# Patient Record
Sex: FEMALE | Race: WHITE | NOT HISPANIC OR LATINO | Employment: FULL TIME
[De-identification: names, ages, dates, MRNs, and addresses within clinical notes are randomized per-mention and may not be internally consistent; named-entity substitution may affect disease eponyms.]

---

## 2017-06-12 ENCOUNTER — RECORDS - HEALTHEAST (OUTPATIENT)
Dept: ADMINISTRATIVE | Facility: OTHER | Age: 47
End: 2017-06-12

## 2017-07-11 ENCOUNTER — RECORDS - HEALTHEAST (OUTPATIENT)
Dept: ADMINISTRATIVE | Facility: OTHER | Age: 47
End: 2017-07-11

## 2017-07-28 ENCOUNTER — COMMUNICATION - HEALTHEAST (OUTPATIENT)
Dept: FAMILY MEDICINE | Facility: CLINIC | Age: 47
End: 2017-07-28

## 2017-07-28 DIAGNOSIS — I77.810 ASCENDING AORTA DILATATION (H): ICD-10-CM

## 2017-08-08 ENCOUNTER — OFFICE VISIT - HEALTHEAST (OUTPATIENT)
Dept: CARDIOLOGY | Facility: CLINIC | Age: 47
End: 2017-08-08

## 2017-08-08 DIAGNOSIS — I49.3 PVC (PREMATURE VENTRICULAR CONTRACTION): ICD-10-CM

## 2017-08-08 DIAGNOSIS — I77.810 ASCENDING AORTA DILATATION (H): ICD-10-CM

## 2017-08-08 ASSESSMENT — MIFFLIN-ST. JEOR: SCORE: 1817.44

## 2018-01-16 ENCOUNTER — OFFICE VISIT - HEALTHEAST (OUTPATIENT)
Dept: FAMILY MEDICINE | Facility: CLINIC | Age: 48
End: 2018-01-16

## 2018-01-16 DIAGNOSIS — N92.1 MENORRHAGIA WITH IRREGULAR CYCLE: ICD-10-CM

## 2018-01-16 DIAGNOSIS — L30.9 DERMATITIS: ICD-10-CM

## 2018-01-16 DIAGNOSIS — G89.29 WRIST PAIN, CHRONIC, RIGHT: ICD-10-CM

## 2018-01-16 DIAGNOSIS — M25.531 WRIST PAIN, CHRONIC, RIGHT: ICD-10-CM

## 2018-01-16 ASSESSMENT — MIFFLIN-ST. JEOR: SCORE: 1853.73

## 2018-01-23 ENCOUNTER — RECORDS - HEALTHEAST (OUTPATIENT)
Dept: ADMINISTRATIVE | Facility: OTHER | Age: 48
End: 2018-01-23

## 2018-01-29 ENCOUNTER — COMMUNICATION - HEALTHEAST (OUTPATIENT)
Dept: FAMILY MEDICINE | Facility: CLINIC | Age: 48
End: 2018-01-29

## 2018-01-30 ENCOUNTER — HOSPITAL ENCOUNTER (OUTPATIENT)
Dept: PHYSICAL MEDICINE AND REHAB | Facility: CLINIC | Age: 48
Discharge: HOME | End: 2018-01-30
Attending: PHYSICAL MEDICINE & REHABILITATION

## 2018-01-30 DIAGNOSIS — M25.531 WRIST PAIN, CHRONIC, RIGHT: ICD-10-CM

## 2018-01-30 DIAGNOSIS — G89.29 WRIST PAIN, CHRONIC, RIGHT: ICD-10-CM

## 2018-02-20 ENCOUNTER — OFFICE VISIT - HEALTHEAST (OUTPATIENT)
Dept: FAMILY MEDICINE | Facility: CLINIC | Age: 48
End: 2018-02-20

## 2018-02-20 DIAGNOSIS — Z01.818 PREOP EXAMINATION: ICD-10-CM

## 2018-02-20 DIAGNOSIS — N92.1 MENORRHAGIA WITH IRREGULAR CYCLE: ICD-10-CM

## 2018-02-20 LAB
ANION GAP SERPL CALCULATED.3IONS-SCNC: 5 MMOL/L (ref 5–18)
BUN SERPL-MCNC: 8 MG/DL (ref 8–22)
CALCIUM SERPL-MCNC: 9 MG/DL (ref 8.5–10.5)
CHLORIDE BLD-SCNC: 105 MMOL/L (ref 98–107)
CO2 SERPL-SCNC: 31 MMOL/L (ref 22–31)
CREAT SERPL-MCNC: 0.77 MG/DL (ref 0.6–1.1)
GFR SERPL CREATININE-BSD FRML MDRD: >60 ML/MIN/1.73M2
GLUCOSE BLD-MCNC: 76 MG/DL (ref 70–125)
HCG UR QL: NEGATIVE
HGB BLD-MCNC: 12.6 G/DL (ref 12–16)
POTASSIUM BLD-SCNC: 4.2 MMOL/L (ref 3.5–5)
SODIUM SERPL-SCNC: 141 MMOL/L (ref 136–145)
SP GR UR STRIP: 1.02 (ref 1–1.03)

## 2018-02-20 ASSESSMENT — MIFFLIN-ST. JEOR: SCORE: 1861.66

## 2018-03-09 ASSESSMENT — MIFFLIN-ST. JEOR: SCORE: 1861.66

## 2018-03-12 ENCOUNTER — ANESTHESIA - HEALTHEAST (OUTPATIENT)
Dept: SURGERY | Facility: CLINIC | Age: 48
End: 2018-03-12

## 2018-03-13 ENCOUNTER — SURGERY - HEALTHEAST (OUTPATIENT)
Dept: SURGERY | Facility: CLINIC | Age: 48
End: 2018-03-13

## 2018-05-09 ENCOUNTER — COMMUNICATION - HEALTHEAST (OUTPATIENT)
Dept: ADMINISTRATIVE | Facility: CLINIC | Age: 48
End: 2018-05-09

## 2019-03-19 ENCOUNTER — HOSPITAL ENCOUNTER (OUTPATIENT)
Dept: ULTRASOUND IMAGING | Facility: CLINIC | Age: 49
Discharge: HOME | End: 2019-03-19

## 2019-03-19 ENCOUNTER — OFFICE VISIT - HEALTHEAST (OUTPATIENT)
Dept: FAMILY MEDICINE | Facility: CLINIC | Age: 49
End: 2019-03-19

## 2019-03-19 DIAGNOSIS — Z12.31 VISIT FOR SCREENING MAMMOGRAM: ICD-10-CM

## 2019-03-19 DIAGNOSIS — Z12.11 SCREEN FOR COLON CANCER: ICD-10-CM

## 2019-03-19 DIAGNOSIS — M79.661 PAIN OF RIGHT LOWER LEG: ICD-10-CM

## 2019-03-28 ENCOUNTER — OFFICE VISIT - HEALTHEAST (OUTPATIENT)
Dept: FAMILY MEDICINE | Facility: CLINIC | Age: 49
End: 2019-03-28

## 2019-03-28 DIAGNOSIS — J06.9 VIRAL UPPER RESPIRATORY TRACT INFECTION WITH COUGH: ICD-10-CM

## 2019-03-28 LAB — DEPRECATED S PYO AG THROAT QL EIA: NORMAL

## 2019-03-29 LAB — GROUP A STREP BY PCR: NORMAL

## 2019-05-07 ENCOUNTER — HOSPITAL ENCOUNTER (OUTPATIENT)
Dept: MAMMOGRAPHY | Facility: CLINIC | Age: 49
Discharge: HOME | End: 2019-05-07

## 2019-05-07 DIAGNOSIS — Z12.31 VISIT FOR SCREENING MAMMOGRAM: ICD-10-CM

## 2019-05-14 ENCOUNTER — HOSPITAL ENCOUNTER (OUTPATIENT)
Dept: ULTRASOUND IMAGING | Facility: CLINIC | Age: 49
Discharge: HOME | End: 2019-05-14

## 2019-05-14 ENCOUNTER — HOSPITAL ENCOUNTER (OUTPATIENT)
Dept: MAMMOGRAPHY | Facility: CLINIC | Age: 49
Discharge: HOME | End: 2019-05-14

## 2019-05-14 DIAGNOSIS — N64.89 BREAST ASYMMETRY: ICD-10-CM

## 2019-05-14 DIAGNOSIS — R92.0 MICROCALCIFICATIONS OF THE BREAST: ICD-10-CM

## 2019-05-22 ENCOUNTER — HOSPITAL ENCOUNTER (OUTPATIENT)
Dept: MAMMOGRAPHY | Facility: CLINIC | Age: 49
Discharge: HOME | End: 2019-05-22
Admitting: RADIOLOGY

## 2019-05-22 ENCOUNTER — HOSPITAL ENCOUNTER (OUTPATIENT)
Dept: MAMMOGRAPHY | Facility: CLINIC | Age: 49
Discharge: HOME | End: 2019-05-22

## 2019-05-22 DIAGNOSIS — N64.89 BREAST ASYMMETRY: ICD-10-CM

## 2019-05-22 DIAGNOSIS — R92.0 MICROCALCIFICATIONS OF THE BREAST: ICD-10-CM

## 2019-05-23 ENCOUNTER — COMMUNICATION - HEALTHEAST (OUTPATIENT)
Dept: MAMMOGRAPHY | Facility: CLINIC | Age: 49
End: 2019-05-23

## 2019-05-24 ENCOUNTER — COMMUNICATION - HEALTHEAST (OUTPATIENT)
Dept: ONCOLOGY | Facility: HOSPITAL | Age: 49
End: 2019-05-24

## 2019-05-24 LAB
LAB AP CHARGES (HE HISTORICAL CONVERSION): ABNORMAL
PATH REPORT.COMMENTS IMP SPEC: ABNORMAL
PATH REPORT.COMMENTS IMP SPEC: ABNORMAL
PATH REPORT.FINAL DX SPEC: ABNORMAL
PATH REPORT.GROSS SPEC: ABNORMAL
PATH REPORT.MICROSCOPIC SPEC OTHER STN: ABNORMAL
PATH REPORT.RELEVANT HX SPEC: ABNORMAL
RESULT FLAG (HE HISTORICAL CONVERSION): ABNORMAL

## 2019-05-27 ENCOUNTER — COMMUNICATION - HEALTHEAST (OUTPATIENT)
Dept: SCHEDULING | Facility: CLINIC | Age: 49
End: 2019-05-27

## 2019-05-28 ENCOUNTER — OFFICE VISIT - HEALTHEAST (OUTPATIENT)
Dept: FAMILY MEDICINE | Facility: CLINIC | Age: 49
End: 2019-05-28

## 2019-05-28 DIAGNOSIS — C50.911 MALIGNANT NEOPLASM OF RIGHT FEMALE BREAST, UNSPECIFIED ESTROGEN RECEPTOR STATUS, UNSPECIFIED SITE OF BREAST (H): ICD-10-CM

## 2019-05-30 ENCOUNTER — HOSPITAL ENCOUNTER (OUTPATIENT)
Dept: MAMMOGRAPHY | Facility: CLINIC | Age: 49
Discharge: HOME | End: 2019-05-30
Admitting: RADIOLOGY

## 2019-05-30 ENCOUNTER — HOSPITAL ENCOUNTER (OUTPATIENT)
Dept: MAMMOGRAPHY | Facility: CLINIC | Age: 49
Discharge: HOME | End: 2019-05-30
Attending: SURGERY

## 2019-05-30 ENCOUNTER — HOSPITAL ENCOUNTER (OUTPATIENT)
Dept: MAMMOGRAPHY | Facility: CLINIC | Age: 49
Discharge: HOME | End: 2019-05-30

## 2019-05-30 DIAGNOSIS — R92.1 BREAST CALCIFICATION, RIGHT: ICD-10-CM

## 2019-05-30 DIAGNOSIS — R92.0 BREAST MICROCALCIFICATIONS: ICD-10-CM

## 2019-06-03 ENCOUNTER — HOSPITAL ENCOUNTER (OUTPATIENT)
Dept: SURGERY | Facility: CLINIC | Age: 49
Discharge: HOME | End: 2019-06-03
Attending: SURGERY

## 2019-06-03 ENCOUNTER — AMBULATORY - HEALTHEAST (OUTPATIENT)
Dept: ONCOLOGY | Facility: CLINIC | Age: 49
End: 2019-06-03

## 2019-06-03 DIAGNOSIS — C50.911 MALIGNANT NEOPLASM OF RIGHT FEMALE BREAST, UNSPECIFIED ESTROGEN RECEPTOR STATUS, UNSPECIFIED SITE OF BREAST (H): ICD-10-CM

## 2019-06-03 DIAGNOSIS — D05.11 BREAST NEOPLASM, TIS (DCIS), RIGHT: ICD-10-CM

## 2019-06-03 ASSESSMENT — MIFFLIN-ST. JEOR: SCORE: 1837.85

## 2019-06-05 ENCOUNTER — RECORDS - HEALTHEAST (OUTPATIENT)
Dept: ADMINISTRATIVE | Facility: OTHER | Age: 49
End: 2019-06-05

## 2019-06-05 ENCOUNTER — OFFICE VISIT - HEALTHEAST (OUTPATIENT)
Dept: ONCOLOGY | Facility: HOSPITAL | Age: 49
End: 2019-06-05

## 2019-06-05 DIAGNOSIS — Z80.0 FAMILY HISTORY OF PANCREATIC CANCER: ICD-10-CM

## 2019-06-05 DIAGNOSIS — Z80.3 FAMILY HISTORY OF BREAST CANCER: ICD-10-CM

## 2019-06-05 DIAGNOSIS — Z80.0 FAMILY HISTORY OF COLON CANCER: ICD-10-CM

## 2019-06-05 DIAGNOSIS — C50.911 MALIGNANT NEOPLASM OF RIGHT FEMALE BREAST, UNSPECIFIED ESTROGEN RECEPTOR STATUS, UNSPECIFIED SITE OF BREAST (H): ICD-10-CM

## 2019-06-17 ENCOUNTER — COMMUNICATION - HEALTHEAST (OUTPATIENT)
Dept: ONCOLOGY | Facility: HOSPITAL | Age: 49
End: 2019-06-17

## 2019-06-19 ENCOUNTER — COMMUNICATION - HEALTHEAST (OUTPATIENT)
Dept: ADMINISTRATIVE | Facility: HOSPITAL | Age: 49
End: 2019-06-19

## 2019-06-20 ENCOUNTER — AMBULATORY - HEALTHEAST (OUTPATIENT)
Dept: SURGERY | Facility: CLINIC | Age: 49
End: 2019-06-20

## 2019-06-27 ENCOUNTER — COMMUNICATION - HEALTHEAST (OUTPATIENT)
Dept: ADMINISTRATIVE | Facility: HOSPITAL | Age: 49
End: 2019-06-27

## 2019-07-03 ENCOUNTER — OFFICE VISIT - HEALTHEAST (OUTPATIENT)
Dept: ONCOLOGY | Facility: HOSPITAL | Age: 49
End: 2019-07-03

## 2019-07-03 ENCOUNTER — COMMUNICATION - HEALTHEAST (OUTPATIENT)
Dept: ONCOLOGY | Facility: HOSPITAL | Age: 49
End: 2019-07-03

## 2019-07-03 DIAGNOSIS — Z80.0 FAMILY HISTORY OF PANCREATIC CANCER: ICD-10-CM

## 2019-07-03 DIAGNOSIS — Z80.0 FAMILY HISTORY OF COLON CANCER: ICD-10-CM

## 2019-07-03 DIAGNOSIS — C50.911 MALIGNANT NEOPLASM OF RIGHT FEMALE BREAST, UNSPECIFIED ESTROGEN RECEPTOR STATUS, UNSPECIFIED SITE OF BREAST (H): ICD-10-CM

## 2019-07-03 DIAGNOSIS — Z80.3 FAMILY HISTORY OF BREAST CANCER: ICD-10-CM

## 2019-07-18 ENCOUNTER — OFFICE VISIT - HEALTHEAST (OUTPATIENT)
Dept: FAMILY MEDICINE | Facility: CLINIC | Age: 49
End: 2019-07-18

## 2019-07-18 DIAGNOSIS — Z01.818 PREOP GENERAL PHYSICAL EXAM: ICD-10-CM

## 2019-07-18 DIAGNOSIS — C50.911 MALIGNANT NEOPLASM OF RIGHT FEMALE BREAST, UNSPECIFIED ESTROGEN RECEPTOR STATUS, UNSPECIFIED SITE OF BREAST (H): ICD-10-CM

## 2019-07-18 LAB
ALBUMIN SERPL-MCNC: 4 G/DL (ref 3.5–5)
ALP SERPL-CCNC: 66 U/L (ref 45–120)
ALT SERPL W P-5'-P-CCNC: 12 U/L (ref 0–45)
ANION GAP SERPL CALCULATED.3IONS-SCNC: 9 MMOL/L (ref 5–18)
APTT PPP: 27 SECONDS (ref 24–37)
AST SERPL W P-5'-P-CCNC: 12 U/L (ref 0–40)
BASOPHILS # BLD AUTO: 0 THOU/UL (ref 0–0.2)
BASOPHILS NFR BLD AUTO: 1 % (ref 0–2)
BILIRUB SERPL-MCNC: 0.3 MG/DL (ref 0–1)
BUN SERPL-MCNC: 9 MG/DL (ref 8–22)
CALCIUM SERPL-MCNC: 9.2 MG/DL (ref 8.5–10.5)
CHLORIDE BLD-SCNC: 106 MMOL/L (ref 98–107)
CO2 SERPL-SCNC: 24 MMOL/L (ref 22–31)
CREAT SERPL-MCNC: 0.84 MG/DL (ref 0.6–1.1)
EOSINOPHIL # BLD AUTO: 0.2 THOU/UL (ref 0–0.4)
EOSINOPHIL NFR BLD AUTO: 3 % (ref 0–6)
ERYTHROCYTE [DISTWIDTH] IN BLOOD BY AUTOMATED COUNT: 12.4 % (ref 11–14.5)
GFR SERPL CREATININE-BSD FRML MDRD: >60 ML/MIN/1.73M2
GLUCOSE BLD-MCNC: 79 MG/DL (ref 70–125)
HCT VFR BLD AUTO: 41 % (ref 35–47)
HGB BLD-MCNC: 13.7 G/DL (ref 12–16)
LYMPHOCYTES # BLD AUTO: 2.5 THOU/UL (ref 0.8–4.4)
LYMPHOCYTES NFR BLD AUTO: 36 % (ref 20–40)
MCH RBC QN AUTO: 30.1 PG (ref 27–34)
MCHC RBC AUTO-ENTMCNC: 33.3 G/DL (ref 32–36)
MCV RBC AUTO: 90 FL (ref 80–100)
MONOCYTES # BLD AUTO: 0.7 THOU/UL (ref 0–0.9)
MONOCYTES NFR BLD AUTO: 9 % (ref 2–10)
NEUTROPHILS # BLD AUTO: 3.5 THOU/UL (ref 2–7.7)
NEUTROPHILS NFR BLD AUTO: 51 % (ref 50–70)
PLATELET # BLD AUTO: 332 THOU/UL (ref 140–440)
PMV BLD AUTO: 8.1 FL (ref 7–10)
POTASSIUM BLD-SCNC: 4.4 MMOL/L (ref 3.5–5)
PROT SERPL-MCNC: 7 G/DL (ref 6–8)
RBC # BLD AUTO: 4.54 MILL/UL (ref 3.8–5.4)
SODIUM SERPL-SCNC: 139 MMOL/L (ref 136–145)
WBC: 6.9 THOU/UL (ref 4–11)

## 2019-07-18 ASSESSMENT — MIFFLIN-ST. JEOR: SCORE: 1839.55

## 2019-07-19 LAB
ATRIAL RATE - MUSE: 76 BPM
DIASTOLIC BLOOD PRESSURE - MUSE: NORMAL MMHG
INTERPRETATION ECG - MUSE: NORMAL
P AXIS - MUSE: 45 DEGREES
PR INTERVAL - MUSE: 178 MS
QRS DURATION - MUSE: 104 MS
QT - MUSE: 410 MS
QTC - MUSE: 461 MS
R AXIS - MUSE: -13 DEGREES
SYSTOLIC BLOOD PRESSURE - MUSE: NORMAL MMHG
T AXIS - MUSE: 27 DEGREES
VENTRICULAR RATE- MUSE: 76 BPM

## 2019-07-29 ENCOUNTER — ANESTHESIA - HEALTHEAST (OUTPATIENT)
Dept: SURGERY | Facility: HOSPITAL | Age: 49
End: 2019-07-29

## 2019-07-29 ASSESSMENT — MIFFLIN-ST. JEOR: SCORE: 1838.98

## 2019-07-30 ENCOUNTER — HOSPITAL ENCOUNTER (OUTPATIENT)
Dept: NUCLEAR MEDICINE | Facility: HOSPITAL | Age: 49
Discharge: HOME | End: 2019-07-30
Attending: SURGERY | Admitting: RADIOLOGY

## 2019-07-30 ENCOUNTER — SURGERY - HEALTHEAST (OUTPATIENT)
Dept: SURGERY | Facility: HOSPITAL | Age: 49
End: 2019-07-30

## 2019-07-30 ENCOUNTER — HOSPITAL ENCOUNTER (OUTPATIENT)
Dept: NUCLEAR MEDICINE | Facility: HOSPITAL | Age: 49
Discharge: HOME | End: 2019-07-30
Attending: SURGERY

## 2019-07-30 DIAGNOSIS — C50.911 MALIGNANT NEOPLASM OF RIGHT FEMALE BREAST, UNSPECIFIED ESTROGEN RECEPTOR STATUS, UNSPECIFIED SITE OF BREAST (H): ICD-10-CM

## 2019-07-30 DIAGNOSIS — D05.11 BREAST NEOPLASM, TIS (DCIS), RIGHT: ICD-10-CM

## 2019-07-30 ASSESSMENT — MIFFLIN-ST. JEOR: SCORE: 1838.98

## 2019-08-12 ENCOUNTER — COMMUNICATION - HEALTHEAST (OUTPATIENT)
Dept: FAMILY MEDICINE | Facility: CLINIC | Age: 49
End: 2019-08-12

## 2019-08-19 ENCOUNTER — HOSPITAL ENCOUNTER (OUTPATIENT)
Dept: SURGERY | Facility: CLINIC | Age: 49
Discharge: HOME | End: 2019-08-19
Attending: SURGERY

## 2019-08-19 DIAGNOSIS — D05.12 DUCTAL CARCINOMA IN SITU (DCIS) OF BOTH BREASTS: ICD-10-CM

## 2019-08-19 DIAGNOSIS — D05.11 DUCTAL CARCINOMA IN SITU (DCIS) OF BOTH BREASTS: ICD-10-CM

## 2019-08-19 ASSESSMENT — MIFFLIN-ST. JEOR: SCORE: 1740.88

## 2019-08-20 ENCOUNTER — COMMUNICATION - HEALTHEAST (OUTPATIENT)
Dept: ONCOLOGY | Facility: HOSPITAL | Age: 49
End: 2019-08-20

## 2019-08-29 ENCOUNTER — OFFICE VISIT - HEALTHEAST (OUTPATIENT)
Dept: SLEEP MEDICINE | Facility: CLINIC | Age: 49
End: 2019-08-29

## 2019-08-29 ENCOUNTER — COMMUNICATION - HEALTHEAST (OUTPATIENT)
Dept: TELEHEALTH | Facility: CLINIC | Age: 49
End: 2019-08-29

## 2019-08-29 DIAGNOSIS — R06.83 SNORING: ICD-10-CM

## 2019-08-29 DIAGNOSIS — R29.818 SUSPECTED SLEEP APNEA: ICD-10-CM

## 2019-08-29 DIAGNOSIS — G47.10 HYPERSOMNIA: ICD-10-CM

## 2019-08-29 DIAGNOSIS — R03.0 ELEVATED BLOOD PRESSURE READING: ICD-10-CM

## 2019-08-29 ASSESSMENT — MIFFLIN-ST. JEOR: SCORE: 1744.87

## 2019-09-04 ENCOUNTER — OFFICE VISIT - HEALTHEAST (OUTPATIENT)
Dept: FAMILY MEDICINE | Facility: CLINIC | Age: 49
End: 2019-09-04

## 2019-09-04 ENCOUNTER — OFFICE VISIT - HEALTHEAST (OUTPATIENT)
Dept: RADIATION ONCOLOGY | Facility: CLINIC | Age: 49
End: 2019-09-04

## 2019-09-04 ENCOUNTER — OFFICE VISIT - HEALTHEAST (OUTPATIENT)
Dept: ONCOLOGY | Facility: CLINIC | Age: 49
End: 2019-09-04

## 2019-09-04 ENCOUNTER — COMMUNICATION - HEALTHEAST (OUTPATIENT)
Dept: ONCOLOGY | Facility: CLINIC | Age: 49
End: 2019-09-04

## 2019-09-04 DIAGNOSIS — C50.911 MALIGNANT NEOPLASM OF RIGHT FEMALE BREAST, UNSPECIFIED ESTROGEN RECEPTOR STATUS, UNSPECIFIED SITE OF BREAST (H): ICD-10-CM

## 2019-09-04 DIAGNOSIS — D05.11 DUCTAL CARCINOMA IN SITU (DCIS) OF BOTH BREASTS: ICD-10-CM

## 2019-09-04 DIAGNOSIS — Z01.818 PRE-OP EXAM: ICD-10-CM

## 2019-09-04 DIAGNOSIS — D05.12 DUCTAL CARCINOMA IN SITU (DCIS) OF BOTH BREASTS: ICD-10-CM

## 2019-09-04 DIAGNOSIS — C50.911 INFILTRATING DUCTAL CARCINOMA OF RIGHT FEMALE BREAST (H): ICD-10-CM

## 2019-09-04 DIAGNOSIS — T85.79XS INFECTION OF BREAST IMPLANT, SEQUELA: ICD-10-CM

## 2019-09-04 LAB
ANION GAP SERPL CALCULATED.3IONS-SCNC: 9 MMOL/L (ref 5–18)
APTT PPP: 28 SECONDS (ref 24–37)
BUN SERPL-MCNC: 6 MG/DL (ref 8–22)
CALCIUM SERPL-MCNC: 9.5 MG/DL (ref 8.5–10.5)
CHLORIDE BLD-SCNC: 106 MMOL/L (ref 98–107)
CO2 SERPL-SCNC: 25 MMOL/L (ref 22–31)
CREAT SERPL-MCNC: 0.77 MG/DL (ref 0.6–1.1)
ERYTHROCYTE [DISTWIDTH] IN BLOOD BY AUTOMATED COUNT: 12 % (ref 11–14.5)
GFR SERPL CREATININE-BSD FRML MDRD: >60 ML/MIN/1.73M2
GLUCOSE BLD-MCNC: 71 MG/DL (ref 70–125)
HCT VFR BLD AUTO: 42.8 % (ref 35–47)
HGB BLD-MCNC: 14.3 G/DL (ref 12–16)
MCH RBC QN AUTO: 30.7 PG (ref 27–34)
MCHC RBC AUTO-ENTMCNC: 33.3 G/DL (ref 32–36)
MCV RBC AUTO: 92 FL (ref 80–100)
PLATELET # BLD AUTO: 365 THOU/UL (ref 140–440)
PMV BLD AUTO: 8.2 FL (ref 7–10)
POTASSIUM BLD-SCNC: 4.3 MMOL/L (ref 3.5–5)
RBC # BLD AUTO: 4.64 MILL/UL (ref 3.8–5.4)
SODIUM SERPL-SCNC: 140 MMOL/L (ref 136–145)
WBC: 4.4 THOU/UL (ref 4–11)

## 2019-09-04 ASSESSMENT — MIFFLIN-ST. JEOR
SCORE: 1824.25
SCORE: 1822.25

## 2019-09-09 ENCOUNTER — COMMUNICATION - HEALTHEAST (OUTPATIENT)
Dept: FAMILY MEDICINE | Facility: CLINIC | Age: 49
End: 2019-09-09

## 2019-09-10 ENCOUNTER — AMBULATORY - HEALTHEAST (OUTPATIENT)
Dept: RADIATION ONCOLOGY | Facility: CLINIC | Age: 49
End: 2019-09-10

## 2019-09-10 DIAGNOSIS — C50.911 INFILTRATING DUCTAL CARCINOMA OF RIGHT FEMALE BREAST (H): ICD-10-CM

## 2019-09-11 ENCOUNTER — COMMUNICATION - HEALTHEAST (OUTPATIENT)
Dept: ONCOLOGY | Facility: HOSPITAL | Age: 49
End: 2019-09-11

## 2019-09-11 ENCOUNTER — OFFICE VISIT - HEALTHEAST (OUTPATIENT)
Dept: INFECTIOUS DISEASES | Facility: CLINIC | Age: 49
End: 2019-09-11

## 2019-09-11 DIAGNOSIS — T81.40XA POSTOPERATIVE INFECTION, UNSPECIFIED TYPE, INITIAL ENCOUNTER: ICD-10-CM

## 2019-09-11 DIAGNOSIS — A49.02 MRSA (METHICILLIN RESISTANT STAPHYLOCOCCUS AUREUS) INFECTION: ICD-10-CM

## 2019-09-12 ENCOUNTER — OFFICE VISIT - HEALTHEAST (OUTPATIENT)
Dept: ONCOLOGY | Facility: CLINIC | Age: 49
End: 2019-09-12

## 2019-09-12 DIAGNOSIS — C50.911 MALIGNANT NEOPLASM OF RIGHT FEMALE BREAST, UNSPECIFIED ESTROGEN RECEPTOR STATUS, UNSPECIFIED SITE OF BREAST (H): ICD-10-CM

## 2019-09-19 ENCOUNTER — COMMUNICATION - HEALTHEAST (OUTPATIENT)
Dept: ONCOLOGY | Facility: HOSPITAL | Age: 49
End: 2019-09-19

## 2019-09-19 ENCOUNTER — HOSPITAL ENCOUNTER (OUTPATIENT)
Dept: PET IMAGING | Facility: HOSPITAL | Age: 49
Setting detail: RADIATION/ONCOLOGY SERIES
Discharge: STILL ADMITTED OR ADMINISTRATIVE | End: 2019-09-19
Attending: INTERNAL MEDICINE

## 2019-09-19 DIAGNOSIS — C50.911 MALIGNANT NEOPLASM OF RIGHT FEMALE BREAST, UNSPECIFIED ESTROGEN RECEPTOR STATUS, UNSPECIFIED SITE OF BREAST (H): ICD-10-CM

## 2019-09-19 LAB — GLUCOSE BLDC GLUCOMTR-MCNC: 77 MG/DL (ref 70–139)

## 2019-09-19 ASSESSMENT — MIFFLIN-ST. JEOR: SCORE: 1815.17

## 2019-09-25 ENCOUNTER — RECORDS - HEALTHEAST (OUTPATIENT)
Dept: ADMINISTRATIVE | Facility: OTHER | Age: 49
End: 2019-09-25

## 2019-09-26 ENCOUNTER — AMBULATORY - HEALTHEAST (OUTPATIENT)
Dept: RADIATION ONCOLOGY | Facility: HOSPITAL | Age: 49
End: 2019-09-26

## 2019-09-26 DIAGNOSIS — C50.911 INFILTRATING DUCTAL CARCINOMA OF RIGHT FEMALE BREAST (H): ICD-10-CM

## 2019-10-02 ENCOUNTER — OFFICE VISIT - HEALTHEAST (OUTPATIENT)
Dept: RADIATION ONCOLOGY | Facility: CLINIC | Age: 49
End: 2019-10-02

## 2019-10-02 ENCOUNTER — AMBULATORY - HEALTHEAST (OUTPATIENT)
Dept: RADIATION ONCOLOGY | Facility: CLINIC | Age: 49
End: 2019-10-02

## 2019-10-08 ENCOUNTER — COMMUNICATION - HEALTHEAST (OUTPATIENT)
Dept: RADIATION ONCOLOGY | Facility: CLINIC | Age: 49
End: 2019-10-08

## 2019-10-09 ENCOUNTER — COMMUNICATION - HEALTHEAST (OUTPATIENT)
Dept: ADMINISTRATIVE | Facility: CLINIC | Age: 49
End: 2019-10-09

## 2019-10-09 ENCOUNTER — AMBULATORY - HEALTHEAST (OUTPATIENT)
Dept: ONCOLOGY | Facility: CLINIC | Age: 49
End: 2019-10-09

## 2019-10-10 ENCOUNTER — AMBULATORY - HEALTHEAST (OUTPATIENT)
Dept: ONCOLOGY | Facility: CLINIC | Age: 49
End: 2019-10-10

## 2019-10-15 ENCOUNTER — AMBULATORY - HEALTHEAST (OUTPATIENT)
Dept: ONCOLOGY | Facility: CLINIC | Age: 49
End: 2019-10-15

## 2019-10-15 ENCOUNTER — OFFICE VISIT - HEALTHEAST (OUTPATIENT)
Dept: RADIATION ONCOLOGY | Facility: CLINIC | Age: 49
End: 2019-10-15

## 2019-10-15 DIAGNOSIS — C50.911 INFILTRATING DUCTAL CARCINOMA OF RIGHT FEMALE BREAST (H): ICD-10-CM

## 2019-10-21 ENCOUNTER — OFFICE VISIT - HEALTHEAST (OUTPATIENT)
Dept: FAMILY MEDICINE | Facility: CLINIC | Age: 49
End: 2019-10-21

## 2019-10-21 DIAGNOSIS — R21 RASH: ICD-10-CM

## 2019-10-22 ENCOUNTER — OFFICE VISIT - HEALTHEAST (OUTPATIENT)
Dept: RADIATION ONCOLOGY | Facility: CLINIC | Age: 49
End: 2019-10-22

## 2019-10-22 DIAGNOSIS — L58.9 RADIATION DERMATITIS: ICD-10-CM

## 2019-10-22 DIAGNOSIS — C50.919 BREAST CANCER (H): ICD-10-CM

## 2019-10-23 ENCOUNTER — COMMUNICATION - HEALTHEAST (OUTPATIENT)
Dept: FAMILY MEDICINE | Facility: CLINIC | Age: 49
End: 2019-10-23

## 2019-10-23 LAB — BACTERIA SPEC CULT: ABNORMAL

## 2019-10-29 ENCOUNTER — OFFICE VISIT - HEALTHEAST (OUTPATIENT)
Dept: RADIATION ONCOLOGY | Facility: CLINIC | Age: 49
End: 2019-10-29

## 2019-10-29 DIAGNOSIS — C50.911 INFILTRATING DUCTAL CARCINOMA OF RIGHT FEMALE BREAST (H): ICD-10-CM

## 2019-10-29 DIAGNOSIS — L58.9 RADIATION DERMATITIS: ICD-10-CM

## 2019-10-30 ENCOUNTER — OFFICE VISIT - HEALTHEAST (OUTPATIENT)
Dept: INFECTIOUS DISEASES | Facility: CLINIC | Age: 49
End: 2019-10-30

## 2019-10-30 DIAGNOSIS — Z22.322 MRSA CARRIER: ICD-10-CM

## 2019-10-31 ENCOUNTER — COMMUNICATION - HEALTHEAST (OUTPATIENT)
Dept: RADIATION ONCOLOGY | Facility: CLINIC | Age: 49
End: 2019-10-31

## 2019-11-05 ENCOUNTER — OFFICE VISIT - HEALTHEAST (OUTPATIENT)
Dept: RADIATION ONCOLOGY | Facility: CLINIC | Age: 49
End: 2019-11-05

## 2019-11-05 DIAGNOSIS — C50.911 INFILTRATING DUCTAL CARCINOMA OF RIGHT FEMALE BREAST (H): ICD-10-CM

## 2019-11-06 ENCOUNTER — AMBULATORY - HEALTHEAST (OUTPATIENT)
Dept: ONCOLOGY | Facility: CLINIC | Age: 49
End: 2019-11-06

## 2019-11-12 ENCOUNTER — OFFICE VISIT - HEALTHEAST (OUTPATIENT)
Dept: RADIATION ONCOLOGY | Facility: CLINIC | Age: 49
End: 2019-11-12

## 2019-11-12 ENCOUNTER — AMBULATORY - HEALTHEAST (OUTPATIENT)
Dept: ONCOLOGY | Facility: CLINIC | Age: 49
End: 2019-11-12

## 2019-11-12 DIAGNOSIS — C50.911 INFILTRATING DUCTAL CARCINOMA OF RIGHT FEMALE BREAST (H): ICD-10-CM

## 2019-11-14 ENCOUNTER — COMMUNICATION - HEALTHEAST (OUTPATIENT)
Dept: SLEEP MEDICINE | Facility: CLINIC | Age: 49
End: 2019-11-14

## 2019-11-14 DIAGNOSIS — R03.0 ELEVATED BLOOD PRESSURE READING: ICD-10-CM

## 2019-11-14 DIAGNOSIS — R29.818 SUSPECTED SLEEP APNEA: ICD-10-CM

## 2019-11-14 DIAGNOSIS — G47.10 HYPERSOMNIA: ICD-10-CM

## 2019-11-14 DIAGNOSIS — R06.83 SNORING: ICD-10-CM

## 2019-11-19 ENCOUNTER — OFFICE VISIT - HEALTHEAST (OUTPATIENT)
Dept: RADIATION ONCOLOGY | Facility: CLINIC | Age: 49
End: 2019-11-19

## 2019-11-19 DIAGNOSIS — C50.911 INFILTRATING DUCTAL CARCINOMA OF RIGHT FEMALE BREAST (H): ICD-10-CM

## 2019-11-25 ENCOUNTER — RECORDS - HEALTHEAST (OUTPATIENT)
Dept: ADMINISTRATIVE | Facility: OTHER | Age: 49
End: 2019-11-25

## 2019-11-25 ENCOUNTER — RECORDS - HEALTHEAST (OUTPATIENT)
Dept: SLEEP MEDICINE | Facility: CLINIC | Age: 49
End: 2019-11-25

## 2019-11-25 DIAGNOSIS — G47.10 HYPERSOMNIA, UNSPECIFIED: ICD-10-CM

## 2019-11-25 DIAGNOSIS — R29.818 OTHER SYMPTOMS AND SIGNS INVOLVING THE NERVOUS SYSTEM: ICD-10-CM

## 2019-11-25 DIAGNOSIS — R03.0 ELEVATED BLOOD-PRESSURE READING, WITHOUT DIAGNOSIS OF HYPERTENSION: ICD-10-CM

## 2019-11-25 DIAGNOSIS — R06.83 SNORING: ICD-10-CM

## 2019-11-26 ENCOUNTER — AMBULATORY - HEALTHEAST (OUTPATIENT)
Dept: ONCOLOGY | Facility: CLINIC | Age: 49
End: 2019-11-26

## 2019-11-26 ENCOUNTER — OFFICE VISIT - HEALTHEAST (OUTPATIENT)
Dept: RADIATION ONCOLOGY | Facility: CLINIC | Age: 49
End: 2019-11-26

## 2019-11-26 DIAGNOSIS — C50.911 INFILTRATING DUCTAL CARCINOMA OF RIGHT FEMALE BREAST (H): ICD-10-CM

## 2019-11-27 ENCOUNTER — COMMUNICATION - HEALTHEAST (OUTPATIENT)
Dept: SLEEP MEDICINE | Facility: CLINIC | Age: 49
End: 2019-11-27

## 2019-12-02 ENCOUNTER — AMBULATORY - HEALTHEAST (OUTPATIENT)
Dept: ONCOLOGY | Facility: CLINIC | Age: 49
End: 2019-12-02

## 2019-12-03 ENCOUNTER — OFFICE VISIT - HEALTHEAST (OUTPATIENT)
Dept: RADIATION ONCOLOGY | Facility: CLINIC | Age: 49
End: 2019-12-03

## 2019-12-03 ENCOUNTER — COMMUNICATION - HEALTHEAST (OUTPATIENT)
Dept: ADMINISTRATIVE | Facility: HOSPITAL | Age: 49
End: 2019-12-03

## 2019-12-03 ENCOUNTER — OFFICE VISIT - HEALTHEAST (OUTPATIENT)
Dept: SLEEP MEDICINE | Facility: CLINIC | Age: 49
End: 2019-12-03

## 2019-12-03 DIAGNOSIS — G47.33 OBSTRUCTIVE SLEEP APNEA: ICD-10-CM

## 2019-12-03 DIAGNOSIS — C50.911 INFILTRATING DUCTAL CARCINOMA OF RIGHT FEMALE BREAST (H): ICD-10-CM

## 2019-12-03 DIAGNOSIS — G47.8 SLEEP DYSFUNCTION WITH SLEEP STAGE DISTURBANCE: ICD-10-CM

## 2019-12-03 DIAGNOSIS — G47.10 HYPERSOMNIA: ICD-10-CM

## 2019-12-03 ASSESSMENT — MIFFLIN-ST. JEOR: SCORE: 1860.53

## 2019-12-05 ENCOUNTER — AMBULATORY - HEALTHEAST (OUTPATIENT)
Dept: RADIATION ONCOLOGY | Facility: CLINIC | Age: 49
End: 2019-12-05

## 2019-12-06 ENCOUNTER — COMMUNICATION - HEALTHEAST (OUTPATIENT)
Dept: FAMILY MEDICINE | Facility: CLINIC | Age: 49
End: 2019-12-06

## 2019-12-06 ENCOUNTER — AMBULATORY - HEALTHEAST (OUTPATIENT)
Dept: ONCOLOGY | Facility: CLINIC | Age: 49
End: 2019-12-06

## 2019-12-06 ENCOUNTER — AMBULATORY - HEALTHEAST (OUTPATIENT)
Dept: RADIATION ONCOLOGY | Facility: CLINIC | Age: 49
End: 2019-12-06

## 2019-12-18 ENCOUNTER — OFFICE VISIT - HEALTHEAST (OUTPATIENT)
Dept: RADIATION ONCOLOGY | Facility: CLINIC | Age: 49
End: 2019-12-18

## 2019-12-18 ENCOUNTER — COMMUNICATION - HEALTHEAST (OUTPATIENT)
Dept: ONCOLOGY | Facility: CLINIC | Age: 49
End: 2019-12-18

## 2019-12-18 DIAGNOSIS — C50.911 INFILTRATING DUCTAL CARCINOMA OF RIGHT FEMALE BREAST (H): ICD-10-CM

## 2019-12-19 ENCOUNTER — AMBULATORY - HEALTHEAST (OUTPATIENT)
Dept: ONCOLOGY | Facility: CLINIC | Age: 49
End: 2019-12-19

## 2019-12-19 ENCOUNTER — OFFICE VISIT - HEALTHEAST (OUTPATIENT)
Dept: ONCOLOGY | Facility: CLINIC | Age: 49
End: 2019-12-19

## 2019-12-19 DIAGNOSIS — C50.911 INFILTRATING DUCTAL CARCINOMA OF RIGHT FEMALE BREAST (H): ICD-10-CM

## 2019-12-19 DIAGNOSIS — Z13.820 SCREENING FOR OSTEOPOROSIS: ICD-10-CM

## 2019-12-24 ENCOUNTER — RECORDS - HEALTHEAST (OUTPATIENT)
Dept: BONE DENSITY | Facility: CLINIC | Age: 49
End: 2019-12-24

## 2019-12-24 ENCOUNTER — RECORDS - HEALTHEAST (OUTPATIENT)
Dept: ADMINISTRATIVE | Facility: OTHER | Age: 49
End: 2019-12-24

## 2019-12-24 DIAGNOSIS — Z13.820 ENCOUNTER FOR SCREENING FOR OSTEOPOROSIS: ICD-10-CM

## 2019-12-27 ENCOUNTER — COMMUNICATION - HEALTHEAST (OUTPATIENT)
Dept: ONCOLOGY | Facility: CLINIC | Age: 49
End: 2019-12-27

## 2020-01-03 ENCOUNTER — COMMUNICATION - HEALTHEAST (OUTPATIENT)
Dept: RADIATION ONCOLOGY | Facility: CLINIC | Age: 50
End: 2020-01-03

## 2020-01-08 ENCOUNTER — AMBULATORY - HEALTHEAST (OUTPATIENT)
Dept: ONCOLOGY | Facility: CLINIC | Age: 50
End: 2020-01-08

## 2020-01-08 DIAGNOSIS — M54.42 BILATERAL LOW BACK PAIN WITH LEFT-SIDED SCIATICA, UNSPECIFIED CHRONICITY: ICD-10-CM

## 2020-01-13 ENCOUNTER — HOSPITAL ENCOUNTER (OUTPATIENT)
Dept: RADIOLOGY | Facility: CLINIC | Age: 50
Discharge: HOME | End: 2020-01-13
Attending: INTERNAL MEDICINE

## 2020-01-13 DIAGNOSIS — M54.42 BILATERAL LOW BACK PAIN WITH LEFT-SIDED SCIATICA, UNSPECIFIED CHRONICITY: ICD-10-CM

## 2020-01-14 ENCOUNTER — COMMUNICATION - HEALTHEAST (OUTPATIENT)
Dept: ONCOLOGY | Facility: CLINIC | Age: 50
End: 2020-01-14

## 2020-01-15 ENCOUNTER — COMMUNICATION - HEALTHEAST (OUTPATIENT)
Dept: ONCOLOGY | Facility: CLINIC | Age: 50
End: 2020-01-15

## 2020-02-03 ENCOUNTER — COMMUNICATION - HEALTHEAST (OUTPATIENT)
Dept: ONCOLOGY | Facility: CLINIC | Age: 50
End: 2020-02-03

## 2020-02-03 DIAGNOSIS — M79.661 PAIN OF RIGHT LOWER LEG: ICD-10-CM

## 2020-02-10 ENCOUNTER — COMMUNICATION - HEALTHEAST (OUTPATIENT)
Dept: FAMILY MEDICINE | Facility: CLINIC | Age: 50
End: 2020-02-10

## 2020-02-13 ENCOUNTER — OFFICE VISIT - HEALTHEAST (OUTPATIENT)
Dept: PHYSICAL THERAPY | Facility: REHABILITATION | Age: 50
End: 2020-02-13

## 2020-02-13 DIAGNOSIS — M25.561 ARTHRALGIA OF RIGHT LOWER LEG: ICD-10-CM

## 2020-02-13 DIAGNOSIS — M62.81 GENERALIZED MUSCLE WEAKNESS: ICD-10-CM

## 2020-02-14 ENCOUNTER — COMMUNICATION - HEALTHEAST (OUTPATIENT)
Dept: ONCOLOGY | Facility: CLINIC | Age: 50
End: 2020-02-14

## 2020-02-14 DIAGNOSIS — C50.911 INFILTRATING DUCTAL CARCINOMA OF RIGHT FEMALE BREAST (H): ICD-10-CM

## 2020-02-20 ENCOUNTER — OFFICE VISIT - HEALTHEAST (OUTPATIENT)
Dept: PHYSICAL THERAPY | Facility: REHABILITATION | Age: 50
End: 2020-02-20

## 2020-02-20 DIAGNOSIS — M62.81 GENERALIZED MUSCLE WEAKNESS: ICD-10-CM

## 2020-02-20 DIAGNOSIS — M25.561 ARTHRALGIA OF RIGHT LOWER LEG: ICD-10-CM

## 2020-03-17 ENCOUNTER — COMMUNICATION - HEALTHEAST (OUTPATIENT)
Dept: ONCOLOGY | Facility: CLINIC | Age: 50
End: 2020-03-17

## 2020-03-18 ENCOUNTER — COMMUNICATION - HEALTHEAST (OUTPATIENT)
Dept: ADMINISTRATIVE | Facility: HOSPITAL | Age: 50
End: 2020-03-18

## 2020-04-01 ENCOUNTER — COMMUNICATION - HEALTHEAST (OUTPATIENT)
Dept: SCHEDULING | Facility: CLINIC | Age: 50
End: 2020-04-01

## 2020-04-07 ENCOUNTER — COMMUNICATION - HEALTHEAST (OUTPATIENT)
Dept: ONCOLOGY | Facility: CLINIC | Age: 50
End: 2020-04-07

## 2020-04-14 ENCOUNTER — OFFICE VISIT - HEALTHEAST (OUTPATIENT)
Dept: ONCOLOGY | Facility: CLINIC | Age: 50
End: 2020-04-14

## 2020-04-14 ENCOUNTER — AMBULATORY - HEALTHEAST (OUTPATIENT)
Dept: INFUSION THERAPY | Facility: CLINIC | Age: 50
End: 2020-04-14

## 2020-04-14 DIAGNOSIS — N95.1 MENOPAUSAL SYNDROME (HOT FLASHES): ICD-10-CM

## 2020-04-14 LAB
ESTRADIOL SERPL-MCNC: 12 PG/ML
FSH SERPL-ACNC: 27.4 MIU/ML

## 2020-04-20 ENCOUNTER — COMMUNICATION - HEALTHEAST (OUTPATIENT)
Dept: ONCOLOGY | Facility: CLINIC | Age: 50
End: 2020-04-20

## 2020-05-20 ENCOUNTER — COMMUNICATION - HEALTHEAST (OUTPATIENT)
Dept: ONCOLOGY | Facility: CLINIC | Age: 50
End: 2020-05-20

## 2020-05-21 ENCOUNTER — COMMUNICATION - HEALTHEAST (OUTPATIENT)
Dept: ONCOLOGY | Facility: CLINIC | Age: 50
End: 2020-05-21

## 2020-05-21 DIAGNOSIS — C50.911 MALIGNANT NEOPLASM OF RIGHT FEMALE BREAST, UNSPECIFIED ESTROGEN RECEPTOR STATUS, UNSPECIFIED SITE OF BREAST (H): ICD-10-CM

## 2020-06-11 ENCOUNTER — COMMUNICATION - HEALTHEAST (OUTPATIENT)
Dept: ONCOLOGY | Facility: CLINIC | Age: 50
End: 2020-06-11

## 2020-06-16 ENCOUNTER — OFFICE VISIT - HEALTHEAST (OUTPATIENT)
Dept: FAMILY MEDICINE | Facility: CLINIC | Age: 50
End: 2020-06-16

## 2020-06-16 DIAGNOSIS — L81.9 HYPOPIGMENTED SKIN LESION: ICD-10-CM

## 2020-06-16 DIAGNOSIS — D05.11 DUCTAL CARCINOMA IN SITU (DCIS) OF BOTH BREASTS: ICD-10-CM

## 2020-06-16 DIAGNOSIS — D05.12 DUCTAL CARCINOMA IN SITU (DCIS) OF BOTH BREASTS: ICD-10-CM

## 2020-06-16 DIAGNOSIS — N93.9 VAGINAL BLEEDING: ICD-10-CM

## 2020-06-17 ENCOUNTER — COMMUNICATION - HEALTHEAST (OUTPATIENT)
Dept: OTHER | Facility: CLINIC | Age: 50
End: 2020-06-17

## 2020-06-23 ENCOUNTER — AMBULATORY - HEALTHEAST (OUTPATIENT)
Dept: OTHER | Facility: CLINIC | Age: 50
End: 2020-06-23

## 2020-06-30 ENCOUNTER — COMMUNICATION - HEALTHEAST (OUTPATIENT)
Dept: OTHER | Facility: CLINIC | Age: 50
End: 2020-06-30

## 2020-07-04 ENCOUNTER — COMMUNICATION - HEALTHEAST (OUTPATIENT)
Dept: SCHEDULING | Facility: CLINIC | Age: 50
End: 2020-07-04

## 2020-07-06 ENCOUNTER — OFFICE VISIT - HEALTHEAST (OUTPATIENT)
Dept: FAMILY MEDICINE | Facility: CLINIC | Age: 50
End: 2020-07-06

## 2020-07-06 ENCOUNTER — COMMUNICATION - HEALTHEAST (OUTPATIENT)
Dept: TELEHEALTH | Facility: CLINIC | Age: 50
End: 2020-07-06

## 2020-07-06 DIAGNOSIS — R60.0 EDEMA OF EXTREMITIES: ICD-10-CM

## 2020-07-06 DIAGNOSIS — Z12.31 VISIT FOR SCREENING MAMMOGRAM: ICD-10-CM

## 2020-07-06 DIAGNOSIS — Z12.11 SCREEN FOR COLON CANCER: ICD-10-CM

## 2020-07-09 ENCOUNTER — AMBULATORY - HEALTHEAST (OUTPATIENT)
Dept: OTHER | Facility: CLINIC | Age: 50
End: 2020-07-09

## 2020-07-15 ENCOUNTER — AMBULATORY - HEALTHEAST (OUTPATIENT)
Dept: OTHER | Facility: CLINIC | Age: 50
End: 2020-07-15

## 2020-07-15 ENCOUNTER — RECORDS - HEALTHEAST (OUTPATIENT)
Dept: ADMINISTRATIVE | Facility: OTHER | Age: 50
End: 2020-07-15

## 2020-07-15 LAB — COLOGUARD-ABSTRACT: NEGATIVE

## 2020-07-21 ENCOUNTER — COMMUNICATION - HEALTHEAST (OUTPATIENT)
Dept: ONCOLOGY | Facility: CLINIC | Age: 50
End: 2020-07-21

## 2020-07-21 ENCOUNTER — RECORDS - HEALTHEAST (OUTPATIENT)
Dept: ADMINISTRATIVE | Facility: OTHER | Age: 50
End: 2020-07-21

## 2020-07-21 DIAGNOSIS — C50.911 MALIGNANT NEOPLASM OF RIGHT FEMALE BREAST, UNSPECIFIED ESTROGEN RECEPTOR STATUS, UNSPECIFIED SITE OF BREAST (H): ICD-10-CM

## 2020-07-28 ENCOUNTER — COMMUNICATION - HEALTHEAST (OUTPATIENT)
Dept: ADMINISTRATIVE | Facility: CLINIC | Age: 50
End: 2020-07-28

## 2020-08-04 ENCOUNTER — RECORDS - HEALTHEAST (OUTPATIENT)
Dept: HEALTH INFORMATION MANAGEMENT | Facility: CLINIC | Age: 50
End: 2020-08-04

## 2020-08-06 ENCOUNTER — COMMUNICATION - HEALTHEAST (OUTPATIENT)
Dept: FAMILY MEDICINE | Facility: CLINIC | Age: 50
End: 2020-08-06

## 2020-08-13 ENCOUNTER — OFFICE VISIT - HEALTHEAST (OUTPATIENT)
Dept: FAMILY MEDICINE | Facility: CLINIC | Age: 50
End: 2020-08-13

## 2020-08-13 DIAGNOSIS — R60.0 EDEMA OF LOWER EXTREMITY: ICD-10-CM

## 2020-08-13 DIAGNOSIS — M79.605 PAIN IN BOTH LOWER EXTREMITIES: ICD-10-CM

## 2020-08-13 DIAGNOSIS — M79.604 PAIN IN BOTH LOWER EXTREMITIES: ICD-10-CM

## 2020-08-18 ENCOUNTER — RECORDS - HEALTHEAST (OUTPATIENT)
Dept: ADMINISTRATIVE | Facility: OTHER | Age: 50
End: 2020-08-18

## 2020-08-28 ENCOUNTER — COMMUNICATION - HEALTHEAST (OUTPATIENT)
Dept: SCHEDULING | Facility: CLINIC | Age: 50
End: 2020-08-28

## 2020-08-28 ENCOUNTER — NURSE TRIAGE (OUTPATIENT)
Dept: NURSING | Facility: CLINIC | Age: 50
End: 2020-08-28

## 2020-08-28 NOTE — TELEPHONE ENCOUNTER
Call came in on the [place] Triage line, pt calling about her lab work, PCP MARIE Bell.      RN transferred the call to HE Triage.     Chanelle Burnett, BUTCH/SIXTO    Additional Information    Negative: RN needs further essential information from caller in order to complete triage    Negative: Requesting regular office appointment    Negative: [1] Caller requesting NON-URGENT health information AND [2] PCP's office is the best resource    Health Information question, no triage required and triager able to answer question    Protocols used: INFORMATION ONLY CALL-A-AH

## 2020-08-29 ENCOUNTER — COMMUNICATION - HEALTHEAST (OUTPATIENT)
Dept: FAMILY MEDICINE | Facility: CLINIC | Age: 50
End: 2020-08-29

## 2020-08-29 ENCOUNTER — COMMUNICATION - HEALTHEAST (OUTPATIENT)
Dept: SCHEDULING | Facility: CLINIC | Age: 50
End: 2020-08-29

## 2020-08-29 ENCOUNTER — OFFICE VISIT - HEALTHEAST (OUTPATIENT)
Dept: FAMILY MEDICINE | Facility: CLINIC | Age: 50
End: 2020-08-29

## 2020-08-29 ENCOUNTER — HOSPITAL ENCOUNTER (OUTPATIENT)
Dept: ULTRASOUND IMAGING | Facility: CLINIC | Age: 50
Discharge: HOME | End: 2020-08-29
Attending: FAMILY MEDICINE

## 2020-08-29 DIAGNOSIS — R60.0 BILATERAL EDEMA OF LOWER EXTREMITY: ICD-10-CM

## 2020-08-29 DIAGNOSIS — R79.89 ELEVATED TSH: ICD-10-CM

## 2020-08-29 DIAGNOSIS — Z91.041 CONTRAST MEDIA ALLERGY: ICD-10-CM

## 2020-08-29 DIAGNOSIS — R79.89 ELEVATED D-DIMER: ICD-10-CM

## 2020-08-29 DIAGNOSIS — R07.89 ATYPICAL CHEST PAIN: ICD-10-CM

## 2020-08-29 DIAGNOSIS — R06.09 EXERTIONAL DYSPNEA: ICD-10-CM

## 2020-08-29 LAB
ANION GAP SERPL CALCULATED.3IONS-SCNC: 9 MMOL/L (ref 5–18)
BASOPHILS # BLD AUTO: 0 THOU/UL (ref 0–0.2)
BASOPHILS NFR BLD AUTO: 0 % (ref 0–2)
BNP SERPL-MCNC: 82 PG/ML (ref 0–76)
BUN SERPL-MCNC: 9 MG/DL (ref 8–22)
CALCIUM SERPL-MCNC: 9.2 MG/DL (ref 8.5–10.5)
CHLORIDE BLD-SCNC: 105 MMOL/L (ref 98–107)
CO2 SERPL-SCNC: 26 MMOL/L (ref 22–31)
CREAT SERPL-MCNC: 0.86 MG/DL (ref 0.6–1.1)
D DIMER PPP FEU-MCNC: 0.48 FEU UG/ML
EOSINOPHIL # BLD AUTO: 0.2 THOU/UL (ref 0–0.4)
EOSINOPHIL NFR BLD AUTO: 5 % (ref 0–6)
ERYTHROCYTE [DISTWIDTH] IN BLOOD BY AUTOMATED COUNT: 11.7 % (ref 11–14.5)
GFR SERPL CREATININE-BSD FRML MDRD: >60 ML/MIN/1.73M2
GLUCOSE BLD-MCNC: 70 MG/DL (ref 70–125)
HCT VFR BLD AUTO: 40.9 % (ref 35–47)
HGB BLD-MCNC: 13.9 G/DL (ref 12–16)
LYMPHOCYTES # BLD AUTO: 1.1 THOU/UL (ref 0.8–4.4)
LYMPHOCYTES NFR BLD AUTO: 28 % (ref 20–40)
MCH RBC QN AUTO: 31.7 PG (ref 27–34)
MCHC RBC AUTO-ENTMCNC: 34.1 G/DL (ref 32–36)
MCV RBC AUTO: 93 FL (ref 80–100)
MONOCYTES # BLD AUTO: 0.4 THOU/UL (ref 0–0.9)
MONOCYTES NFR BLD AUTO: 10 % (ref 2–10)
NEUTROPHILS # BLD AUTO: 2.3 THOU/UL (ref 2–7.7)
NEUTROPHILS NFR BLD AUTO: 57 % (ref 50–70)
PLATELET # BLD AUTO: 286 THOU/UL (ref 140–440)
PMV BLD AUTO: 7.8 FL (ref 7–10)
POTASSIUM BLD-SCNC: 4.7 MMOL/L (ref 3.5–5)
RBC # BLD AUTO: 4.39 MILL/UL (ref 3.8–5.4)
SODIUM SERPL-SCNC: 140 MMOL/L (ref 136–145)
T4 FREE SERPL-MCNC: 0.9 NG/DL (ref 0.7–1.8)
TROPONIN I SERPL-MCNC: <0.01 NG/ML (ref 0–0.29)
TSH SERPL DL<=0.005 MIU/L-ACNC: 3.88 UIU/ML (ref 0.3–5)
WBC: 4 THOU/UL (ref 4–11)

## 2020-08-30 ENCOUNTER — HOSPITAL ENCOUNTER (OUTPATIENT)
Dept: CT IMAGING | Facility: CLINIC | Age: 50
Discharge: HOME | End: 2020-08-30
Attending: FAMILY MEDICINE

## 2020-08-30 ENCOUNTER — COMMUNICATION - HEALTHEAST (OUTPATIENT)
Dept: FAMILY MEDICINE | Facility: CLINIC | Age: 50
End: 2020-08-30

## 2020-08-30 DIAGNOSIS — R07.89 ATYPICAL CHEST PAIN: ICD-10-CM

## 2020-08-30 DIAGNOSIS — R06.09 EXERTIONAL DYSPNEA: ICD-10-CM

## 2020-08-30 DIAGNOSIS — R79.89 ELEVATED D-DIMER: ICD-10-CM

## 2020-08-31 ENCOUNTER — COMMUNICATION - HEALTHEAST (OUTPATIENT)
Dept: FAMILY MEDICINE | Facility: CLINIC | Age: 50
End: 2020-08-31

## 2020-08-31 DIAGNOSIS — R60.0 EDEMA OF LOWER EXTREMITY: ICD-10-CM

## 2020-08-31 DIAGNOSIS — R06.02 SHORTNESS OF BREATH: ICD-10-CM

## 2020-08-31 DIAGNOSIS — C50.911 INFILTRATING DUCTAL CARCINOMA OF RIGHT FEMALE BREAST (H): ICD-10-CM

## 2020-09-03 ENCOUNTER — COMMUNICATION - HEALTHEAST (OUTPATIENT)
Dept: FAMILY MEDICINE | Facility: CLINIC | Age: 50
End: 2020-09-03

## 2020-09-04 ENCOUNTER — COMMUNICATION - HEALTHEAST (OUTPATIENT)
Dept: PULMONOLOGY | Facility: OTHER | Age: 50
End: 2020-09-04

## 2020-09-17 ENCOUNTER — COMMUNICATION - HEALTHEAST (OUTPATIENT)
Dept: FAMILY MEDICINE | Facility: CLINIC | Age: 50
End: 2020-09-17

## 2020-09-18 ENCOUNTER — AMBULATORY - HEALTHEAST (OUTPATIENT)
Dept: PULMONOLOGY | Facility: OTHER | Age: 50
End: 2020-09-18

## 2020-09-18 ENCOUNTER — OFFICE VISIT - HEALTHEAST (OUTPATIENT)
Dept: PULMONOLOGY | Facility: OTHER | Age: 50
End: 2020-09-18

## 2020-09-18 ENCOUNTER — HOSPITAL ENCOUNTER (OUTPATIENT)
Dept: RESPIRATORY THERAPY | Facility: CLINIC | Age: 50
Discharge: HOME | End: 2020-09-18

## 2020-09-18 DIAGNOSIS — E66.812 CLASS 2 OBESITY DUE TO EXCESS CALORIES WITH BODY MASS INDEX (BMI) OF 39.0 TO 39.9 IN ADULT, UNSPECIFIED WHETHER SERIOUS COMORBIDITY PRESENT: ICD-10-CM

## 2020-09-18 DIAGNOSIS — G47.33 OBSTRUCTIVE SLEEP APNEA SYNDROME: ICD-10-CM

## 2020-09-18 DIAGNOSIS — R06.09 DOE (DYSPNEA ON EXERTION): ICD-10-CM

## 2020-09-18 DIAGNOSIS — E66.09 CLASS 2 OBESITY DUE TO EXCESS CALORIES WITH BODY MASS INDEX (BMI) OF 39.0 TO 39.9 IN ADULT, UNSPECIFIED WHETHER SERIOUS COMORBIDITY PRESENT: ICD-10-CM

## 2020-09-18 DIAGNOSIS — R53.81 PHYSICAL DECONDITIONING: ICD-10-CM

## 2020-09-18 DIAGNOSIS — R06.00 DYSPNEA, UNSPECIFIED TYPE: ICD-10-CM

## 2020-09-18 ASSESSMENT — MIFFLIN-ST. JEOR: SCORE: 1928.57

## 2020-10-12 ENCOUNTER — COMMUNICATION - HEALTHEAST (OUTPATIENT)
Dept: FAMILY MEDICINE | Facility: CLINIC | Age: 50
End: 2020-10-12

## 2020-10-13 ENCOUNTER — OFFICE VISIT - HEALTHEAST (OUTPATIENT)
Dept: ONCOLOGY | Facility: CLINIC | Age: 50
End: 2020-10-13

## 2020-10-13 DIAGNOSIS — D05.12 DUCTAL CARCINOMA IN SITU (DCIS) OF BOTH BREASTS: ICD-10-CM

## 2020-10-13 DIAGNOSIS — C50.911 MALIGNANT NEOPLASM OF RIGHT FEMALE BREAST, UNSPECIFIED ESTROGEN RECEPTOR STATUS, UNSPECIFIED SITE OF BREAST (H): ICD-10-CM

## 2020-10-13 DIAGNOSIS — D05.11 DUCTAL CARCINOMA IN SITU (DCIS) OF BOTH BREASTS: ICD-10-CM

## 2020-10-13 DIAGNOSIS — C50.911 INFILTRATING DUCTAL CARCINOMA OF RIGHT FEMALE BREAST (H): ICD-10-CM

## 2020-10-14 ENCOUNTER — COMMUNICATION - HEALTHEAST (OUTPATIENT)
Dept: ONCOLOGY | Facility: CLINIC | Age: 50
End: 2020-10-14

## 2020-10-23 ENCOUNTER — COMMUNICATION - HEALTHEAST (OUTPATIENT)
Dept: ONCOLOGY | Facility: HOSPITAL | Age: 50
End: 2020-10-23

## 2020-10-23 ENCOUNTER — COMMUNICATION - HEALTHEAST (OUTPATIENT)
Dept: ADMINISTRATIVE | Facility: CLINIC | Age: 50
End: 2020-10-23

## 2020-10-23 ENCOUNTER — COMMUNICATION - HEALTHEAST (OUTPATIENT)
Dept: FAMILY MEDICINE | Facility: CLINIC | Age: 50
End: 2020-10-23

## 2020-10-29 ENCOUNTER — OFFICE VISIT - HEALTHEAST (OUTPATIENT)
Dept: OBGYN | Facility: CLINIC | Age: 50
End: 2020-10-29

## 2020-10-29 DIAGNOSIS — N95.0 PMB (POSTMENOPAUSAL BLEEDING): ICD-10-CM

## 2020-11-02 ENCOUNTER — COMMUNICATION - HEALTHEAST (OUTPATIENT)
Dept: SCHEDULING | Facility: CLINIC | Age: 50
End: 2020-11-02

## 2020-11-03 ENCOUNTER — OFFICE VISIT - HEALTHEAST (OUTPATIENT)
Dept: FAMILY MEDICINE | Facility: CLINIC | Age: 50
End: 2020-11-03

## 2020-11-03 ENCOUNTER — COMMUNICATION - HEALTHEAST (OUTPATIENT)
Dept: FAMILY MEDICINE | Facility: CLINIC | Age: 50
End: 2020-11-03

## 2020-11-03 DIAGNOSIS — H61.22 IMPACTED CERUMEN OF LEFT EAR: ICD-10-CM

## 2020-11-19 ENCOUNTER — COMMUNICATION - HEALTHEAST (OUTPATIENT)
Dept: FAMILY MEDICINE | Facility: CLINIC | Age: 50
End: 2020-11-19

## 2020-11-19 DIAGNOSIS — R60.0 EDEMA OF LOWER EXTREMITY: ICD-10-CM

## 2020-11-19 DIAGNOSIS — M79.605 PAIN IN BOTH LOWER EXTREMITIES: ICD-10-CM

## 2020-11-19 DIAGNOSIS — M79.604 PAIN IN BOTH LOWER EXTREMITIES: ICD-10-CM

## 2020-11-23 ENCOUNTER — RECORDS - HEALTHEAST (OUTPATIENT)
Dept: ADMINISTRATIVE | Facility: OTHER | Age: 50
End: 2020-11-23

## 2020-12-07 ENCOUNTER — HOSPITAL ENCOUNTER (OUTPATIENT)
Dept: ULTRASOUND IMAGING | Facility: CLINIC | Age: 50
Discharge: HOME | End: 2020-12-07
Attending: PLASTIC SURGERY

## 2020-12-07 DIAGNOSIS — Z85.3 PERSONAL HISTORY OF MALIGNANT NEOPLASM OF BREAST: ICD-10-CM

## 2020-12-19 ENCOUNTER — VIRTUAL VISIT (OUTPATIENT)
Dept: FAMILY MEDICINE | Facility: OTHER | Age: 50
End: 2020-12-19

## 2020-12-19 NOTE — PROGRESS NOTES
"Date: 2020 17:50:41  Clinician: Garfield Tidwell  Clinician NPI: [id number]  Patient: Sarah Gilmore  Patient : 1970  Patient Address: [address] [place] [id number]  Patient Phone: [phone]  Visit Protocol: URI  Patient Summary:  Sarah is a 50 year old ( : 1970 ) female who initiated a [facility] Visit for COVID-19 (Coronavirus) evaluation and screening. When asked the question \"Please sign me up to receive news, health information and promotions. \", Sarah responded \"No\".    Sarah states her symptoms started 1-2 days ago.   Her symptoms consist of diarrhea, myalgia, ear pain, a headache, nasal congestion, chills, malaise, and rhinitis. She is experiencing mild difficulty breathing with activities but can speak normally in full sentences. Sarah also feels feverish.   Symptom details     Nasal secretions: The color of her mucus is clear.    Temperature: Her current temperature is 100.2 degrees Fahrenheit. Sarah has had a temperature over 100 degrees Fahrenheit for 1-2 days.     Headache: She states the headache is moderate (4-6 on a 10 point pain scale).      Sarah denies having facial pain or pressure, anosmia, wheezing, cough, nausea, sore throat, teeth pain, ageusia, and vomiting. She also denies having a sinus infection within the past year, having recent facial or sinus surgery in the past 60 days, and taking antibiotic medication in the past month.   Precipitating events  She has not recently been exposed to someone with influenza. Sarah has not been in close contact with any high risk individuals.   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 In the past 14 days, Sarah has worked or volunteered at a healthcare facility or a group living setting. 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   Sarah has not had a close contact with a laboratory-confirmed COVID-19 patient within 14 days of symptom onset.    Sarah has not been tested for COVID-19.   Pertinent medical history  She has not been told by her provider to avoid NSAIDs.   Sarah does not get yeast infections when she takes antibiotics.   Sarah does not have diabetes. She denies having immunosuppressive conditions (e.g., chemotherapy, HIV, organ transplant, long-term use of steroids or other immunosuppressive medications, splenectomy). She denies having congestive heart failure and severe COPD. She does not have asthma.   Sarah needs a return to work/school note.   Sarah does not smoke or use smokeless tobacco.   Additional information as reported by the patient (free text): Breast cancer history. Wouldn't allow me to add my meds   Weight: 260 lbs    MEDICATIONS: No current medications, ALLERGIES: NKDA  Clinician Response:  Dear Sarah,   Your symptoms show that you may have coronavirus (COVID-19). This illness can cause fever, cough and trouble breathing. Many people get a mild case and get better on their own. Some people can get very sick.  Based on the symptoms you have shared, I would like you to be re-checked in 2 to 3 days. Please call your family clinic to set up a video or phone visit.  Will I be tested for COVID-19?  We would like to test you for this virus.   Please call [phone] to schedule your visit. Explain that you were referred by [facility] to have a COVID-19 test. Be ready to share your [facility] visit ID number.   * If you need to schedule in [place] or [facility] please call [phone] or for [employer] employees please call [phone].    The following will serve as your written order for this COVID Test, ordered by me, for the indication of suspected COVID [Z20.828]: The test will be ordered in [employer], our electronic health record, after you are scheduled. It will show as ordered and  "authorized by Alex Khan MD.  Order: COVID-19 (Coronavirus) PCR for SYMPTOMATIC testing from [facility].   1.When it's time for your COVID test:   Stay at least 6 feet away from others. (If someone will drive you to your test, stay in the backseat, as far away from the  as you can.)   Cover your mouth and nose with a mask, tissue or washcloth.  Go straight to the testing site. Don't make any stops on the way there or back.      2.Starting now: Stay home and away from others (self-isolate) until:   You've had no fever---and no medicine that reduces fever---for one full day (24 hours). And...   Your other symptoms have gotten better. For example, your cough or breathing has improved. And...   At least 10 days have passed since your symptoms started.       During this time, don't leave the house except for testing or medical care.   Stay in your own room, even for meals. Use your own bathroom if you can.   Stay away from others in your home. No hugging, kissing or shaking hands. No visitors.  Don't go to work, school or anywhere else.    Clean \"high touch\" surfaces often (doorknobs, counters, handles, etc.). Use a household cleaning spray or wipes. You'll find a full list of  on the EPA website: www.epa.gov/pesticide-registration/list-n-disinfectants-use-against-sars-cov-2.   Cover your mouth and nose with a mask, tissue or washcloth to avoid spreading germs.  Wash your hands and face often. Use soap and water.  Caregivers in these groups are at risk for severe illness due to COVID-19:  o People 65 years and older  o People who live in a nursing home or long-term care facility  o People with chronic disease (lung, heart, cancer, diabetes, kidney, liver, immunologic)   o People who have a weakened immune system, including those who:   Are in cancer treatment  Take medicine that weakens the immune system, such as corticosteroids  Had a bone marrow or organ transplant  Have an immune deficiency  Have poorly " controlled HIV or AIDS  Are obese (body mass index of 40 or higher)  Smoke regularly   o Caregivers should wear gloves while washing dishes, handling laundry and cleaning bedrooms and bathrooms.  o Use caution when washing and drying laundry: Don't shake dirty laundry, and use the warmest water setting that you can.  o For more tips, go to www.cdc.gov/coronavirus/2019-ncov/downloads/10Things.pdf.      How can I take care of myself?   Get lots of rest. Drink extra fluids (unless a doctor has told you not to)   Take Tylenol (acetaminophen) for fever or pain. If you have liver or kidney problems, ask your family doctor if it's okay to take Tylenol.   Adults can take either:    650 mg (two 325 mg pills) every 4 to 6 hours, or...   1,000 mg (two 500 mg pills) every 8 hours as needed.    Note: Don't take more than 3,000 mg in one day. Acetaminophen is found in many medicines (both prescribed and over-the-counter medicines). Read all labels to be sure you don't take too much.   For children, check the Tylenol bottle for the right dose. The dose is based on the child's age or weight.    If you have other health problems (like cancer, heart failure, an organ transplant or severe kidney disease): Call your specialty clinic if you don't feel better in the next 2 days.       Know when to call 911. Emergency warning signs include:    Trouble breathing or shortness of breath Pain or pressure in the chest that doesn't go away Feeling confused like you haven't felt before, or not being able to wake up Bluish-colored lips or face  Where can I get more information?    [facility] -- About COVID-19: [email or web address]."[email or web address]/   CDC -- What to Do If You're Sick: www.cdc.gov/coronavirus/2019-ncov/about/steps-when-sick.html   CDC -- Ending Home Isolation: www.cdc.gov/coronavirus/2019-ncov/hcp/disposition-in-home-patients.html   CDC -- Caring for Someone: www.cdc.gov/coronavirus/2019-ncov/if-you-are-sick/care-for-someone.html    [facility] -- Interim Guidance for Hospital Discharge to Home: www.health.[facility].mn.us/diseases/coronavirus/hcp/hospdischarge.pdf   [facility] clinical trials (COVID-19 research studies): clinicalaffairs.[facility]/n-clinical-trials    Below are the COVID-19 hotlines at the [place] Department of Health ([facility]). Interpreters are available.    For health questions: Call 405-101-3742 or 1-918.402.2202 (7 a.m. to 7 p.m.) For questions about schools and childcare: Call 254-121-4580 or 1-574.712.8136 (7 a.m. to 7 p.m.)       Diagnosis: Other malaise  Diagnosis ICD: R53.81

## 2020-12-20 ENCOUNTER — AMBULATORY - HEALTHEAST (OUTPATIENT)
Dept: FAMILY MEDICINE | Facility: CLINIC | Age: 50
End: 2020-12-20

## 2020-12-20 DIAGNOSIS — Z20.822 SUSPECTED COVID-19 VIRUS INFECTION: ICD-10-CM

## 2020-12-21 LAB
SARS-COV-2 PCR COMMENT: NORMAL
SARS-COV-2 RNA SPEC QL NAA+PROBE: NEGATIVE
SARS-COV-2 VIRUS SPECIMEN SOURCE: NORMAL

## 2020-12-23 ENCOUNTER — COMMUNICATION - HEALTHEAST (OUTPATIENT)
Dept: SCHEDULING | Facility: CLINIC | Age: 50
End: 2020-12-23

## 2021-02-09 ENCOUNTER — OFFICE VISIT - HEALTHEAST (OUTPATIENT)
Dept: FAMILY MEDICINE | Facility: CLINIC | Age: 51
End: 2021-02-09

## 2021-02-09 DIAGNOSIS — C50.911 MALIGNANT NEOPLASM OF RIGHT FEMALE BREAST, UNSPECIFIED ESTROGEN RECEPTOR STATUS, UNSPECIFIED SITE OF BREAST (H): ICD-10-CM

## 2021-02-09 DIAGNOSIS — Z01.818 PREOP GENERAL PHYSICAL EXAM: ICD-10-CM

## 2021-02-09 LAB
ANION GAP SERPL CALCULATED.3IONS-SCNC: 7 MMOL/L (ref 5–18)
BUN SERPL-MCNC: 13 MG/DL (ref 8–22)
CALCIUM SERPL-MCNC: 8.9 MG/DL (ref 8.5–10.5)
CHLORIDE BLD-SCNC: 106 MMOL/L (ref 98–107)
CO2 SERPL-SCNC: 28 MMOL/L (ref 22–31)
CREAT SERPL-MCNC: 0.83 MG/DL (ref 0.6–1.1)
GFR SERPL CREATININE-BSD FRML MDRD: >60 ML/MIN/1.73M2
GLUCOSE BLD-MCNC: 75 MG/DL (ref 70–125)
HGB BLD-MCNC: 13.4 G/DL (ref 12–16)
INR PPP: 1.06 (ref 0.9–1.1)
POTASSIUM BLD-SCNC: 4.3 MMOL/L (ref 3.5–5)
SODIUM SERPL-SCNC: 141 MMOL/L (ref 136–145)

## 2021-02-19 ENCOUNTER — COMMUNICATION - HEALTHEAST (OUTPATIENT)
Dept: ADMINISTRATIVE | Facility: CLINIC | Age: 51
End: 2021-02-19

## 2021-02-19 DIAGNOSIS — R60.9 LIPEDEMA: ICD-10-CM

## 2021-02-25 ENCOUNTER — COMMUNICATION - HEALTHEAST (OUTPATIENT)
Dept: ADMINISTRATIVE | Facility: CLINIC | Age: 51
End: 2021-02-25

## 2021-03-03 DIAGNOSIS — G47.33 OBSTRUCTIVE SLEEP APNEA (ADULT) (PEDIATRIC): Primary | ICD-10-CM

## 2021-03-05 ENCOUNTER — COMMUNICATION - HEALTHEAST (OUTPATIENT)
Dept: ONCOLOGY | Facility: CLINIC | Age: 51
End: 2021-03-05

## 2021-03-08 ENCOUNTER — COMMUNICATION - HEALTHEAST (OUTPATIENT)
Dept: ONCOLOGY | Facility: HOSPITAL | Age: 51
End: 2021-03-08

## 2021-03-13 ENCOUNTER — RECORDS - HEALTHEAST (OUTPATIENT)
Dept: ADMINISTRATIVE | Facility: OTHER | Age: 51
End: 2021-03-13

## 2021-03-25 ENCOUNTER — TELEPHONE (OUTPATIENT)
Dept: SLEEP MEDICINE | Facility: CLINIC | Age: 51
End: 2021-03-25

## 2021-03-25 NOTE — TELEPHONE ENCOUNTER
Reason for call:  Other   Patient called regarding (reason for call): call back  Additional comments: Request for CPAP was on 3/3/21 and patient hasn't heard back. Was seen at the [facility].    Phone number to reach patient:  Home number on file [phone] (home)    Best Time:  Anytime    Can we leave a detailed message on this number?  YES    Travel screening: N/A

## 2021-04-08 NOTE — TELEPHONE ENCOUNTER
Patient will need to schedule a follow up appointment to discuss DME orders. LVM for patient to contact sleep clinic to schedule.    Peri Lockett MA

## 2021-04-12 ENCOUNTER — COMMUNICATION - HEALTHEAST (OUTPATIENT)
Dept: ONCOLOGY | Facility: CLINIC | Age: 51
End: 2021-04-12

## 2021-04-15 ENCOUNTER — OFFICE VISIT - HEALTHEAST (OUTPATIENT)
Dept: ONCOLOGY | Facility: CLINIC | Age: 51
End: 2021-04-15

## 2021-04-15 DIAGNOSIS — C50.911 INFILTRATING DUCTAL CARCINOMA OF RIGHT FEMALE BREAST (H): ICD-10-CM

## 2021-04-15 DIAGNOSIS — D05.12 DUCTAL CARCINOMA IN SITU (DCIS) OF BOTH BREASTS: ICD-10-CM

## 2021-04-15 DIAGNOSIS — C50.911 MALIGNANT NEOPLASM OF RIGHT FEMALE BREAST, UNSPECIFIED ESTROGEN RECEPTOR STATUS, UNSPECIFIED SITE OF BREAST (H): ICD-10-CM

## 2021-04-15 DIAGNOSIS — Z79.811 LONG TERM CURRENT USE OF AROMATASE INHIBITOR: ICD-10-CM

## 2021-04-15 DIAGNOSIS — D05.11 DUCTAL CARCINOMA IN SITU (DCIS) OF BOTH BREASTS: ICD-10-CM

## 2021-04-15 DIAGNOSIS — I89.0 LYMPHEDEMA OF RIGHT ARM: ICD-10-CM

## 2021-04-15 ASSESSMENT — MIFFLIN-ST. JEOR: SCORE: 1925.39

## 2021-04-23 ENCOUNTER — COMMUNICATION - HEALTHEAST (OUTPATIENT)
Dept: FAMILY MEDICINE | Facility: CLINIC | Age: 51
End: 2021-04-23

## 2021-04-23 ENCOUNTER — COMMUNICATION - HEALTHEAST (OUTPATIENT)
Dept: ONCOLOGY | Facility: CLINIC | Age: 51
End: 2021-04-23

## 2021-05-03 ENCOUNTER — OFFICE VISIT - HEALTHEAST (OUTPATIENT)
Dept: VASCULAR SURGERY | Facility: CLINIC | Age: 51
End: 2021-05-03

## 2021-05-03 DIAGNOSIS — M79.89 SWELLING IN RIGHT ARMPIT: ICD-10-CM

## 2021-05-03 DIAGNOSIS — I87.303 VENOUS HYPERTENSION OF LOWER EXTREMITY, BILATERAL: ICD-10-CM

## 2021-05-03 DIAGNOSIS — M79.605 LEG PAIN, BILATERAL: ICD-10-CM

## 2021-05-03 DIAGNOSIS — I89.0 ACQUIRED LYMPHEDEMA OF LEG: ICD-10-CM

## 2021-05-03 DIAGNOSIS — M79.89 LEG SWELLING: ICD-10-CM

## 2021-05-03 DIAGNOSIS — E66.01 CLASS 2 SEVERE OBESITY WITH SERIOUS COMORBIDITY AND BODY MASS INDEX (BMI) OF 38.0 TO 38.9 IN ADULT, UNSPECIFIED OBESITY TYPE (H): ICD-10-CM

## 2021-05-03 DIAGNOSIS — M79.604 LEG PAIN, BILATERAL: ICD-10-CM

## 2021-05-03 DIAGNOSIS — E66.812 CLASS 2 SEVERE OBESITY WITH SERIOUS COMORBIDITY AND BODY MASS INDEX (BMI) OF 38.0 TO 38.9 IN ADULT, UNSPECIFIED OBESITY TYPE (H): ICD-10-CM

## 2021-05-03 DIAGNOSIS — R60.9 LIPEDEMA: ICD-10-CM

## 2021-05-03 ASSESSMENT — MIFFLIN-ST. JEOR: SCORE: 1912.69

## 2021-05-04 ENCOUNTER — COMMUNICATION - HEALTHEAST (OUTPATIENT)
Dept: ONCOLOGY | Facility: CLINIC | Age: 51
End: 2021-05-04

## 2021-05-04 ENCOUNTER — OFFICE VISIT - HEALTHEAST (OUTPATIENT)
Dept: PHYSICAL THERAPY | Facility: REHABILITATION | Age: 51
End: 2021-05-04

## 2021-05-04 DIAGNOSIS — D05.11 DUCTAL CARCINOMA IN SITU (DCIS) OF BOTH BREASTS: ICD-10-CM

## 2021-05-04 DIAGNOSIS — D05.12 DUCTAL CARCINOMA IN SITU (DCIS) OF BOTH BREASTS: ICD-10-CM

## 2021-05-04 DIAGNOSIS — I89.0 LYMPHEDEMA OF RIGHT ARM: ICD-10-CM

## 2021-05-04 DIAGNOSIS — C50.911 INFILTRATING DUCTAL CARCINOMA OF RIGHT FEMALE BREAST (H): ICD-10-CM

## 2021-05-06 ENCOUNTER — OFFICE VISIT - HEALTHEAST (OUTPATIENT)
Dept: PHYSICAL THERAPY | Facility: REHABILITATION | Age: 51
End: 2021-05-06

## 2021-05-06 ENCOUNTER — RECORDS - HEALTHEAST (OUTPATIENT)
Dept: VASCULAR ULTRASOUND | Facility: CLINIC | Age: 51
End: 2021-05-06

## 2021-05-06 DIAGNOSIS — M79.89 OTHER SPECIFIED SOFT TISSUE DISORDERS: ICD-10-CM

## 2021-05-06 DIAGNOSIS — I89.0 LYMPHEDEMA, NOT ELSEWHERE CLASSIFIED: ICD-10-CM

## 2021-05-06 DIAGNOSIS — M79.605 PAIN IN LEFT LEG: ICD-10-CM

## 2021-05-06 DIAGNOSIS — I89.0 LYMPHEDEMA OF RIGHT ARM: ICD-10-CM

## 2021-05-06 DIAGNOSIS — I87.303 CHRONIC VENOUS HYPERTENSION (IDIOPATHIC) WITHOUT COMPLICATIONS OF BILATERAL LOWER EXTREMITY: ICD-10-CM

## 2021-05-06 DIAGNOSIS — M62.81 GENERALIZED MUSCLE WEAKNESS: ICD-10-CM

## 2021-05-06 DIAGNOSIS — D05.12 DUCTAL CARCINOMA IN SITU (DCIS) OF BOTH BREASTS: ICD-10-CM

## 2021-05-06 DIAGNOSIS — C50.911 INFILTRATING DUCTAL CARCINOMA OF RIGHT FEMALE BREAST (H): ICD-10-CM

## 2021-05-06 DIAGNOSIS — M79.604 PAIN IN RIGHT LEG: ICD-10-CM

## 2021-05-06 DIAGNOSIS — M25.561 ARTHRALGIA OF RIGHT LOWER LEG: ICD-10-CM

## 2021-05-06 DIAGNOSIS — D05.11 DUCTAL CARCINOMA IN SITU (DCIS) OF BOTH BREASTS: ICD-10-CM

## 2021-05-06 DIAGNOSIS — R60.9 EDEMA, UNSPECIFIED: ICD-10-CM

## 2021-05-10 ENCOUNTER — COMMUNICATION - HEALTHEAST (OUTPATIENT)
Dept: VASCULAR SURGERY | Facility: CLINIC | Age: 51
End: 2021-05-10

## 2021-05-11 ENCOUNTER — OFFICE VISIT - HEALTHEAST (OUTPATIENT)
Dept: PHYSICAL THERAPY | Facility: REHABILITATION | Age: 51
End: 2021-05-11

## 2021-05-11 ENCOUNTER — VIRTUAL VISIT (OUTPATIENT)
Dept: SLEEP MEDICINE | Facility: CLINIC | Age: 51
End: 2021-05-11
Payer: COMMERCIAL

## 2021-05-11 DIAGNOSIS — E66.812 CLASS 2 SEVERE OBESITY WITH SERIOUS COMORBIDITY AND BODY MASS INDEX (BMI) OF 38.0 TO 38.9 IN ADULT, UNSPECIFIED OBESITY TYPE (H): ICD-10-CM

## 2021-05-11 DIAGNOSIS — G47.33 OBSTRUCTIVE SLEEP APNEA: Primary | ICD-10-CM

## 2021-05-11 DIAGNOSIS — E66.01 CLASS 2 SEVERE OBESITY WITH SERIOUS COMORBIDITY AND BODY MASS INDEX (BMI) OF 38.0 TO 38.9 IN ADULT, UNSPECIFIED OBESITY TYPE (H): ICD-10-CM

## 2021-05-11 DIAGNOSIS — I89.0 ACQUIRED LYMPHEDEMA OF LEG: ICD-10-CM

## 2021-05-11 DIAGNOSIS — I89.0 LYMPHEDEMA OF RIGHT ARM: ICD-10-CM

## 2021-05-11 DIAGNOSIS — C50.911 INFILTRATING DUCTAL CARCINOMA OF RIGHT FEMALE BREAST (H): ICD-10-CM

## 2021-05-11 PROBLEM — Z85.3 PERSONAL HISTORY OF MALIGNANT NEOPLASM OF BREAST: Status: ACTIVE | Noted: 2020-12-22

## 2021-05-11 PROBLEM — Z98.890 H/O BREAST RECONSTRUCTION: Status: ACTIVE | Noted: 2019-08-30

## 2021-05-11 PROCEDURE — 99213 OFFICE O/P EST LOW 20 MIN: CPT | Mod: TEL | Performed by: INTERNAL MEDICINE

## 2021-05-11 RX ORDER — TURMERIC 400 MG
CAPSULE ORAL
COMMUNITY

## 2021-05-11 RX ORDER — DIPHENOXYLATE HYDROCHLORIDE AND ATROPINE SULFATE 2.5; .025 MG/1; MG/1
1 TABLET ORAL
COMMUNITY

## 2021-05-11 RX ORDER — OMEGA-3/DHA/EPA/FISH OIL 300-1000MG
CAPSULE ORAL
COMMUNITY

## 2021-05-11 RX ORDER — ANASTROZOLE 1 MG/1
1 TABLET ORAL
COMMUNITY
Start: 2020-07-21 | End: 2021-10-18

## 2021-05-11 RX ORDER — GABAPENTIN 100 MG/1
100 CAPSULE ORAL
COMMUNITY
Start: 2020-08-13 | End: 2022-02-08

## 2021-05-11 NOTE — PATIENT INSTRUCTIONS
Your BMI is There is no height or weight on file to calculate BMI.  Weight management is a personal decision.  If you are interested in exploring weight loss strategies, the following discussion covers the approaches that may be successful. Body mass index (BMI) is one way to tell whether you are at a healthy weight, overweight, or obese. It measures your weight in relation to your height.  A BMI of 18.5 to 24.9 is in the healthy range. A person with a BMI of 25 to 29.9 is considered overweight, and someone with a BMI of 30 or greater is considered obese. More than two-thirds of American adults are considered overweight or obese.  Being overweight or obese increases the risk for further weight gain. Excess weight may lead to heart disease and diabetes.  Creating and following plans for healthy eating and physical activity may help you improve your health.  Weight control is part of healthy lifestyle and includes exercise, emotional health, and healthy eating habits. Careful eating habits lifelong are the mainstay of weight control. Though there are significant health benefits from weight loss, long-term weight loss with diet alone may be very difficult to achieve- studies show long-term success with dietary management in less than 10% of people. Attaining a healthy weight may be especially difficult to achieve in those with severe obesity. In some cases, medications, devices and surgical management might be considered.  What can you do?  If you are overweight or obese and are interested in methods for weight loss, you should discuss this with your provider.     Consider reducing daily calorie intake by 500 calories.     Keep a food journal.     Avoiding skipping meals, consider cutting portions instead.    Diet combined with exercise helps maintain muscle while optimizing fat loss. Strength training is particularly important for building and maintaining muscle mass. Exercise helps reduce stress, increase energy,  and improves fitness. Increasing exercise without diet control, however, may not burn enough calories to loose weight.       Start walking three days a week 10-20 minutes at a time    Work towards walking thirty minutes five days a week     Eventually, increase the speed of your walking for 1-2 minutes at time    In addition, we recommend that you review healthy lifestyles and methods for weight loss available through the National Institutes of Health patient information sites:  http://win.niddk.nih.gov/publications/index.htm    And look into health and wellness programs that may be available through your health insurance provider, employer, local community center, or mian club.    Weight management plan: Patient was referred to their PCP to discuss a diet and exercise plan.  Equipment Instructions    We will process your PAP order and send it to a Durable Medical Equipment (DME) provider.    The medical equipment company should call you within 7 days.  If you have not heard from the company, please contact them to see if they received your order and are planning to call you.    Please call us at [phone] if you are unable to contact the medical equipment company or if they do not have the order.    If you are starting a new PAP machine, please call us after you use it the first night to let us know how it went. This call also helps us know that you received your equipment and that everything is ready. Please use our central phone number [phone]    Contact information for [employer] company:    [employer] Tel: [phone]

## 2021-05-11 NOTE — PROGRESS NOTES
"Sarah Gilmore is a 50 year old female being evaluated via a billable telephone visit.     \"This telephone visit will be conducted via a call between you and your physician/provider. We have found that certain health care needs can be provided without the need for an in-person visit or physical exam.  This service lets us provide the care you need with a telephone conversation.  If a prescription is necessary we can send it directly to your pharmacy.  If lab work is needed we can place an order for that and you can then stop by our lab to have the test done at a later time.\"    Telephone visits are billed at different rates depending on your insurance coverage.  Please reach out to your insurance provider with any questions.    Patient has given verbal consent for  a Telephone visit? Yes    What telephone number would you like your provider to contact at at:  [phone]    How would you like to obtain your AVS? Mail a copy    Fatemeh Angeles MA    Telephone Visit Details:     Telephone Visit Start Time: 2:20 PM    Telephone Visit End Time:2:27 PM    Thank you for the opportunity to participate in the care of Sarah Gilmore.     She is a 50 year old y/o female patient who comes to the sleep medicine clinic for follow up.  The patient was diagnosed with ONIEL at our facility on 11/25/2019 (AHI=35.6). The patient was started on CPAP therapy and doing well since that time. She states that she is still getting benefit from CPAP therapy. She just needs to get a prescription to continue to get supplies.     Assessment and Plan:  In summary Sarah Gilmore is a 50 year old year old female who is here for follow up.    1. Obstructive sleep apnea  I congratulated the patient on her excellent CPAP usage. I will narrow her pressure range to 10 cwp.  - COMPREHENSIVE DME     Compliance Download data for 30 Days:  Pressure setting: APAP 5-20 cwp  95% pressure:9.5 cwp  Residual AHI:3.7 events per " hour  Leak:Minimal  Compliance:80%  Mask Tolerance:Good  Skin irritation:None  DME:[facility]    Sleep-Wake Cycle:    The patient likes to initiate sleep at around 8-11 PM with a sleep latency of less than 15 minutes. The patient has 1-2 nocturnal awakenings. Final wake up time is around 9AM.    JEAN CLAUDE:  JEAN CLAUDE Total Score: 6  Total score - [place]: 12 (2021  8:00 AM)        Patient Active Problem List   Diagnosis     Iron deficiency anemia     Excessive or frequent menstruation     Hymenoptera allergy     Osteopenia     Vitamin D deficiency     Acquired lymphedema of leg     H/O breast reconstruction     Personal history of malignant neoplasm of breast       Past Medical History:   Diagnosis Date     Foot fracture 6/3/11     Other specified anemias      Radial head fracture 6/3/11       Past Surgical History:   Procedure Laterality Date     C  DELIVERY ONLY         Social History     Socioeconomic History     Marital status: Single     Spouse name: Not on file     Number of children: 3     Years of education: 12+     Highest education level: Not on file   Occupational History     Occupation: pt care tech, [employer]   Social Needs     Financial resource strain: Not on file     Food insecurity     Worry: Not on file     Inability: Not on file     Transportation needs     Medical: Not on file     Non-medical: Not on file   Tobacco Use     Smoking status: Never Smoker     Smokeless tobacco: Never Used   Substance and Sexual Activity     Alcohol use: Not on file     Drug use: Not on file     Sexual activity: Yes     Partners: Male     Birth control/protection: Condom   Lifestyle     Physical activity     Days per week: Not on file     Minutes per session: Not on file     Stress: Not on file   Relationships     Social connections     Talks on phone: Not on file     Gets together: Not on file     Attends Temple service: Not on file     Active member of club or organization: Not on file     Attends  meetings of clubs or organizations: Not on file     Relationship status: Not on file     Intimate partner violence     Fear of current or ex partner: Not on file     Emotionally abused: Not on file     Physically abused: Not on file     Forced sexual activity: Not on file   Other Topics Concern      Service Not Asked     Blood Transfusions No     Caffeine Concern Yes     Occupational Exposure Not Asked     Hobby Hazards No     Sleep Concern No     Stress Concern No     Weight Concern No     Special Diet No     Back Care No     Exercise Yes     Bike Helmet Not Asked     Seat Belt Yes     Self-Exams Yes   Social History Narrative     Not on file       Current Outpatient Medications   Medication Sig Dispense Refill     anastrozole (ARIMIDEX) 1 MG tablet Take 1 mg by mouth       EPI E-Z PEN JOELLEN 1:1000  IJ 1 TIME ONLY  PRN bee sting 2 Device 20     ergocalciferol (ERGOCALCIFEROL) 51133 UNIT capsule Take 1 capsule by mouth every 14 days. 12 capsule 1     FERROUS SULFATE  MG OR TBCR 1 TABLET BID  after food withm orange juice 100 3     fish oil-omega-3 fatty acids 1000 MG capsule        gabapentin (NEURONTIN) 100 MG capsule Take 100 mg by mouth       Multiple Vitamin (MULTI-VITAMINS) TABS Take 1 tablet by mouth       OSCO MAGNESIUM CITRATE OR        Turmeric 400 MG CAPS          Allergies   Allergen Reactions     Bees      Noted in 1/17/09 ER     Contrast Dye      airway problems       I reviewed the efficacy and compliance report from her device. Data summarized on the HPI and the PAP compliance flow sheet.     Patient verbalized understanding of these issues, agrees with the plan and all questions were answered today. Patient was given an opportuntity to voice any other symptoms or concerns not listed above. Patient did not have any other symptoms or concerns.      Darshan Christianson DO  Board Certified in Internal Medicine and Sleep Medicine    (Note created with Dragon voice recognition and unintended  spelling errors and word substitutions may occur)     Audio and visual devices were used for this virtual clinic visit with permission from patient.

## 2021-05-12 NOTE — NURSING NOTE
DME order to [facility].  Reminder card will be sent out to patient to follow up in 2 years.

## 2021-05-13 ENCOUNTER — OFFICE VISIT - HEALTHEAST (OUTPATIENT)
Dept: PHYSICAL THERAPY | Facility: REHABILITATION | Age: 51
End: 2021-05-13

## 2021-05-13 DIAGNOSIS — I89.0 ACQUIRED LYMPHEDEMA OF LEG: ICD-10-CM

## 2021-05-13 DIAGNOSIS — I89.0 LYMPHEDEMA OF RIGHT ARM: ICD-10-CM

## 2021-05-13 DIAGNOSIS — C50.911 INFILTRATING DUCTAL CARCINOMA OF RIGHT FEMALE BREAST (H): ICD-10-CM

## 2021-05-25 ENCOUNTER — OFFICE VISIT - HEALTHEAST (OUTPATIENT)
Dept: PHYSICAL THERAPY | Facility: REHABILITATION | Age: 51
End: 2021-05-25

## 2021-05-25 DIAGNOSIS — E66.01 CLASS 2 SEVERE OBESITY WITH SERIOUS COMORBIDITY AND BODY MASS INDEX (BMI) OF 38.0 TO 38.9 IN ADULT, UNSPECIFIED OBESITY TYPE (H): ICD-10-CM

## 2021-05-25 DIAGNOSIS — M79.89 SWELLING IN RIGHT ARMPIT: ICD-10-CM

## 2021-05-25 DIAGNOSIS — I87.303 VENOUS HYPERTENSION OF LOWER EXTREMITY, BILATERAL: ICD-10-CM

## 2021-05-25 DIAGNOSIS — I89.0 ACQUIRED LYMPHEDEMA OF LEG: ICD-10-CM

## 2021-05-25 DIAGNOSIS — E66.812 CLASS 2 SEVERE OBESITY WITH SERIOUS COMORBIDITY AND BODY MASS INDEX (BMI) OF 38.0 TO 38.9 IN ADULT, UNSPECIFIED OBESITY TYPE (H): ICD-10-CM

## 2021-05-25 DIAGNOSIS — R60.9 LIPEDEMA: ICD-10-CM

## 2021-05-26 VITALS — HEART RATE: 72 BPM | DIASTOLIC BLOOD PRESSURE: 76 MMHG | TEMPERATURE: 98.6 F | SYSTOLIC BLOOD PRESSURE: 104 MMHG

## 2021-05-26 VITALS — DIASTOLIC BLOOD PRESSURE: 64 MMHG | SYSTOLIC BLOOD PRESSURE: 108 MMHG | HEART RATE: 77 BPM | OXYGEN SATURATION: 97 %

## 2021-05-27 ENCOUNTER — OFFICE VISIT - HEALTHEAST (OUTPATIENT)
Dept: PHYSICAL THERAPY | Facility: REHABILITATION | Age: 51
End: 2021-05-27

## 2021-05-27 DIAGNOSIS — I89.0 ACQUIRED LYMPHEDEMA OF LEG: ICD-10-CM

## 2021-05-27 DIAGNOSIS — R60.9 LIPEDEMA: ICD-10-CM

## 2021-05-27 DIAGNOSIS — I87.303 VENOUS HYPERTENSION OF LOWER EXTREMITY, BILATERAL: ICD-10-CM

## 2021-05-27 DIAGNOSIS — M79.89 SWELLING IN RIGHT ARMPIT: ICD-10-CM

## 2021-05-27 NOTE — PROGRESS NOTES
Subjective:   Sarah Gilmore is a(n) 48 y.o. White or  female who presents to [facility] with the following complaint(s):  Sore Throat (fever) and Cough    History of Present Illness:  Primary symptom: Sore throat  Onset: 4-5 day(s) ago  Progression: Worsening  Hoarseness: Yes  Dysphagia: Yes  Drooling: No  Neck swelling: Yes, left-sided  Fevers: Yes, Tmax 101 F  Chills: Yes  Headache: Yes  Rash: No  Abdominal pain: No  Upper respiratory symptoms: Has nasal congestion but minimal rhinorrhea. No ear pain.   Additional symptoms: Has an occasional cough, mainly when laying down.   Home therapies utilized: Acetaminophen.   History of recurrent strep: Yes  History of peritonsillar abscess: No  Exposure to strep: No  Exposure to mono: No  Tobacco use / exposure: No    The following portions of the patient's history were reviewed and updated as appropriate: allergies, current medications, past family history, past medical history, past social history, past surgical history and problem list.    Review of Systems:   Review of Systems   All other systems reviewed and are negative.    Objective:     Vitals:    03/28/19 1327   BP: 111/76   Patient Site: Right Arm   Patient Position: Sitting   Cuff Size: Adult Large   Pulse: 65   Resp: 18   Temp: 98.3  F (36.8  C)   TempSrc: Oral   SpO2: 98%   Weight: (!) 253 lb (114.8 kg)     Physical Exam   Constitutional: She is oriented to person, place, and time. She appears well-developed and well-nourished.  Non-toxic appearance. No distress.   HENT:   Head: Normocephalic and atraumatic.   Right Ear: Tympanic membrane, external ear and ear canal normal.   Left Ear: Tympanic membrane, external ear and ear canal normal.   Nose: Mucosal edema present. No rhinorrhea. Right sinus exhibits no maxillary sinus tenderness and no frontal sinus tenderness. Left sinus exhibits no maxillary sinus tenderness and no frontal sinus tenderness.   Mouth/Throat: Uvula is midline and mucous  membranes are normal. No oral lesions. No trismus in the jaw. No uvula swelling. Posterior oropharyngeal erythema (mild, streaky) present. No oropharyngeal exudate. Tonsils are 2+ on the right. Tonsils are 1+ on the left. No tonsillar exudate.   Eyes: Conjunctivae and lids are normal.   Neck: Neck supple. No edema and no erythema present.   Cardiovascular: Normal rate, regular rhythm, S1 normal and S2 normal. Exam reveals no gallop and no friction rub.   No murmur heard.  Pulmonary/Chest: Effort normal and breath sounds normal. No stridor. She has no wheezes. She has no rhonchi. She has no rales.   Lymphadenopathy:        Head (right side): No submandibular adenopathy present.        Head (left side): No submandibular adenopathy present.     She has no cervical adenopathy.   Neurological: She is alert and oriented to person, place, and time.   Skin: Skin is warm and dry. No rash noted. No pallor.   Nursing note and vitals reviewed.    Laboratory:  Results for orders placed or performed in visit on 03/28/19   Rapid Strep A Screen-Throat   Result Value Ref Range    Rapid Strep A Antigen No Group A Strep detected, presumptive negative No Group A Strep detected, presumptive negative       Radiology:  N/A    Electrocardiogram:  N/A    Assessment/Plan   1. Viral upper respiratory tract infection with cough  - Rapid Strep A Screen-Throat  - Group A Strep, RNA Direct Detection, Throat    - Strep screen negative. Reflex testing in process; will prescribe amoxicillin if positive. Discussed symptomatic / supportive cares.   - Counseled patient regarding assessment and plan for evaluation and treatment. Questions were answered. See AVS for the specific written instructions and educational handout(s) regarding viral URI that were provided at the conclusion of the visit.   - Discussed signs / symptoms that warrant urgent / emergent medical attention.   - Follow up as needed.     Gordon Carreno MD

## 2021-05-29 NOTE — TELEPHONE ENCOUNTER
Called patient to discuss preliminary results from her genetic testing (BRCAPl[facility] STAT panel). Sarah was not available, so I left a non-detailed message to call back with my contact information.     Qi Stein MS, [facility]  Licensed Genetic Counselor  [facility]  [phone]

## 2021-05-29 NOTE — PROGRESS NOTES
History:  This is a 48 y.o. female who I'm asked to see for evaluation of a right breast cancer.  This was picked up by screening mammogram as an area of calcifications.  She actually has 2 areas of calcifications.  Both are retroareolar.  The deeper location was biopsied.  The more superficial area of calcifications was unable to technically be biopsied stereotactically.  The patient cannot feel a mass.  She denies any recent changes to her breast.  The right side has always been slightly larger and more ptotic than the left.  A needle biopsy was done of the deeper area of calcifications which shows DCIS.  The receptor status was deferred to surgical excision.      PAST MEDICAL HISTORY:  Osteoporosis    PAST SURGICAL HISTORY:  Right arm surgery after fracture  Laparoscopic hysterectomy with bilateral salpingectomy      Current Outpatient Medications:      EPINEPHrine (EPIPEN JR) 0.15 mg/0.3 mL injection, Inject 0.3 mL (0.15 mg total) into the shoulder, thigh, or buttocks as needed for anaphylaxis., Disp: 1 each, Rfl: 12     hydrOXYzine pamoate (VISTARIL) 25 MG capsule, Take 1 capsule (25 mg total) by mouth 3 (three) times a day as needed for itching., Disp: 60 capsule, Rfl: 0     ondansetron (ZOFRAN) 4 MG tablet, Take 1 tablet (4 mg total) by mouth daily as needed for nausea., Disp: 30 tablet, Rfl: 1    Allergies:  Anaphylaxis to bees and IV contrast    Social History:  Reports that she has never smoked. She has never used smokeless tobacco. She reports that she does not drink alcohol or use drugs.  Works as a dialysis nurse.    Family History:  She has a family history of breast cancer - involving her paternal grandmother in her 50s.   in her 60s of the breast cancer.  That grandmother had 3 sisters.  At least 1 of them also had breast cancer.    ROS:  General ROS: No complaints or constitutional symptoms  Skin: No complaints or symptoms   Hematologic/Lymphatic: No symptoms or complaints  Psychiatric:  "No symptoms or complaints  Endocrine: No excessive fatigue, no hypermetabolic symptoms reported  Respiratory ROS: No cough, shortness of breath, or wheezing  Cardiovascular ROS: No chest pain or dyspnea on exertion  Breast ROS: No new changes including masses, rash, nipple discharge, or nipple inversion  Gastrointestinal ROS: No abdominal pain, nausea, diarrhea, or constipation  Musculoskeletal ROS: No recent injuries reported  Neurological ROS: No focal neurologic defects reported.      PHYSICAL EXAM  /80 (Patient Site: Left Arm, Patient Position: Sitting, Cuff Size: Adult Large)   Pulse 84   Ht 5' 10\" (1.778 m)   Wt (!) 253 lb (114.8 kg)   LMP 03/03/2018   SpO2 99%   Breastfeeding? No   BMI 36.30 kg/m    General: Alert, cooperative, appears stated age   Skin: Skin color, texture, turgor normal, no rashes or lesions   Lymphatic: No obvious adenopathy, no swelling   Eyes: No scleral icterus, pupils equal  HENT: No traumatic injury to the head or face, no gross abnormalities  Lungs: Normal respiratory effort, breath sounds equal bilaterally  Heart: Regular rate and rhythm  Breasts: Right larger than left and more ptotic.  No discrete palpable masses or lymphadenopathy.  Abdomen: Soft, non-distended and non-tender to palpation  Neurologic: Grossly intact    IMAGING  EXAM: UNILATERAL MAMMO DIAGNOSTIC 2D RIGHT, UNILATERAL US BREAST LIMITED (FOCAL) RIGHT  LOCATION: [facility]  DATE/TIME: 5/14/2019 10:44 AM     INDICATION: Right breast microcalcification x 2, right breast nodular density-call back.  COMPARISON: Baseline exam 5/7/2019.     MAMMOGRAPHIC FINDINGS: Right ML and spot compression magnification ML and CC views were obtained. In addition to the two focal clusters of slightly heterogeneous calcifications deep to the nipple, there are other calcifications scattered throughout the   anterior right breast. Some are dispersed and there is another grouping far medially. Multiple partially obscured " nodular densities are also noted. These are not associated with the calcifications in question.     ULTRASOUND FINDINGS: The entire superior aspect of the right breast was examined. There are numerous simple cysts scattered throughout. No duct ectasia or any abnormalities associated with the calcifications seen on the mammogram.     IMPRESSION:   1.  Patient has two groupings of suspicious calcifications with other somewhat scattered microcalcifications. No solid components noted by ultrasound to clearly indicate these could reflect partially calcified papillomas. Would suggest stereotactic core   biopsy of one group. One furthest from the nipple should be amenable to targeting.  2.  Patient also has numerous scattered simple cysts.  3.  Findings discussed at length with the patient.     Our staff will help her schedule the biopsy at the [facility]     ACR BI-RADS Category 4: Suspicious.    PATHOLOGY  RIGHT BREAST, 3 O'CLOCK POSITION, SUBAREOLAR, STEREOTACTIC NEEDLE CORE BIOPSY:    1) DUCTAL CARCINOMA IN-SITU (DCIS):         a) NUCLEAR GRADE: 2-3 OF 3         b) PATTERNS: CRIBRIFORM, SOLID, AND PAPILLARY TYPES WITH FOCI OF NECROSIS,            SINGLE FOCUS OF COMEDONECROSIS     2) CALCIFICATIONS PRESENT IN DCIS AND IN ADJACENT STROMAL TISSUE     3) ADDITIONAL FINDINGS:         a) PATCHY CHRONIC INFLAMMATION AND STROMAL FIBROSIS         b) PORTIONS OF BENIGN SCLEROSING PAPILLOMA     4) NO EVIDENCE OF INVASIVE CARCINOMA     5) BREAST CANCER PROGNOSTIC MARKER ANALYSIS:         a) DEFERRED TO EXCISION OF THIS LESION      IMPRESSION:  Right breast DCIS - receptors unknown     PLAN:   Discussed the surgical options for treatment of breast cancer which generally are a lumpectomy (partial mastectomy) combined with radiation versus a mastectomy.  Explained that the survival benefit is the same for both.  The difference is in local recurrence risk.  The patient is a fair candidate for a lumpectomy.  Since  the biopsied area is not palpable, it would require localization.  Unfortunately, she has a second area that has not pathologically been evaluated.  It would need to be known whether or not this area also involves cancer.  We could obtain an MRI to further evaluate this area of DCIS and the one nearest the nipple.  If both areas turn out to look suspicious, then she would be a very poor candidate for lumpectomy.  If on MRI the second area did not look suspicious, then I would still recommend a second wire localization in order to biopsy the second area of calcifications.  Discussed SLN biopsy.  It would not be done with lumpectomy, but would be recommended with mastectomy.  The procedure and rationale were explained.   Discussed that at this point we do not know yet whether or not she will need chemotherapy, but it generally does not play a role in DCIS.  I also explained the role of endocrine therapy if her cancer came back as estrogen receptor positive.      She has spoken with a few friends who have been through the process.  She has already been leaning toward a right mastectomy.  She would be a candidate for genetic testing.  She would be interested in obtaining results.  A positive genetic test would sway her to have a bilateral mastectomy over a right mastectomy.  She would be interested in reconstruction.  A list of plastic surgeons was provided to her.  I explained the surgical process for mastectomy with or without reconstruction.  She understands that it is done under general anesthesia in the hospital setting.  She would stay in the hospital overnight.  She would be taught how to manage a drain.  Her postoperative restrictions would be dependent upon the plastic surgeon.  Then ultimately her next steps in treatment would be dependent upon her surgical pathology.      An urgent referral to genetics has been placed.  The results would sway her for a bilateral mastectomy.  She will check with her insurance  and make an appointment to meet with a plastic surgeon.  After she has met with plastic surgery, the 2 offices will coordinate a date for her surgery, which we would plan at [facility].    Mckenzie Colby [facility]  [phone]

## 2021-05-29 NOTE — PROGRESS NOTES
Office Visit - Follow Up   Sarah Gilmore   48 y.o. female    Date of Visit: 5/28/2019    Chief Complaint   Patient presents with     Nausea     x2 days        Assessment and Plan   1. Malignant neoplasm of right female breast, unspecified estrogen receptor status, unspecified site of breast (H)  Recent breast cancer diagnosis, patient is feeling much better, less nausea.  Patient has an appointment to see the surgeon on Monday 6/3.  All questions were answered regarding typical sequence of events for early-stage breast cancer.  Recommend using hydroxyzine as needed for sleep and Zofran for nausea.  Also recommended that patient have a sleep study due to possible sleep apnea leading to chronic fatigue.  - Ambulatory referral to Sleep Medicine  - ondansetron (ZOFRAN) 4 MG tablet; Take 1 tablet (4 mg total) by mouth daily as needed for nausea.  Dispense: 30 tablet; Refill: 1  - hydrOXYzine pamoate (VISTARIL) 25 MG capsule; Take 1 capsule (25 mg total) by mouth 3 (three) times a day as needed for itching.  Dispense: 60 capsule; Refill: 0    The following high BMI interventions were performed this visit: encouragement to exercise and dietary management education, guidance, and counseling    No follow-ups on file.     History of Present Illness   This 48 y.o. old female patient with history of obesity presented to the clinic today for follow-up after she was recently diagnosed with breast cancer from her pathology results from her right breast, 3 o'clock position, subareolar, stereotactic needle core biopsy performed on 5/22/19. After her diagnosis she says she started feeling nauseated she thinks this is due to her anxiety from knowing that she has cancer.  She also reported that she has been fatigued lately she is not sure if this is due to the cancer something else, she has no other symptoms. There was a second area on the original imaging that is of interest and a biopsy has been scheduled for 5/30/19 at 9:00am.      Review of Systems: A comprehensive review of systems was negative except as noted.     Medications, Allergies and Problem List   Reviewed and updated     Physical Exam   General Appearance: Well groomed    /67   Pulse 68   Wt (!) 253 lb (114.8 kg)   LMP 03/03/2018   SpO2 97%   BMI 36.83 kg/m         Additional Information   Current Outpatient Medications   Medication Sig Dispense Refill     EPINEPHrine (EPIPEN JR) 0.15 mg/0.3 mL injection Inject 0.3 mL (0.15 mg total) into the shoulder, thigh, or buttocks as needed for anaphylaxis. 1 each 12     hydrOXYzine pamoate (VISTARIL) 25 MG capsule Take 1 capsule (25 mg total) by mouth 3 (three) times a day as needed for itching. 60 capsule 0     ondansetron (ZOFRAN) 4 MG tablet Take 1 tablet (4 mg total) by mouth daily as needed for nausea. 30 tablet 1     No current facility-administered medications for this visit.      Allergies   Allergen Reactions     Bee Pollen Anaphylaxis     Iodinated Contrast- Oral And Iv Dye Anaphylaxis     Social History     Tobacco Use     Smoking status: Never Smoker     Smokeless tobacco: Never Used   Substance Use Topics     Alcohol use: No     Drug use: No       Reviewed biopsy result and confirmed malignant     Time: total time spent with the patient was 25 minutes of which >50% was spent in counseling and coordination of care     Laura Lobo, CNP

## 2021-05-29 NOTE — TELEPHONE ENCOUNTER
work number today    Recent Breast Cancer diagnosis (Friday).  Feeling nauseated, has another biopsy Thursday, see's  surgeon next Monday.    Said is so fatigued and feels its because of the Cancer. Wants something for nausea. Says she spoke with a pharmacist who said there was not anything over the counter. Explained to her she would need to be seen for script, in her best interest. She has not seen her provider yet since this diagnosis. Transferred to schedulers to make appointment.                         Reason for Disposition    Symptoms interfere with work or school    Protocols used: ANXIETY AND PANIC ATTACK-A-AH

## 2021-05-29 NOTE — TELEPHONE ENCOUNTER
6/17/2019    I called Sarah with her BRCA1 and BRCA2 results today.  Sarah is NEGATIVE for pathogenic (harmful) mutations in the genes on the BRCAPlus panel by sequencing and deletion/duplication analysis (BLOSSOM, BRCA1, BRCA2, CDH1, CHEK2, PALB2, PTEN, and TP53). No clinically actionable mutations were discovered.    We rushed these genes so that she can make surgical decisions. Results for the remaining genes (OvaNext panel) are still pending and should be available within the next 2 weeks.     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 This variant is called [id number] (also known as c.[id number]).  No other variants or mutations were found in the BLOSSOM gene. Given the uncertain significance of this result, medical management decisions should NOT be made based on this test result alone.      Interpretation of a variant of uncertain significance:  We briefly discussed several different interpretations of this inconclusive test result. It is not clear if this variant in the BLOSSOM gene is associated with increased cancer risk.  1. This variant may be a benign change that does not increase cancer risk.  2. This variant may be a harmful mutation that causes increased risk for breast cancer.    Cancer Risks:    Individuals with an BLOSSOM mutation are at increased risk of certain cancers.  These risks may be influenced by family history:     The lifetime breast cancer risk for women who carry one BLOSSOM mutation is approximately 24-48%, which is a 2-4 fold increase compared to the general population lifetime population risk of 12%. Some mutations in the BLOSSOM gene may confer a higher risk for breast cancer.    We also discussed the association of BLOSSOM mutations with increased risk for pancreatic and prostate cancer; however data is still limited in this area.  Though no exact lifetime risks are available at this time, there may be an increased risk for other cancers.     We reviewed that the  BLOSSOM gene is currently considered a moderate-risk gene. This means that mutations in this gene increase the risk for certain cancers, but are unlikely to be the single cause for an individual's cancer. There are likely other genetic and/or environmental risk factors that, in combination with the BLOSOSM gene mutation, may be associated with the cancers in her and her family.   The laboratory is working to determine if this variant is harmful or benign, and they will contact me if it is reclassified.    I explained that will discussed the results in more detail when we discuss the full results from her genetic testing.     Plan:  1. She plans to follow-up with the breast surgeons she has consulted with (Dr. Colby and .  2. Sarah will be contacted byt our scheduling department to set up a result disclosure appointment.     If Sarah has any further questions, I encouraged her to contact me at [phone].    Qi Stein MS, [facility]  Licensed Genetic Counselor  [facility]  [phone]

## 2021-05-29 NOTE — PROGRESS NOTES
6/5/2019    Referring Provider: Mckenzie Colby DO    Presenting Information:   I met with Sarah Gilmore today for genetic counseling at the [facility] to discuss her personal history of breast cancer and family history of breast, pancreatic, and colon cancer.  She is here today to review this history, cancer screening recommendations, and available genetic testing options.    Personal History:  Sarah is a 48 y.o. female. She was recently diagnosed with a right breast cancer at the end of May 2019 as the result of a screening mammogram. 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 Sarah met with Dr. Colby on June 3rd to discuss surgical options. Sarah stated that the results from genetic testing will help determine whether bilateral mastectomy is the right surgical option for her.      She had her first menstrual period at age 10, her first child at age 18, and is postmenopausal. Sarah had a total abdominal hysterectomy at age 47 due to abnormal bleeding. She reports about a 2-year history of history of oral contraceptive use in he 20's and that she has never been on hormone replacement therapy.      She has not had a colonoscopy yet due to her age. She knows that she is due for one now that she is 48.  She does not regularly do any other cancer screening at this time.  Sarah reported no tobacco use, and no alcohol use.    Family History: (Please see scanned pedigree for detailed family history information)    Sarah's maternal uncle passed away at age 55 due to pancreatic cancer.     Sarah's maternal uncle, who is in his late 60's, has a history of pancreatic cancer diagnosed in his 60's.     Sarah's maternal aunt passed away in her late 80's with a history of colon cancer diagnosed in her 60's.    Sarah's maternal aunt passed away in her 70's with a history of lung cancer diagnosed in her 60's. She was  a smoker.     Sarah's paternal aunt, who is in her late 60's has a history of and unknown type of cancer. Diagnosed at an unknown age.     Sarah's paternal grndmother passed away in her 50's due to breast cancer diagnosed at age 53.    Her maternal ethnicity is [place] and [place]. Her paternal ethnicity is Mexican and [place].  There is no known Ashkenazi Faith ancestry on either side of her family. There is no reported consanguinity.    Discussion:    Sarah's personal history of breast cancer and family history of pancreatic and colon cancer is suggestive of a hereditary cancer syndrome.    We reviewed the features of sporadic, familial, and hereditary cancers. In looking at Sarah's family history, it is possible that a cancer susceptibility gene is present due to her breast cancer, her paternal grandmother's breast cancer, her maternal uncles' histories of pancreatic cancer, and her paternal aunt's history of colon cancer.    We discussed the natural history and genetics of hereditary breast, pancreatic, and colon cancers. A detailed handout regarding the information we discussed was provided to Sarah at the end of our appointment today and can be found in the after visit summary. Topics included: inheritance pattern, cancer risks, cancer screening recommendations, and also risks, benefits and limitations of testing.    We reviewed that the most common cause of hereditary breast cancer is Hereditary Breast and Ovarian Cancer (HBOC) syndrome, which is caused by mutations in the genes BRCA1 and BRCA2. BRCA1 and BRCA2 are two genes that increase the risk for breast and ovarian cancers, among others. Women who inherit a BRCA mutation have a 50 to 85% lifetime risk of breast cancer and a 15 to 45% lifetime risk of ovarian cancer. This is higher than the general population lifetime risks of 12% for breast cancer and less than 2% for ovarian cancer. Men with BRCA gene mutations have up to a 7% risk of breast cancer and  20% risk of prostate cancer. Other cancers, such as pancreatic cancer and melanoma, have also been associated with BRCA mutations.    Given her maternal family history of pancreatic and colon cancers, we also discussed Cotton syndrome. Cotton syndrome can be caused by a mutation in one of five genes:  MLH1, MSH2, MSH6, PMS2, and EPCAM.  Cotton syndrome may present with polyps, but typically a small number.  The highest cancer risks associated with Cotton syndrome include colon cancer, endometrial/uterine cancer, gastric cancer, and ovarian cancer.    Based on her personal and family history, Sarah meets current National Comprehensive Cancer Network (NCCN) criteria for genetic testing of BRCA1 and BRCA2 and Cotton syndrome.      We discussed that there are additional genes that could cause increased risk for breast, pancreatic, and colon cancer. As many of these genes present with overlapping features in a family and accurate cancer risk cannot always be established based upon the pedigree analysis alone, it would be reasonable for Sarah to consider panel genetic testing to analyze multiple genes at once. After our discussion, Sarah opted to proceed with OvaNext through [facility].  Genetic testing is available for 25 genes associated with hereditary gynecologic, breast, and related cancers: OvaNext (BLOSSOM, BARD1, BRCA1, BRCA2, BRIP1, CDH1, CHEK2, DICER1, EPCAM, MLH1, MRE11A, MSH2, MSH6, MUTYH, NBN, NF1, PALB2, PMS2, PTEN, RAD50, RAD51C, RAD51D, SMARCA4, STK11, and TP53).  We discussed that some of the genes in the OvaNext panel are associated with specific hereditary cancer syndromes and published management guidelines: Hereditary Breast and Ovarian Cancer syndrome (BRCA1, BRCA2), Cotton syndrome (MLH1, MSH2, MSH6, PMS2, EPCAM), Hereditary Diffuse Gastric Cancer (CDH1), Cowden syndrome (PTEN), Li Fraumeni syndrome (TP53), Peutz-Jeghers syndrome (STK11), MUTYH Associated Polyposis (MUTYH), and Neurofibromatosis type 1  (NF1).    Risk-reducing salpingo-oophorectomy can be considered in women with mutations in BRIP1, RAD51C, or RAD51D. Breast and/or other cancer risk management guidelines are available for BLOSSOM, CHEK2, PALB2, NF1, and NBN.  The remaining genes (BARD1, DICER1, MRE11A, RAD50, and SMARCA4) are associated with increased cancer risk and may allow us to make medical recommendations when mutations are identified.    Consent was obtained and genetic testing for OvaNext was sent to [facility].    Medical Management: For Sarah, we reviewed that the information from genetic testing may determine:    surgery to treat Sarah's active cancer diagnosis (i.e. lumpectomy versus bilateral mastectomy, partial versus total colectomy, etc.),    additional cancer screening for which Sarah may qualify (i.e. mammogram and breast MRI, more frequent colonoscopies, more frequent dermatologic exams, etc.),    options for risk reducing surgeries Sarah could consider (i.e. bilateral mastectomy, surgery to remove her ovaries and/or uterus, etc.),      and targeted chemotherapies for Sarah's active cancer, or if she were to develop certain cancers in the future (i.e. immunotherapy for individuals with Cotton syndrome, PARP inhibitors, etc.).     These recommendations and possible targeted chemotherapies will be discussed in detail once genetic testing is completed.   We discussed that Sarah's surgical decisions are pending genetic testing results. For that reason, I will place a STAT request on the BRCAPlus genes (BLOSSOM, BRCA1, BRCA2, CDH1, CHEK2, PALB2, PTEN, and TP53) in order to get these results back as soon as possible.    Plan:  1) Today Sarah elected to proceed with OvaNext genetic testing (25 genes) through [facility].  2) A STAT request was placed on the BRCAPlus genes (including BRCA1 and BRCA2) and we will get these results back in about 2 weeks. I will call Sarah with these results as soon as they are available.   3) The  results from the rest of the testing will be available in approximately 4 weeks.   4) Sarah will be contacted by our scheduling department to set up a result disclosure appointment either over the phone or in person.     Face to face time: 55 minutes    Qi Stein MS, [facility]  Licensed Genetic Counselor  [facility]  [phone]

## 2021-05-29 NOTE — TELEPHONE ENCOUNTER
Telephoned and spoke with Sarah to share pathology results from her right breast, 3 o'clock position, subareolar, stereotactic needle core biopsy performed on 5/22/19 at [facility].  We discussed breast anatomy and next steps.  There was a second area on the original imaging that is of interest and a biopsy has been scheduled for 5/30/19 at 9:00am.  Sarah verbalized understanding of that appointment details and location.    I assisted in scheduling surgical consult with Dr. Mckenzie Colby on 6/3/19 at 9:00am (8:40 arrival).  Patient also verbalized understanding of those appointment details and location.  I helped her understand what to expect at that appointment and answered her questions about the typical sequence of events for early-stage breast cancer.      Zabrina gave permission for website links regarding ductal-carcinoma-in-situ to be e-mailed to [email or web address] and I did send her American Cancer Society  https://www.cancer.org/cancer/breast-cancer/if-you-have-breast-cancer.html     Yxdrhj603.org  https://bklkpi361.org/topics/category/ductal-carcinoma-in-situ-dcis/     Breastcancer.org  https://www.breastcancer.org/symptoms/types/dcis    Offered emotional support and encouragement and I remain a resource to Sarah as needed.  Bhavya Britton RN

## 2021-05-29 NOTE — PROGRESS NOTES
I met with Sarah and her sister and family today at her surgical consult with Dr. Colby.  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  She has recently been diagnosed with right breast DCIS.  I talked with her after her consult with Dr. Colby.  She is planning a right breast mastectomy with reconstruction.  She was given the list of plastic surgeons.  An urgent referral for genetics was placed as she will use this information to decide her surgical decision and whether or not she would have a bilateral mastectomy.  We discussed general post op information following a mastectomy with reconstruction.  Reviewed some resources with her including the Uyguaf[employer] web info on DCIS as well as [facility] Newly diagnosed and [employer] sisterhood support info.  Discussed the role of her Nurse Navigator.  She is a bit worried about her financial situation as she lives by herself.  Discussed with her that there are financial support resources available to her specific to breast cancer as well as the [employer] to help her if she qualifies and needs it.  I have given her my contact information as well as that of my partner, Bhavya.  Support and reassurance provided, invited calls.

## 2021-05-29 NOTE — PROGRESS NOTES
Chief Complaint   Patient presents with     Consult     patient into talk about treatment on right breast CA tumor found by the rad tech

## 2021-05-30 NOTE — TELEPHONE ENCOUNTER
I called Sarah today at our scheduled appointment time (8am) to discuss her genetic testing results. Her blood was drawn on 06/05/2019. OvaNext testing was ordered  from [facility]. This testing was done because of Sarah's personal and family history of breast cancer.  As Sarah was not available, I left a non-detailed voicemail message with my contact information.  I can be reached at [phone].    Qi Stein MS, [facility]  Licensed Genetic Counselor  [facility]  [phone]

## 2021-05-30 NOTE — ANESTHESIA CARE TRANSFER NOTE
Last vitals:   Vitals:    07/30/19 1420   BP: 120/63   Pulse: (!) 58   Resp: 17   Temp:    SpO2: 100%     Patient's level of consciousness is drowsy  Spontaneous respirations: yes  Maintains airway independently: yes  Dentition unchanged: yes  Oropharynx: oropharynx clear of all foreign objects    QCDR Measures:  ASA# 20 - Surgical Safety Checklist: WHO surgical safety checklist completed prior to induction    PQRS# 430 - Adult PONV Prevention: 4558F - Pt received => 2 anti-emetic agents (different classes) preop & intraop  ASA# 8 - Peds PONV Prevention: NA - Not pediatric patient, not GA or 2 or more risk factors NOT present  PQRS# 424 - Niesha-op Temp Management: 4559F - At least one body temp DOCUMENTED => 35.5C or 95.9F within required timeframe  PQRS# 426 - PACU Transfer Protocol: - Transfer of care checklist used  ASA# 14 - Acute Post-op Pain: ASA14B - Patient did NOT experience pain >= 7 out of 10

## 2021-05-30 NOTE — PROGRESS NOTES
"7/3/2019    Referring Provider: Dr. Colby    Presenting Information:  I spoke with Sarah over the phone today to discuss her genetic testing results. Her blood was drawn on 06/05/2019. OvaNext testing was ordered from [facility]. This testing was done because of her personal and family history of breast cancer.    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 This variant is called [id number] (also known as c.8507T>C).  No other variants or mutations were found in the BLOSSOM gene. Given the uncertain significance of this result, medical management decisions should NOT be made based on this test result alone.    Sarah is negative for pathogenic (harmful) mutations in the BLOSSOM, BARD1, BRCA1, BRCA2, BRIP1, CDH1, CHEK2, DICER1, EPCAM, MLH1, MRE11A, MSH2, MSH6, MUTYH, NBN, NF1, PALB2, PMS2, PTEN, RAD50, RAD51C, RAD51D, SMARCA4, STK11, and TP53 genes. No harmful (pathogenic) mutations were found in any of the 25 genes analyzed. This test involved sequencing and deletion/duplication analysis of all genes with the exceptions of EPCAM (deletions/duplications only).    Testing did not detect an identifiable pathogenic (harmful) mutation associated with Hereditary Breast and Ovarian Cancer syndrome (BRCA1, BRCA2), Cotton syndrome (MLH1, MSH2, MSH6, PMS2, EPCAM), Hereditary Diffuse Gastric Cancer (CDH1), Cowden syndrome (PTEN), Li Fraumeni syndrome (TP53), Peutz-Jeghers syndrome (STK11), MUTYH Associated Polyposis (MUTYH), or Neurofibromatosis type 1 (NF1).    We reviewed the autosomal dominant inheritance of these genes. Sarah cannot pass on a mutation in any of these genes to her children based on this test result. Mutations in these genes do not skip generations.      A copy of the test report can be found in the Media tab and named \"Genetics Scan -[facility]\". The report is scanned in as a linked " document.    Interpretation:  We discussed several different interpretations of this inconclusive test result. It is not clear if this variant in the BLOSSOM gene is associated with increased cancer risk.  1. This variant may be a benign change that does not increase cancer risk.  2. This variant may be a harmful mutation that causes increased risk for breast cancer.     Cancer Risks:    Individuals with an BLOSSOM mutation are at increased risk of certain cancers.  These risks may be influenced by family history:     The lifetime breast cancer risk for women who carry one BLOSSOM mutation is approximately 24-48%, which is a 2-4 fold increase compared to the general population lifetime population risk of 12%. Some mutations in the BLOSSOM gene may confer a higher risk for breast cancer.    We also discussed the association of BLOSSOM mutations with increased risk for pancreatic and prostate cancer; however data is still limited in this area.  Though no exact lifetime risks are available at this time, there may be an increased risk for other cancers.      We reviewed that the BLOSSOM gene is currently considered a moderate-risk gene. This means that mutations in this gene increase the risk for certain cancers, but are unlikely to be the single cause for an individual's cancer. There are likely other genetic and/or environmental risk factors that, in combination with the BLOSSOM gene mutation, may be associated with the cancers in her and her family.   We also discussed reproductive implications of having an BLOSSOM mutation. In rare situations in which both parents have a mutation in the BLOSSOM gene, their children each have a 25% risk for ataxia-telangiectasia. Ataxia-telangiectasia is a rare autosomal recessive disorder that typically presents in childhood and can present with widened blood vessels called telangiectasias, progressive neurologic symptoms, immune deficiency, and increased risk for childhood cancers. If this variant is later classified  as harmful, Sarah would be considered a carrier for Ataxia telangectasia. In that case, genetic counseling and genetic testing may be advised for her partner.    The laboratory is working to determine if this variant is harmful or benign, and they will contact me if it is reclassified. If this variant is determined to be a benign change, there may be a different gene or combination of genes and environment that are associated with the cancers in Sarah and/or her relatives.      It is also important to consider that her paternal grandmother may have had a mutation in one of the genes tested and she did not inherit it.     Inheritance:  We reviewed the autosomal dominant inheritance of this variant in the BLOSSOM gene.  We discussed that Sarah has a 50% chance to pass this variant to each of her children.  Likewise, each of her siblings has a 50% risk of having the same variant. Because it is unclear what, if any, risk is associated with this variant, clinical genetic testing for this BLOSSOM variant alone is not recommended for relatives.    Family Studies:  The laboratory may offer additional research based testing for specific relatives in order to help reclassify this variant.    Screening:  Based on this inconclusive test result, it is important for Sarah and her relatives to refer back to the family history for appropriate cancer screening.      Sarah s close female relatives remain at increased risk for breast cancer given their family history. Breast cancer screening is generally recommended to begin approximately 10 years younger than the earliest age of breast cancer diagnosis in the family, or at age 40, whichever comes first. In this family, screening may begin at age 39. Breast screening options should be discussed with an individual's primary care provider and a genetic counselor, to determine at what age to begin screening, what screening is appropriate, and if additional screening (such as breast MRI) is  necessary based on personal/family history factors.      We discussed that Sarah likely has some increased risk for pancreatic cancer and colon cancer given her maternal aunt's and uncles' histories as well as her paternal grandfather's history, but routine screening is typically not recommended.  Sarah is encouraged to discuss this history with her care providers.      Other population cancer screening options, such as those recommended by the American Cancer Society and the National Comprehensive Cancer Network (NCCN), are also appropriate for Sarah and her family. These screening recommendations may change if there are changes to Sarah's personal and/or family history. Final screening recommendations should be made by each individual's managing physician.    Additional Testing Considerations:  It is possible Sarah does carry a currently identifiable gene or combination of genes and environment that increase her risk for pancreatic and/or colon cancer. We again reviewed the option of larger gene panels covering more moderate risk genes associated with pancreatic and colon cancer. Sarah is encouraged to contact me if she wishes to readdress these larger gene panel options.    Although Sarah's genetic testing result is inconclusive, other relatives may still carry a harmful gene mutation associated with pancreatic and/or colon cancer. Genetic counseling is recommended for Sarah's relatives with a personal/family history of pancreatic cancer histories and her siblings to discuss genetic testing options.  If any of these relatives do pursue genetic testing, Sarah is encouraged to contact me so that we may review the impact of their test results on Sarah.    Summary:  We do not have an explanation for her breast cancer. Because of that, it is important that she continue with cancer screening based on her personal and family history as discussed above.    Genetic testing is rapidly advancing, and new cancer  susceptibility genes will most likely be identified in the future. Therefore, I encouraged Sarah to contact me annually or if there are changes in her personal or family history. This may change how we assess her cancer risk, screening, and the testing we would offer.    Plan:  1.  I provided Sarah with a copy of her test results today.    2. She plans to follow-up with her oncology care team.  3. She should contact me annually, or sooner if her family history changes.  4. I will contact Sarah if the laboratory informs me that this VUS has been reclassified.  This may change screening and testing recommendations for Sarah and her relatives.    If Sarah has any further questions, I encouraged her to contact me at [phone].      Time spent over the phone: 30 minutes    Qi Stein MS, [facility]  Licensed Genetic Counselor  [facility]  [phone]

## 2021-05-30 NOTE — PROGRESS NOTES
Preoperative Exam    Scheduled Procedure: Double Mastectomy  Surgery Date:  7.30.19  Surgery Location: [facility], fax [phone]    Surgeon:  Singh Hemphill    Assessment/Plan:     1. Preop general physical exam  2. Malignant neoplasm of right female breast, unspecified estrogen receptor status, unspecified site of breast (H)  - Comprehensive Metabolic Panel  - APTT(PTT)  - HM1(CBC and Differential)  - HM1 (CBC with Diff)  - Electrocardiogram Perform and Read    Surgical Procedure Risk: Low (reported cardiac risk generally < 1%)  Have you had prior anesthesia?: Yes  Have you or any family members had a previous anesthesia reaction:  No  Do you or any family members have a history of a clotting or bleeding disorder?: No  Cardiac Risk Assessment: no increased risk for major cardiac complications    APPROVAL GIVEN to proceed with proposed procedure, without further diagnostic evaluation    Functional Status: Independent  Patient plans to recover at home with family.     Subjective:      Sarah Gilmore is a 49 y.o. female who presents for a preoperative consultation. She was recently diagnosed with breast cancer from her pathology results from her right breast, 3 o'clock position, subareolar, stereotactic needle core biopsy performed on 5/22/19. Patient is working with her oncologist for mastectomy. Patient has no concerns today. She denied chest pain, shortness of breath, fever and chills.      All other systems reviewed and are negative, other than those listed in the HPI.    Pertinent History  Do you have difficulty breathing or chest pain after walking up a flight of stairs: No  History of obstructive sleep apnea: No  Steroid use in the last 6 months: No  Frequent Aspirin/NSAID use: No  Prior Blood Transfusion: No  Prior Blood Transfusion Reaction: No  If for some reason prior to, during or after the procedure, if it is medically indicated, would you be willing to have a blood transfusion?:  There is no  transfusion refusal.    Current Outpatient Medications   Medication Sig Dispense Refill     EPINEPHrine (EPIPEN JR) 0.15 mg/0.3 mL injection Inject 0.3 mL (0.15 mg total) into the shoulder, thigh, or buttocks as needed for anaphylaxis. 1 each 12     hydrOXYzine pamoate (VISTARIL) 25 MG capsule Take 1 capsule (25 mg total) by mouth 3 (three) times a day as needed for itching. 60 capsule 0     ondansetron (ZOFRAN) 4 MG tablet Take 1 tablet (4 mg total) by mouth daily as needed for nausea. 30 tablet 1     No current facility-administered medications for this visit.         Allergies   Allergen Reactions     Bee Pollen Anaphylaxis     Iodinated Contrast- Oral And Iv Dye Anaphylaxis       Patient Active Problem List   Diagnosis     Menorrhagia with irregular cycle     Malignant neoplasm of right female breast (H)       Past Medical History:   Diagnosis Date     Anemia      Osteopenia      Osteoporosis        Past Surgical History:   Procedure Laterality Date      SECTION, CLASSIC       HYSTERECTOMY Bilateral 3/13/2018    Procedure: TOTAL LAPAROSCOPIC HYSTERECTOMY BILATERAL SALPINGECTOMY, CYSTOSCOPY;  Surgeon: Magi White MD;  Location: [facility];  Service:      MAMMO STEREOTACTIC CORE BIOPSY RIGHT Right 2019     MAMMO STEREOTACTIC CORE BIOPSY RIGHT Right 2019     OOPHORECTOMY       right arm surgery         Social History     Socioeconomic History     Marital status: Single     Spouse name: Not on file     Number of children: Not on file     Years of education: Not on file     Highest education level: Not on file   Occupational History     Not on file   Social Needs     Financial resource strain: Not on file     Food insecurity:     Worry: Not on file     Inability: Not on file     Transportation needs:     Medical: Not on file     Non-medical: Not on file   Tobacco Use     Smoking status: Never Smoker     Smokeless tobacco: Never Used   Substance and Sexual Activity     Alcohol use: No      "Drug use: No     Sexual activity: Never   Lifestyle     Physical activity:     Days per week: Not on file     Minutes per session: Not on file     Stress: Not on file   Relationships     Social connections:     Talks on phone: Not on file     Gets together: Not on file     Attends Christian service: Not on file     Active member of club or organization: Not on file     Attends meetings of clubs or organizations: Not on file     Relationship status: Not on file     Intimate partner violence:     Fear of current or ex partner: Not on file     Emotionally abused: Not on file     Physically abused: Not on file     Forced sexual activity: Not on file   Other Topics Concern     Not on file   Social History Narrative     Not on file       Patient Care Team:  Laura Lobo FNP as PCP - General (Nurse Practitioner)          Objective:     Vitals:    07/18/19 1432   BP: 119/58   Pulse: 86   Temp: 98.6  F (37  C)   TempSrc: Oral   SpO2: 99%   Weight: (!) 255 lb 2 oz (115.7 kg)   Height: 5' 9.5\" (1.765 m)   LMP: 03/03/2018         Physical Exam:  /58   Pulse 86   Temp 98.6  F (37  C) (Oral)   Ht 5' 9.5\" (1.765 m)   Wt (!) 255 lb 2 oz (115.7 kg)   LMP 03/03/2018   SpO2 99%   BMI 37.14 kg/m    General appearance: alert, appears stated age and cooperative  Head: Normocephalic, without obvious abnormality, atraumatic  Eyes: conjunctivae/corneas clear. PERRL, EOM's intact. Fundi benign.  Throat: lips, mucosa, and tongue normal; teeth and gums normal  Neck: no adenopathy, no carotid bruit, no JVD, supple, symmetrical, trachea midline and thyroid not enlarged, symmetric, no tenderness/mass/nodules  Lungs: clear to auscultation bilaterally  Heart: regular rate and rhythm, S1, S2 normal, no murmur, click, rub or gallop  Abdomen: soft, non-tender; bowel sounds normal; no masses,  no organomegaly  Extremities: extremities normal, atraumatic, no cyanosis or edema  Pulses: 2+ and symmetric  Skin: Skin color, texture, " turgor normal. No rashes or lesions  Lymph nodes: Cervical, supraclavicular, and axillary nodes normal.  Neurologic: Grossly normal      There are no Patient Instructions on file for this visit.    EKG:  Normal sinus rhythm   Normal ECG   When compared with ECG of 21-SEP-2003 19:34,   No significant change was found     Labs:  Recent Results (from the past 240 hour(s))   Electrocardiogram Perform and Read    Collection Time: 07/18/19  3:14 PM   Result Value Ref Range    SYSTOLIC BLOOD PRESSURE  mmHg    DIASTOLIC BLOOD PRESSURE  mmHg    VENTRICULAR RATE 76 BPM    ATRIAL RATE 76 BPM    P-R INTERVAL 178 ms    QRS DURATION 104 ms    Q-T INTERVAL 410 ms    QTC CALCULATION (BEZET) 461 ms    P Axis 45 degrees    R AXIS -13 degrees    T AXIS 27 degrees    MUSE DIAGNOSIS       Normal sinus rhythm  Normal ECG  When compared with ECG of 21-SEP-2003 19:34,  No significant change was found      and Labs pending at this time.  Results will be reviewed when available.    Immunization History   Administered Date(s) Administered     Tdap 06/23/2016           Electronically signed by IGOR Sweet 07/18/19 2:30 PM

## 2021-05-30 NOTE — ANESTHESIA PROCEDURE NOTES
Peripheral Block    Patient location during procedure: pre-op  Start time: 7/30/2019 10:35 AM  End time: 7/30/2019 10:50 AM  post-op analgesia per surgeon order as noted in medical record  Preanesthetic Checklist  Completed: patient identified, site marked, risks, benefits, and alternatives discussed, timeout performed, consent obtained, airway assessed, oxygen available, suction available, emergency drugs available and hand hygiene performed  Peripheral Block  Block type: other, paravertebral - thoracic  Prep: ChloraPrep  Patient position: prone  Patient monitoring: cardiac monitor, continuous pulse oximetry, heart rate and blood pressure  Laterality: bilateral, same technique used bilaterally  Injection technique: ultrasound guided    Ultrasound used to visualize needle placement in proximity to nerve being blocked: yes   Permanent ultrasound image captured for medical record  Sterile gel and probe cover used for ultrasound.    Needle  Needle gauge: 20G  Needle length: 4 in  no peripheral nerve catheter placed  Assessment  Injection assessment: no difficulty with injection  Additional Notes    B/L T3-6 paravertebral blocks (6 needle insertion sites - (3 levels B/L)). Patient tolerated procedure well.

## 2021-05-30 NOTE — ANESTHESIA POSTPROCEDURE EVALUATION
Patient: Sarah Gilmore  BILATERAL MASTECTOMY, WITH RIGHT SENTINEL LYMPH NODE BIOPSY @0940, BILATERAL IMPLANT RECONSTRUCTION TO BREASTS  Anesthesia type: general    Patient location: PACU  Last vitals:   Vitals Value Taken Time   /60 7/30/2019  4:00 PM   Temp 36.8  C (98.2  F) 7/30/2019  3:10 PM   Pulse 68 7/30/2019  4:01 PM   Resp 22 7/30/2019  4:01 PM   SpO2 96 % 7/30/2019  4:01 PM   Vitals shown include unvalidated device data.  Post vital signs: stable  Level of consciousness: awake and responds to simple questions  Post-anesthesia pain: pain controlled  Post-anesthesia nausea and vomiting: no  Pulmonary: unassisted, return to baseline  Cardiovascular: stable and blood pressure at baseline  Hydration: adequate  Anesthetic events: no    QCDR Measures:  ASA# 11 - Niesha-op Cardiac Arrest: ASA11B - Patient did NOT experience unanticipated cardiac arrest  ASA# 12 - Niesha-op Mortality Rate: ASA12B - Patient did NOT die  ASA# 13 - PACU Re-Intubation Rate: ASA13B - Patient did NOT require a new airway mgmt  ASA# 10 - Composite Anes Safety: ASA10A - No serious adverse event    Additional Notes:

## 2021-05-31 VITALS — HEIGHT: 69 IN | WEIGHT: 260 LBS | BODY MASS INDEX: 38.51 KG/M2

## 2021-05-31 VITALS — HEIGHT: 70 IN | WEIGHT: 260 LBS | BODY MASS INDEX: 37.22 KG/M2

## 2021-05-31 VITALS — WEIGHT: 252 LBS | BODY MASS INDEX: 37.33 KG/M2 | HEIGHT: 69 IN

## 2021-05-31 NOTE — PROGRESS NOTES
"[facility] with Dr. Colby.  She is accompanied by her mother for appointment.  RN assessment and EMR update.  /66 (Patient Site: Left Arm, Patient Position: Sitting)   Pulse 83   Resp 16   Ht 5' 5\" (1.651 m)   Wt (!) 249 lb 1.9 oz (113 kg)   LMP 03/03/2018   SpO2 99%   BMI 41.46 kg/m  .  Sarah said her right drain started coming out by itself and she developed an infection in her right mastectomy side.  She had a temp that was as high as 103.3.  She has been on antibiotics and following closely with Dr. Crum.  She feels that it is improving.  Patient met with Dr. Colby, see dictation for details.  She will plan to meet with Onc and Rad Onc and continue to follow with Dr. Crum.  She will see Dr. Colby again in a years, sooner as needed. RN time 20 mins  "

## 2021-05-31 NOTE — PROGRESS NOTES
"Dear Dr. Lobo, Laura C, Fnp  [address] [facility]  [address]  [place] [address]    Thank you for the opportunity to participate in the care of Ms. Sarah Gilmore.    She is a 49 y.o. female who comes to the clinic with a chief complaint of excessive daytime sleepiness that is been going on for approximately a year.  The patient has been informed by his friends and family members that she has significant pauses in her breathing during sleep followed by loud snoring.  Patient was recently diagnosed with cancer currently scheduled to undergo chemo and radiation therapy.  The patient's review of system is otherwise negative.     Ideal Sleep-Wake Cycle(devoid of societal pressure):    Patient would try to initiate sleep at around 9-10 PM with a sleep latency of variable length. The patient would have 2-4 awakenings. Final wake up time is around 6-8 AM.    Past Medical History  Past Medical History:   Diagnosis Date     Anemia      Breast cancer (H)      Dilated aortic root (H)      History of anesthesia complications     slow to wake up     Osteopenia      Osteoporosis      PONV (postoperative nausea and vomiting)      Seizures (H)     h/o seizure disorder in childhood, last seizure activity  \"4 yrs ago\"     Suspected sleep apnea     sleep study set up in Aug 2019        Past Surgical History  Past Surgical History:   Procedure Laterality Date      SECTION, CLASSIC       HYSTERECTOMY Bilateral 3/13/2018    Procedure: TOTAL LAPAROSCOPIC HYSTERECTOMY BILATERAL SALPINGECTOMY, CYSTOSCOPY;  Surgeon: Magi White MD;  Location: [facility];  Service:      MAMMO STEREOTACTIC CORE BIOPSY RIGHT Right 2019     MAMMO STEREOTACTIC CORE BIOPSY RIGHT Right 2019     NM SENTINEL NODE INJECTION  2019     OOPHORECTOMY       CO MASTECTOMY, MODIFIED RADICAL Bilateral 2019    Procedure: BILATERAL MASTECTOMY;  Surgeon: Mckenzie Colby DO;  Location: [facility] OR;  Service: General     CO " REPLACE TISSUE EXPANDER Bilateral 7/30/2019    Procedure: BILATERAL IMPLANT RECONSTRUCTION TO BREASTS;  Surgeon: Carlitos Crum MD;  Location: [facility];  Service: Plastics     right arm surgery          Meds  Current Outpatient Medications   Medication Sig Dispense Refill     cyclobenzaprine (FLEXERIL) 10 MG tablet Take 1 tablet (10 mg total) by mouth 3 (three) times a day as needed for muscle spasms. 30 tablet 0     docusate sodium (COLACE) 100 MG capsule Take 1 capsule (100 mg total) by mouth 2 (two) times a day.  0     EPINEPHrine (EPIPEN JR) 0.15 mg/0.3 mL injection Inject 0.3 mL (0.15 mg total) into the shoulder, thigh, or buttocks as needed for anaphylaxis. 1 each 12     hydrOXYzine pamoate (VISTARIL) 25 MG capsule Take 1 capsule (25 mg total) by mouth 3 (three) times a day as needed for itching. 60 capsule 0     ibuprofen (ADVIL,MOTRIN) 600 MG tablet Take 1 tablet (600 mg total) by mouth every 6 (six) hours as needed for pain. 90 tablet 0     levoFLOXacin (LEVAQUIN) 500 MG tablet Take 500 mg by mouth.       ondansetron (ZOFRAN) 4 MG tablet Take 1 tablet (4 mg total) by mouth every 8 (eight) hours as needed for nausea. 10 tablet 1     No current facility-administered medications for this visit.         Allergies  Bee pollen; Bee venom protein (honey bee); and Iodinated contrast media     Social History  Social History     Socioeconomic History     Marital status: Single     Spouse name: Not on file     Number of children: Not on file     Years of education: Not on file     Highest education level: Not on file   Occupational History     Not on file   Social Needs     Financial resource strain: Not on file     Food insecurity:     Worry: Not on file     Inability: Not on file     Transportation needs:     Medical: Not on file     Non-medical: Not on file   Tobacco Use     Smoking status: Never Smoker     Smokeless tobacco: Never Used   Substance and Sexual Activity     Alcohol use: No     Drug use: No      Sexual activity: Never   Lifestyle     Physical activity:     Days per week: Not on file     Minutes per session: Not on file     Stress: Not on file   Relationships     Social connections:     Talks on phone: Not on file     Gets together: Not on file     Attends Scientologist service: Not on file     Active member of club or organization: Not on file     Attends meetings of clubs or organizations: Not on file     Relationship status: Not on file     Intimate partner violence:     Fear of current or ex partner: Not on file     Emotionally abused: Not on file     Physically abused: Not on file     Forced sexual activity: Not on file   Other Topics Concern     Not on file   Social History Narrative     Not on file        Family History  Family History   Problem Relation Age of Onset     Mental illness Mother      Diabetes Father      No Medical Problems Sister      No Medical Problems Brother      Asperger's syndrome Son      Suicidality Maternal Grandfather      Breast cancer Paternal Grandmother 50     Prostate cancer Paternal Grandfather      Mental illness Son         Review of Systems:  Constitutional: Negative except as noted in HPI.   Eyes: Negative except as noted in HPI.   ENT: Negative except as noted in HPI.   Cardiovascular: Negative except as noted in HPI.   Respiratory: Negative except as noted in HPI.   Gastrointestinal: Negative except as noted in HPI.   Genitourinary: Negative except as noted in HPI.   Musculoskeletal: Negative except as noted in HPI.   Integumentary: Negative except as noted in HPI.   Neurological: Negative except as noted in HPI.   Psychiatric: Negative except as noted in HPI.   Endocrine: Negative except as noted in HPI.   Hematologic/Lymphatic: Negative except as noted in HPI.      STOP BANG 8/29/2019   Do you snore loudly (louder than talking or loud enough to be heard through closed doors)? 1   Do you often feel tired, fatigued, or sleepy during daytime? 1   Has anyone observed  "you stop breathing in your sleep? 1   Do you have or are you being treated for high blood pressure? 0   BMI more than 35 kg/m2 1   Age over 50 years old? 1   Neck circumference greater than 16 inches? 0   Gender male? 0   Total Score 5     Epworths Sleepiness Scale 8/29/2019   Sitting and reading 3   Watching TV 3   Sitting, inactive in a public place (e.g. a theatre or a meeting) 3   As a passenger in a car for an hour without a break 3   Lying down to rest in the afternoon when circumstances permit 3   Sitting and talking to someone 0   Sitting quietly after a lunch without alcohol 3   In a car, while stopped for a few minutes in traffic 0   Total score 18     Rooming 8/29/2019   Usual bedtime 9-10   Sleep Latency varies   Awakenings 2-4 times   Wake Up Time 5am   Weekends varies   Energy Drinks no   Coffee yes   Cola yes   Difficulty falling asleep Yes   Difficulty staying asleep Yes   Excessive daytime tiredness Yes   Excessive daytime sleepiness Yes   Dozing off while driving No   Shift Worker No   Sleep Walking? No   Sleep Talking? No   Kicking or punching? No   Restless legs symptoms No       Physical Exam:  BP (!) 122/93   Pulse 84   Ht 5' 5\" (1.651 m)   Wt (!) 250 lb (113.4 kg)   LMP 03/03/2018   SpO2 97%   BMI 41.60 kg/m    BMI:Body mass index is 41.6 kg/m .   GEN: NAD, morbidly obese  Head: Normocephalic.  EYES: PERRLA, EOMI  ENT: Oropharynx is clear, Tanner class 4+ airway.   Nasal mucosa is moist without erythema  Neck : Thyroid is within normal limits.  CV: Regular rate and rhythm, S1 & S2 positive.  LUNGS: Bilateral breathsounds heard.   ABDOMEN: Positive bowel sounds in all quadrants, soft, no rebound or guarding  MUSCULOSKELETAL: Bilateral trace leg swelling  SKIN: warm, dry, no rashes  Neurological: Alert, oriented to time, place, and person.  Psych: normal mood, normal affect     Labs/Studies:     Lab Results   Component Value Date    WBC 6.9 07/18/2019    HGB 13.7 07/18/2019    HCT 41.0 " 07/18/2019    MCV 90 07/18/2019     07/18/2019         Chemistry        Component Value Date/Time     07/18/2019 1520    K 4.4 07/18/2019 1520     07/18/2019 1520    CO2 24 07/18/2019 1520    BUN 9 07/18/2019 1520    CREATININE 0.84 07/18/2019 1520    GLU 79 07/18/2019 1520        Component Value Date/Time    CALCIUM 9.2 07/18/2019 1520    ALKPHOS 66 07/18/2019 1520    AST 12 07/18/2019 1520    ALT 12 07/18/2019 1520    BILITOT 0.3 07/18/2019 1520            No results found for: FERRITIN  Lab Results   Component Value Date    TSH 2.65 06/30/2016         Assessment and Plan:  In summary Sarah Gilmore is a 49 y.o. year old female here for sleep disturbance.  1.  Suspected sleep apnea   Mr. Sarah Gilmore has high risk for obstructive sleep apnea based on the history of witnessed apnea, snoring and a crowded airway. I educated the patient on the underlying pathophysiology of obstructive sleep apnea. We reviewed the risks associated with sleep apnea, including increased cardiovascular risk and overall death. We talked about treatments briefly. I recommend getting an split-night nocturnal polysomnography. The patient should return to the clinic to discuss results and treatment option in a patient-centered approach.  2.  Hypersomnia  3.  Snoring  4.  Elevated blood pressure reading  I will have the patient's blood pressure readings repeated during this clinic visit. I strongly advised the patient to follow up with PCP in one week.    Patient verbalized understanding of these issues, agrees with the plan and all questions were answered today. Patient was given an opportuntity to voice any other symptoms or concerns not listed above. Patient did not have any other symptoms or concerns.      Patient told to return in one week after the sleep study is interpreted.      Darshan Chritsianson DO  Board Certified in Internal Medicine and Sleep Medicine  [facility].    (Note created with Dragon  voice recognition and unintended spelling errors and word substitutions may occur)

## 2021-05-31 NOTE — TELEPHONE ENCOUNTER
Telephoned and spoke with Sarah to assist in scheduling medical and radiation oncology consults as ordered by Dr. Colby for patient recent diagnosis of breast cancer.     Appointments created with Dr. Burroughs on 9/4/19 at 11:00am (10:30 arrival) at [facility], and with Dr. Renee on 9/4/19 at 1:30pm (1:00 arrival) at [facility].    We did correct Sarah's address and welcome letter, health history form, and medications/allergy list will be sent in US mail to her home.   Bhavya Britton RN

## 2021-05-31 NOTE — PROGRESS NOTES
"HISTORY:  Sarah Gilmore is s/p bilateral mastectomy with DTI reconstruction on 7/31.  She is finally doing better.  She got started on some antibiotics by Dr. Crum because of a possible infection.  She notes she had some pain and redness around her drain site on the right.  Her left drain got pulled mid last week.  Her right drain got pulled last Friday.    PHYSICAL EXAM:  Vitals:    08/19/19 0927   BP: 108/66   Patient Site: Left Arm   Patient Position: Sitting   Pulse: 83   Resp: 16   SpO2: 99%   Weight: (!) 249 lb 1.9 oz (113 kg)   Height: 5' 5\" (1.651 m)     BREAST: Drain sites healing well without evidence of erythema or underlying infection.  Bilateral breast incisions healing well without erythema or signs of ischemia.    PATHOLOGY:  A) RIGHT BREAST SENTINEL LYMPH NODE, BIOPSY:        - RARE ISOLATED CYTOKERATIN(+) CELLS IDENTIFIED, INSUFFICIENT FOR DIAGNOSIS OF          MICROMETASTASIS     B) RIGHT BREAST, MASTECTOMY:        - MULTIFOCAL DUCTAL CARCINOMA IN-SITU, SOLID, PAPILLARY, AND MICROPAPILLARY TYPES          WITH FOCI OF COMEDONECROSIS        - NUCLEAR GRADE 2 OF 3        - TUMOR IS LOCATED 0.5 MM FROM THE UPPER INNER QUADRANT SOFT TISSUE MARGIN        - DCIS IS IDENTIFIED ON 21 SLIDES OF 50 EXAMINED, LARGEST SINGLE FOCUS MEASURES          0.7 CM IN GREATEST DIMENSION        - NO EVIDENCE OF INVASIVE CARCINOMA (IMMUNOSTAINS PERFORMED ON MULTIPLE BLOCKS)        - MULTIPLE MICROPAPILLOMAS AND RADIAL SCARS ARE IDENTIFIED        - PLEASE SEE SYNOPTIC REPORT FOR ADDITIONAL DETAILS     C) RIGHT BREAST SENTINEL LYMPH NODE, BIOPSY:        - METASTATIC DUCTAL CARCINOMA INVOLVING ONE LYMPH NODE OF FIVE EXAMINED (1/5)        - GRADE: ESTIMATED LEE GRADE I OF III (WELL-DIFFERENTIATED)        - METASTATIC DEPOSIT MEASURES 2.2 MM IN GREATEST DIMENSION, MACROMETASTASIS,          CYTOKERATIN(+)        - BREAST CANCER PROGNOSTIC MARKER ANALYSIS:          - ESTROGEN RECEPTOR: POSITIVE (GREATER THAN 90% OF " CELLS, STRONG STAINING)          - PROGESTERONE RECEPTOR: POSITIVE (90% OF CELLS, MODERATE TO STRONG STAINING)          - HER2/BROCK: NEGATIVE (1-2+ OUT OF 3 MEMBRANE STAINING)     D) LEFT BREAST, MASTECTOMY:        - FOCUS OF DUCTAL CARCINOMA IN-SITU, SOLID, PAPILLARY, AND MICROPAPILLARY TYPES          (APPROXIMATELY 2 MM IN GREATEST DIMENSION), GRADE 1 OF 3, WITHOUT COMEDONECROSIS        - MULTIPLE MICROPAPILLOMAS        - MICROSCOPIC FOCI OF ATYPICAL LOBULAR HYPERPLASIA        - BENIGN NIPPLE AND SKIN        - BENIGN MICROCALCIFICATIONS IDENTIFIED WITHIN FIBROCYSTIC CHANGES        - HISTOLOGICALLY UNREMARKABLE SURGICAL MARGINS        - DCIS IS LOCATED GREATER THAN 1 CM FROM THE SURGICAL MARGINS     ASSESSMENT:  Sarah Gilmore is s/p bilateral mastectomy for DCIS, but also an invasive component since IDC was found in 2 of 6 lymph nodes  ER/IN+, HER2-, grade 1    PLAN:  Pathology was discussed  Referrals were placed for medical oncology and radiation oncology  Will no longer have yearly mammograms  All physical activity restrictions per Dr. Crum.  Continue antibiotics per Dr. Crum.  Return to the breast clinic in 1 year, or earlier as needed    Mckenzie Colby, DO  [facility]  [phone]

## 2021-06-01 ENCOUNTER — OFFICE VISIT - HEALTHEAST (OUTPATIENT)
Dept: FAMILY MEDICINE | Facility: CLINIC | Age: 51
End: 2021-06-01

## 2021-06-01 DIAGNOSIS — D05.12 DUCTAL CARCINOMA IN SITU (DCIS) OF BOTH BREASTS: ICD-10-CM

## 2021-06-01 DIAGNOSIS — D05.11 DUCTAL CARCINOMA IN SITU (DCIS) OF BOTH BREASTS: ICD-10-CM

## 2021-06-01 DIAGNOSIS — Z01.818 PREOP GENERAL PHYSICAL EXAM: ICD-10-CM

## 2021-06-01 LAB
ANION GAP SERPL CALCULATED.3IONS-SCNC: 13 MMOL/L (ref 5–18)
BUN SERPL-MCNC: 10 MG/DL (ref 8–22)
CALCIUM SERPL-MCNC: 9.5 MG/DL (ref 8.5–10.5)
CHLORIDE BLD-SCNC: 107 MMOL/L (ref 98–107)
CO2 SERPL-SCNC: 19 MMOL/L (ref 22–31)
CREAT SERPL-MCNC: 0.71 MG/DL (ref 0.6–1.1)
GFR SERPL CREATININE-BSD FRML MDRD: >60 ML/MIN/1.73M2
GLUCOSE BLD-MCNC: 74 MG/DL (ref 70–125)
HGB BLD-MCNC: 14.2 G/DL (ref 12–16)
INR PPP: 0.97 (ref 0.9–1.1)
POTASSIUM BLD-SCNC: 4.2 MMOL/L (ref 3.5–5)
SODIUM SERPL-SCNC: 139 MMOL/L (ref 136–145)

## 2021-06-01 NOTE — PATIENT INSTRUCTIONS - HE
Cleared for surgery     Follow their NPO guidelines     Ensure only essential meds to be taken, otherwise save for after the procedure     Will fax the note over this evening     Let the team know if you get any new fevers or illnesses in case they need to delay     Make sure that the anesthesiologist is aware of any reaction concerns.     Stop taking Ibuprofen or other NSAID 7 days before surgery.

## 2021-06-01 NOTE — PROCEDURES
Procedures  Clinical Treatment Planning Note    The complex radiotherapy planning will be completed for the patient to plan the treatment for her breast cancer.  The patient had a planning CT earlier today for planning.  The treatment aids were used for planning, including headrest and Wing Board to help keep the same position during the daily radiation therapy.  The therapy planning is necessary to reduce radiotherapy dose to the normal critical organs which are not possible with simple treatment.  In addition, dose to the target and the critical structures requires three-dimensional analysis of the isodose distribution.  The planning will be done to reduce dose to the lungs, spinal cord, liver, and heart.     I will contour the clinical tumor volume  CTV , with expanded volume of planning treatment volume  PTV  on the treatment planning system.  The critical structures will be outlined, including spinal cord, lungs, heart, liver, bone and soft tissue.     Treatment planning will be done on the computer treatment planning system.  The tangential field will be used to achieve optimal coverage of the target volume.  Dose distribution to the above critical structures will be reviewed.  Isodose distribution along with the X, Y, Z plan will also be reviewed.  Custom blocking will be used to shield normal structures.  The beam s eye views will be reviewed and the digital reconstructed image will be reviewed on the planning software.      The patient will receive 5040 cGy in 28 treatments targeted to the right chest/breast and regional lymph nodes using 6-MV or 10-MV photons.  An additional 1000 cGy in 5 fractions will be planned to give to the primary tumor bed using electron.        Shira Renee MD, PhD  Department of Radiation Oncology   [facility]  Tel: [phone]  Page: [phone]    [facility]  [address] [place] [address]     [place] [id number] [place] [address]

## 2021-06-01 NOTE — CONSULTS
[facility] Hematology and Oncology Consult Note    Patient: Sarah Gilmore  MRN: [id number]  Date of Service: 09/04/2019      Reason for Visit    Chief Complaint   Patient presents with     HE Cancer     Breast Cancer       Assessment/Plan    ECOG Performance   ECOG Performance Status: 0  Distress Assessment  Distress Assessment Score: 2(new dx)    #.  DCIS of both breasts, pTx pN1a M0 invasive ductal carcinoma of the right breast, grade 1.  ER positive, AL positive, HER-2 negative.     I reviewed her pathology result in detail with the patient and her mother.  Primary tumor of the invasive portion of the right breast was not able to identified, however there is evidence of invasive ductal carcinoma in the 1 of the 5 lymph node examined.  I explained to her that it will be determined as pTx.  The question here is whether she has indication for adjuvant chemotherapy.  To further clarify that, I will send out Oncotype DX to the right axillary lymph node positive for invasive cancer.  Because of the hormone receptor positive biology, she has clear indication for adjuvant endocrine therapy.  I discussed about timing of treatment as surgery, followed by chemotherapy (if indicated), followed by radiation therapy (if indicated), followed by endocrine therapy.  At this point, she will still need to address the infection of the right breast.   I discussed about adjuvant endocrine therapy with tamoxifen due to her age and history of osteoporosis.  She still have intact bilateral ovaries.  I reviewed the side effects and duration of treatment will be minimum of 5 years.   We will call when the Oncotype results is available and time to follow-up.     #.  History of osteoporosis.   She is not currently not taking calcium or vitamin D but reported she was.  Encouraged her to resume calcium vitamin D and weightbearing exercises for bone health aspect.    #.  MRSA infection of the right breast around the implant.   Follow-up with  "Dr. Crum as planned.    Reviewed the pathology results with our pathologist.    Problem List    1. Malignant neoplasm of right female breast, unspecified estrogen receptor status, unspecified site of breast (H)  Oncotype Test   2. 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       ______________________________________________________________________________    Staging History    Cancer Staging  Breast neoplasm, Tis (DCIS), right  Staging form: Breast, AJCC 8th Edition  - Clinical: No stage assigned - Unsigned  - Pathologic stage from 9/3/2019: pTis (DCIS), pN1a, cM0, ER+, NJ+, HER2- - Signed by Ez Burroughs MD on 9/3/2019      History    Ms. Sarah Gilmore is a very pleasant 49-year-old female accompanied by her mother.  She was recently diagnosed with DCIS of the right breast, subsequently underwent right breast mastectomy and right sentinel lymph node biopsy.  Interestingly, 1 of the 5 sentinel lymph node was positive for invasive ductal carcinoma (ER positive, NJ positive, HER-2 negative).  She had immediate reconstruction.  She is now dealing with MRSA infection around the implant in the right breast and planning for surgery tomorrow by Dr. Crum.    Reported history of aortic root enlargement which has been followed by CT annually and [facility] and overall stable in the last several years.  History of osteoporosis.    G3, P3.  Age at first pregnancy 19.  Menarche at age 10.  LMP at age 48 from hysterectomy.    She is .  She has 3 children.  She is a dialysis technician.  Never smoker.  She does not drink alcohol.    Past History    Past Medical History:   Diagnosis Date     Anemia      Breast cancer (H)      Dilated aortic root (H)      History of anesthesia complications     slow to wake up     Osteopenia      Osteoporosis      PONV (postoperative nausea and vomiting)      Seizures (H)     h/o seizure disorder in childhood, last seizure activity  \"4 yrs ago\"     Suspected sleep apnea     sleep " study set up in Aug 2019    Family History   Problem Relation Age of Onset     Mental illness Mother      Sleep apnea Mother      Diabetes Father      No Medical Problems Sister      No Medical Problems Brother      Asperger's syndrome Son      Suicidality Maternal Grandfather      Breast cancer Paternal Grandmother 50     Prostate cancer Paternal Grandfather      Testicular cancer Paternal Grandfather      Mental illness Son      Leukemia Paternal Uncle       Past Surgical History:   Procedure Laterality Date      SECTION, CLASSIC       HYSTERECTOMY Bilateral 3/13/2018    Procedure: TOTAL LAPAROSCOPIC HYSTERECTOMY BILATERAL SALPINGECTOMY, CYSTOSCOPY;  Surgeon: Magi White MD;  Location: [facility];  Service:      MAMMO STEREOTACTIC CORE BIOPSY RIGHT Right 2019     MAMMO STEREOTACTIC CORE BIOPSY RIGHT Right 2019     NM SENTINEL NODE INJECTION  2019     OOPHORECTOMY       NM MASTECTOMY, MODIFIED RADICAL Bilateral 2019    Procedure: BILATERAL MASTECTOMY;  Surgeon: Mckenzie Colby DO;  Location: [facility];  Service: General     NM REPLACE TISSUE EXPANDER Bilateral 2019    Procedure: BILATERAL IMPLANT RECONSTRUCTION TO BREASTS;  Surgeon: Carlitos Crum MD;  Location: [facility];  Service: Plastics     right arm surgery      Social History     Socioeconomic History     Marital status: Single     Spouse name: Not on file     Number of children: Not on file     Years of education: Not on file     Highest education level: Not on file   Occupational History     Not on file   Social Needs     Financial resource strain: Not on file     Food insecurity:     Worry: Not on file     Inability: Not on file     Transportation needs:     Medical: Not on file     Non-medical: Not on file   Tobacco Use     Smoking status: Never Smoker     Smokeless tobacco: Never Used   Substance and Sexual Activity     Alcohol use: No     Drug use: No     Sexual activity: Never   Lifestyle      Physical activity:     Days per week: Not on file     Minutes per session: Not on file     Stress: Not on file   Relationships     Social connections:     Talks on phone: Not on file     Gets together: Not on file     Attends Restorationism service: Not on file     Active member of club or organization: Not on file     Attends meetings of clubs or organizations: Not on file     Relationship status: Not on file     Intimate partner violence:     Fear of current or ex partner: Not on file     Emotionally abused: Not on file     Physically abused: Not on file     Forced sexual activity: Not on file   Other Topics Concern     Not on file   Social History Narrative     Not on file        Allergies    Allergies   Allergen Reactions     Bee Pollen Anaphylaxis     Bee Venom Protein (Honey Bee) Anaphylaxis     Iodinated Contrast Media Anaphylaxis       Review of Systems    General  General (WDL): Exceptions to WDL  Fatigue: Yes - Chronic (Greater than 3 months)  Fever: None  Generalized Muscle Weakness: None  Weight Loss: Yes - Chronic (Less than 3 months)  ENT  ENT (WDL): Exceptions to WDL  Vertigo (Dizziness): Yes - Chronic (Greater than 3 months)  Changes in vision: None  Glasses or Contacts: None  Hearing loss: None  Hearing Aids: None  Tinnitus: None  Pain/Pressure in ears: None  Sinus Congestion/Drainage: None  Hoarseness: None  Sore Throat: None  Dental Problems: Yes - Chronic (Greater than 3 months)  Dentures: None  Respiratory  Respiratory (WDL): All respiratory elements are within defined limits  Cardiovascular  Cardiovascular (WDL): Exceptions to WDL  Palpitations: None  Edema Limbs: None  Irregular Heart Beat: None  Chest Pain: None  Lightheadedness: Yes - Chronic (Greater than 3 months)  Endocrine  Endocrine (WDL): Exceptions to WDL  Heat Intolerance: None  Excessive Thirst: None  Cold Intolerance: None  Excessive Urination: Yes - Chronic (Greater than 3 months)  Hotflashes:  None  Gastrointestinal  Gastrointestinal (WDL): Exceptions to WDL  Difficulty Swallowing: None  Heartburn: Yes - Chronic (Greater than 3 months)  Constipation: Yes - Chronic (Greater than 3 months)  Yellowish skin and/or eyes: None  Blood from rectum: None  Nausea and Vomiting: Yes - Recent (Less than 3 months)  Abdominal Pain: None  Diarrhea: None  Have had black or tan stools?: None  Hemorrhoids: None  Poor Appetite: Yes- Recent (Less than 3 months)  Musculoskeletal  Musculoskeletal (WDL): Exceptions to WDL  Range of Motion Limitation: None  Joint pain: Yes - Chronic (Greater than 3 months)  Back Pain: None  Activity Assistance: None  Difficulty to lie flat for more than 30 minutes: None  Pain interfering with walking: None  Muscle pain or stiffness: Yes - Chronic (Greater than 3 months)  Recent fall: None  Assistive device: None  Neurological  Neurological (WDL): Exceptions to WDL  History of LOC?: None  Headaches: None  Difficulty walking: None  Difficulty with speech: None  Difficulty with memory: None  Vertigo (Dizziness): Yes - Chronic (Greater than 3 months)  Dominant Hand: Right  Seizures: None  Difficulty with Balance: None  Numbness and/or tingling: Yes - Chronic (Greater than 3 months)  Psychological/Emotional  Psychological/Emotional (WDL): Exceptions to WDL  Depression: None  Insomnia: None  Panic attacks: None  Anxiety: None  Daytime sleepiness: Yes - Recent (Less than 3 months)  Hallucinations: None  Psychosocial needs or not coping well: None  Hematological/Lymphatic  Hematological/Lymphatic (WDL): All hematological/lymphatic elements are within defined limits  Dermatological  Dermatologic (WDL): Exceptions to WDL  Open wounds: None  Rash : Yes - Chronic (Greater than 3 months)  Hair Loss: None  Healing Incision: Yes - Recent (Less than 3 months)  Changes in moles: None  Genitourinary/Reproductive  Genitourinary/Reproductive (WDL): Exceptions to WDL  Urinary Frequency: Yes - Chronic (Greater than 3  "months)  Urinary Incontinence: None  Painful urination: None  Urination more than 2 times a night: Yes - Chronic (Greater than 3 months)  Urinary Urgency: Yes - Chronic (Greater than 3 months)  Difficulty Initiating Urine Stream: None  Blood in urine: None  Sensation of incomplete emptying of bladder: Yes - Chronic (Greater than 3 months)  Reproductive (Females only)  Menstrual irritation or increase in discharge: No  Age at start of periods: 10  Last menstural cycle: 2018  Number of pregnancies: 3  Age at first pregnancy: 19  Patient Pregnant?: No  Is the patient trying to get pregnant?: No  Is the patient on birth control: No  Pain  Currently in Pain: Yes  Pain Score (Initial OR Reassessment): 2  Pain Frequency: Constant/continuous  Location: breast  Pain Characteristics : Dull;Sore  Pain Intervention(s): Home medication;Repositioned  Response to Interventions: improved    Physical Exam    Recent Vitals 9/4/2019   Height 5' 9\"   Weight 253 lbs 8 oz   BSA (m2) 2.37 m2   /75   Pulse 70   Temp 98   Temp src 1   SpO2 99   Some recent data might be hidden     General: alert, awake, not in acute distress  HEENT: Head: Normal, normocephalic, atraumatic.  Eye: Normal external eye, conjunctiva, lids cornea, DAYSI.  Ears:  Non-tender.  Nose: Normal external nose, mucus membranes and septum.  Pharynx: Dental Hygiene adequate. Normal buccal mucosa. Normal pharynx.  Neck / Thyroid: Supple, no masses, nodes, nodules or enlargement.  Lymphatics: No abnormally enlarged lymph nodes.  Chest: Normal chest wall and respirations. Clear to auscultation.  Breasts: Reconstructed breast.  Slight tenderness around the right breast.  Overlying skin is normal.  Heart: S1 S2 RRR, no murmur.   Abdomen: abdomen is soft without significant tenderness, masses, organomegaly or guarding  Extremities: normal strength, tone, and muscle mass.  No tenderness on percussion over the spine.  Skin: normal. no rash or abnormalities  CNS: non " focal.    Lab Results    Recent Results (from the past 168 hour(s))   HM2(CBC w/o Differential)   Result Value Ref Range    WBC 4.4 4.0 - 11.0 thou/uL    RBC 4.64 3.80 - 5.40 mill/uL    Hemoglobin 14.3 12.0 - 16.0 g/dL    Hematocrit 42.8 35.0 - 47.0 %    MCV 92 80 - 100 fL    MCH 30.7 27.0 - 34.0 pg    MCHC 33.3 32.0 - 36.0 g/dL    RDW 12.0 11.0 - 14.5 %    Platelets 365 140 - 440 thou/uL    MPV 8.2 7.0 - 10.0 fL       Imaging Results    No results found.      Signed by: Ez Burroughs MD

## 2021-06-01 NOTE — PROGRESS NOTES
Preoperative Exam    Scheduled Procedure: Implant infection  Surgery Date:  9.5.19  Surgery Location: [facility], [phone]  Surgeon:  Dr. Crum    Assessment/Plan:     1. Pre-op exam  2. Infection of breast implant, sequela  - APTT(PTT)  - Basic Metabolic Panel  - HM2(CBC w/o Differential)    Surgical Procedure Risk: Low (reported cardiac risk generally < 1%)  Have you had prior anesthesia?: Yes  Have you or any family members had a previous anesthesia reaction:  No  Do you or any family members have a history of a clotting or bleeding disorder?: No  Cardiac Risk Assessment: no increased risk for major cardiac complications    APPROVAL GIVEN to proceed with proposed procedure, without further diagnostic evaluation    Functional Status: Independent  Patient plans to recover at home with family.     Subjective:      Sarah Gilmore is a 49 y.o. female who presents for a preoperative consultation.  Recent mastectomy with implant reconstruction.  Patient experiences right breast swelling and pain. She did have an evaluation for seroma and aspiration.  Drainage was cultured.  Patient has been on levofloxacin for 3 weeks.  Patient has no concerns today.  Patient denied chest pain, shortness of breath fever or chills.    All other systems reviewed and are negative, other than those listed in the HPI.    Pertinent History  Do you have difficulty breathing or chest pain after walking up a flight of stairs: No  History of obstructive sleep apnea: No  Steroid use in the last 6 months: No  Frequent Aspirin/NSAID use: No  Prior Blood Transfusion: No  Prior Blood Transfusion Reaction: No  If for some reason prior to, during or after the procedure, if it is medically indicated, would you be willing to have a blood transfusion?:  There is no transfusion refusal.    Current Outpatient Medications   Medication Sig Dispense Refill     cyclobenzaprine (FLEXERIL) 10 MG tablet Take 1 tablet (10 mg total) by mouth 3 (three) times  "a day as needed for muscle spasms. 30 tablet 0     docusate sodium (COLACE) 100 MG capsule Take 1 capsule (100 mg total) by mouth 2 (two) times a day.  0     EPINEPHrine (EPIPEN JR) 0.15 mg/0.3 mL injection Inject 0.3 mL (0.15 mg total) into the shoulder, thigh, or buttocks as needed for anaphylaxis. 1 each 12     hydrOXYzine pamoate (VISTARIL) 25 MG capsule Take 1 capsule (25 mg total) by mouth 3 (three) times a day as needed for itching. 60 capsule 0     ibuprofen (ADVIL,MOTRIN) 600 MG tablet Take 1 tablet (600 mg total) by mouth every 6 (six) hours as needed for pain. 90 tablet 0     ondansetron (ZOFRAN) 4 MG tablet Take 1 tablet (4 mg total) by mouth every 8 (eight) hours as needed for nausea. 10 tablet 1     levoFLOXacin (LEVAQUIN) 500 MG tablet Take 500 mg by mouth daily.       No current facility-administered medications for this visit.         Allergies   Allergen Reactions     Bee Pollen Anaphylaxis     Bee Venom Protein (Honey Bee) Anaphylaxis     Iodinated Contrast Media Anaphylaxis       Patient Active Problem List   Diagnosis     Menorrhagia with irregular cycle     Malignant neoplasm of right female breast (H)     Breast neoplasm, Tis (DCIS), right       Past Medical History:   Diagnosis Date     Anemia      Breast cancer (H)      Dilated aortic root (H)      History of anesthesia complications     slow to wake up     Osteopenia      Osteoporosis      PONV (postoperative nausea and vomiting)      Seizures (H)     h/o seizure disorder in childhood, last seizure activity  \"4 yrs ago\"     Suspected sleep apnea     sleep study set up in Aug 2019       Past Surgical History:   Procedure Laterality Date      SECTION, CLASSIC       HYSTERECTOMY Bilateral 3/13/2018    Procedure: TOTAL LAPAROSCOPIC HYSTERECTOMY BILATERAL SALPINGECTOMY, CYSTOSCOPY;  Surgeon: Magi White MD;  Location: [facility];  Service:      MAMMO STEREOTACTIC CORE BIOPSY RIGHT Right 2019     MAMMO STEREOTACTIC CORE " BIOPSY RIGHT Right 5/30/2019     NM SENTINEL NODE INJECTION  7/30/2019     OOPHORECTOMY       DE MASTECTOMY, MODIFIED RADICAL Bilateral 7/30/2019    Procedure: BILATERAL MASTECTOMY;  Surgeon: Mckenzie Colby DO;  Location: [facility];  Service: General     DE REPLACE TISSUE EXPANDER Bilateral 7/30/2019    Procedure: BILATERAL IMPLANT RECONSTRUCTION TO BREASTS;  Surgeon: Carlitos Crum MD;  Location: [facility];  Service: Plastics     right arm surgery         Social History     Socioeconomic History     Marital status: Single     Spouse name: Not on file     Number of children: Not on file     Years of education: Not on file     Highest education level: Not on file   Occupational History     Not on file   Social Needs     Financial resource strain: Not on file     Food insecurity:     Worry: Not on file     Inability: Not on file     Transportation needs:     Medical: Not on file     Non-medical: Not on file   Tobacco Use     Smoking status: Never Smoker     Smokeless tobacco: Never Used   Substance and Sexual Activity     Alcohol use: No     Drug use: No     Sexual activity: Never   Lifestyle     Physical activity:     Days per week: Not on file     Minutes per session: Not on file     Stress: Not on file   Relationships     Social connections:     Talks on phone: Not on file     Gets together: Not on file     Attends Worship service: Not on file     Active member of club or organization: Not on file     Attends meetings of clubs or organizations: Not on file     Relationship status: Not on file     Intimate partner violence:     Fear of current or ex partner: Not on file     Emotionally abused: Not on file     Physically abused: Not on file     Forced sexual activity: Not on file   Other Topics Concern     Not on file   Social History Narrative     Not on file       Patient Care Team:  Laura Lobo FNP as PCP - General (Nurse Practitioner)  Laura Lobo FNP as Assigned PCP  Víctor  "MD Shira as Physician (Radiation Oncology)  Ez Burroughs MD as Physician (Hematology and Oncology)  Bhavya Britton, RN as Oncology Nurse Navigator (Hematology and Oncology)          Objective:     Vitals:    09/04/19 0851   BP: 123/74   Pulse: 70   SpO2: 99%   Weight: (!) 252 lb 3 oz (114.4 kg)   Height: 5' 9.25\" (1.759 m)   LMP: 03/03/2018         Physical Exam:  /74   Pulse 70   Ht 5' 9.25\" (1.759 m)   Wt (!) 252 lb 3 oz (114.4 kg)   LMP 03/03/2018   SpO2 99%   BMI 36.97 kg/m    General appearance: alert, appears stated age and cooperative  Head: Normocephalic, without obvious abnormality, atraumatic  Eyes: conjunctivae/corneas clear. PERRL, EOM's intact. Fundi benign.  Throat: lips, mucosa, and tongue normal; teeth and gums normal  Neck: no adenopathy, no carotid bruit, no JVD, supple, symmetrical, trachea midline and thyroid not enlarged, symmetric, no tenderness/mass/nodules  Lungs: clear to auscultation bilaterally  Heart: regular rate and rhythm, S1, S2 normal, no murmur, click, rub or gallop  Abdomen: soft, non-tender; bowel sounds normal; no masses,  no organomegaly  Extremities: extremities normal, atraumatic, no cyanosis or edema  Pulses: 2+ and symmetric  Skin: Skin color, texture, turgor normal. No rashes or lesions  Lymph nodes: Cervical, supraclavicular, and axillary nodes normal.  Neurologic: Grossly normal      There are no Patient Instructions on file for this visit.    EKG:  Normal sinus rhythm   Normal ECG   When compared with ECG of 21-SEP-2003 19:34,   No significant change was found   Confirmed by FRANCE RIOJAS MD LOC: ([id number]) on 7/19/2019 3:29:31 PM     Labs:  No results found for this or any previous visit (from the past 240 hour(s)). and Labs pending at this time.  Results will be reviewed when available.    Immunization History   Administered Date(s) Administered     Tdap 06/23/2016           Electronically signed by IGOR Sweet 09/04/19 8:46 AM    "

## 2021-06-01 NOTE — TELEPHONE ENCOUNTER
Left message with clinician's message - also left a message for patient to call back - unsure what patient is looking for in the message. Would recommend patient to call her plastic surgeon to discuss the antibiotic with them - she would also need to go back to the provider who referred her to infectious disease as the orders doesn't seem like they were placed by [facility].

## 2021-06-01 NOTE — PROGRESS NOTES
[place] paperwork signed and faxed to patients employer as well as to HIM to be scanned.  Copy made for patient to have as well.    Fax # [phone] for [employer] [facility]

## 2021-06-01 NOTE — CONSULTS
Consults   [facility] Radiation Oncology Consult Note     Patient: Sarah Gilmore  MRN: [id number]  Date of Service: 09/04/2019          Mckenzie Colby DO  [id number] [id number]       Dear Dr. Colby:    Thank you very much for referring this patient for consideration of radiotherapy. As you know Ms. Gilmore is a 49 y.o. female with a diagnosis of left breast DCIS and right breast cancer, stage [id number], ER/CA positive, HER-2 negative, status post bilateral simple mastectomy with immediate reconstruction and right sentinel lymph nodes resection (1/6 LNs +) with negative margin and the closest DCIS margin was measured 0.5 mm upper inner margin.  The patient is referred to radiation oncology for evaluation and discussion of possible postop radiation therapy.    HISTORY OF PRESENT ILLNESS:   Ms. Gilmore is a 49 y.o. female who has been in her usual state of good health until recently.  Patient presented with abnormal finding by routine screening mammogram for which she was a seeking further evaluation.  The mammogram followed by ultrasound showed 2 area of calcification involving the right breast suspicious for malignancy. Both are retroareolar.  The deeper location was biopsied.  The more superficial area of calcifications was unable to technically be biopsied stereotactically. A needle biopsy was done of the deeper area of calcifications which shows DCIS.  After discussed with the patient about various treatment options, the patient had to proceed with bilateral simple mastectomy with immediate reconstruction and right sentinel node resection as her treatment choice and had a surgery on 7/30/2019.     The pathology from left breast mastectomy showed 2 mm focal DCIS, grade 1 with negative margin and the closest margin was measured 1 cm.    The pathology from the right breast mastectomy and sentinel node showed multi focus of DCIS involving 21/50 examined the blocks, grade 2, negative margin  with the closest margin measured 0.5 mm upper inner quadrant with no pathologic evidence of invasive cancer.  1/6 removed sentinel lymph node showed evidence of metastatic disease measuring 2.2 mm in greatest dimension with no evidence of actual capsular extension.  ER/DE positive and HER-2 negative.    Her postoperative course is complicated by wound infection (MRSA+) and patient is currently receiving antibiotics with significant improvement.  She saw Dr. Burroughs, med oncology earlier today with an Oncotype DX score was ordered.  The patient is referred to radiation oncology for evaluation and discussion of possible postop radiation therapy.    CHEMOTHERAPY HISTORY: Concurrent Chemotherapy: No    RADIATION THERAPY HISTORY: Prior Radiation: No    IMPLANTED CARDIAC DEVICE: none     PREGNANCY: The patient is informed not to be pregnant during the radiation therapy.      Current Outpatient Medications   Medication Sig Dispense Refill     acetaminophen 325 mg cap Take 2 tablets by mouth as needed.       cyclobenzaprine (FLEXERIL) 10 MG tablet Take 1 tablet (10 mg total) by mouth 3 (three) times a day as needed for muscle spasms. 30 tablet 0     docusate sodium (COLACE) 100 MG capsule Take 1 capsule (100 mg total) by mouth 2 (two) times a day.  0     EPINEPHrine (EPIPEN JR) 0.15 mg/0.3 mL injection Inject 0.3 mL (0.15 mg total) into the shoulder, thigh, or buttocks as needed for anaphylaxis. 1 each 12     hydrOXYzine pamoate (VISTARIL) 25 MG capsule Take 1 capsule (25 mg total) by mouth 3 (three) times a day as needed for itching. 60 capsule 0     ibuprofen (ADVIL,MOTRIN) 600 MG tablet Take 1 tablet (600 mg total) by mouth every 6 (six) hours as needed for pain. 90 tablet 0     levoFLOXacin (LEVAQUIN) 500 MG tablet Take 500 mg by mouth daily.       ondansetron (ZOFRAN) 4 MG tablet Take 1 tablet (4 mg total) by mouth every 8 (eight) hours as needed for nausea. 10 tablet 1     No current facility-administered medications for  "this visit.      Past Medical History:   Diagnosis Date     Anemia      Breast cancer (H)      Dilated aortic root (H)      History of anesthesia complications     slow to wake up     Osteopenia      Osteoporosis      PONV (postoperative nausea and vomiting)      Seizures (H)     h/o seizure disorder in childhood, last seizure activity  \"4 yrs ago\"     Suspected sleep apnea     sleep study set up in Aug 2019     Past Surgical History:   Procedure Laterality Date      SECTION, CLASSIC       HYSTERECTOMY Bilateral 3/13/2018    Procedure: TOTAL LAPAROSCOPIC HYSTERECTOMY BILATERAL SALPINGECTOMY, CYSTOSCOPY;  Surgeon: Magi White MD;  Location: [facility];  Service:      MAMMO STEREOTACTIC CORE BIOPSY RIGHT Right 2019     MAMMO STEREOTACTIC CORE BIOPSY RIGHT Right 2019     NM SENTINEL NODE INJECTION  2019     OOPHORECTOMY       TX MASTECTOMY, MODIFIED RADICAL Bilateral 2019    Procedure: BILATERAL MASTECTOMY;  Surgeon: Mckenzie Colby DO;  [place] [facility] [place]  Service: General     TX REPLACE TISSUE EXPANDER Bilateral 2019    Procedure: BILATERAL IMPLANT RECONSTRUCTION TO BREASTS;  Surgeon: Carlitos Crum MD;  [place] [facility] [place]  Service: Plastics     right arm surgery       Bee pollen; Bee venom protein (honey bee); and Iodinated contrast media  Family History   Problem Relation Age of Onset     Mental illness Mother      Sleep apnea Mother      Diabetes Father      No Medical Problems Sister      No Medical Problems Brother      Asperger's syndrome Son      Suicidality Maternal Grandfather      Breast cancer Paternal Grandmother 50     Prostate cancer Paternal Grandfather      Testicular cancer Paternal Grandfather      Mental illness Son      Leukemia Paternal Uncle      Social History     Socioeconomic History     Marital status: Single     Spouse name: Not on file     Number of children: Not on file     Years of education: Not on file     Highest " education level: Not on file   Occupational History     Not on file   Social Needs     Financial resource strain: Not on file     Food insecurity:     Worry: Not on file     Inability: Not on file     Transportation needs:     Medical: Not on file     Non-medical: Not on file   Tobacco Use     Smoking status: Never Smoker     Smokeless tobacco: Never Used   Substance and Sexual Activity     Alcohol use: No     Drug use: No     Sexual activity: Never   Lifestyle     Physical activity:     Days per week: Not on file     Minutes per session: Not on file     Stress: Not on file   Relationships     Social connections:     Talks on phone: Not on file     Gets together: Not on file     Attends [employer] service: Not on file     Active member of club or organization: Not on file     Attends meetings of clubs or organizations: Not on file     Relationship status: Not on file     Intimate partner violence:     Fear of current or ex partner: Not on file     Emotionally abused: Not on file     Physically abused: Not on file     Forced sexual activity: Not on file   Other Topics Concern     Not on file   Social History Narrative     Not on file        Review of Systems:      General  General (WDL): Exceptions to WDL  Fatigue: Yes - Chronic (Greater than 3 months)(having sleep study end of month)  Weight Loss: Yes - Chronic (Less than 3 months)  EENT  ENT (WDL): Exceptions to WDL  Respiratory   Respiratory (WDL): All respiratory elements are within defined limits  Cardiovascular  Cardiovascular (WDL): Exceptions to WDL  Lightheadedness: Yes - Chronic (Greater than 3 months)(occasionally)  Endocrine  Endocrine (WDL): Exceptions to WDL  Excessive Urination: Yes - Chronic (Greater than 3 months)  Gastrointestinal  Gastrointestinal (WDL): Exceptions to WDL  Heartburn: Yes - Chronic (Greater than 3 months)  Constipation: Yes - Chronic (Greater than 3 months)  Nausea and Vomiting: Yes - Recent (Less than 3 months)(since on  antibiotic)  Musculoskeletal  Musculoskeletal (WDL): Exceptions to WDL  Joint pain: Yes - Chronic (Greater than 3 months)(knees)  Muscle pain or stiffness: Yes - Chronic (Greater than 3 months)  Integumentary                  Neurological  Neurological (WDL): Exceptions to WDL  Vertigo (Dizziness): Yes - Chronic (Greater than 3 months)  Dominant Hand: Right  Psychological/Emotional   Psychological/Emotional (WDL): Exceptions to WDL  Daytime sleepiness: Yes - Recent (Less than 3 months)(having sleep study end of month)  Hematological/Lymphatic  Hematological/Lymphatic (WDL): All hematological/lymphatic elements are within defined limits  Dermatologic  Dermatologic (WDL): Exceptions to WDL  Rash : Yes - Chronic (Greater than 3 months)(chronic rashes)  Healing Incision: Yes - Recent (Less than 3 months)(bilateral breast cancer, R>L)  Genitourinary/Reproductive  Genitourinary/Reproductive (WDL): Exceptions to WDL  Urinary Frequency: Yes - Chronic (Greater than 3 months)  Urination more than 2 times a night: Yes - Chronic (Greater than 3 months)(nocturia x2-3)  Urinary Urgency: Yes - Chronic (Greater than 3 months)  Sensation of incomplete emptying of bladder: Yes - Chronic (Greater than 3 months)  Reproductive  Menstrual irritation or increase in discharge: No  Age at start of periods: 10  Last menstural cycle: 2018  Number of pregnancies: 3  Age at first pregnancy: 19  Patient Pregnant?: No  Is the patient trying to get pregnant?: No  Is the patient on birth control: No  Pain              Currently in Pain: Yes  Pain Score (Initial OR Reassessment): 2  Pain Frequency: Constant/continuous  Location: right breast  Pain Intervention(s): Home medication  Response to Interventions: some  Accompanied by       Imaging: Reviewed.    Pathology: Reviewed    ECOG Peformance Status  ECOG Performance Status: 0  Distress Assessment Score: 2    Objective:      PHYSICAL EXAMINATION:    /75 Comment: VS done in medical oncology   Pulse 70 Comment: VS done in medical oncology  Temp 98  F (36.7  C) (Oral) Comment: VS done in medical oncology  Wt (!) 254 lb 12.8 oz (115.6 kg)   LMP 03/03/2018   SpO2 99% Comment: VS done in medical oncology  BMI 37.63 kg/m      Gen: Alert, in NAD. The rest of examination is not performed today and the patient is only here for evaluation and discussion of possible radiation therapy.    Intent of Therapy: Curative     We recommend adjuvant radiation as part of her breast conserving therapy to decrease her chance of local recurrence to the single digits. ROM stretches and skin care were discussed with the patient. She understands that these maneuvers need to continue for 6 months following completion of radiation as skin and muscle fibrosis continue to form for weeks to months following completion of therapy.      Side effects that may occur during or within weeks after radiation therapy      Fatigue and general weakness    Darkening, irritation, itchiness, redness, dryness, erythema, peeling, scabbing, ulceration and contraction of the skin of the breast and chest    Swelling of the breast    Loss of armpit hair    Lung irritation    Decrease in appetite    Side effects that may occur months or years after radiation therapy      Development of another tumor or cancer    Thickening, telangiectasias (development of spider like blood vessels in the skin) and ulceration of the skin of the breast and chest    Firming, fibrosis (scar tissue), fat necrosis, and distortion of the breast    Poor healing after a trauma or surgery in the irradiated area    Nerve damage resulting in loss of arm strength and sensation    Coronary artery blockage causing angina pain or a heart attack    Lung inflammation of fibrosis causing cough, fever and shortness of breath    Fracture of the ribs    Swellingof an arm and hand    The risks, benefits and alternatives to radiation therapy were outlined with the patient. All questions were  answered and a consent was signed.     Impression     Lft breast DCIS and right breast cancer, stage [id number], ER/OR positive, HER-2 negative, status post bilateral simple mastectomy with immediate reconstruction and right sentinel lymph nodes resection (1/6 LNs +) with negative margin and the closest DCIS margin was measured 0.5 mm upper inner margin.      Assessment & Plan:     I have personally reviewed her upcoming medical record today.  I have also discussed with the patient about all the possible treatment options for breast cancer including surgery, systemic therapy, and radiation therapy.  The possible risks and side effects of radiation therapy has been explained to the patient in detail and at the great lengths.  Questions are answered to patient's satisfaction.  This is a 49-year-old healthy female with a new diagnosis of breast cancer status post bilateral simple mastectomy with immediate reconstruction and right sentinel lymph node resection.  Her left breast DCIS is a status post mastectomy with negative margin.  There is no postop radiation therapy indicated.  Given the evidence of close surgical margin with DCIS and the positive sentinel node metastasis for the right breast, I think the patient is at an increased risks of local regional recurrence and therefore postop radiation therapy will be a good option to reduce likelihood of cancer recurrence.  The pros and cons of different options has also been discussed with patient.  After long discussion, the patient denied to proceed with postop radiation therapy as her treatment choice for her right breast cancer being aware of potential risks and side effects involved.  We will wait for the Oncotype DX score to determine if the patient is a candidate for chemotherapy.  We will also wait for her right breast infection (MRSA+) to be cleared before proceed with postop radiation therapy.  The patient is informed to contact radiation oncology later on to set  up time for simulation.  I plan to give her radiation therapy at 180 cGy fraction to a total of 5040 cGy in 28 treatments targeted to the right breast/chest wall and the regional lymph nodes using a high tangents or 4 field technique followed by additional 1000 cGy in 5 fractions to the primary tumor bed using electron.    Again, thank you very much for the referral and allowing me to participate in the care of this patient.  If you have any questions or concerns about this consultation, please do not hesitate to call.  I spent approximately 45 minutes today with the patient and 80% time was used for counseling.      Sincerely,        Shira Renee MD, PhD  Department of Radiation Oncology   [facility]  Tel: [phone]  Page: [phone]    [facility]  [address] [place] [address]    CC:  Patient Care Team:  Laura Lobo FNP as PCP - General (Nurse Practitioner)  Laura Lobo FNP as Assigned PCP  Shira Renee MD as Physician (Radiation Oncology)  Ez Burroughs MD as Physician (Hematology and Oncology)  Bhavya Britton RN as Oncology Nurse Navigator (Hematology and Oncology)  Mckenzie Colby DO as Physician (General Surgery)  Carlitos Crum MD as Physician (Plastic Surgery)

## 2021-06-01 NOTE — PROGRESS NOTES
[facility]   Outpatient Consult Note    ASSESSMENT  Breast cancer  Postmastectomy MRSA abscess chest wall   status post removal of implant and debridement 9/fifth  Doing well on Septra      PLAN  Continue Septra DS until 9/19  Touch base with surgery regarding the drain  Weight a few days after stopping the Septra before resuming chemoradiotherapy to make sure there is no relapse    Agapito Covington M.D.          HPI  49 years old female with recent diagnosis of breast cancer who underwent bilateral breast mastectomies and implants 7/30.  This was complicated by MRSA infection of the implant diagnosed by aspiration of fluid collection this grew MRSA see culture at [facility] micro lab.  She then had the implant removed on the right side 9/fifth.  Initially she had been on levofloxacin fatigue.  After surgery she was switched to Septra since the culture was MRSA previously.  Since then she is feeling much better.  She still has a drain which drains about 30 cc of serous fluid.  No redness.  No fevers.  No rash.  Chemo and radiotherapy are planned.      ROS  All systems reviewed, negative except for the above    Current Outpatient Medications on File Prior to Visit   Medication Sig Dispense Refill     acetaminophen 325 mg cap Take 2 tablets by mouth as needed.       ibuprofen (ADVIL,MOTRIN) 600 MG tablet Take 1 tablet (600 mg total) by mouth every 6 (six) hours as needed for pain. 90 tablet 0     cyclobenzaprine (FLEXERIL) 10 MG tablet Take 1 tablet (10 mg total) by mouth 3 (three) times a day as needed for muscle spasms. 30 tablet 0     docusate sodium (COLACE) 100 MG capsule Take 1 capsule (100 mg total) by mouth 2 (two) times a day.  0     EPINEPHrine (EPIPEN JR) 0.15 mg/0.3 mL injection Inject 0.3 mL (0.15 mg total) into the shoulder, thigh, or buttocks as needed for anaphylaxis. 1 each 12     hydrOXYzine pamoate (VISTARIL) 25 MG capsule Take 1 capsule (25 mg total) by mouth 3 (three)  times a day as needed for itching. 60 capsule 0     ondansetron (ZOFRAN) 4 MG tablet Take 1 tablet (4 mg total) by mouth every 8 (eight) hours as needed for nausea. 10 tablet 1     [DISCONTINUED] levoFLOXacin (LEVAQUIN) 500 MG tablet Take 500 mg by mouth daily.       No current facility-administered medications on file prior to visit.        Allergies   Allergen Reactions     Bee Pollen Anaphylaxis     Bee Venom Protein (Honey Bee) Anaphylaxis     Iodinated Contrast Media Anaphylaxis       Social History     Socioeconomic History     Marital status: Single     Spouse name: Not on file     Number of children: Not on file     Years of education: Not on file     Highest education level: Not on file   Occupational History     Not on file   Social Needs     Financial resource strain: Not on file     Food insecurity:     Worry: Not on file     Inability: Not on file     Transportation needs:     Medical: Not on file     Non-medical: Not on file   Tobacco Use     Smoking status: Never Smoker     Smokeless tobacco: Never Used   Substance and Sexual Activity     Alcohol use: No     Drug use: No     Sexual activity: Never   Lifestyle     Physical activity:     Days per week: Not on file     Minutes per session: Not on file     Stress: Not on file   Relationships     Social connections:     Talks on phone: Not on file     Gets together: Not on file     Attends [employer] service: Not on file     Active member of club or organization: Not on file     Attends meetings of clubs or organizations: Not on file     Relationship status: Not on file     Intimate partner violence:     Fear of current or ex partner: Not on file     Emotionally abused: Not on file     Physically abused: Not on file     Forced sexual activity: Not on file   Other Topics Concern     Not on file   Social History Narrative     Not on file       Family History   Problem Relation Age of Onset     Mental illness Mother      Sleep apnea Mother      Diabetes Father       No Medical Problems Sister      No Medical Problems Brother      Asperger's syndrome Son      Suicidality Maternal Grandfather      Breast cancer Paternal Grandmother 50     Prostate cancer Paternal Grandfather      Testicular cancer Paternal Grandfather      Mental illness Son      Leukemia Paternal Uncle          PHYSICAL EXAM  LMP 03/03/2018     General Appearance:  Alert, cooperative, no distress, appears stated age    Head:  Normocephalic, without obvious abnormality, atraumatic   Neck no stiffness or adenopathy  Eyes no conjunctivitis or icterus          Chest Wall:   Right chest incision healed.  Drain in place ALVARO serous.  No redness no tenderness no induration   CV No edema   Abdomen:  Soft, non-tender, bowel sounds active , no masses, no organomegaly    Extremities:  Extremities normal, atraumatic, no cyanosis or edema,     Skin:  Skin color, texture, turgor normal, no rashes or lesions    Neurologic:  Alert and oriented X 3, Moves all 4 extremities      DATA  Results for orders placed or performed in visit on 09/04/19   APTT(PTT)   Result Value Ref Range    PTT 28 24 - 37 seconds   Basic Metabolic Panel   Result Value Ref Range    Sodium 140 136 - 145 mmol/L    Potassium 4.3 3.5 - 5.0 mmol/L    Chloride 106 98 - 107 mmol/L    CO2 25 22 - 31 mmol/L    Anion Gap, Calculation 9 5 - 18 mmol/L    Glucose 71 70 - 125 mg/dL    Calcium 9.5 8.5 - 10.5 mg/dL    BUN 6 (L) 8 - 22 mg/dL    Creatinine 0.77 0.60 - 1.10 mg/dL    GFR MDRD Af Amer >60 >60 mL/min/1.73m2    GFR MDRD Non Af Amer >60 >60 mL/min/1.73m2   HM2(CBC w/o Differential)   Result Value Ref Range    WBC 4.4 4.0 - 11.0 thou/uL    RBC 4.64 3.80 - 5.40 mill/uL    Hemoglobin 14.3 12.0 - 16.0 g/dL    Hematocrit 42.8 35.0 - 47.0 %    MCV 92 80 - 100 fL    MCH 30.7 27.0 - 34.0 pg    MCHC 33.3 32.0 - 36.0 g/dL    RDW 12.0 11.0 - 14.5 %    Platelets 365 140 - 440 thou/uL    MPV 8.2 7.0 - 10.0 fL       RADIOLOGY  none

## 2021-06-01 NOTE — TELEPHONE ENCOUNTER
Question following Office Visit  When did you see your provider: 9/4/19  What is your question: Patient was started on SMZTMP- mg, one tablet two times a day x 7 days.  Oncology advised patient her infection was MRSA.  Plastic surgeon cleaned out infection.  Removed right implant and started antibiotic.    Patient is asking if oral antibiotic is enough to kick out the MRSA.  She can not start her chemotherapy until the infection is gone.    Second:  She can not get into Infection Disease provider until October 11, 2019.  Is there another place she can go or a way to get her in sooner?  Okay to leave a detailed message: Yes

## 2021-06-01 NOTE — TELEPHONE ENCOUNTER
Sarah telephoned to inquire about her PET scan results and follow up on my voice mail from 9/11/19.  She is anxious to learn of the next step.  I informed her she can expect contact from Dr. Burroughs or her collab nurse.      We discussed the simulation process for radiation therapy and time frame for start.  Bhavya Britton RN

## 2021-06-01 NOTE — PROGRESS NOTES
Patient here ambulatory accompanied by family for radiation consult for her breast cancer.  Patient states she is having problems with an infection and is going back to the surgeon for more surgery tomorrow to remove one implant.  Patient met with medical oncology today as well.  Per patient sample sent for oncotype and will not be back for a couple weeks.  15 minutes spent in review of radiation process and potential side effects.  Written information given for review: ACS RT book, [facility] RT handouts, RT to breast handout, HE class schedule, insurance PA handout, doctor bio card, dietician handout, team photo sheet and HE cancer care welcome letter.  Seen by Dr. Renee.  Plan RTC for follow up and/or CT simulation as directed by physician.

## 2021-06-01 NOTE — PROGRESS NOTES
[facility] Hematology and Oncology Progress Note    Patient: Sarah Gilmore  MRN: [id number]  Date of Service: 09/12/2019        Reason for Visit    Chief Complaint   Patient presents with     HE Cancer     Breast Cancer       Assessment and Plan  Cancer Staging  Infiltrating ductal carcinoma of right female breast (H)  Staging form: Breast, AJCC 8th Edition  - Clinical: No stage assigned - Unsigned  - Pathologic stage from 9/3/2019: Stage Unknown (pTX, pN1a(sn), cM0, G1, ER+, MI+, HER2-) - Signed by Ez Burroughs MD on 9/4/2019      ECOG Performance   ECOG Performance Status: 0     Distress Assessment  Distress Assessment Score: 5(visit today)    Pain  Currently in Pain: No/denies  Pain Score (Initial OR Reassessment): No/Denies Pain  Location: R breast    #. DCIS of both breasts (upper outer quadrant, lower outer quadrant and central portion of the right breast, multifocal, grade 2; focus of DCIS approximately 2 mm grade 1 in the left breast)  #. pTx pN1a M0 invasive ductal carcinoma of the right breast, grade 1.  ER positive, MI positive, HER-2 negative.     Today, I reviewed with her that the Oncotype DX RS for invasive carcinoma was not able to perform  because the Oncotype test was not validated to for perform on the lymph node sample.  I have reached out to [facility] and verified that information with them as well.  Oncotype DX for DCIS will be available to complete, however it will not add any benefit in terms of whether she would benefit from chemotherapy or not.  I reviewed the NCI predict score and it is clearly she will not get any survival benefit from addition of chemotherapy.  I explained to her that her with the current understanding of the biology of the cancer, she would not likely get a significant benefit from chemotherapy, and  my recommendation is to start adjuvant endocrine therapy after she finishes adjuvant radiation treatment.  I also discussed about adjuvant chemotherapy regimens,  potential side effects and schedule as well.  She asked whether she has any distant disease.   I explained to her that we would not need to complete systemic staging in general like in her situation, however I think it will be beneficial before making final decision about adjuvant treatment options.  Because of that, I offered her to proceed with PET scan to complete staging.   I also discussed about seeking a second opinion will be reasonable to know there is a consistent recommendation or controversies.   I discussed about adjuvant endocrine therapy with tamoxifen as well.  I reviewed side effects.  Duration of endocrine therapy will be minimum 5 years.    Plan:  - PET scan.  - Will call with the result.  - She will return to Dr. Renee after she is clear from MRSA infection of right breast.      Problem List    1. Malignant neoplasm of right female breast, unspecified estrogen receptor status, unspecified site of breast (H)  NM PET CT Skull to Mid Thigh      ______________________________________________________________________________    History of Present Illness    Oliver presents today accompanied by her sister, Balaji.  She reported that the breast drain has removed.  No fever.  She has ongoing fatigue.  Reported incision of the right breast is healing.    Pain Status  Currently in Pain: No/denies    Review of Systems    Oncology Nurse Assessment/CMA Intake: Constitutional  Constitutional (WDL): Exceptions to WDL  Fatigue: Fatigue relieved by rest  Fever: None  Chills: None  Weight Gain: None  Weight Loss: to <10% from baseline, intervention not indicated(3# 9/4/19)  Neurosensory  Neurosensory (WDL): Exceptions to WDL  Peripheral Motor Neuropathy: None  Ataxia: None  Peripheral Sensory Neuropathy: Asymptomatic, loss of deep tendon reflexes or paresthesia(hands and feet)  Confusion: None  Syncope: None  Eye   Eye Disorder (WDL): Exceptions to WDL  Blurred Vision: Intervention not indicated(needs eyes  checked)  Dry Eye: None  Eye Pain: None  Watering Eyes: None  Ear  Ear Disorder (WDL): Exceptions to WDL  Ear Pain: None  Tinnitus: Mild symptoms, intervention not indicated(intermittent)  Cardiovascular  Cardiovascular (WDL): Exceptions to WDL  Palpitations: None  Edema: Yes  Edema Limbs: 5 - 10% inter-limb discrepancy in volume or circumference at point of greatest visible difference, swelling or obscuration of anatomic architecture on close inspection(bilat LE)  Phlebitis: None  Superficial thrombophlebitis: None  Pulmonary  Respiratory (WDL): Exceptions to WDL  Cough: None  Dyspnea: Shortness of breath with moderate exertion  Hypoxia: None  Gastrointestinal  Gastrointestinal (WDL): Exceptions to WDL  Anorexia: None  Constipation: Occasional or intermittent symptoms, occasional use of stool softeners, laxatives, dietary modification, or enema  Diarrhea: None  Dysphagia: None  Esophagitis: Asymptomatic, clinical or diagnostic observations only, intervention not indicated(occ)  Nausea: Loss of appetite without alteration in eating habits  Pharyngitis: None  Vomiting: None  Dysgeusia: None  Dry Mouth: None  Genitourinary  Genitourinary (WDL): Exceptions to WDL  Urinary Frequency: Present  Urinary Retention: Urinary, suprapubic or intermittent catheter placement not indicated, able to void with some residual(doesn't feel like empties)  Urinary Tract Pain: None  Lymphatic  Lymph (WDL): Exceptions to WDL  Lymph Node Discomfort: Yes  Lymph Node Discomfort Location: R axilla, R breast  Musculoskeletal and Connective Tissue  Musculoskeletal and Connetive Tissue Disorders (WDL): All Musculoskeletal and Connetive Tissue Disorder elements are within defined limits  Integumentary  Integumentary (WDL): Exceptions to WDL(healing incision R breast/drain removed today-R breast)  Alopecia: None  Rash Maculo-Papular: None  Pruritus: None  Urticaria: None  Palmar-Plantar Erythrodysesthesia Syndrome: None  Flushing: None  Patient  "Coping  Patient Coping: Accepting;Open/discussion  Distress Assessment  Distress Assessment Score: 5(visit today)  Accompanied by  Accompanied by: Family Member(SisterBalaji)  Oral Chemo Adherence         Past History  Past Medical History:   Diagnosis Date     Anemia      Breast cancer (H)      Dilated aortic root (H)      History of anesthesia complications     slow to wake up     Osteopenia      Osteoporosis      PONV (postoperative nausea and vomiting)      Seizures (H)     h/o seizure disorder in childhood, last seizure activity  \"4 yrs ago\"     Suspected sleep apnea     sleep study set up in Aug 2019       Physical Exam    Recent Vitals 9/12/2019   Height -   Weight 251 lbs 10 oz   BSA (m2) 2.36 m2   /76   Pulse 70   Temp 98.1   Temp src 1   SpO2 99   Some recent data might be hidden     General: alert, awake, not in acute distress      Lab Results    No results found for this or any previous visit (from the past 168 hour(s)).    Imaging    No results found.    TT: 40 minutes: time consisted of medical record review, counseling time on patient of Oncotype testing, discussion about pain treatment options for breast cancer and including coordination care time with personnel from [employer].      Signed by: Ez Burroughs MD  "

## 2021-06-01 NOTE — PROCEDURES
Procedures    SIMULATION NOTE:       DIAGNOSIS: Left breast DCIS and right breast cancer, stage [id number], ER/IL positive, HER-2 negative, status post bilateral simple mastectomy with immediate reconstruction and right sentinel lymph nodes resection (1/6 LNs +) with negative margin and the closest DCIS margin was measured 0.5 mm upper inner margin.      INDICATION:  Postoperative radiation therapy is recommended for patient to further reduce the likelihood of cancer recurrence.    CONSENT:  The possible risks and the side effects of radiation therapy have been discussed with patient in detail and at great length.  Questions are answered to patient's satisfaction.  Written consent was obtained.    SIMULATION:  The patient is in a supine position with a wing breast board to help keep the same position during the daily radiation therapy.  Tentative isocenter is set up in the center of the thoracic region.  We will acquire CT information to help us to better locate target and design radiation therapy field.    BLOCKS:  Custom blocks will be drawn to minimize radiation to normal tissues and to protect normal organs including, but not limited to, lungs, heart, liver, bone and soft tissue.    DOSAGE:  I plan to give her radiation therapy at 180 cGy each fraction to a total of 5040 cGy in 28 treatments targeted to the right breast/chest wall and the regional lymph nodes using a 4 field technique.  An additional 1000 cGy in 5 fractions will be given to the primary tumor bed using an electron. I will consider to use 3D conformer technique to help us better locate target and to protect normal tissue.      Shira Renee MD, PhD  Department of Radiation Oncology   [facility]  Tel: [phone]  Page: [phone]    [facility]  [address] [place] [address]     [place] [id number] [facility] [place] [address]

## 2021-06-02 VITALS — WEIGHT: 253 LBS | BODY MASS INDEX: 36.83 KG/M2

## 2021-06-02 VITALS — WEIGHT: 253.4 LBS | BODY MASS INDEX: 36.88 KG/M2

## 2021-06-02 VITALS — BODY MASS INDEX: 36.22 KG/M2 | HEIGHT: 70 IN | WEIGHT: 253 LBS

## 2021-06-02 VITALS — BODY MASS INDEX: 36.83 KG/M2 | WEIGHT: 253 LBS

## 2021-06-02 NOTE — TELEPHONE ENCOUNTER
Please review and advise if it is okay to proceed forward with scheduling the sleep study. Her insurance has not heard from us regarding her Sleep Study. Please call Ivanna at [phone].    Thank you!

## 2021-06-02 NOTE — PROGRESS NOTES
Met with Sarah when she was at clinic for radiation treatment. She is currently on leave from work and is in need of financial assistance. She is receiving a reduce income while receiving short term disability. She is living with her sister and has not applied for any assistance at this time. Writer was scheduled for a meeting and will plan to meet with sarah in the morning after her radiation treatment to review financial resource options....Rupali Douglas RN

## 2021-06-02 NOTE — PROGRESS NOTES
RADIATION ONCOLOGY WEEKLY TREATMENT VISIT NOTE      Assessment / Impression       1. Radiation dermatitis  mometasone (ELOCON) 0.1 % cream   2. Breast cancer (H)  mometasone (ELOCON) 0.1 % cream      Tolerating radiation therapy well.  The patient did have significant skin reaction within the region therapy field.  She states she has a sensitive skin and easy to have some burn.  All questions and concerns addressed.    Plan:     Continue radiation treatment as prescribed.    Discussed with the patient about appropriate skin care.  We will redo the bolus to cover the scar with 2 cm margin to reduce her skin reaction.    Subjective:      HPI: Sarah Gilmore is a 49 y.o. female with    1. Radiation dermatitis  mometasone (ELOCON) 0.1 % cream   2. Breast cancer (H)  mometasone (ELOCON) 0.1 % cream       The following portions of the patient's history were reviewed and updated as appropriate: allergies, current medications, past family history, past medical history, past social history, past surgical history and problem list.    Assessment                  Body Site: Breast                           Site: Right Breast  Stereotactic Radiosurgery: No  Today's Dose: 1800  Total Dose for Breast: 6040  Today's Fraction/Total Fraction Breast: 10/33  Drainage: 0: Absent                                            Sexuality Alteration                 Emotional Alteration Copin: Effective  Comfort Alteration KPS: 90% Can perform normal activity, minor signs of disease  Fatigue (ONS scale) : 3: Mild Fatigue  Pain Location: Right LE  Pain Intensity. Rate degree of pain ranging from 0 (no pain) to 10 (severe pain) : 0-6  Pain Description: Pricking - Pricking pain, tingling nerve endings  Pain Intervention: 5: Complementary and/or alternative methods(elevating)  Effectiveness of pain intervention: 2: Pain relieved 50%  Hot Flashes and/or Flushes: 1: Mild or no more than 1 per day   Nutrition Alteration Anorexia: 0:  None  Nausea: 0: None  Vomitin: None  Skin Alteration Skin Sensation: 2: Burning  Skin Reaction: 3: Dry desquamation with or without erythema(mepilex lite & radiacare gel sheets given w/instructions)  AUA Assessment                                  Accompanied by       Objective:     Exam: moderate, severe Erythema.    Vitals:    10/22/19 1116   BP: 147/82   Pulse: 70   Temp: 98.1  F (36.7  C)   TempSrc: Oral   SpO2: 97%   Weight: (!) 257 lb (116.6 kg)       Wt Readings from Last 8 Encounters:   10/22/19 (!) 257 lb (116.6 kg)   10/21/19 (!) 256 lb 3 oz (116.2 kg)   10/15/19 (!) 256 lb 4.8 oz (116.3 kg)   19 (!) 248 lb (112.5 kg)   19 (!) 251 lb 9.6 oz (114.1 kg)   19 (!) 254 lb 12.8 oz (115.6 kg)   19 (!) 253 lb 8 oz (115 kg)   19 (!) 252 lb 3 oz (114.4 kg)       General: Alert and oriented, in no acute distress  Sarah has moderate, severe Erythema.  Aria chart and setup information reviewed    Shira Renee MD

## 2021-06-02 NOTE — PROGRESS NOTES
RADIATION ONCOLOGY WEEKLY TREATMENT VISIT NOTE      Assessment / Impression       1. Infiltrating ductal carcinoma of right female breast (H)        Tolerating radiation therapy well.  All questions and concerns addressed.    Plan:     Continue radiation treatment as prescribed.    Patient noticed some skin rashes on the knees in the axilla and chest wall region.  Recommend patient not to take deodorant during the radiation therapy.    The findings could be related to the fungal infection.  I have recommend patient to take over-the-counter antifungal cream for topical use.    I have discussed with the patient about appropriate skin care during the therapy.    Subjective:      HPI: Sarah Gilmore is a 49 y.o. female with    1. Infiltrating ductal carcinoma of right female breast (H)         The following portions of the patient's history were reviewed and updated as appropriate: allergies, current medications, past family history, past medical history, past social history, past surgical history and problem list.    Assessment                  Body Site: Breast                           Site: Right Breast  Stereotactic Radiosurgery: No  Today's Dose: 900  Total Dose for Breast: 6040  Today's Fraction/Total Fraction Breast:   Drainage: 0: Absent                                            Sexuality Alteration                 Emotional Alteration Copin: Effective  Comfort Alteration KPS: 90% Can perform normal activity, minor signs of disease  Fatigue (ONS scale) : 3: Mild Fatigue  Pain Location: Right lower leg  Pain Intensity. Rate degree of pain ranging from 0 (no pain) to 10 (severe pain) : 3  Pain Description: Pricking - Pricking pain, tingling nerve endings  Pain Intervention: 5: Complementary and/or alternative methods(Elevating)  Effectiveness of pain intervention: 2: Pain relieved 50%   Nutrition Alteration Anorexia: 0: None  Nausea: 0: None  Vomitin: None  Skin Alteration Skin  Sensation: 2: Burning(Pt reports burning/itching.)  Skin Reaction: 1: Faint erythema or dry desquamation  AUA Assessment                                  Accompanied by       Objective:     Exam: The patient has skin rashes on the chest wall and right/left axilla suspicious for fungal infection.    Vitals:    10/15/19 1513   BP: 128/72   Pulse: 80   Temp: 98.2  F (36.8  C)   TempSrc: Oral   SpO2: 98%   Weight: (!) 256 lb 4.8 oz (116.3 kg)       Wt Readings from Last 8 Encounters:   10/15/19 (!) 256 lb 4.8 oz (116.3 kg)   09/19/19 (!) 248 lb (112.5 kg)   09/12/19 (!) 251 lb 9.6 oz (114.1 kg)   09/04/19 (!) 254 lb 12.8 oz (115.6 kg)   09/04/19 (!) 253 lb 8 oz (115 kg)   09/04/19 (!) 252 lb 3 oz (114.4 kg)   08/29/19 (!) 250 lb (113.4 kg)   08/19/19 (!) 249 lb 1.9 oz (113 kg)       General: Alert and oriented, in no acute distress  The patient has skin rashes on the chest wall and right/left axilla suspicious for fungal infection.  Aria chart and setup information reviewed    Shira Renee MD

## 2021-06-02 NOTE — TELEPHONE ENCOUNTER
Received voicemail from patient asking that we call her back.  Called patient and she had seen her infectious disease doctor yesterday and was cleared to resume radiation.  Spoke with Dr. Renee earlier today and he is OK with resuming radiation on Monday 11/4/19.  Informed patient of that and she will come at her usual time on Monday for treatment.

## 2021-06-02 NOTE — PROGRESS NOTES
RADIATION ONCOLOGY WEEKLY TREATMENT VISIT NOTE      Assessment / Impression       1. Infiltrating ductal carcinoma of right female breast (H)     2. Radiation dermatitis       All questions and concerns addressed.    Plan:     Her culture from her chest wall last week showed positive for MRSA and patient is currently given antibiotics.  Her skin changes has been stable since last week.  She however still have significant skin erythema and the skin rashes within and outside chest wall region.  The patient had a sensitive skin which certainly can contribute to the current skin reaction.  I will consider give her another week break to allow her to recover from her infection/dermatitis.  We will also wait for clearance from her wound care/infection specialist before restart her radiation therapy.  Patient is scheduled to return to radiation oncology next week for another evaluation and office follow-up.    Subjective:      HPI: Sarah Gilmore is a 49 y.o. female with    1. Infiltrating ductal carcinoma of right female breast (H)     2. Radiation dermatitis         The following portions of the patient's history were reviewed and updated as appropriate: allergies, current medications, past family history, past medical history, past social history, past surgical history and problem list.    Assessment                  Body Site: Breast                           Site: Right Breast  Stereotactic Radiosurgery: No  Today's Dose: 2160  Total Dose for Breast: 6040  Today's Fraction/Total Fraction Breast:   Drainage: 0: Absent                                            Sexuality Alteration                 Emotional Alteration Copin: Effective  Comfort Alteration KPS: 90% Can perform normal activity, minor signs of disease  Fatigue (ONS scale) : 3: Mild Fatigue  Pain Location: Right LE  Pain Intensity. Rate degree of pain ranging from 0 (no pain) to 10 (severe pain) : 0-6  Pain Description: Pricking -  Pricking pain, tingling nerve endings  Pain Intervention: 5: Complementary and/or alternative methods(elevating)  Effectiveness of pain intervention: 2: Pain relieved 50%  Hot Flashes and/or Flushes: 1: Mild or no more than 1 per day   Nutrition Alteration Anorexia: 0: None  Nausea: 0: None  Vomitin: None  Skin Alteration Skin Sensation: 0: No problem(fullness in right chest wall)  Skin Reaction: 3: Dry desquamation with or without erythema  AUA Assessment                                  Accompanied by       Objective:     Exam: moderate, severe Erythema.    Vitals:    10/29/19 1457   BP: 134/75   Pulse: 71   Temp: 97.7  F (36.5  C)   TempSrc: Oral   SpO2: 97%   Weight: (!) 258 lb 14.4 oz (117.4 kg)       Wt Readings from Last 8 Encounters:   10/29/19 (!) 258 lb 14.4 oz (117.4 kg)   10/22/19 (!) 257 lb (116.6 kg)   10/21/19 (!) 256 lb 3 oz (116.2 kg)   10/15/19 (!) 256 lb 4.8 oz (116.3 kg)   19 (!) 248 lb (112.5 kg)   19 (!) 251 lb 9.6 oz (114.1 kg)   19 (!) 254 lb 12.8 oz (115.6 kg)   19 (!) 253 lb 8 oz (115 kg)       General: Alert and oriented, in no acute distress  Sarah has moderate, severe Erythema.  Aria chart and setup information reviewed    Shira Renee MD

## 2021-06-02 NOTE — PROGRESS NOTES
Assessment:   1. Rash  Likely secondary to radiation dermatitis from radiation burn.  Discussed diagnosis with patient and recommend wound culture and initial treatment which mupirocin.  Recommend the patient follow-up with oncologist for treatment adjustment due to recent reaction.  - Culture, Wound  - mupirocin (BACTROBAN) 2 % ointment; Apply to affected area 3 times daily  Dispense: 22 g; Refill: 0     Plan:   Medications: topical antibiotic.  Verbal patient instruction given.  Follow up in 2 weeks.     Subjective:   Sarah Gilmore is a 49 y.o. female with a recent diagnosis of breast cancer with mastectomy. She reports significant skin reaction during the region of her radiotherapy.  She states that rash is itchy and painful.  She was informed by the radiation takes to follow-up with her PCP for possible fungal infection.  Patient did reports that she has a sensitive skin and can easily have a sunburn . Rash involves the chest. Rash has changed over time. Rash is painful and is pruritic. Associated symptoms: none. Patient denies: abdominal pain, arthralgia, congestion, cough, decrease in appetite, decrease in energy level, headache, myalgia, nausea and vomiting.     The following portions of the patient's history were reviewed and updated as appropriate: allergies, current medications, past family history, past medical history, past social history, past surgical history and problem list.    Review of Systems  A 12 point comprehensive review of systems was negative except as noted.      Objective:      /64   Pulse 67   Temp 97.9  F (36.6  C)   Wt (!) 256 lb 3 oz (116.2 kg)   LMP 03/03/2018   SpO2 97%   BMI 36.76 kg/m    General:  alert, appears stated age and cooperative   Skin:  Erythema with dry burns skin, superficial.  Few open wound and vesicles.

## 2021-06-02 NOTE — PROGRESS NOTES
Met with Sarah at clinic today regarding financial resources. 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 Applications were provided and reviewed for [employer] and [facility]. She will complete and submit the applications. She signed COLTEN to allow future communication with  [facility] as they will call to verify active treatment status prior to funding dispersement. Writer will submit online application for [facility]. Sarah will keep writer updated with status of applications. She has writer's contact information for future correspondence.

## 2021-06-02 NOTE — TELEPHONE ENCOUNTER
Patient is to start her radiation therapy treatment tomorrow for her breast cancer and left a message on the nurses line to call.  Called patient and she states she is having dry cough.  Feels this is better today than yesterday but wanted to be sure it did not interfere with her starting treatment.  Reassured patient that she should be fine to get treatment tomorrow and that Dr. Renee would be in clinic if there are any problems that need to be addressed.  Patient verbalized understanding and will be here tomorrow at scheduled time.

## 2021-06-02 NOTE — PROGRESS NOTES
[facility] Infectious Disease  Followup Visit        Assessment:   Breast cancer  Postmastectomy MRSA abscess chest wall : resolved  status post removal of implant and debridement 9/5  Now presenting for MRSA carriage state: no abscess no cellulitis on exam  Radiation dermatitis    Plan:   Apply mupirocin to the nares twice a day for 5 days  Use Hibiclens in the shower apply from the neck down then  rinse off for 3 times only  Okay to finish clindamycin 10 days course  Okay to restart radiation therapy  Consider steroid ointment to rash    Agapito Covington M.D.      Present Illness:   Patient referred again by radiation oncology.  We saw her back in September with a history of MRSA abscess chest wall here is the note:  49 years old female with recent diagnosis of breast cancer who underwent bilateral breast mastectomies and implants 7/30.  This was complicated by MRSA infection of the implant diagnosed by aspiration of fluid collection this grew MRSA see culture at [facility] micro lab.  She then had the implant removed on the right side 9/fifth.  Initially she had been on levofloxacin fatigue.  After surgery she was switched to Septra since the culture was MRSA previously.  Since then she is feeling much better.  She still has a drain which drains about 30 cc of serous fluid.  No redness.  No fevers.  No rash.  Chemo and radiotherapy are planned.    Then follow-up with plastic surgery :  drain was removed, signs of infection resolved  After starting radiation she developed a rash upper chest, and now posterior shoulder  There was some weeping so this was cultured and turned out to be MRSA  There was no new pain no fevers  She has not noticed that the rash has gotten any better since starting clindamycin topical mupirocin      Review of Systems  Performed and all negative except as mentioned above.    Past medical, family, and social history reviewed, unchanged from before.        Physical Exam:     General  Appearance:  Alert, cooperative, no distress, appears stated age    Head:  Normocephalic, without obvious abnormality, atraumatic   Neck no stiffness or adenopathy  Eyes no conjunctivitis or icterus          Chest Wall:   Dermatitis anterior upper chest and right posterior shoulder not tender no abscess no fluctuance no induration no pimples           Extremities:  Extremities normal, atraumatic, no cyanosis or edema,     Skin:  Skin color, texture, turgor normal, no rashes or lesions    Neurologic:  Alert and oriented X 3, Moves all 4 extremities        DATA     Results for orders placed or performed in visit on 10/21/19   Culture, Wound   Result Value Ref Range    Culture 3+ MRSA Coagulase Positive Staph (!)        Susceptibility    MRSA Coagulase Positive Staph - ASHLEY     Clindamycin <=0.5 Sensitive      Cefazolin >16 Resistant      Doxycycline <=0.5 Sensitive      Daptomycin <=1 Sensitive      Linezolid 2 Sensitive      Oxacillin >2 Resistant      Trimethoprim + Sulfamethoxazole <=1/19 Sensitive      Vancomycin 1 Sensitive          Agapito Covington MD

## 2021-06-02 NOTE — PROGRESS NOTES
Met with Sarah in clinic and relayed that she was awarded a $500 momo from [employer] and they will be mailing her a bill payment form to complete. She will request [employer] and [employer] cards. She will be seen for her OTV today, reports that she has radiation dermatitis and looks forward to further recommendations from the MD..Rupali Douglas RN

## 2021-06-03 ENCOUNTER — AMBULATORY - HEALTHEAST (OUTPATIENT)
Dept: LAB | Facility: CLINIC | Age: 51
End: 2021-06-03

## 2021-06-03 VITALS
HEIGHT: 69 IN | DIASTOLIC BLOOD PRESSURE: 74 MMHG | HEART RATE: 70 BPM | BODY MASS INDEX: 37.35 KG/M2 | OXYGEN SATURATION: 99 % | WEIGHT: 252.19 LBS | SYSTOLIC BLOOD PRESSURE: 123 MMHG

## 2021-06-03 VITALS
SYSTOLIC BLOOD PRESSURE: 142 MMHG | DIASTOLIC BLOOD PRESSURE: 90 MMHG | WEIGHT: 264.7 LBS | OXYGEN SATURATION: 97 % | TEMPERATURE: 98 F | BODY MASS INDEX: 37.98 KG/M2 | HEART RATE: 79 BPM

## 2021-06-03 VITALS
BODY MASS INDEX: 36.94 KG/M2 | HEIGHT: 70 IN | DIASTOLIC BLOOD PRESSURE: 70 MMHG | WEIGHT: 258 LBS | OXYGEN SATURATION: 97 % | HEART RATE: 73 BPM | SYSTOLIC BLOOD PRESSURE: 106 MMHG

## 2021-06-03 VITALS
HEART RATE: 79 BPM | TEMPERATURE: 97.8 F | WEIGHT: 256.4 LBS | BODY MASS INDEX: 36.79 KG/M2 | SYSTOLIC BLOOD PRESSURE: 111 MMHG | OXYGEN SATURATION: 99 % | DIASTOLIC BLOOD PRESSURE: 78 MMHG

## 2021-06-03 VITALS
DIASTOLIC BLOOD PRESSURE: 85 MMHG | BODY MASS INDEX: 37.11 KG/M2 | TEMPERATURE: 97.8 F | SYSTOLIC BLOOD PRESSURE: 134 MMHG | WEIGHT: 258.6 LBS | HEART RATE: 85 BPM | OXYGEN SATURATION: 99 %

## 2021-06-03 VITALS
TEMPERATURE: 98 F | BODY MASS INDEX: 37.55 KG/M2 | SYSTOLIC BLOOD PRESSURE: 144 MMHG | WEIGHT: 253.5 LBS | DIASTOLIC BLOOD PRESSURE: 75 MMHG | HEIGHT: 69 IN | OXYGEN SATURATION: 99 % | HEART RATE: 70 BPM

## 2021-06-03 VITALS
OXYGEN SATURATION: 97 % | WEIGHT: 256.19 LBS | TEMPERATURE: 98.1 F | TEMPERATURE: 97.9 F | DIASTOLIC BLOOD PRESSURE: 64 MMHG | HEART RATE: 70 BPM | BODY MASS INDEX: 36.76 KG/M2 | DIASTOLIC BLOOD PRESSURE: 82 MMHG | WEIGHT: 257 LBS | OXYGEN SATURATION: 97 % | HEART RATE: 67 BPM | SYSTOLIC BLOOD PRESSURE: 147 MMHG | BODY MASS INDEX: 36.88 KG/M2 | SYSTOLIC BLOOD PRESSURE: 109 MMHG

## 2021-06-03 VITALS
HEART RATE: 71 BPM | DIASTOLIC BLOOD PRESSURE: 75 MMHG | OXYGEN SATURATION: 97 % | BODY MASS INDEX: 37.15 KG/M2 | WEIGHT: 258.9 LBS | SYSTOLIC BLOOD PRESSURE: 134 MMHG | TEMPERATURE: 97.7 F

## 2021-06-03 VITALS — WEIGHT: 255 LBS | HEIGHT: 70 IN | BODY MASS INDEX: 36.51 KG/M2

## 2021-06-03 VITALS
DIASTOLIC BLOOD PRESSURE: 75 MMHG | BODY MASS INDEX: 37.63 KG/M2 | HEART RATE: 70 BPM | WEIGHT: 254.8 LBS | TEMPERATURE: 98 F | SYSTOLIC BLOOD PRESSURE: 144 MMHG | OXYGEN SATURATION: 99 %

## 2021-06-03 VITALS
TEMPERATURE: 98.1 F | DIASTOLIC BLOOD PRESSURE: 76 MMHG | OXYGEN SATURATION: 99 % | WEIGHT: 251.6 LBS | BODY MASS INDEX: 37.15 KG/M2 | HEART RATE: 70 BPM | SYSTOLIC BLOOD PRESSURE: 153 MMHG

## 2021-06-03 VITALS
TEMPERATURE: 98.2 F | DIASTOLIC BLOOD PRESSURE: 64 MMHG | SYSTOLIC BLOOD PRESSURE: 124 MMHG | BODY MASS INDEX: 37.45 KG/M2 | WEIGHT: 261 LBS | OXYGEN SATURATION: 98 % | HEART RATE: 75 BPM

## 2021-06-03 VITALS
TEMPERATURE: 98.2 F | OXYGEN SATURATION: 98 % | HEART RATE: 68 BPM | DIASTOLIC BLOOD PRESSURE: 78 MMHG | WEIGHT: 263.6 LBS | SYSTOLIC BLOOD PRESSURE: 124 MMHG | BODY MASS INDEX: 37.82 KG/M2

## 2021-06-03 VITALS
WEIGHT: 256.3 LBS | HEART RATE: 80 BPM | DIASTOLIC BLOOD PRESSURE: 72 MMHG | BODY MASS INDEX: 36.78 KG/M2 | OXYGEN SATURATION: 98 % | SYSTOLIC BLOOD PRESSURE: 128 MMHG | TEMPERATURE: 98.2 F

## 2021-06-03 VITALS
SYSTOLIC BLOOD PRESSURE: 115 MMHG | WEIGHT: 258.6 LBS | OXYGEN SATURATION: 99 % | DIASTOLIC BLOOD PRESSURE: 75 MMHG | BODY MASS INDEX: 37.11 KG/M2 | HEART RATE: 73 BPM | TEMPERATURE: 98.1 F

## 2021-06-03 VITALS
TEMPERATURE: 98.7 F | SYSTOLIC BLOOD PRESSURE: 104 MMHG | DIASTOLIC BLOOD PRESSURE: 70 MMHG | HEART RATE: 72 BPM | BODY MASS INDEX: 36.59 KG/M2 | WEIGHT: 255 LBS

## 2021-06-03 VITALS — WEIGHT: 255.13 LBS | BODY MASS INDEX: 36.52 KG/M2 | HEIGHT: 70 IN

## 2021-06-03 VITALS
TEMPERATURE: 98.1 F | OXYGEN SATURATION: 98 % | DIASTOLIC BLOOD PRESSURE: 68 MMHG | SYSTOLIC BLOOD PRESSURE: 109 MMHG | HEART RATE: 74 BPM | WEIGHT: 260 LBS | BODY MASS INDEX: 37.31 KG/M2

## 2021-06-03 VITALS — BODY MASS INDEX: 41.65 KG/M2 | HEIGHT: 65 IN | WEIGHT: 250 LBS

## 2021-06-03 VITALS — BODY MASS INDEX: 41.51 KG/M2 | HEIGHT: 65 IN | WEIGHT: 249.12 LBS

## 2021-06-03 VITALS — HEIGHT: 70 IN | WEIGHT: 248 LBS | BODY MASS INDEX: 35.5 KG/M2

## 2021-06-03 DIAGNOSIS — Z01.818 PREOP GENERAL PHYSICAL EXAM: ICD-10-CM

## 2021-06-03 NOTE — TELEPHONE ENCOUNTER
Patient Returning Call  Reason for call:  lmtcb   Information relayed to patient: Relayed Msg and patient agrees, Patient was transferred to Sleep Med to make follow up apt.   Patient has additional questions:  No  If YES, what are your questions/concerns:  Na  Okay to leave a detailed message?: Yes

## 2021-06-03 NOTE — TELEPHONE ENCOUNTER
Lmtckeesha    Please mail a letter with a copy of the patient's sleep study encouraging the patient to call us to schedule a follow up visit to review the results in person. Thank you.

## 2021-06-03 NOTE — PROGRESS NOTES
"  RADIATION ONCOLOGY WEEKLY TREATMENT VISIT NOTE      Assessment / Impression       1. Infiltrating ductal carcinoma of right female breast (H)        Tolerating radiation therapy well.  All questions and concerns addressed.    Plan:     Continue radiation treatment as prescribed.    The patient has a significant skin reaction and is carrier for MRSA.  There is concern for ongoing infection.  If her skin continue getting worse, I may consider give her a few days break to allow her recovery before complete her radiation therapy.    Subjective:      HPI: Sarah Gilmore is a 49 y.o. female with    1. Infiltrating ductal carcinoma of right female breast (H)         The following portions of the patient's history were reviewed and updated as appropriate: allergies, current medications, past family history, past medical history, past social history, past surgical history and problem list.    Assessment                  Body Site: Breast                           Site: Right Breast  Stereotactic Radiosurgery: No  Concurrent Therapy: No  Today's Dose: 4860  Total Dose for Breast: 6040  Today's Fraction/Total Fraction Breast:   Drainage: 0: Absent                                            Sexuality Alteration                 Emotional Alteration Copin: Effective  Comfort Alteration KPS: 90% Can perform normal activity, minor signs of disease  Fatigue (ONS scale) : 6: Moderate Fatigue  Pain Location: Right Chest area.  Pain Intensity. Rate degree of pain ranging from 0 (no pain) to 10 (severe pain) : 3-4  Pain Description: Pressure - Pressure, heavy or fullness(\"Not all the time\")  Pain Intervention: 5: Complementary and/or alternative methods  Effectiveness of pain intervention: 2: Pain relieved 50%  Hot Flashes and/or Flushes: 1: Mild or no more than 1 per day   Nutrition Alteration Anorexia: 0: None  Nausea: 0: None  Skin Alteration Skin Sensation: 2: Burning  Skin Reaction: 3: Dry desquamation with or " without erythema  AUA Assessment                                  Accompanied by       Objective:     Exam: moderate, severe Erythema.    Vitals:    11/26/19 1539   BP: 115/75   Pulse: 73   Temp: 98.1  F (36.7  C)   TempSrc: Oral   SpO2: 99%   Weight: (!) 258 lb 9.6 oz (117.3 kg)       Wt Readings from Last 8 Encounters:   11/26/19 (!) 258 lb 9.6 oz (117.3 kg)   11/19/19 (!) 258 lb 9.6 oz (117.3 kg)   11/12/19 (!) 261 lb (118.4 kg)   11/05/19 (!) 256 lb 6.4 oz (116.3 kg)   10/30/19 (!) 255 lb (115.7 kg)   10/29/19 (!) 258 lb 14.4 oz (117.4 kg)   10/22/19 (!) 257 lb (116.6 kg)   10/21/19 (!) 256 lb 3 oz (116.2 kg)       General: Alert and oriented, in no acute distress  Sarah has moderate, severe Erythema.  Aria chart and setup information reviewed    Shira Renee MD

## 2021-06-03 NOTE — PROGRESS NOTES
Met with Zabrina when she was at clinic for radiation treatment. She plans to move to her own apartment on 11/29. She continues to be off from work during her radiation treatment. She has received [employer], qualifies for [employer] in January. Application for [facility] was provided for submission. She has writer's contact information for future reference.

## 2021-06-03 NOTE — PROGRESS NOTES
RADIATION ONCOLOGY WEEKLY TREATMENT VISIT NOTE      Assessment / Impression       1. Infiltrating ductal carcinoma of right female breast (H)       Tolerating radiation therapy well.  All questions and concerns addressed.    Plan:     Continue radiation treatment as prescribed.    The skin within the radiation therapy field appears to be stable with no significant changes compared to last week.    Subjective:      HPI: Sarah Gilmore is a 49 y.o. female with    1. Infiltrating ductal carcinoma of right female breast (H)         The following portions of the patient's history were reviewed and updated as appropriate: allergies, current medications, past family history, past medical history, past social history, past surgical history and problem list.    Assessment                  Body Site: Breast                           Site: Right Breast  Stereotactic Radiosurgery: No  Today's Dose: 3960  Total Dose for Breast: 6040  Today's Fraction/Total Fraction Breast: /  Drainage: 0: Absent                                            Sexuality Alteration                 Emotional Alteration Copin: Effective  Comfort Alteration KPS: 90% Can perform normal activity, minor signs of disease  Fatigue (ONS scale) : 6: Moderate Fatigue  Pain Location: Denies currently  Pain Intensity. Rate degree of pain ranging from 0 (no pain) to 10 (severe pain) : 0-6(Occassional prickly pain.)  Pain Description: Pricking - Pricking pain, tingling nerve endings  Pain Intervention: 5: Complementary and/or alternative methods(Cabbage)  Effectiveness of pain intervention: 2: Pain relieved 50%  Hot Flashes and/or Flushes: 1: Mild or no more than 1 per day   Nutrition Alteration Anorexia: 0: None  Nausea: 1: Able to eat  Skin Alteration Skin Sensation: 2: Burning  Skin Reaction: 3: Dry desquamation with or without erythema(Pt using Radiaplex and cabbage leaves.)  AUA Assessment                                  Accompanied by        Objective:     Exam:  moderate, severe Erythema.    Vitals:    11/19/19 1522   BP: 134/85   Pulse: 85   Temp: 97.8  F (36.6  C)   TempSrc: Oral   SpO2: 99%   Weight: (!) 258 lb 9.6 oz (117.3 kg)       Wt Readings from Last 8 Encounters:   11/19/19 (!) 258 lb 9.6 oz (117.3 kg)   11/12/19 (!) 261 lb (118.4 kg)   11/05/19 (!) 256 lb 6.4 oz (116.3 kg)   10/30/19 (!) 255 lb (115.7 kg)   10/29/19 (!) 258 lb 14.4 oz (117.4 kg)   10/22/19 (!) 257 lb (116.6 kg)   10/21/19 (!) 256 lb 3 oz (116.2 kg)   10/15/19 (!) 256 lb 4.8 oz (116.3 kg)       General: Alert and oriented, in no acute distress  Sarah has moderate, severe Erythema.  Aria chart and setup information reviewed    Shira Renee MD

## 2021-06-03 NOTE — PROGRESS NOTES
Followed up with Sarah when she came to clinic for her radiation treatment. She was on antibx for infection was cleared to restart treatment a couple days ago. She is off from work at this time. She has [employer] bill payment form and will send from in to allocate funds in the near future. She is appreciative of the follow up.

## 2021-06-03 NOTE — PROGRESS NOTES
Dear Dr. Lobo, Laura C, FN  [address] [facility]  [place] [address] [place] [id number],    Thank you for the opportunity to participate in the care of Sarah Gilmore.     She is a 49 y.o.  female patient who comes to the sleep medicine clinic for review of her sleep study. The study was completed on 11/25/2019 which showed that the patient had severe obstructive sleep apnea with an apnea hypopnea index of 35.6 events per hour with lowest O2 sat of 81%.    Current Outpatient Medications   Medication Sig Dispense Refill     acetaminophen 325 mg cap Take 2 tablets by mouth as needed.       chlorhexidine (HIBICLENS) 4 % external liquid Apply daily from the neck down, then rinse off, shower, for 3 days 118 mL 0     cyclobenzaprine (FLEXERIL) 10 MG tablet Take 1 tablet (10 mg total) by mouth 3 (three) times a day as needed for muscle spasms. (Patient taking differently: Take 10 mg by mouth 3 (three) times a day as needed for muscle spasms. Not currently using      ) 30 tablet 0     EPINEPHrine (EPIPEN JR) 0.15 mg/0.3 mL injection Inject 0.3 mL (0.15 mg total) into the shoulder, thigh, or buttocks as needed for anaphylaxis. 1 each 12     fexofenadine (ALLEGRA ALLERGY) 60 MG tablet Take 60 mg by mouth daily.       hydrOXYzine pamoate (VISTARIL) 25 MG capsule Take 1 capsule (25 mg total) by mouth 3 (three) times a day as needed for itching. 60 capsule 0     ibuprofen (ADVIL,MOTRIN) 600 MG tablet Take 1 tablet (600 mg total) by mouth every 6 (six) hours as needed for pain. 90 tablet 0     mometasone (ELOCON) 0.1 % cream Apply to affected area up to twice daily as needed for itch. Apply before other creams for better skin absorption. 45 g 1     mupirocin (BACTROBAN) 2 % ointment Apply topically 3 (three) times a day.       ondansetron (ZOFRAN) 4 MG tablet Take 1 tablet (4 mg total) by mouth every 8 (eight) hours as needed for nausea. 10 tablet 1     No current facility-administered medications for this visit.        Allergies  "  Allergen Reactions     Bee Pollen Anaphylaxis     Bee Venom Protein (Honey Bee) Anaphylaxis     Iodinated Contrast Media Anaphylaxis       Epworths Sleepiness Scale 8/29/2019   Sitting and reading 3   Watching TV 3   Sitting, inactive in a public place (e.g. a theatre or a meeting) 3   As a passenger in a car for an hour without a break 3   Lying down to rest in the afternoon when circumstances permit 3   Sitting and talking to someone 0   Sitting quietly after a lunch without alcohol 3   In a car, while stopped for a few minutes in traffic 0   Total score 18       Physical Exam:  /70   Pulse 73   Ht 5' 10\" (1.778 m)   Wt (!) 258 lb (117 kg)   LMP 03/03/2018   SpO2 97%   BMI 37.02 kg/m    BMI:Body mass index is 37.02 kg/m .   GEN: NAD, obese  Psych: normal mood, normal affect     Labs/Studies:    - We reviewed the results of the overnight PSG as described on the HPI.     Assessment and Plan:  In summary Sarah Gilmore is a 49 y.o. year old female here for review of her sleep study.  1. Obstructive Sleep Apnea  We had an extensive conversation to review the results of her sleep study and to  her on the importance of treating sleep apnea. Patient decided to proceed with CPAP. She will start using the device as soon as she receives it with the intention to use if for the entire night. We discussed some tips to increase PAP tolerance as well as the normal curve of adaptation. CPAP is going to provide improved respiratory function during the night but it can cause some sleep disruption that tends to improve with continuous usage. She should return to the clinic in 6 weeks to review compliance and efficacy monitoring. Since the patient does not have any preference, we will use [employer] as the durable medical equipment company.   2.  Hypersomnia  3.  Other sleep disturbance     Patient verbalized understanding of these issues, agrees with the plan and all questions were answered today. Patient " was given an opportuntity to voice any other symptoms or concerns not listed above. Patient did not have any other symptoms or concerns.        Darshan Christianson DO  Board Certified in Internal Medicine and Sleep Medicine  [facility].    (Note created with Dragon voice recognition and unintended spelling errors and word substitutions may occur)

## 2021-06-03 NOTE — PROGRESS NOTES
RADIATION ONCOLOGY WEEKLY TREATMENT VISIT NOTE      Assessment / Impression       1. Infiltrating ductal carcinoma of right female breast (H)        Tolerating radiation therapy well.  Her skin is stable compared to last week.  There is no signs of skin breakdown and infection.  All questions and concerns addressed.    Plan:     Continue radiation treatment as prescribed.    The patient has a significant skin reaction and is carrier for MRSA.  There is concern for ongoing infection.  She had a few days off and her skin has been stable.  We will finish her boost treatment this week.    Discussed with patient about appropriate skin care.    Subjective:      HPI: Sarah Gilmore is a 49 y.o. female with    1. Infiltrating ductal carcinoma of right female breast (H)         The following portions of the patient's history were reviewed and updated as appropriate: allergies, current medications, past family history, past medical history, past social history, past surgical history and problem list.    Assessment                  Body Site: Breast                           Site: Right Breast  Stereotactic Radiosurgery: No  Concurrent Therapy: No  Today's Dose: 5440  Total Dose for Breast: 6040  Today's Fraction/Total Fraction Breast: 30/33  Drainage: 0: Absent                                            Sexuality Alteration                 Emotional Alteration Copin: Effective  Comfort Alteration KPS: 90% Can perform normal activity, minor signs of disease  Fatigue (ONS scale) : 6: Moderate Fatigue  Pain Location: Treatment site  Pain Intensity. Rate degree of pain ranging from 0 (no pain) to 10 (severe pain) : 3-4  Pain Description: Burning - Burning, hot, fire type pain  Pain Intervention: 5: Complementary and/or alternative methods  Effectiveness of pain intervention: 2: Pain relieved 50%  Hot Flashes and/or Flushes: 1: Mild or no more than 1 per day   Nutrition Alteration Anorexia: 0: None  Nausea: 0:  None  Skin Alteration Skin Sensation: 2: Burning  Skin Reaction: 3: Dry desquamation with or without erythema(Pt reports continuing to use Radiaplex.)  AUA Assessment                                  Accompanied by       Objective:     Exam: moderate, severe Erythema.    Vitals:    12/03/19 1517   BP: 109/68   Pulse: 74   Temp: 98.1  F (36.7  C)   TempSrc: Oral   SpO2: 98%   Weight: (!) 260 lb (117.9 kg)       Wt Readings from Last 8 Encounters:   12/03/19 (!) 260 lb (117.9 kg)   12/03/19 (!) 258 lb (117 kg)   11/26/19 (!) 258 lb 9.6 oz (117.3 kg)   11/19/19 (!) 258 lb 9.6 oz (117.3 kg)   11/12/19 (!) 261 lb (118.4 kg)   11/05/19 (!) 256 lb 6.4 oz (116.3 kg)   10/30/19 (!) 255 lb (115.7 kg)   10/29/19 (!) 258 lb 14.4 oz (117.4 kg)       General: Alert and oriented, in no acute distress  Sarah has moderate, severe Erythema.  Aria chart and setup information reviewed    Shira Renee MD

## 2021-06-03 NOTE — PROGRESS NOTES
Pt ambulatory to On Treatment Visit during Radiation Therapy. Pt reports increased fatigue, and some occasional nausea. Pt using Radiaplex and cabbage leaves for skin; skin is red with dry desquamation.

## 2021-06-03 NOTE — PROGRESS NOTES
RADIATION ONCOLOGY WEEKLY TREATMENT VISIT NOTE      Assessment / Impression       1. Infiltrating ductal carcinoma of right female breast (H)       Cancer Staging  Infiltrating ductal carcinoma of right female breast (H)  Staging form: Breast, AJCC 8th Edition  - Clinical: No stage assigned - Unsigned  - Pathologic stage from 9/3/2019: Stage Unknown (pTX, pN1a(sn), cM0, G1, ER+, VT+, HER2-) - Signed by Ez Burroughs MD on 9/4/2019     Plan:     1. Continue radiation treatment as prescribed. Tolerating radiation therapy well.    2. Positive MRSA culture from chest erythema last week and given break from radiation treatment to heal while on Clindamycin x10 days. She has followed up with wound cares on 10/30/2019 who gave further skin care instructions and cleared patient for radiation therapy. Patient evaluated yesterday prior to treatment and cleared to begin radiation treatment by all parties involved. Has finished antibiotics and feels erythema is improving. No fevers today. All questions and concerns addressed.    Radiation: Site: Right Breast  Stereotactic Radiosurgery: No  Stereotactic Radiosurgery: No  Today's Dose: 2340  Total Dose for Breast: 6040  Today's Fraction/Total Fraction Breast: 13/33      Subjective:      HPI: Sarah Gilmore is a 49 y.o. female with    1. Infiltrating ductal carcinoma of right female breast (H)         The following portions of the patient's history were reviewed and updated as appropriate: allergies, current medications, past family history, past medical history, past social history, past surgical history and problem list.    Assessment                  Body Site: Breast                           Site: Right Breast  Stereotactic Radiosurgery: No  Today's Dose: 2340  Total Dose for Breast: 6040  Today's Fraction/Total Fraction Breast: 13/33  Drainage: 0: Absent                                            Sexuality Alteration                 Emotional Alteration  Copin: Effective  Comfort Alteration KPS: 90% Can perform normal activity, minor signs of disease  Fatigue (ONS scale) : 4: Moderate Fatigue  Pain Location: Right LE  Pain Intensity. Rate degree of pain ranging from 0 (no pain) to 10 (severe pain) : 0-6  Pain Description: Pricking - Pricking pain, tingling nerve endings  Pain Intervention: 5: Complementary and/or alternative methods(elevating)  Effectiveness of pain intervention: 2: Pain relieved 50%  Hot Flashes and/or Flushes: 1: Mild or no more than 1 per day   Nutrition Alteration Anorexia: 0: None  Nausea: 0: None  Vomitin: None  Skin Alteration Skin Sensation: 0: No problem(tight)  Skin Reaction: 3: Dry desquamation with or without erythema  AUA Assessment                                  Accompanied by       Objective:     Exam:     Vitals:    19 1500   BP: 111/78   Pulse: 79   Temp: 97.8  F (36.6  C)   TempSrc: Oral   SpO2: 99%   Weight: (!) 256 lb 6.4 oz (116.3 kg)       Wt Readings from Last 8 Encounters:   19 (!) 256 lb 6.4 oz (116.3 kg)   10/30/19 (!) 255 lb (115.7 kg)   10/29/19 (!) 258 lb 14.4 oz (117.4 kg)   10/22/19 (!) 257 lb (116.6 kg)   10/21/19 (!) 256 lb 3 oz (116.2 kg)   10/15/19 (!) 256 lb 4.8 oz (116.3 kg)   19 (!) 248 lb (112.5 kg)   19 (!) 251 lb 9.6 oz (114.1 kg)       General: Alert and oriented, in no acute distress  Sarah has mild-moderate erythema with mild folliculitis on right anterior CW from radiation therapy. Similar, less severe, erythema to posterior CW. Both areas of erythema are improving per patient.     Treatment Summary to Date    Aria chart and setup information reviewed    Rogerio Contreras MD

## 2021-06-03 NOTE — PROGRESS NOTES
Met with Sarah briefly when she was at clinic for radiation treatment. She is considering moving into her own residence in December. She received [employer] momo and still needs to complete the bill payment form and is considering using for housing/rent. We will connect again in the near future. She has writer's contact information

## 2021-06-03 NOTE — PROGRESS NOTES
RADIATION ONCOLOGY WEEKLY TREATMENT VISIT NOTE      Assessment / Impression       1. Infiltrating ductal carcinoma of right female breast (H)        Tolerating radiation therapy well.  All questions and concerns addressed.    Plan:     Continue radiation treatment as prescribed.    Subjective:      HPI: Sarah Gilmore is a 49 y.o. female with    1. Infiltrating ductal carcinoma of right female breast (H)         The following portions of the patient's history were reviewed and updated as appropriate: allergies, current medications, past family history, past medical history, past social history, past surgical history and problem list.    Assessment                  Body Site: Breast                           Site: Right Breast  Stereotactic Radiosurgery: No  Today's Dose: 3240  Total Dose for Breast: 6040  Today's Fraction/Total Fraction Breast: 18/  Drainage: 0: Absent                                            Sexuality Alteration                 Emotional Alteration Copin: Effective  Comfort Alteration KPS: 90% Can perform normal activity, minor signs of disease  Fatigue (ONS scale) : 5: Moderate Fatigue  Pain Location: denies currently  Hot Flashes and/or Flushes: 1: Mild or no more than 1 per day   Nutrition Alteration Anorexia: 0: None  Nausea: 0: None  Vomitin: None  Skin Alteration Skin Sensation: 2: Burning  Skin Reaction: 3: Dry desquamation with or without erythema  AUA Assessment                                  Accompanied by       Objective:     Exam: moderate, severe Erythema.  No signs of significant worsening of skin.    Vitals:    19 1457   BP: 124/64   Pulse: 75   Temp: 98.2  F (36.8  C)   TempSrc: Oral   SpO2: 98%   Weight: (!) 261 lb (118.4 kg)       Wt Readings from Last 8 Encounters:   19 (!) 261 lb (118.4 kg)   19 (!) 256 lb 6.4 oz (116.3 kg)   10/30/19 (!) 255 lb (115.7 kg)   10/29/19 (!) 258 lb 14.4 oz (117.4 kg)   10/22/19 (!) 257 lb (116.6 kg)    10/21/19 (!) 256 lb 3 oz (116.2 kg)   10/15/19 (!) 256 lb 4.8 oz (116.3 kg)   09/19/19 (!) 248 lb (112.5 kg)       General: Alert and oriented, in no acute distress  Sarah has moderate, severe Erythema.  Aria chart and setup information reviewed    Shira Renee MD

## 2021-06-03 NOTE — TELEPHONE ENCOUNTER
----- Message from Wayne Helm, PSGT sent at 11/14/2019 11:42 AM CST -----  Hi, this patient's in lab sleep study has been denied. You can call and do a peer to peer, or change to a HST.    Thanks,     Wayne

## 2021-06-04 ENCOUNTER — COMMUNICATION - HEALTHEAST (OUTPATIENT)
Dept: SCHEDULING | Facility: CLINIC | Age: 51
End: 2021-06-04

## 2021-06-04 VITALS — WEIGHT: 230 LBS | BODY MASS INDEX: 33 KG/M2

## 2021-06-04 VITALS
OXYGEN SATURATION: 97 % | SYSTOLIC BLOOD PRESSURE: 106 MMHG | DIASTOLIC BLOOD PRESSURE: 75 MMHG | WEIGHT: 273 LBS | TEMPERATURE: 99 F | RESPIRATION RATE: 20 BRPM | BODY MASS INDEX: 39.17 KG/M2 | HEART RATE: 65 BPM

## 2021-06-04 VITALS
DIASTOLIC BLOOD PRESSURE: 87 MMHG | SYSTOLIC BLOOD PRESSURE: 138 MMHG | HEART RATE: 72 BPM | BODY MASS INDEX: 37.84 KG/M2 | WEIGHT: 263.7 LBS | OXYGEN SATURATION: 96 % | TEMPERATURE: 98.4 F

## 2021-06-04 VITALS
DIASTOLIC BLOOD PRESSURE: 68 MMHG | SYSTOLIC BLOOD PRESSURE: 128 MMHG | BODY MASS INDEX: 39.6 KG/M2 | OXYGEN SATURATION: 98 % | WEIGHT: 276 LBS | HEART RATE: 69 BPM

## 2021-06-04 VITALS
WEIGHT: 273 LBS | SYSTOLIC BLOOD PRESSURE: 104 MMHG | OXYGEN SATURATION: 98 % | HEART RATE: 98 BPM | HEIGHT: 70 IN | BODY MASS INDEX: 39.08 KG/M2 | DIASTOLIC BLOOD PRESSURE: 70 MMHG | RESPIRATION RATE: 12 BRPM

## 2021-06-04 VITALS
WEIGHT: 277.6 LBS | HEART RATE: 70 BPM | TEMPERATURE: 98.3 F | OXYGEN SATURATION: 95 % | SYSTOLIC BLOOD PRESSURE: 149 MMHG | BODY MASS INDEX: 39.83 KG/M2 | DIASTOLIC BLOOD PRESSURE: 87 MMHG

## 2021-06-04 VITALS
TEMPERATURE: 98.3 F | DIASTOLIC BLOOD PRESSURE: 78 MMHG | BODY MASS INDEX: 38.86 KG/M2 | WEIGHT: 270.8 LBS | HEART RATE: 68 BPM | SYSTOLIC BLOOD PRESSURE: 118 MMHG | OXYGEN SATURATION: 98 %

## 2021-06-04 VITALS
OXYGEN SATURATION: 95 % | HEART RATE: 74 BPM | DIASTOLIC BLOOD PRESSURE: 64 MMHG | WEIGHT: 269.1 LBS | SYSTOLIC BLOOD PRESSURE: 126 MMHG | BODY MASS INDEX: 38.61 KG/M2

## 2021-06-04 NOTE — TELEPHONE ENCOUNTER
I was able to speak to Sarah but it was not a good time to talk and requested that I call another time. I accepted that information and would indeed call another time. Chyna

## 2021-06-04 NOTE — TELEPHONE ENCOUNTER
"Contacted patient, she said:  She was told by tech here when having DEXA scan on 12/24, \"Has anyone ever told you you have a compression fracture in your low back? Because you do and I don't want you to be surprised.\"  She is unsure of previous DEXA scan, know she had one, thinks it was at [facility].  Care Everywhere shows results from 06/08/2011.    She is taking Calcium 600 mg with Vitamin D 400 IU two tabs daily.   Is aware of weight bearing exercise.   She is wondering how to proceed?  Kaila Akins RN    "

## 2021-06-04 NOTE — TELEPHONE ENCOUNTER
Called patient at the requested of medical oncology staff to answer patient questions.  Left message for patient with call back number.  Patient questioning paperwork for return to work on Monday 1/6/20 and also had questions about implants.  Awaiting call back from patient.

## 2021-06-04 NOTE — TELEPHONE ENCOUNTER
Who is requesting the letter?  Sarah  Why is the letter needed? Patient has cancer and is going through radiation treatment.  She has two cats at home for emotional support.  If she has a letter stating her cats are for support, her apartment management will waive the  additional month pet fee and damage deposit.  Money is very tight for since she has been out of work since July 30, 2019 dealing with post-mastectomy complications.    How would you like this letter returned? Patient will  the letter from the .  Okay to leave a detailed message? Yes

## 2021-06-04 NOTE — PROGRESS NOTES
Patient here ambulatory for final treatment to her right chest wall for her breast cancer.  Patient has bright erythema with desquamation along the scar line in the treatment field.  Reinforced skin cares.  Written discharge instructions reviewed and given to patient.  Follow up with Dr. Renee in about 1-2 weeks for skin check.  Patient able to verbalize understanding and left ambulatory with copy of appointments.

## 2021-06-04 NOTE — TELEPHONE ENCOUNTER
Reviewed Dexa scan results with Dr. Burroughs yesterday.  Dr. Burroughs would like clarification re:  Compression fracture.  Where was imaging done to indicate this? Where is location of this compression fracture? I asked she call me back for further clarification on this   I also left  's recommendation to:  -Take calcium and Vit D.  -Balance and weight bearing exercises  -She recommends repeat in Dexa in 1 year/SLIM Maravilla RN

## 2021-06-04 NOTE — PROGRESS NOTES
Radiation Treatment Summary    Patient: Sarah Gilmore   MRN: [id number]  : 1970  Care Provider: Shira Renee    Date of Service: 2019          Mckenzie Colby DO  [address] [address]             Dear Dr. Colby:     Your patient Mrs. Sarah Gilmore completed her radiation therapy on 2019. As you know Ms. Gilmore is a 49 y.o. female with a diagnosis of left breast DCIS and right breast cancer, stage [id number], ER/DE positive, HER-2 negative, status post bilateral simple mastectomy with immediate reconstruction and right sentinel lymph nodes resection ( LNs +) with negative margin and the closest DCIS margin was measured 0.5 mm upper inner margin.  She received postop radiation therapy for her breast cancer with a total dose of 5040 cGy in 28 treatments targeted to the right breast/chest wall and the regional lymph nodes followed by additional 1000 cGy in 5 fractions given to the primary tumor bed using electron.  Her radiation therapy was given from 10/9/2019-2019.  The patient did experience a significant skin reaction during the radiation therapy for which she required a few days break before completion.  She otherwise has been doing well at the end of the therapy.  She is scheduled to return to radiation oncology eating 4 weeks for routine post radiation therapy office follow-up.    Again, thank you very much for the referral and allowing me to participate in the care of this patient.  If you have any question about this patient, please do not hesitate to call.    Sincerely,      Shira Renee MD, PhD  Department of Radiation Oncology   [facility]  Tel: [phone]  Page: [phone]    [facility]  [address] [place] [address]     [address]   [place] [address]    CC:  Patient Care Team:  Laura Lobo FNP as PCP - General (Nurse Practitioner)  Laura Lobo FNP as Assigned PCP  Shira Renee MD as  Physician (Radiation Oncology)  Ez Burroughs MD as Physician (Hematology and Oncology)  Mckenzie Colby DO as Physician (General Surgery)  Carlitos Crum MD as Physician (Plastic Surgery)  Rupali Douglas, BUTCH as Oncology Nurse Navigator (Oncology)

## 2021-06-04 NOTE — PROGRESS NOTES
Patient is here today for provider visit for Malignant neoplasm of right female breast, unspecified estrogen receptor status, unspecified site of breast.

## 2021-06-04 NOTE — PROGRESS NOTES
Met with Sarah when she came to clinic for follow up with Dr. Burroughs. She saw Dr. Renee in follow up yesterday and reports that her skin has healed and her fatigue is improving. She returns to work on 12/23 to 8 hour days, instead of her longer 12 hour shifts. She has moved to her own apartment in [place] and is enjoying living independently. She has writer's direct dial number for future correspondence.

## 2021-06-04 NOTE — PROGRESS NOTES
Patient here ambulatory for follow up s/p radiation for her breast cancer.  Patient states her skin is healing well and she is scheduled to return to work next week.  Seen by Dr. Renee.  Plan RTC for follow up as directed by physician.

## 2021-06-04 NOTE — TELEPHONE ENCOUNTER
Message reviewed with Dr. Burroughs sent from JUDITH Alarcon.  Dr. Burroughs asked that I contact Dr. Ramos for clarification on the tech mentioning to patient she has a compression fracture.  Message sent.  Called and left patient a message letting her know that I have a call out to Dr. Cornelius's office and will call her back next week with an update/SLIM Maravilla RN     1/7/20-Dr. Burroughs will call patient tomorrow/SLIM Maravilla RN

## 2021-06-04 NOTE — PROGRESS NOTES
[facility] Hematology and Oncology Progress Note    Patient: Sarah Gilmore  MRN: [id number]  Date of Service: 12/19/2019        Reason for Visit    Chief Complaint   Patient presents with     HE Cancer     Malignant neoplasm of right female breast, unspecified estrogen receptor status, unspecified site of breast       Assessment and Plan  Cancer Staging  Infiltrating ductal carcinoma of right female breast (H)  Staging form: Breast, AJCC 8th Edition  - Clinical: No stage assigned - Unsigned  - Pathologic stage from 9/3/2019: Stage Unknown (pTX, pN1a(sn), cM0, G1, ER+, NH+, HER2-) - Signed by Ez Burroughs MD on 9/4/2019      ECOG Performance   ECOG Performance Status: 0     Distress Assessment  Distress Assessment Score: 1    Pain  Currently in Pain: No/denies    #.  DCIS of the both breasts (upper outer quadrant, lower outer quadrant and central portion of the right breast, multifocal, grade 2; focus of DCIS approximately 2 mm grade 1 in the left breast)  #. pTx pN1a M0 invasive ductal carcinoma of the right breast, grade 1.  ER positive, NH positive, HER-2 negative.     She completed adjuvant radiation couple weeks ago.  She is recovering well.  She is here to discuss about adjuvant endocrine therapy.  We reviewed the tamoxifen side effects again today and information for reading.  I recommended that she can start anytime now.     Duration of adjuvant endocrine therapy will be for minimum of 5 years and potentially 10-year in her case if she tolerates well and no major side effects   Follow-up clinical exam in about 3 months.     #.  History of osteoporosis.   I cannot find any imaging or other information.  It was diagnosed many years ago.  I recommended to get a new DEXA scan.  If it confirms osteoporosis, I will discuss about osteoporosis management.   Recommended to start calcium and vitamin D, and recommended daily requirement information were given today.    #.  MRSA infection of the right breast  implant.   She will need to have her reconstruction in 6 months.    Problem List    1. Screening for osteoporosis  DXA Bone Density Scan   2. Infiltrating ductal carcinoma of right female breast (H)  tamoxifen (NOLVADEX) 20 MG tablet      ______________________________________________________________________________  Diagnosis  July 2019-    DCIS of both breasts (upper outer quadrant, lower outer quadrant and central portion of the right breast, multifocal, grade 2; focus of DCIS approximately 2 mm grade 1 in the left breast)   pTx pN1a M0 invasive ductal carcinoma of the right breast, grade 1.  ER positive, SD positive, HER-2 negative.      Treatment to date  7/30/2019- right breast mastectomy and right sentinel lymph node biopsy, left mastectomy.  10/9/2019-12/6/2019-radiation to the right breast and chest wall and regional lymph nodes area of 5040 cGy/28 fractions followed by additional 1000 cGy in 5 fractions to the primary tumor bed.    History of Present Illness    Oliver presents today accompanied by her sister, Balaji.  She reported that the breast drain has removed.  No fever.  She has ongoing fatigue.  Reported incision of the right breast is healing.    Pain Status  Currently in Pain: No/denies    Review of Systems  Constitutional  Constitutional (WDL): Exceptions to WDL  Fatigue: Concerns(finished radiation)  Neurosensory  Neurosensory (WDL): Exceptions to WDL  Peripheral Motor Neuropathy: Concerns(hands and feet)  Eye   Eye Disorder (WDL): All eye disorder elements are within defined limits  Ear  Ear Disorder (WDL): All ear disorder elements are within defined limits  Cardiovascular  Cardiovascular (WDL): Exceptions to WDL  Edema: Concerns(ankles)  Pulmonary  Respiratory (WDL): Within Defined Limits  Gastrointestinal  Gastrointestinal (WDL): Exceptions to WDL  Diarrhea: Concerns(since finishing radiation)  Genitourinary  Genitourinary (WDL): All genitourinary elements are within defined  "limits  Lymphatic  Lymph (WDL): All lymph disorder elements are within defined limits  Musculoskeletal and Connective Tissue  Musculoskeletal and Connetive Tissue Disorders (WDL): Exceptions to WDL  Arthralgia: Concerns(all over)  Integumentary  Integumentary (WDL): All integumentary elements are within defined limits  Patient Coping  Patient Coping: Accepting  Accompanied by  Accompanied by: Alone  Oral Chemo Adherence         Past History  Past Medical History:   Diagnosis Date     Anemia      Breast cancer (H)      Dilated aortic root (H)      History of anesthesia complications     slow to wake up     Osteopenia      Osteoporosis      PONV (postoperative nausea and vomiting)      Seizures (H)     h/o seizure disorder in childhood, last seizure activity  \"4 yrs ago\"     Suspected sleep apnea     sleep study set up in Aug 2019       Physical Exam    Recent Vitals 12/19/2019   Height -   Weight 264 lbs 11 oz   BSA (m2) 2.44 m2   /90   Pulse 79   Temp 98   Temp src 1   SpO2 97   Some recent data might be hidden     General: alert, awake, not in acute distress  HEENT: Head: Normal, normocephalic, atraumatic.  Eye: Normal external eye, conjunctiva, lids cornea, DAYSI.  Ears:  Non-tender.  Nose: Normal external nose, mucus membranes and septum.  Pharynx: Dental Hygiene adequate. Normal buccal mucosa. Normal pharynx.  Neck / Thyroid: Supple, no masses, nodes, nodules or enlargement.  Lymphatics: No abnormally enlarged lymph nodes.  Chest: Normal chest wall and respirations. Clear to auscultation.  Heart: S1 S2 RRR, no murmur.   Abdomen: abdomen is soft without significant tenderness, masses, organomegaly or guarding  Extremities: normal strength, tone, and muscle mass  Skin: normal. no rash or abnormalities  CNS: non focal.      Lab Results    No results found for this or any previous visit (from the past 168 hour(s)).    Imaging    No results found.    Signed by: Ez Burroughs MD  "

## 2021-06-04 NOTE — PROGRESS NOTES
[facility] Radiation Oncology Follow Up     Patient: Sarah iGlmore  MRN: [id number]  Date of Service: 12/18/2019       DISEASE TREATED:  Left breast DCIS and right breast cancer, stage [id number], ER/SC positive, HER-2 negative, status post bilateral simple mastectomy with immediate reconstruction and right sentinel lymph nodes resection (1/6 LNs +) with negative margin and the closest DCIS margin was measured 0.5 mm upper inner margin.       TYPE OF RADIATION THERAPY ADMINISTERED:  5040 cGy in 28 treatments targeted to the right breast/chest wall and the regional lymph nodes followed by additional 1000 cGy in 5 fractions given to the primary tumor bed using electron.  Her radiation therapy was given from 10/9/2019-12/6/2019.      INTERVAL SINCE COMPLETION OF RADIATION THERAPY: 3 weeks.      SUBJECTIVE:  Ms. Gilmore is a 49 y.o. female who has been in her usual state of good health until recently.  Patient presented with abnormal finding by routine screening mammogram for which she was a seeking further evaluation.  The mammogram followed by ultrasound showed 2 area of calcification involving the right breast suspicious for malignancy. Both are retroareolar.  The deeper location was biopsied.  The more superficial area of calcifications was unable to technically be biopsied stereotactically. A needle biopsy was done of the deeper area of calcifications which shows DCIS.  After discussed with the patient about various treatment options, the patient had to proceed with bilateral simple mastectomy with immediate reconstruction and right sentinel node resection as her treatment choice and had a surgery on 7/30/2019.      The pathology from left breast mastectomy showed 2 mm focal DCIS, grade 1 with negative margin and the closest margin was measured 1 cm.     The pathology from the right breast mastectomy and sentinel node showed multi focus of DCIS involving 21/50 examined the blocks, grade 2, negative margin with the  closest margin measured 0.5 mm upper inner quadrant with no pathologic evidence of invasive cancer.  1/6 removed sentinel lymph node showed evidence of metastatic disease measuring 2.2 mm in greatest dimension with no evidence of actual capsular extension. ER/IL positive and HER-2 negative.     Her postoperative course is complicated by wound infection (MRSA+) and patient is currently receiving antibiotics with significant improvement.  She received postop radiation therapy for her breast cancer with a total dose of 5040 cGy in 28 treatments targeted to the right breast/chest wall and the regional lymph nodes followed by additional 1000 cGy in 5 fractions given to the primary tumor bed using electron.  Her radiation therapy was given from 10/9/2019-12/6/2019.  The patient did experience a significant skin reaction during the radiation therapy for which she required a few days break before completion.     She has been recovering very well since completion of the radiation therapy.  Her skin has been completely healed.  The patient denies any pain discomfort at the time of evaluation.  She is here for routine post therapy office follow-up.    Medications were reviewed and are up to date on EPIC.    The following portions of the patient's history were reviewed and updated as appropriate: allergies, current medications, past family history, past medical history, past social history, past surgical history and problem list.    Review of Systems:      General  Constitutional (WDL): Exceptions to WDL  Fatigue: Fatigue relieved by rest  EENT  Eye Disorder (WDL): All eye disorder elements are within defined limits  Ear Disorder (WDL): Exceptions to WDL  Tinnitus: Mild symptoms, intervention not indicated(intermittent)  Respiratory   Respiratory (WDL): Exceptions to WDL  Dyspnea: Shortness of breath with moderate exertion(cold air bothers)  Cardiovascular  Cardiovascular (WDL): Exceptions to WDL  Edema: Yes  Edema Limbs: 5 -  10% inter-limb discrepancy in volume or circumference at point of greatest visible difference, swelling or obscuration of anatomic architecture on close inspection(bilateral LE)  Endocrine     Gastrointestinal  Gastrointestinal (WDL): Exceptions to WDL  Musculoskeletal  Musculoskeletal and Connetive Tissue Disorders (WDL): Exceptions to WDL  Myalgia: Mild pain(right axilla, chest wall)  Integumentary               Integumentary (WDL): Exceptions to WDL(radiation dermatitis right chestwall & axilla)  Neurological  Neurosensory (WDL): Exceptions to WDL  Peripheral Sensory Neuropathy: Asymptomatic, loss of deep tendon reflexes or paresthesia(hands & feet)  Dizziness: Mild unsteadiness or sensation of movement(occ)  Psychological/Emotional   Patient Coping: Accepting;Open/discussion  Hematological/Lymphatic  Lymph (WDL): Exceptions to WDL  Lymph Node Discomfort: Yes  Lymph Node Discomfort Location: Rt axilla, Rt chest wall  Dermatologic     Genitourinary/Reproductive  Genitourinary (WDL): Exceptions to WDL  Urinary Frequency: Present  Reproductive     Pain              Currently in Pain: Yes  Pain Score (Initial OR Reassessment): 2  Pain Frequency: Intermittent  Location: right chest wall/axilla  Pain Intervention(s): Repositioned  Response to Interventions: good  Accompanied by  Accompanied by: Alone    Objective:      PHYSICAL EXAMINATION:    /78   Pulse 68   Temp 98.2  F (36.8  C) (Oral)   Wt (!) 263 lb 9.6 oz (119.6 kg)   LMP 03/03/2018   SpO2 98%   BMI 37.82 kg/m      Gen: Alert, in NAD  Eyes: PERRL, EOMI, sclera anicteric  HENT     Head: NC/AT     Ears: No external auricular lesions     Nose/sinus: No rhinorrhea or epistaxis     Oropharynx: MMM, no visible oral lesions  Neck: Supple, full ROM, no LAD  Pulm: No wheezing, stridor or respiratory distress  CV: Well-perfused, no cyanosis, no pedal edema  Abdominal: BS+, soft, nontender, nondistended, no hepatomegaly  Back: No step-offs or pain to palpation  along the thoracolumbar spine  Rectal: Deferred  : Deferred  Musculoskeletal: Normal muscle bulk and tone  Skin: Normal color and turgor, skin within radiation therapy field shows post therapy changes.  Neurologic: A/Ox3, CN II-XII intact, normal gait and station  Psychiatric: Appropriate mood and affect      Impression     The patient is 49-year-old female with a diagnosis of right breast cancer, status post surgery followed by postop radiation therapy.  She completed her radiation therapy 3 weeks ago and has been recovering well.    Assessment & Plan:     1.  The patient will continue her long-term follow-up and ongoing care for her breast cancer with Dr. Burroughs, medical oncology as planned.  2.  Follow-up with radiation oncology as needed.    Face to face time  15 minutes with > 75% spent on consultation, education and coordination of care.      Shira Renee MD, PhD  Department of Radiation Oncology   [facility]  Tel: [phone]  Page: [phone]    [address] [place] [address]     [address] [place] [address]    CC:  Patient Care Team:  Laura Lobo FNP as PCP - General (Nurse Practitioner)  Laura Lobo FNP as Assigned PCP  Shira Renee MD as Physician (Radiation Oncology)  Ez Burroughs MD as Physician (Hematology and Oncology)  Mckenzie Colby DO as Physician (General Surgery)  Carlitos Crum MD as Physician (Plastic Surgery)  Rupali Douglas RN as Oncology Nurse Navigator (Oncology)

## 2021-06-04 NOTE — TELEPHONE ENCOUNTER
This will need to be addressed by Promise next week.  I am not sure if he would require an office visit.    Mikala Leach NP

## 2021-06-04 NOTE — TELEPHONE ENCOUNTER
Patient called wanting results of bone scan from 12/24.  She said she was told she has a compression fracture and wondering if she needs to start on the shots, like Dr. Burruoghs discussed, for osteoporosis.  She asked for a call back to [phone]/SLIM Maravilla RN    Called patient to acknowledge that I received her message and that Dr. Burroughs is out of the office until 1/2. I told her I would follow-up with her when she returns with the plan.  Patient verbalized understanding.     Message to Dr. Burroughs/SLIM Maravilla RN

## 2021-06-04 NOTE — PROGRESS NOTES
I met with Sarah to present her SCP. I explained the contents of the care plan and reviewed the treatment summary in its' entirety. She voiced understanding. I pointed out the survivorship resources should she be interested in online or reading resources. We discussed health and wellness, free yoga and strength rehab. We also discussed adv dir and I explained the rationale and cautioned her about the witnessed signature. I pointed out my business card and asked if she could look the plan over in the next week or so as I would like to call her on follow up. She again voiced understanding. I congratulated her on her survivorship and thanked her for her time. Chyna

## 2021-06-04 NOTE — PROGRESS NOTES
Met with Sarah briefly when she came to clinic on her last day of radiation treatment. She is very excited about treatment completion. She will return to work in two weeks. She did receive momo from [facility]. She will return to clinic for follow up with Dr. Burroughs  on 12/19. She has writer's contact information for future correspondence.

## 2021-06-05 ENCOUNTER — COMMUNICATION - HEALTHEAST (OUTPATIENT)
Dept: SCHEDULING | Facility: CLINIC | Age: 51
End: 2021-06-05

## 2021-06-05 VITALS
SYSTOLIC BLOOD PRESSURE: 117 MMHG | DIASTOLIC BLOOD PRESSURE: 71 MMHG | WEIGHT: 274.06 LBS | BODY MASS INDEX: 39.32 KG/M2 | OXYGEN SATURATION: 99 % | HEART RATE: 87 BPM

## 2021-06-05 VITALS
HEIGHT: 70 IN | TEMPERATURE: 98.3 F | OXYGEN SATURATION: 97 % | DIASTOLIC BLOOD PRESSURE: 70 MMHG | SYSTOLIC BLOOD PRESSURE: 128 MMHG | BODY MASS INDEX: 38.98 KG/M2 | HEART RATE: 83 BPM | WEIGHT: 272.3 LBS

## 2021-06-05 VITALS
HEIGHT: 70 IN | SYSTOLIC BLOOD PRESSURE: 112 MMHG | BODY MASS INDEX: 38.58 KG/M2 | DIASTOLIC BLOOD PRESSURE: 64 MMHG | OXYGEN SATURATION: 98 % | WEIGHT: 269.5 LBS | RESPIRATION RATE: 20 BRPM | TEMPERATURE: 99.3 F | HEART RATE: 74 BPM

## 2021-06-05 NOTE — TELEPHONE ENCOUNTER
"Pt called cancer care clinic to inquire about her phone call from a couple days ago. She is also reporting a new shooting pain she experienced. She said her right leg went numb with this and her foot felt like it was \"vibrating\". It has gotten better but seems to come and goes. She is wondering what Dr. Burroughs's recommendations are and if it would be ok for pt to try Voxx Sox.  Per Dr. Burroughs, pt should start with PT if she is willing.  Called pt back and she would like to start PT.   I also informed her that she could try the Voxx Sox if she would like. And also that she may want to f/u with her PCP at some point if these issues persist.  Pt was agreeable to this. Call routed to Dr. Burroughs to place PT order.  "

## 2021-06-05 NOTE — TELEPHONE ENCOUNTER
"Patient called and LM, reports sleep issues. She also reports \"bone pain\"; bottoms of feet and wrists. She said this is why   Left message for patient to return call. Kaila Akins RN      "

## 2021-06-05 NOTE — TELEPHONE ENCOUNTER
I left a final message for Sarah looking for feedback on her CP or inquiring as to any needs she may have. I reminded her of my contact information and congratulated her again on her survivorship. Chyna

## 2021-06-05 NOTE — TELEPHONE ENCOUNTER
"Dr Burroughs, please review and advise.   Patient returned call. She says for past week or so, she is having pain on bottom of feet, ankles,  also posterior Rt calf.Describes pain as bone pain, achy, tight, twisting, difficult to stand and walk at work. Difficult to sleep; she will sleep for a few hours and then wake up achy. Will take Tylenol 325 mg, it \"takes the edge off.\"  Denies swelling except says her legs always have some edema, no change. Nothing red, warm, no fevers, no open areas on feet. Has had US of Rt lower leg previously. No clot.   Reports hx of Lyme's x \"years ago.\"  F/u appt 3/19/20.  Kaila Akins RN    "

## 2021-06-05 NOTE — TELEPHONE ENCOUNTER
Left message for patient to call back. If patient calls back, please relay message below.    I want to believe that patient was seen by a provider prior to ordering PT. She can continue with PT and follow up with me if symptom persist after PT>     Yamil    Routing comment

## 2021-06-05 NOTE — TELEPHONE ENCOUNTER
Who is calling:  Patient   Reason for Call:  Caller stated that she did cancel her appointment for today and was just wondering if Laura Lobo FNP also wanted to do a follow up with her for lab work or scans. Caller stated that she is seeing a physical therapist and was wondering if she should had seen Laura Lobo FNP first or after.   Date of last appointment with primary care: n/a  Okay to leave a detailed message: Yes

## 2021-06-05 NOTE — TELEPHONE ENCOUNTER
Patient called and left a message stating she was told she couldn't get her flu shot for a period of time after radiation.  She said that her work wants her to get and if she is unable to get, she will need a note and have to wear a mask.  She asked for call back to [phone].    I discussed the above with Dr. Burroughs and as well as Juli, Radiation RN and they didn't see any reason patient couldn't have her flu shot.    Called patient to let her know it was ok to get flu shot.  Patient verbalized understanding/SLIM Maravilla RN

## 2021-06-06 NOTE — PROGRESS NOTES
[facility]   Initial Evaluation    Patient Name: Sarah Gilmore  Date of evaluation: 2/13/2020  Referral Diagnosis: Pain of right lower leg  Referring provider: Ez Burroughs MD  Visit Diagnosis:     ICD-10-CM    1. Arthralgia of right lower leg M25.561    2. Generalized muscle weakness M62.81        Assessment:       Patient presents with leg pain, R>L, which has increased in intensity after chemotherapy.  She has Lyme's Disease so has had leg pain and swelling on and off prior to this.  She has tried wearing compression stockings but they bunch at her ankles as the day progresses.  She has more pain while sitting but feels better when she can move and circulate blood flow. Due to the treatments she is more deconditioned and frequently tired so wants to build her stamina to sustain activity as well.  She demonstrates weakness and pain to palpation, decreased APTA.  Functional limitations are described as occurring with: stairs, sit, stand, sleep, kneel and squat  Pt. is appropriate for skilled PT intervention as outlined in the Plan of Care (POC).  Feel she will benefit from edu, stretches and strengthening to help promote strength and improved blood flow for support and stabilization.    Goals:  Pt. will be independent with home exercise program in : 6 weeks    Pt will: Able to ambulate up and down stairs with step through pattern and less dependent on railing as leg strength improves within 8-10 visits  Pt will: Able to sleep through the night without waking due to pain within 8-10 visits  Pt will: Able to sit >60 minutes at work with pain 0-2/10 within 8-10 visits      Patient's expectations/goals are realistic.    Barriers to Learning or Achieving Goals:  Past Medical History:   Diagnosis Date     Anemia      Breast cancer (H)      Dilated aortic root (H)      History of anesthesia complications     slow to wake up     Osteopenia      Osteoporosis      PONV (postoperative nausea and vomiting)   "    Seizures (H)     h/o seizure disorder in childhood, last seizure activity  \"4 yrs ago\"     Suspected sleep apnea     sleep study set up in Aug 2019          Plan / Patient Instructions:        Plan of Care:   Authorization / Certification Start Date: 20  Authorization / Certification Number of Visits: 8-10  Communication with: Referral Source  Patient Related Instruction: Nature of Condition;Treatment plan and rationale;Self Care instruction;Basis of treatment  Times per Week: 1-2  Number of Visits: 8-10  Therapeutic Exercise: ROM;Stretching;Strengthening  Neuromuscular Reeducation: kinesio tape  Manual Therapy: soft tissue mobilization  Modalities: electrical stimulation  Gait Training: prn      Plan for next visit:   Review stretches  Intro hip, LE and core exercises  Proprioception exercises       Subjective:       History of Present Illness:    Sarah is a 49 y.o. female who presents to therapy today with complaints of right leg pain, She has Lyme's Disease so has always had swelling and pain in her legs.  After the cancer the pain is more intense and pronounced.  She does not wear support stockings because as the day goes on it becomes painful.  The leg will get numb and tingling and painful to stand on and if knee is bent symptoms may increase.   Symptoms are constant and not improving.  She recently moved someplace that has a pool so has started to use it regularly.    Pain Ratin}  Pain rating at best: 1  Pain rating at worst: 8  Pain description: numbness, pain and tingling    Functional limitations are described as occurring with:   stairs, sit, stand, sleep, kneel and squat    Patient reports benefit from:  moving, rest, elevation, stretch       Objective:      Patient Outcome Measures :    Lower Extremity Functional Scale (_/80): 42     Scores range from 0-80, where a score of 80 represents maximum function. The minimal clinically important difference is a positive change of 9 points.  APTA " score: 10    Examination  1. Arthralgia of right lower leg     2. Generalized muscle weakness       Involved side: Right  Posture Observation:      General standing posture is fair.    Gait: right foot mild circumduction, rotation at ankle, decrease hip motion    Lumbar AROM: WNL    Hip and knee AROM: WNL    Hip/Knee Strength  Date: 2/13/20     Hip/Knee Strength (/5) MMT MMT MMT    Right Left Right Left Right Left   Hip Flexion 5- 5-       Hip Abduction 5- 5-       Hip Adduction 4 4+       Hip Extension         Hip External Rotation         Hip Internal Rotation         Knee Extension 5- 5-       Knee Flexion 5- 5-           Palpation: medial knees at hip adductor insertion, various spots in calf    1 leg stance: R=2 seconds                        L=9 seconds, instability noted         Pain in back of knees with both    Squatting: moderate hip deviation to left  Flexibility: fair to good at hamstrings    Trendelenberg: unable to determine    Exercises:  Exercise #1: stretches: sitting hamstring, standing gastroc and soleus, stair heel cord, SKC, LTR, supine piriformis  Comment #1:    hold 30 seconds X 1-3 reps  Exercise #2: sit<>stands    Treatment Today     TREATMENT MINUTES COMMENTS   Evaluation 25 Leg pain   Self-care/ Home management     Manual therapy     Neuromuscular Re-education     Therapeutic Activity     Therapeutic Exercises 30 See above or flow sheet  Intro to the stick   Gait training     Modality__________________                Total 55    Blank areas are intentional and mean the treatment did not include these items.       PT Evaluation Code: (Please list factors)  Patient History/Comorbidities:   Past Medical History:   Diagnosis Date     Anemia      Breast cancer (H)      Dilated aortic root (H)      History of anesthesia complications     slow to wake up     Osteopenia      Osteoporosis      PONV (postoperative nausea and vomiting)      Seizures (H)     h/o seizure disorder in childhood, last  "seizure activity  \"4 yrs ago\"     Suspected sleep apnea     sleep study set up in Aug 2019     Examination: pain to palpation, weakness  Clinical Presentation: stable  Clinical Decision Making: low    Patient History/  Comorbidities Examination  (body structures and functions, activity limitations, and/or participation restrictions) Clinical Presentation Clinical Decision Making (Complexity)   No documented Comorbidities or personal factors 1-2 Elements Stable and/or uncomplicated Low   1-2 documented comorbidities or personal factor 3 Elements Evolving clinical presentation with changing characteristics Moderate   3-4 documented comorbidities or personal factors 4 or more Unstable and unpredictable High         Laurita Mathias, PT  2/13/2020  6:37 AM               "

## 2021-06-06 NOTE — TELEPHONE ENCOUNTER
"Dr. Burroughs, Please advise.   Patient called in, LM- reports ongoing \"bone\" pain and blurry vision.   Called and spoke with patient, she has had ongoing pain., mostly lateral shins and ankles. Worse in mornings. She has done some PT, says they mentioned lymphedema therapy due to \"fluid in my legs.\" She has not had lymph node dissection in groin. . She says she is not continuing with PT due to COVID 19. She will also take warm bath with Epsom salt soaks.   She also reports some blurry vision on and off for past few weeks. Happened anywhere from 1 to 4 times daily, lasts maybe 30 seconds. Denies HA's, no new double vision, no other visual defects.   Wondering if related to Tamoxifen? Other options for meds?   Had hysterectomy, still has ovaries.   Kaila Akins RN    "

## 2021-06-06 NOTE — TELEPHONE ENCOUNTER
Patient Returning Call  Reason for call:  PT  Information relayed to patient:  The writer read the following to patient per Laura Lobo CNP:  I want to believe that patient was seen by a provider prior to ordering PT. She can continue with PT and follow up with me if symptom persist after        Patient has additional questions:  No  If YES, what are your questions/concerns:  Patient was recommended per oncology to start PT again for possible pinched nerve.  She was last seen in Dec and has a three month check with oncologist soon.     Okay to leave a detailed message?: No call back needed

## 2021-06-06 NOTE — PROGRESS NOTES
[facility] Discharge Summary  Patient Name: Sarah Gilmore  Date: 4/2/2020  Referral Diagnosis: Leg Pain  Referring provider: Ez Burroughs MD  Visit Diagnosis:   1. Arthralgia of right lower leg     2. Generalized muscle weakness         Goals:  Pt. will be independent with home exercise program in : 6 weeks    Pt will: Able to ambulate up and down stairs with step through pattern and less dependent on railing as leg strength improves within 8-10 visits  Pt will: Able to sleep through the night without waking due to pain within 8-10 visits  Pt will: Able to sit >60 minutes at work with pain 0-2/10 within 8-10 visits      Patient was seen for 2 visits from 2/13/20 to 2/20/20 with 2 missed appointments.  Pateint canceled last two visits with no reason.  She is discharged at this time.    Therapy will be discontinued at this time.  The patient will need a new referral to resume.    Thank you for your referral.  Laurita S Stew, PT  4/2/2020  11:28 AM    [facility] Daily Progress     Patient Name: Sarah Gilmore  Date: 2/20/2020  Visit #: 2/8-10  Evaluation Date: 2/13/20, [place]  Referral Diagnosis: Pain of right lower leg  Referring provider: Ez Burroughs MD  Visit Diagnosis:     ICD-10-CM    1. Arthralgia of right lower leg M25.561    2. Generalized muscle weakness M62.81          Assessment:     Patient is benefitting from skilled physical therapy and is making steady progress toward functional goals.  Patient is appropriate to continue with skilled physical therapy intervention, as indicated by initial plan of care.    Goal Status:  Pt. will be independent with home exercise program in : 6 weeks    Pt will: Able to ambulate up and down stairs with step through pattern and less dependent on railing as leg strength improves within 8-10 visits  Pt will: Able to sleep through the night without waking due to pain within 8-10 visits  Pt will: Able to sit >60 minutes at work  with pain 0-2/10 within 8-10 visits      Plan / Patient Education:     Intro/review hip, LE and core exercises  Proprioception exercises    Subjective:     Pain Ratin- 3    Not much change since last visit  No questions on the stretches  Sit<>stands are ok.  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     Infiltrating ductal carcinoma of right female breast (H)     Exercises:  Exercise #1: stretches: sitting hamstring, standing gastroc and soleus, stair heel cord, SKC, LTR, supine piriformis  Comment #1:    hold 30 seconds X 1-3 reps  Exercise #2: sit<>stands  Comment #2: bridges   hold 10 seconds x 6-12 reps  Exercise #3: clamshells  X 15-20  Comment #3: all 4's  alt arm and leg  hold 10 seconds x 3-6 reps  Exercise #4: standing hip:  abd, ext, marching  X 10-20 reps  Comment #4: 1 leg stance   work up to 30 seconds      Treatment Today     TREATMENT MINUTES COMMENTS   Evaluation     Self-care/ Home management     Manual therapy     Neuromuscular Re-education     Therapeutic Activity     Therapeutic Exercises 28 See above or flow sheet   Gait training     Modality__________________                Total 28    Blank areas are intentional and mean the treatment did not include these items.       Laurita Mathias, PT  2020

## 2021-06-07 NOTE — PROGRESS NOTES
Patient here today for labs and follow-up visit with Dr Burroughs for breast CA/SLIM Maravilla RN

## 2021-06-07 NOTE — TELEPHONE ENCOUNTER
Called Sarah to check in after her most recent clinic follow up. Left message with contact information if any questions, concerns or needs arise.

## 2021-06-07 NOTE — TELEPHONE ENCOUNTER
"Sarah calls today to report that she developed mild \"tightness\" under her right arm/axilla/surgery/radiation site. It feels tight all the time with mild pain. She does not have redness or warmth. She does have an OV with Dr. Burroughs on 4/14 and wanted us to have a heads up prior to visit and ask if there is anything she would need to do in the meantime. I did not give specific suggestions, however, stated it does sound like post surgical/post radiation lymphadema and we can discuss further on 4/14. She is comfortable with that plan. I did advise to call if the sx worsen, or she develops redness or warmth, temp. She expressed understanding.    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 I stated that since she has not had chemotherapy, and does not have immunocompromise, and is less that 60 years old she is not considered high risk per the CDC guidelines, and she may continue to work though follow her institutions guidelines for PPE. She expressed understanding and said she mostly needed reassurance for her family. Sariah Tidwell    "

## 2021-06-07 NOTE — PROGRESS NOTES
[facility] Progress Note    Patient: Sarah Gilmore  MRN: [id number]  Date of Service: 04/14/2020        Reason for Visit    Chief Complaint   Patient presents with     HE Cancer     Breast Cancer       Assessment and Plan  Cancer Staging  Infiltrating ductal carcinoma of right female breast (H)  Staging form: Breast, AJCC 8th Edition  - Clinical: No stage assigned - Unsigned  - Pathologic stage from 9/3/2019: Stage Unknown (pTX, pN1a(sn), cM0, G1, ER+, MA+, HER2-) - Signed by Ez Burroughs MD on 9/4/2019      ECOG Performance   ECOG Performance Status: 0     Distress Assessment  Distress Assessment Score: 5(due to work, Covid19)    Pain  Currently in Pain: No/denies    #.  DCIS of the both breasts (upper outer quadrant, lower outer quadrant and central portion of the right breast, multifocal, grade 2; focus of DCIS approximately 2 mm grade 1 in the left breast)  #. pTx pN1a M0 invasive ductal carcinoma of the right breast, grade 1.  ER positive, MA positive, HER-2 negative.     She has been off tamoxifen for about a month after she was taking for about 2 months.  She feels that her pain has not significantly changed and now she is thinking the pain was related to her underlying Lyme's disease.  We then discussed about possible use of aromatase inhibitors.  She is now open for tamoxifen or aromatase inhibitors.  I offer her to check her hormone level to determine her menopausal status.   Will call with the results on of which endocrine therapy we are going to use.   Duration of adjuvant endocrine therapy will be for minimum of 5 years and potentially 10-year in her case if she tolerates well and no major side effects   Follow-up clinical exam in about 6 months.     #.  Low bone density with low fracture risk.   I verify with her that there is no evidence of osteoporosis.  Prior history of osteoporotic fracture.   She will continue calcium vitamin D and daily exercises.     #.  Right-sided  chest wall/axillary discomfort.   No visible or palpable abnormalities.  Recommended massage and range of motion exercises.  To resume physical therapy when able.    Problem List    1. Menopausal syndrome (hot flashes)  Estradiol    FSH      ______________________________________________________________________________  Diagnosis  July 2019-    DCIS of both breasts (upper outer quadrant, lower outer quadrant and central portion of the right breast, multifocal, grade 2; focus of DCIS approximately 2 mm grade 1 in the left breast)   pTx pN1a M0 invasive ductal carcinoma of the right breast, grade 1.  Multifocal DCIS.  6 right axillary sentinel lymph nodes were examined 1 lymph node was positive for macro metastases and one lymph node was positive for isolated tumor cells.  ER positive (90%), NH positive (80%), HER-2 negative.    LMP-at age 48 ( in 3/2018) after hysterectomy.    Treatment to date  7/30/2019- right breast mastectomy and right sentinel lymph node biopsy, left mastectomy.  10/9/2019-12/6/2019-radiation to the right breast and chest wall and regional lymph nodes area of 5040 cGy/28 fractions followed by additional 1000 cGy in 5 fractions to the primary tumor bed.  12/2019-initiated adjuvant tamoxifen, but he will about 2 months after due to pain in both shins.    History of Present Illness    Oliver reports that she has ongoing fatigue which is up-and-down.  She always chills.  No hot flashes.  She has intermittent nausea, dizziness, occasional ringing in the ears.  She also reported feet are more tingling than hands.  She has chronic diarrhea/loose stool 2-3 times a day.  She has stopped taking tamoxifen about a month ago due to severe pain in both shins.  Noted some improvement although not completely resolved yet.  She is now wondering it could be related to her chronic Lyme disease.    Also reported some pain in the right lateral chest wall under the axilla for the last few days but now slightly better  "with doing some exercises.  She has not been able to do physical therapy due to COVID outbreak.    Pain Status  Currently in Pain: No/denies    Review of Systems  Constitutional  Constitutional (WDL): Exceptions to WDL  Fatigue: Fatigue relieved by rest(up and down)  Fever: None  Chills: Mild sensation of cold, shivering, chattering of teeth(gets chilled easily)  Weight Gain: None  Weight Loss: None  Neurosensory  Neurosensory (WDL): Exceptions to WDL  Peripheral Motor Neuropathy: None  Ataxia: None  Peripheral Sensory Neuropathy: Asymptomatic, loss of deep tendon reflexes or paresthesia(feet >hand, Hx Lyme's)  Confusion: None  Syncope: None  Eye   Eye Disorder (WDL): Exceptions to WDL  Blurred Vision: Intervention not indicated(intermittent visual disturbances-\"fuzzy waves\")  Dry Eye: None  Eye Pain: None  Watering Eyes: None  Ear  Ear Disorder (WDL): Exceptions to WDL  Ear Pain: None  Tinnitus: Mild symptoms, intervention not indicated(occ, not new)  Cardiovascular  Cardiovascular (WDL): Exceptions to WDL  Palpitations: None  Edema: Yes  Edema Limbs: 5 - 10% inter-limb discrepancy in volume or circumference at point of greatest visible difference, swelling or obscuration of anatomic architecture on close inspection(bilat LE)  Phlebitis: None  Superficial thrombophlebitis: None  Pulmonary  Respiratory (WDL): Exceptions to WDL  Cough: None  Dyspnea: Shortness of breath with moderate exertion  Hypoxia: None  Gastrointestinal  Gastrointestinal (WDL): Exceptions to WDL  Anorexia: None  Constipation: None  Diarrhea: Increase of <4 stools per day over baseline, mild increase in ostomy output compared to baseline(~2-3 day, loose)  Dysphagia: None  Esophagitis: None  Nausea: Loss of appetite without alteration in eating habits(intermittent with visual disturbances)  Pharyngitis: None  Vomiting: None  Dysgeusia: None  Dry Mouth: None  Genitourinary  Genitourinary (WDL): All genitourinary elements are within defined " "limits  Lymphatic  Lymph (WDL): Exceptions to WDL  Musculoskeletal and Connective Tissue  Musculoskeletal and Connetive Tissue Disorders (WDL): All Musculoskeletal and Connetive Tissue Disorder elements are within defined limits  Integumentary  Integumentary (WDL): All integumentary elements are within defined limits  Patient Coping  Patient Coping: Accepting;Open/discussion  Distress Assessment  Distress Assessment Score: 5(due to work, Covid19)  Accompanied by  Accompanied by: Alone  Oral Chemo Adherence         Past History  Past Medical History:   Diagnosis Date     Anemia      Breast cancer (H)      Dilated aortic root (H)      History of anesthesia complications     slow to wake up     Osteopenia      Osteoporosis      PONV (postoperative nausea and vomiting)      Seizures (H)     h/o seizure disorder in childhood, last seizure activity  \"4 yrs ago\"     Suspected sleep apnea     sleep study set up in Aug 2019       Physical Exam    Recent Vitals 4/14/2020   Height -   Weight 263 lbs 11 oz   BSA (m2) 2.43 m2   /87   Pulse 72   Temp 98.4   Temp src -   SpO2 96   Some recent data might be hidden     General: alert, awake, not in acute distress  HEENT: Head: Normal, normocephalic, atraumatic.  Eye: Normal external eye, conjunctiva, lids cornea, DAYSI.  Ears:  Non-tender.  Nose: Normal external nose, mucus membranes and septum.  Pharynx: Dental Hygiene adequate. Normal buccal mucosa. Normal pharynx.  Neck / Thyroid: Supple, no masses, nodes, nodules or enlargement.  Lymphatics: No abnormally enlarged lymph nodes.  Chest: Normal chest wall and respirations. Clear to auscultation.  Breasts: Surgically absent right breast.  No palpable skin abnormalities.  Good range of motion in both shoulders.  Left breast showed implant.  No palpable abnormalities.  Heart: S1 S2 RRR, no murmur.   Abdomen: abdomen is soft without significant tenderness, masses, organomegaly or guarding  Extremities: normal strength, tone, and " muscle mass  Skin: normal. no rash or abnormalities  CNS: non focal.    Lab Results    No results found for this or any previous visit (from the past 168 hour(s)).    Imaging    No results found.    Signed by: Ez Burroughs MD

## 2021-06-07 NOTE — TELEPHONE ENCOUNTER
Pt is calling in about a small amount of vaginal bleeding on the tissue she had today while wiping. Pt denies blood in stool, or bleeding from a hemorrhoid. Pt does have some mild lower back pain, but denies blood in urine, or pain with urination.  Pt denies abdominal pain, and only has some mild dizziness occasionally. Pt did have a hysterectomy 2 years ago. Pt has history of Breast Cancer, and has an Oncologist.    Pt has not traveled internationally, or to a high risk area in the past month, and has not been exposed to anyone known to have tested positive for the Coronavirus.   Per protocol pt should be seen by her physician within 2 weeks for Postmenopausal bleeding.   Pt completed Tamoxifen for Breast Cancer in December, and has an appointment with her Oncologist in April for labs, so pt will go to that appointment.   Pt was advised to call back if bleeding increases, if she develops severe abdominal pain or dizziness, or if she becomes worse.     Jona Ferguson RN [facility] Triage/Medication Refill    Reason for Disposition    Postmenopausal vaginal bleeding    Protocols used: VAGINAL BLEEDING - [id number]

## 2021-06-08 NOTE — TELEPHONE ENCOUNTER
Patient called and left a message stating that she went off Tamoxifen for 1 month. She has restarted it but doesn't like the side effects. She said she heard from women in her support group that gabapentin helped with muscle pain so was wondering about that. She asked for call back to [phone]. She also said she could be reach at work until 4p at [phone].     1329:  tried calling her work #. I was on hold for sometime so hung up.    1333: I called and  left a message on her cell phone to call me back, as Dr. Burroughs may want to switch her medication/SLIM Maravilla RN     5/22/20-called and left patient message letting her know Dr. Burroughs sent Rx for anastrozole and she is to stop taking tamoxifen. Instructed her to call back with questions or concerns/SLIM Maravilla RN

## 2021-06-08 NOTE — PROGRESS NOTES
"Sarah Gilmore is a 49 y.o. female who is being evaluated via a billable telephone visit.      The patient has been notified of following:     \"This telephone visit will be conducted via a call between you and your physician/provider. We have found that certain health care needs can be provided without the need for a physical exam.  This service lets us provide the care you need with a short phone conversation.  If a prescription is necessary we can send it directly to your pharmacy.  If lab work is needed we can place an order for that and you can then stop by our lab to have the test done at a later time.    Telephone visits are billed at different rates depending on your insurance coverage. During this emergency period, for some insurers they may be billed the same as an in-person visit.  Please reach out to your insurance provider with any questions.    If during the course of the call the physician/provider feels a telephone visit is not appropriate, you will not be charged for this service.\"    Patient has given verbal consent to a Telephone visit? Yes    What phone number would you like to be contacted at? [phone]    Patient would like to receive their AVS by AVS Preference: Mail a copy.    Patient presents via telephone visit with several concerns.    Patient complains of vaginal bleeding/spotting.  History of hysterectomy secondary to heavy menstrual bleeding.  States her ovaries are intact.  Reports a small amount of vaginal spotting about every 1-2 weeks for the past 2-3 months or so.  Symptoms have improved.  The blood is bright red.  Not enough volume for a pad or liner.  Does not last longer than 1 day.  She will sometimes get some sharp pains in her lower abdomen, but this does not last.  Denies any other vaginal discharge.  No dryness or irritation that is bothersome to her.  She is on Arimidex for history of breast cancer.  Previously on Tamoxifen.    Patient also complains of new skin lesions " on her right breast.  She did previously have radiation to this breast for treatment of breast cancer.  She is on Arimidex for this.  Skin changes just occurred recently.  She has 2 hypopigmented flat spots and 2 hypopigmented raised spots according to her.  They are not dry, flaky, painful, or pruritic.  The lesions have not changed since onset.     Patient is requesting an order for new mastectomy bras.     Assessment/Plan:  1. Vaginal bleeding  Vaginal spotting post hysterectomy.  I am unsure if this could be secondary to her Arimidex and previous Tamoxifen.  I did encourage her to discuss this with her oncologist.  I recommend a pelvic exam to assess for other possible causes of bleeding such as vaginal atrophy or inflammation.  I encouraged patient to schedule with her gynecologist.     2. Hypopigmented skin lesion  Unsure if this is related to her previous site of radiation.  Regardless, I recommend a skin check and evaluation.  Referral placed for dermatology.  - Ambulatory referral to Dermatology    3. 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  - Ambulatory referral for Orthotics / Prosthetics - [place]        Phone call duration:  17 minutes  Start: 1303  End: 1320

## 2021-06-08 NOTE — TELEPHONE ENCOUNTER
Pt called because she noticed 4 white colored small dots or spots on her right radiated breast area. Pt also reports spotting after her hysterectomy and feeling winded when doing 3 flights of stairs. I talked with Dr Burroughs. Pt cannot send us a picture through My Chart because she does not have MyChart. She should follow up with her OB/GYN because they could check both issues and to call if further concerns. Patsy Vidales RN

## 2021-06-08 NOTE — TELEPHONE ENCOUNTER
Pt called to say she is interested in switching to a new medication, she does not like the side effects from tamoxifen. She would like Dr Burroughs to send the new medication to the [facility] in [place]. Patsy Vidales RN

## 2021-06-09 NOTE — TELEPHONE ENCOUNTER
"Pt is calling in about her right ankle being very swollen, about the size of a golf ball. Pt reports no redness, or fever, but pain is chronic. Swelling and increased intensity of pain started last night, and she is rating the pain \"8-9\". Pt denies any injury, but mentions she is on some medications from her Oncologist. Pt denies leg or calf swelling, and does not know if this is on the joint.     Care advice given, and pt was advised it is difficult to triage without being able to visualize the area. Pt also can call her oncologist if she wants a second opinion, per protocol pt should be evaluated by a physician within 24 hours. WIC are closed, and ER is only option due to the holiday. Pt agrees with plan, and will go to the ER. Pt was advised to call back if she develops a fever, or symptoms worsen. Pt verbalized unsderstanding.     Jona Ferguson RN [facility] Triage/Medication Refill    Reason for Disposition    [1] Very swollen joint AND [2] no fever    Protocols used: ANKLE PAIN-A-AH      "

## 2021-06-09 NOTE — PROGRESS NOTES
S: I have received Sarah's ABC bra. RX on-file is current.    A: No assessment was made. I will be shipping the garments to her home address, along with proper compliance paperwork to be signed, dated, and returned.    P: She is to call with any further needs.  Goal is to maintain a home program.

## 2021-06-09 NOTE — PROGRESS NOTES
S: Patient was seen in [place] to be measured for mastectomy bras. She already has a breast form for her right side and only needs the bras as ordered by Mikala Leach NP.    O: Goal is to evaluate and measure patient for a mastectomy garment that will provide her comfort and support.     A: Sarah measures into a size 42D/44C which she said is about what she wears (44C). She like the [employer] Embra bra in soft mocha as well as the ABC #112 strapless bra in beige. I will order a few different sizes for her to try. Order submitted for fabrication to [employer].     P: I will call her once the items are in and ready for a delivery appnt.

## 2021-06-09 NOTE — PROGRESS NOTES
Assessment/Plan:   1. Edema of extremities  Discussed diagnosis and treatment options including decrease sodium in the diet, elevate feet above the level of the heart whenever possible, increase physical activity, use of compression stockings and weight loss. The patient was also instructed to call IMMEDIATELY (i.e., day or night) if any cardiopulmonary symptoms occur, especially chest pain, shortness of breath, dyspnea on exertion, paroxysmal nocturnal dyspnea, or orthopnea, and these were explained.  Follow up in 2 weeks and as needed.   - Compression stockings 20/30 mmHg; Knee    2. Screen for colon cancer  Discussed need for screening  - Cologgovind    Subjective:   Sarah Gilmore is a 50 y.o. female with a history of ductal carcinoma in situ of both breasts who presents for evaluation of edema in both ankles and feet. The edema has been moderate to severe. Onset of symptoms was a few days ago, and patient reports symptoms have gradually improved since that time. The edema is present in the afternoon and in the evening. The patient states the problem is new. The swelling has been aggravated by dependency of involved area. The swelling has been relieved by elevation of involved area. Associated factors include: nothing. Cardiac risk factors: obesity (BMI >= 30 kg/m2).  Patient reported that she was seen at [facility] 2 days ago to evaluate her lower extremity edema.  Ultrasound was unremarkable including labs.    The following portions of the patient's history were reviewed and updated as appropriate: allergies, current medications, past family history, past medical history, past social history, past surgical history and problem list.    Review of Systems  A 12 point comprehensive review of systems was negative except as noted.     Objective:   /64   Pulse 74   Wt (!) 269 lb 1.6 oz (122.1 kg)   LMP 03/03/2018   SpO2 95%   BMI 38.61 kg/m    General appearance: alert, appears stated age and  cooperative  Head: Normocephalic, without obvious abnormality, atraumatic  Extremities: edema Of ankle and feet bilaterally.  Pulses: 2+ and symmetric  Skin: Skin color, texture, turgor normal. No rashes or lesions  Lymph nodes: Cervical, supraclavicular, and axillary nodes normal.  Neurologic: Grossly normal     Cardiographics  ECG: Not indicated    Imaging  Chest x-ray: not indicated

## 2021-06-09 NOTE — TELEPHONE ENCOUNTER
I called Julius on 6/30/2020 and left a VM with the scheduling line for her to call back to set up a delivery appnt for her bras.

## 2021-06-09 NOTE — PROGRESS NOTES
S: I have received Sarah's ABC post-mastectomy bras. RX on-file is current.    A: I assisted aSrah in trying on her bras. She found one she really liked that appeared to fit well and felt comfortable. She went home with the ABC Embrace #503 bra (44C) in soft mocha. I will order one more for her in the same color from [employer].    P: She is to call with any further needs. She took her one bra home today.  Goal is to maintain a home program.

## 2021-06-10 NOTE — PROGRESS NOTES
Assessment:   1. Edema of lower extremity  2. Pain in both lower extremities  Encourage the use of compression stockings, elevating legs and avoiding standing in 1 position for a long time.  Trial of gabapentin for pain management.  - gabapentin (NEURONTIN) 100 MG capsule; Take 100 mg by mouth 3 (three) times a day.  Dispense: 90 capsule; Refill: 3    Plan:   Recommendations: decrease sodium in the diet, elevate feet above the level of the heart whenever possible, increase physical activity, use of compression stockings and weight loss.  The patient was also instructed to call IMMEDIATELY (i.e., day or night) if any cardiopulmonary symptoms occur, especially chest pain, shortness of breath, dyspnea on exertion, paroxysmal nocturnal dyspnea, or orthopnea, and these were explained.  Follow up in 3 months and as needed.     Subjective:   Sarah Gilmore is a 50 y.o. female with history of ductal carcinoma in situ of both breasts, status post double mastectomy who presents for evaluation of edema in both ankles and feet.  The edema has been moderate to severe.  This is an ongoing problem since her diagnosis of breast cancer and treatment with hormone suppressant.  Patient reported increased pain in her lower legs while at work.  She did see her cardiologist who did not agree with treatment with oral diuretics due to her low blood pressure.  Patient is requesting for something to help ease her pain.  Patient has continue to use compression socks and elevate her legs when she can.  Patient has no other concerns today.    Review of Systems  A 12 point comprehensive review of systems was negative except as noted.     Objective:   /78   Pulse 68   Temp 98.3  F (36.8  C) (Oral)   Wt (!) 270 lb 12.8 oz (122.8 kg)   LMP 03/03/2018   SpO2 98%   BMI 38.86 kg/m    General appearance: alert, appears stated age and cooperative  Head: Normocephalic, without obvious abnormality, atraumatic  Extremities: edema Of bilateral  lower extremities.  Pulses: 2+ and symmetric  Skin: Skin color, texture, turgor normal. No rashes or lesions  Lymph nodes: Cervical, supraclavicular, and axillary nodes normal.  Neurologic: Grossly normal

## 2021-06-10 NOTE — TELEPHONE ENCOUNTER
Question following Office Visit  When did you see your provider: 7/6/20  What is your question: Patient continues to have the edema which causes discomfort.  Hormone blocker she takes also adds to gives joint pain/edema.  She is asking for medication to treat discomfort.  She mentions Lidocaine gel or gabapentin.  At the end of the day, her feet are too swollen to wear shois.  Okay to leave a detailed message: Yes

## 2021-06-11 NOTE — TELEPHONE ENCOUNTER
Pt is calling.    Cardiologist through [facility] ordered some labs.  They had called her today, and told her that she may have a PE.  It does match with the symptoms that she is having. They also stated that some test results are off regarding her thyroid. She was seen 08/13/2020 with her PCP for these issues.   She was told that that she should contact her PCP regarding these results for further follow up.  She was having some shortness of breath with activity and during exercise. No shortness of breath at rest.    Some chest pains under her breast that she associates with the double mastectomy that she has had. This pain comes and goes and she noticed this for the first time today. She stated that she gets this with lifting and repetition at work, and has had it in the past, after her surgery, but thought that it had gone away, until she noticed that the pain came back today.  Increase swelling in her legs but more on the right leg than the left. She is on her feet and legs all day for long hours every day at work. She has started wearing compression stockings.  D-Dimer came back positive at 0.54.  CBC and CMP were normal. TSH was 5.65. Slight hypothyroid. May want to re-check in the near future.  I advised her that I would contact the doctor on call, as Promise, her NP is not in today, and then call her back.  I advised her to call back immediately with any shortness or breath at rest, chest pain, chest heaviness/tightness, redness or warm to the touch on her legs with swelling, or with any new concerns.  She verbalized understanding.  5:10pm-Page to on call physicianDr. Villafana.   5:32nc-Bf-Jfiin on call physicianDr. Villafana.  6:12pm-Re Page to doctor on call. No answer on her cell phone either. On call center stated that they would contact the clinic supervisor and call back.  6:20pm-Dr. Sawant called back. She advised her to to into the ED tonight if having shortness of breath at rest. If no shortness of breath at  rest, ok to wait until tomorrow AM, but then DO go into the WIC to be assessed.  Pt is aware of all of the above. She stated that she is not having any shortness of breath at rest or chest pain. No redness in her legs or areas that are hot to the touch. I advised her to go into the WIC in the morning tomorrow no matter what, to be assessed. Call back or to ED immediately with any shortness of breath at rest, increase swelling with pain/redness in one leg, or with any chest pain.  She verbalized understanding and will do that.    Reason for Disposition    [1] Follow-up call from patient regarding patient's clinical status AND [2] information urgent    Additional Information    Negative: Lab calling with strep throat test results and triager can call in prescription    Negative: Lab calling with urinalysis test results and triager can call in prescription    Negative: Medication questions    Negative: ED call to PCP    Negative: Physician call to PCP    Negative: Call about patient who is currently hospitalized    Negative: Lab or radiology calling with CRITICAL test results    Negative: [1] Prescription not at pharmacy AND [2] was prescribed today by PCP    Protocols used: PCP CALL - NO TRIAGE-A-    Alisa Lamb RN   [facility] Nurse Advisor  08/28/20 at 5:01 PM

## 2021-06-11 NOTE — TELEPHONE ENCOUNTER
Who is calling:  Patient   Reason for Call:  Patient states she requested a letter for reducing hours for her work on 08/31/20 but the letter was not faxed to her employer . Patient is requesting to fax letter as soon as possible to fax [phone].  Date of last appointment with primary care: 08/13/20  Okay to leave a detailed message: No

## 2021-06-11 NOTE — TELEPHONE ENCOUNTER
Left message for patient to call back. If patient calls back, please relay message below.    We received short term disability forms for patient. Is the short term disability for her cancer or her legs? Is she still working at the moment? Please clarify questions with patient if she calls back.

## 2021-06-11 NOTE — TELEPHONE ENCOUNTER
Attempted to reach patient by phone.  Left her a voicemail requesting that she call back through [facility] at her convenience.  When patient returns my call, she needs to be informed that  1.  Repeat D-Dimer is normal at 0.48 today.  This is considered a normal/negative level and would indicate that CTA of the chest does not need to be completed to rule out PE.  2.  Blood cell counts are normal.  3.  Electrolyte levels and kidney function tests are normal.  4.  BNP level is minimally elevated, suggesting that she could have a component of congestive heart failure despite having a normal ejection fraction on her recent echocardiogram.  5.  Repeat TSH is normal at 3.88 today.  This would indicate that her thyroid gland is functioning normally.  A Free T4 level is still in process to verify normal thyroid gland function.  6.  Troponin level is undetectable, ruling out recent cardiac ischemia.  7.  Ultrasound of the lower extremities is negative for DVT.  8.  Patient will need to decide whether or not she wishes to proceed with her scheduled CTA of the chest tomorrow afternoon given that her D-Dimer level was normal today but mildly elevated earlier this week at [facility].    Gordon Carreno MD 08/29/20 6:32 PM

## 2021-06-11 NOTE — TELEPHONE ENCOUNTER
Referral Request  Type of referral: Pulmonology   Who s requesting: Patient  Why the request: Mild to moderate enlargement of the central pulmonary arteries  Have you been seen for this request: N/A  Does patient have a preference on a group/provider? Unknown   Okay to leave a detailed message?  No

## 2021-06-11 NOTE — TELEPHONE ENCOUNTER
Currently not experiencing shortness but has been increasing at times during work.  Would prefer an order for Pulmonology.

## 2021-06-11 NOTE — TELEPHONE ENCOUNTER
Patient Returning Call  Reason for call:  Returning clinic call.  Information relayed to patient:  Thej patient was read the note entry and answers as follows:  The forms are for the legs, and the limitations that the Dr put her on, she is currently working, the clinic that she is at is a 3 day/week clinic.  Patient has additional questions:  No  If YES, what are your questions/concerns:  NA  Okay to leave a detailed message?: Yes

## 2021-06-11 NOTE — TELEPHONE ENCOUNTER
Who is requesting the letter?  Patient   Why is the letter needed? Patient states that she went walk in clinic on 08/29/20 did some test her Chest CTA is negative for pulmonary emboli but does show several incidental findings (mild to moderate enlargement of the central pulmonary arteries suggesting pulmonary arterial hypertension, stable mild dilation of the aortic root and ascending aorta, borderline cardiac enlargement, and mild centrilobular emphysema with air trapping and possible bronchial inflammation). Patient is requesting a letter for reducing current restricted work hours from 12 hr to 10 hr to 8 hr . For questions please reach out patient .  How would you like this letter returned? Fax  [phone]  attentin to Neymar Mack  Okay to leave a detailed message? No

## 2021-06-11 NOTE — TELEPHONE ENCOUNTER
Pt returned call and was notified of information below from Dr. Carreno.   Patient stated understanding and will follow up with her cardiologist regarding elevated BNP.   FYI will be sent to IG[facility] Zaragoza .      Dinora Miguel RN 08/29/20 6:43 PM  [facility] [place] Nurse Advisor

## 2021-06-11 NOTE — TELEPHONE ENCOUNTER
Spoke with patient. Informed her that her Free T4 is normal, verifying normal thyroid gland function at this time.  Informed her that her Chest CTA is negative for pulmonary emboli but does show several incidental findings (mild to moderate enlargement of the central pulmonary arteries suggesting pulmonary arterial hypertension, stable mild dilation of the aortic root and ascending aorta, borderline cardiac enlargement, and mild centrilobular emphysema with air trapping and possible bronchial inflammation).  Recommend that patient follow up with Primary Care as planned to discuss her symptoms, diagnostic studies, and possible referral to Pulmonology.  Patient voiced understanding of this information.    Gordon Carreno MD 08/30/20 3:17 PM

## 2021-06-11 NOTE — PATIENT INSTRUCTIONS - HE
1. Follow up in the sleep clinic  2. Increase physical activity as tolerated  3. Weight loss program  4. Follow up in the pulmonary clinic as needed

## 2021-06-11 NOTE — TELEPHONE ENCOUNTER
Please inform patient. Letter faxed to [phone]  zabrina Mack per patient request.  Letter also mailed to patient.

## 2021-06-11 NOTE — PROGRESS NOTES
Oxygen saturation walk test    Patient oxygen saturation on RA at rest is 96%.  Oxygen saturation while ambulating 300ft on RA is 94%.      Patient is ambulatory within his/her home.

## 2021-06-11 NOTE — PROGRESS NOTES
RESPIRATORY CARE NOTE     Patient Name: Sarah Gilmore  Today's Date: 9/18/2020     Complete PFT done. Pt performed tests with good effort. Test results meet ATS criteria. Results scanned into epic. Pt left in no distress.       Marjorie Hills RRT

## 2021-06-11 NOTE — PROGRESS NOTES
[facility] [place] Note                                                        Date of Visit: 8/29/2020     Chief Complaint   Sarah Gilmore is a(n) 50 y.o. White or  female who presents to [facility] [place] with the following complaint(s):  Lab results  need attention       Assessment and Plan   1. Elevated d-dimer  - D-dimer, Quantitative  - US Venous Legs Bilateral; Future  - CTA Chest PE Run; Future    2. Contrast media allergy  - methylPREDNISolone (MEDROL) 32 MG tablet; Take 1 tablet by mouth 12 hours prior to your CT scan and 1 tablet by mouth 2 hours prior to your CT scan.  Dispense: 2 tablet; Refill: 0  - diphenhydrAMINE (BENADRYL) 25 mg capsule; Take 2 capsules by mouth 2 hours prior to your CT scan.  Dispense: 2 capsule; Refill: 0    3. Elevated TSH  - Thyroid Stimulating Hormone (TSH)  - T4, Free    4. Exertional dyspnea  - D-dimer, Quantitative  - HM1(CBC and Differential)  - Basic Metabolic Panel  - BNP(B-type Natriuretic Peptide)  - Troponin I  - CTA Chest PE Run; Future  - HM1 (CBC with Diff)    5. Bilateral edema of lower extremity  - D-dimer, Quantitative  - HM1(CBC and Differential)  - Basic Metabolic Panel  - BNP(B-type Natriuretic Peptide)  - Troponin I  - US Venous Legs Bilateral; Future  - HM1 (CBC with Diff)    6. 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 She had follow up laboratory studies (CBC,  "BMP, D-Dimer, and TSH) drawn through [facility] 4 days ago (8/25/2020). She received a call from Cardiology yesterday (8/28/2020) regarding abnormal D-Dimer and TSH levels and was instructed to follow up with Primary Care regarding these results and \"have a scan done.\" Per review of results through Care Everywhere, CBC was normal, BMP as normal, D-Dimer was elevated at 0.54, and TSH was elevated at 5.65. She is followed by Dr. Ford at [facility] for aortic root enlargement. She had a consultation with Dr. Champagne at [facility] on 8/6/2020 due to issues with lower extremity edema and exertional dyspnea. D-Dimer was negative on 8/6/2020 and echocardiogram completed on 8/18/2020 showed mild-moderate left ventricular chamber enlargement, normal left ventricular wall thickness, normal left ventricular systolic function (EF 65%), no regional wall motion abnormalities, mild right ventricular chamber enlargement, normal right ventricular systolic function, normal sized atria, stable aortic dilation, and no valvular disease. Patient has been wearing compression stockings and was prescribed furosemide 20 mg every other day on 8/20/2020 by Cardiology but continues to experience bothersome lower extremity edema. Checking CBC, BMP, D-Dimer, Troponin, BNP, TSH, and Free T4 due to patient's ongoing lower extremity edema, exertional dyspnea, intermittent chest pain, elevated D-Dimer level 4 days ago, and elevated TSH level 4 days ago. Lower extremity ultrasound will be completed today due to edema and elevated D-Dimer, with plan to initiate anticoagulation today if positive. CTA of the chest will be completed to rule out pulmonary emboli due to patient's exertional dyspnea and elevated D-Dimer level. This has been scheduled for tomorrow since the patient has a listed allergy to iodine contrast and will therefore require premedication. Patient tolerated administration of IV Omnipaque contrast " "without issue for a Cardiac CT through [facility] on 7/26/2017; she was premedicated with prednisone, diphenhydramine, and famotidine at that time (see Care Everywhere). Spoke with Dr. Hunt in Radiology, who recommended premedication with methylprednisolone and diphenhydramine as listed above prior to CTA.     Counseled patient regarding assessment and plan for evaluation and treatment. Questions were answered. See AVS for the specific written instructions and educational handout(s) regarding edema, dyspnea, and chest pain that were provided at the conclusion of the visit.     Discussed signs / symptoms that warrant urgent / emergent medical attention.     Follow up for recheck with Primary Care within 4 days.      History of Present Illness   Reason for visit: Had follow up laboratory studies (CBC, BMP, D-Dimer, and TSH) drawn through [facility] 4 days ago (8/25/2020). Received a call from Cardiology yesterday (8/28/2020) regarding abnormal D-Dimer and TSH. Was instructed to follow up with Primary Care regarding these results and \"have a scan done.\" Per review of results through Care Everywhere, CBC was normal, BMP as normal, D-Dimer was minimally elevated at 0.54, and TSH was minimally elevated at 5.65.   Primary symptom: Edema  Onset: Several months  Location(s): Bilateral legs, right greater than left  Progression: Gradually worsening  Associated weeping: No  Associated erythema: On the left shin with prolonged standing  Associated shortness of breath: Not at rest  Associated exertional dyspnea: Yes, for the past couple of months, with climbing stairs and exercising (elliptical)  Associated orthopnea: No  Associated cough: Rarely  Associated hemoptysis: No  Associated chest pain: Intermittently, anterior chest, right greater than left, which she attributes to phantom pain following bilateral mastectomy. Denies left-sided shoulder / neck pain.   Associated palpitations: Only with activity and " prolonged work shifts  Additional symptoms: None  History of similar episodes: No  History of congestive heart failure: No  History of arrhythmia: No  History of coronary artery disease: No  History of deep vein thrombosis: No  History of pulmonary embolism: No  Additional pertinent history: Was diagnosed with bilateral breast cancer in March 2019. Was treated with bilateral mastectomy and radiation therapy (right-side only). Did not receive chemotherapy. Is now taking anastrozole. Is followed by Dr. Ford at [facility] for aortic root enlargement. Had a consultation with Dr. Champagne at [facility] on 8/6/2020 due to issues with lower extremity edema and exertional dyspnea. D-Dimer was negative on 8/6/2020 and echocardiogram completed on 8/18/2020 showed mild-moderate left ventricular chamber enlargement, normal left ventricular wall thickness, normal left ventricular systolic function (EF 65%), no regional wall motion abnormalities, mild right ventricular chamber enlargement, normal right ventricular systolic function, normal sized atria, stable aortic dilation, and no valvular disease. Patient has been wearing compression stockings and was prescribed furosemide 20 mg every other day on 8/20/2020 by Cardiology.      Review of Systems   Review of Systems   All other systems reviewed and are negative.       Physical Exam   Vitals:    08/29/20 1123   BP: 106/75   Patient Site: Left Arm   Patient Position: Sitting   Cuff Size: Adult Large   Pulse: 65   Resp: 20   Temp: 99  F (37.2  C)   TempSrc: Oral   SpO2: 97%   Weight: (!) 273 lb (123.8 kg)     Physical Exam  Vitals signs and nursing note reviewed.   Constitutional:       General: She is not in acute distress.     Appearance: She is well-developed. She is obese. She is not ill-appearing, toxic-appearing or diaphoretic.   Cardiovascular:      Rate and Rhythm: Normal rate and regular rhythm.      Heart sounds: S1 normal and S2 normal. No  murmur. No friction rub. No gallop.    Pulmonary:      Effort: Pulmonary effort is normal.      Breath sounds: Normal breath sounds. No stridor. No wheezing, rhonchi or rales.   Musculoskeletal:      Right lower le+ Edema present.      Left lower le+ Edema present.   Skin:     General: Skin is warm and dry.      Coloration: Skin is not pale.      Findings: No ecchymosis, erythema or rash.   Neurological:      General: No focal deficit present.      Mental Status: She is alert and oriented to person, place, and time.          Diagnostic Studies   Laboratory:  N/A    Radiology:  N/A    Electrocardiogram:  N/A     Procedure Note   N/A     Pertinent History   The following portions of the patient's history were reviewed and updated as appropriate: allergies, current medications, past family history, past medical history, past social history, past surgical history and problem list.    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    Patient has a past medical history of Anemia, Breast cancer (H), Dilated aortic root (H), History of anesthesia complications, Osteopenia, Osteoporosis, PONV (postoperative nausea and vomiting), Seizures (H), and Suspected sleep apnea.    Patient has a past surgical history that includes  section, classic; right arm surgery; Hysterectomy (Bilateral, 3/13/2018); Oophorectomy; Mammo Stereotactic Core Biopsy Right (Right, 2019); Mammo Stereotactic Core Biopsy Right (Right, 2019); NM [place] Node Injection (2019); pr mastectomy, modified radical (Bilateral, 2019); and pr replace tissue expander (Bilateral, 2019).    Patient's family history includes Asperger's syndrome in her son; Breast cancer (age of onset: 50) in her paternal grandmother; Diabetes in her father; Leukemia in her paternal uncle; Mental illness in her mother  and son; No Medical Problems in her brother and sister; Prostate cancer in her paternal grandfather; Sleep apnea in her mother; Suicidality in her maternal grandfather; Testicular cancer in her paternal grandfather.    Patient reports that she has never smoked. She has never used smokeless tobacco. She reports that she does not drink alcohol or use drugs.     Portions of this note have been dictated using voice recognition software. Any grammatical or contextual distortions are unintentional and inherent to the software.    Gordon Carreno MD  [facility]

## 2021-06-12 NOTE — PROGRESS NOTES
PULMONARY OUTPATIENT CONSULT NOTE    Assessment:   1. Dyspnea  Related to deconditioning, body habitus.   Hx breast cancer s/p bilateral mastectomy and radiation therapy.   No tobacco use in the past, no history of asthma or outdoors allergies, chest CT scan showed mild centrilobular emphysema, PFTs showed normal spirometry, no bronchodilator response, normal diffusion capacity.  No significant desaturation with activities.    Evaluated by cardiology, recent echocardiogram showed normal EF, mild right ventricular enlargement, normal right ventricular systolic function. Mild ascending aorta dilatation  (4.1 cm).  Weight gain, 30 lbs over the last three years, currently 273 lbs.   2. Obstructive sleep apnea  Sleep study done on 11/2019 showed AHI 35.5, lowest SpO2 81%  On CPAP therapy.   Follows in the sleep clinic.   3. Hx breast cancer s/p bilateral mastectomy and radiation therapy, currently on anastrozole.   4. Physical deconditioning   5. Obesity BMI 39     Plan:   1. Follow up in the sleep clinic  2. Increase physical activity as tolerated  3. Weight loss program  4. Follow up in the pulmonary clinic as needed    Thank you for this consultation.     Rocco Mishra  Pulmonary / Critical Care  9/18/2020    CC:      Chief Complaint   Patient presents with     Abnormal CXR/CT     Shortness of Breath     Consult       HPI:       Sarah Gilmore is an 50 y.o. female who presents for evaluation of dyspnea.   Patient has history of breast cancer diagnosed on March 2019, s/p bilateral mastectomy and radiation therapy, currently on anastrozole, ONIEL, obesity BMI 39, aortic root enlargement 42 mm.     Patient was evaluated by cardiology regarding exertional dyspnea and swelling of LEs.   Diuresis was added to treatment. D-dimer was negative. Patient had a venous US, study was negative for DVT, chest CT scan which showed no pulmonary embolism, mild enlargement of pulmonary arteries, mild centrilobular  "emphysema.  Recent echocardiogram showed preserved LF function, mild right ventricular enlargement, normal right ventricular systolic function. Mild-moderate aortic sinus of Valsalva dilatation (4.4 cm), mild ascending aorta dilatation  (4.1 cm).  Denies tobacco use, no history of asthma or outdoors allergies.   Patient reports mild exertional dyspnea, able to walk two to three blocks, her apartment is located in the 3rd floor, and she has difficulty climbing the stairs.   Denies cough or wheezes.   Denies postnasal drip, no nasal congestion or sinus tenderness.   No chest pain, no orthopnea or PND. Reports swelling of LEs.   Weight gain, 240 lbs on 2017, 264 lbs on 12/2019, 273 lbs 9/2020.   Reports occasional acid reflux.   History of sleep apnea, uses CPAP machine every night.   Works as dialysis tech.   Denies tobacco use.    Past Medical History:   Diagnosis Date     Anemia      Breast cancer (H)      Dilated aortic root (H)      History of anesthesia complications     slow to wake up     Osteopenia      Osteoporosis      PONV (postoperative nausea and vomiting)      Seizures (H)     h/o seizure disorder in childhood, last seizure activity  \"4 yrs ago\"     Suspected sleep apnea     sleep study set up in Aug 2019     Medications:     Prior to Admission medications    Medication Sig Start Date End Date Taking? Authorizing Provider   acetaminophen 325 mg cap Take 2 tablets by mouth as needed.    PROVIDER, HISTORICAL   anastrozole (ARIMIDEX) 1 mg tablet TAKE 1 TABLET BY MOUTH EVERY DAY 7/21/20   Jatin Morataya MD   EPINEPHrine (EPIPEN JR) 0.15 mg/0.3 mL injection Inject 0.3 mL (0.15 mg total) into the shoulder, thigh, or buttocks as needed for anaphylaxis. 6/15/16   Laura Lobo, FNP   fexofenadine (ALLEGRA ALLERGY) 60 MG tablet Take 60 mg by mouth as needed.     PROVIDER, HISTORICAL   furosemide (LASIX) 20 MG tablet Take 20 mg by mouth. 8/20/20   PROVIDER, HISTORICAL   gabapentin (NEURONTIN) 100 MG " capsule Take 100 mg by mouth 3 (three) times a day. 8/13/20   Laura Lobo C, FNP   ibuprofen (ADVIL,MOTRIN) 600 MG tablet Take 1 tablet (600 mg total) by mouth every 6 (six) hours as needed for pain. 7/31/19   Jo Yarbrough PA-C   ondansetron (ZOFRAN) 4 MG tablet Take 1 tablet (4 mg total) by mouth every 8 (eight) hours as needed for nausea. 7/31/19   Jo Yarbrough PA-C     Social History     Socioeconomic History     Marital status: Single     Spouse name: Not on file     Number of children: Not on file     Years of education: Not on file     Highest education level: Not on file   Occupational History     Not on file   Social Needs     Financial resource strain: Not on file     Food insecurity     Worry: Not on file     Inability: Not on file     Transportation needs     Medical: Not on file     Non-medical: Not on file   Tobacco Use     Smoking status: Never Smoker     Smokeless tobacco: Never Used   Substance and Sexual Activity     Alcohol use: No     Drug use: No     Sexual activity: Never   Lifestyle     Physical activity     Days per week: Not on file     Minutes per session: Not on file     Stress: Not on file   Relationships     Social connections     Talks on phone: Not on file     Gets together: Not on file     Attends Christianity service: Not on file     Active member of club or organization: Not on file     Attends meetings of clubs or organizations: Not on file     Relationship status: Not on file     Intimate partner violence     Fear of current or ex partner: Not on file     Emotionally abused: Not on file     Physically abused: Not on file     Forced sexual activity: Not on file   Other Topics Concern     Not on file   Social History Narrative     Not on file     Family History   Problem Relation Age of Onset     Mental illness Mother      Sleep apnea Mother      Diabetes Father      No Medical Problems Sister      No Medical Problems Brother      Asperger's syndrome Son      Suicidality  "Maternal Grandfather      Breast cancer Paternal Grandmother 50     Prostate cancer Paternal Grandfather      Testicular cancer Paternal Grandfather      Mental illness Son      Leukemia Paternal Uncle      Review of Systems  A 12 point comprehensive review of systems was negative except as noted.      Objective:     Vitals:    09/18/20 1008   BP: 104/70   Pulse: 98   Resp: 12   SpO2: 98%   Weight: (!) 273 lb (123.8 kg)   Height: 5' 10\" (1.778 m)   Patient oxygen saturation on RA at rest is 96%.  Oxygen saturation while ambulating 300ft on RA is 94%.    Gen: obesity, awake, alert, no distress  HEENT: pink conjunctiva, moist mucosa, Mallampati II/IV  Neck: no thyromegaly, masses or JVD  Lungs: clear  CV: regular, no murmurs or gallops appreciated  Abdomen: soft, NT, BS wnl  Ext: edema 1+  Neuro: CN II-XII intact, non focal      Diagnostic tests:     PFTs  Result Date: 9/18/2020  Pulmonary Functions Testing Results: FEV1/FVC  is normal FEV1 is normal FVC is normal There was no improvement in spirometry after a single inhaled dose of bronchodilator TLC is normal DLCO is normal when it is corrected for hemoglobin. Impression:    Full Pulmonary Function Test is normal.    Sleep study 11/27/2019  Respiratory monitoring showed severe obstructive sleep apnea (AHI/POLINA=35.6).  The patient spent a total of 13.4 minutes at an oxygen saturation at or below 88%.    US VENOUS LEGS BILATERAL LOCATION: [facility]   DATE/TIME: 8/29/2020 2:13 PM   INDICATION: Bilateral lower extremity edema, elevated D-Dimer,   IMPRESSION  1.  No deep venous thrombosis in the bilateral lower extremities.      CTA CHEST PE RUN  LOCATION: [facility]  DATE/TIME: 08/30/2020, 1:14 PM  INDICATION: 50-year-old woman with history of breast cancer presents with exertional dyspnea, atypical chest pain and lower extremity edema. Elevated serum d-dimer.  COMPARISON: PET/CT 09/19/2019 and CT coronary angiogram 07/25/2017.  FINDINGS:  ANGIOGRAM " CHEST: No acute pulmonary emboli. No signs of chronic pulmonary emboli. Mild to moderate enlargement of the central pulmonary arteries with main pulmonary artery diameter 3.7 cm may indicate some degree of pulmonary arterial hypertension. The aortic root is mildly dilated at 3.9 cm diameter and the ascending thoracic aorta is mildly dilated at 4.2 cm diameter, unchanged.  LUNGS AND PLEURA: Mild upper lung centrilobular emphysema. Mosaic attenuation pattern throughout both lungs consistent with patchy air trapping. Mild mural thickening of the normal caliber bronchi could indicate some degree of bronchial inflammation.   Benign calcified granuloma right lower lobe. Lungs otherwise clear. No pleural effusion.  MEDIASTINUM/AXILLAE: Borderline mild cardiac enlargement. Small esophageal hiatal hernia. No lymphadenopathy. Bilateral mastectomies. Left breast implant.  UPPER ABDOMEN: Diminutive hepatic cyst is unchanged and requires no follow-up. Benign adenoma left adrenal gland is stable and requires no follow-up.  MUSCULOSKELETAL: Bones appear demineralized. No bone lesions.  IMPRESSION:   1.  No pulmonary emboli.  2.  Mild to moderate enlargement of the central pulmonary arteries may indicate some degree of pulmonary arterial hypertension.  3.  Mild stable dilatation of the aortic root and mid ascending thoracic aorta.  4.  Borderline cardiac enlargement.  5.  Mild centrilobular emphysema, air trapping and CT signs of possible bronchial inflammation.    Echocardiogram 8/18/2020  Final Conclusion Previous Study: 7/11/2017  1. Mild-moderate left ventricular chamber enlargement. Normal left ventricular wall thickness.   Normal left ventricular systolic function.  Calculated left ventricular ejection fraction (modified Quintero technique) is 65 %. No   regional wall motion abnormalities.  2. Mild right ventricular chamber enlargement. Normal right ventricular systolic function.  3. Normal sized atria.  4. Mild-moderate  aortic sinus of Valsalva dilatation (4.4 cm). Mild ascending aorta dilatation  (4.1 cm).  5. No significant valvular heart disease. Tricuspid aortic valve.  6. When compared to the previous echocardiographic images of 7/11/2017, there has been no significant change. LVEDD similar when remeasured from prior contrast images.

## 2021-06-12 NOTE — PROGRESS NOTES
"CC: Sarah A Poegel secondary to post menopausal bleeding.    HPI: The pt is a 50 y.o. SWF P3 who presents with bleeding that she first noted a few months ago.  It happened again about a month ago and then again a couple weeks ago.  It's pink spotting.  It lasts a couple days, and she has some low back pain at the same time.  She has a history of a hysterectomy in 2018.  She also has had bilateral ductal carcinoma in situ in 2019 for which she underwent a bilateral mastectomy.    Past Medical History:   Diagnosis Date     Anemia      DCIS (ductal carcinoma in situ) 2019    bilateral     Dilated aortic root (H)      History of anesthesia complications     slow to wake up     Osteopenia      Osteoporosis      PONV (postoperative nausea and vomiting)      Seizures (H)     h/o seizure disorder in childhood, last seizure activity  \"4 yrs ago\"     Suspected sleep apnea     sleep study set up in Aug 2019       Past Surgical History:   Procedure Laterality Date      SECTION, LOW TRANSVERSE  07/10/1994    breech     HYSTERECTOMY Bilateral 2018    TOTAL LAPAROSCOPIC HYSTERECTOMY BILATERAL SALPINGECTOMY, CYSTOSCOPY     MAMMO STEREOTACTIC CORE BIOPSY RIGHT Right 2019     MAMMO STEREOTACTIC CORE BIOPSY RIGHT Right 2019     NM SENTINEL NODE INJECTION  2019     WV MASTECTOMY, MODIFIED RADICAL Bilateral 2019    Procedure: BILATERAL MASTECTOMY;  Surgeon: Mckenzie Colby DO;  Location: [facility] OR;  Service: General     WV REPLACE TISSUE EXPANDER Bilateral 2019    Procedure: BILATERAL IMPLANT RECONSTRUCTION TO BREASTS;  Surgeon: Carlitos Crum MD;  Location: [facility] OR;  Service: Plastics     right arm surgery         Patient's   Family History   Problem Relation Age of Onset     Mental illness Mother      Sleep apnea Mother      Diabetes Father      No Medical Problems Sister      No Medical Problems Brother      Asperger's syndrome Son      Suicidality Maternal Grandfather  "     Breast cancer Paternal Grandmother 50     Prostate cancer Paternal Grandfather      Testicular cancer Paternal Grandfather      Mental illness Son      Leukemia Paternal Uncle        Patient   Social History     Socioeconomic History     Marital status: Single     Spouse name: None     Number of children: None     Years of education: None     Highest education level: None   Occupational History     None   Social Needs     Financial resource strain: None     Food insecurity     Worry: None     Inability: None     Transportation needs     Medical: None     Non-medical: None   Tobacco Use     Smoking status: Never Smoker     Smokeless tobacco: Never Used   Substance and Sexual Activity     Alcohol use: No     Drug use: No     Sexual activity: Never   Lifestyle     Physical activity     Days per week: None     Minutes per session: None     Stress: None   Relationships     Social connections     Talks on phone: None     Gets together: None     Attends Shinto service: None     Active member of club or organization: None     Attends meetings of clubs or organizations: None     Relationship status: None     Intimate partner violence     Fear of current or ex partner: None     Emotionally abused: None     Physically abused: None     Forced sexual activity: None   Other Topics Concern     None   Social History Narrative     None       Outpatient Medications Prior to Visit   Medication Sig Dispense Refill     acetaminophen 325 mg cap Take 2 tablets by mouth as needed.       anastrozole (ARIMIDEX) 1 mg tablet Take 1 tablet (1 mg total) by mouth daily. 90 tablet 3     EPINEPHrine (EPIPEN JR) 0.15 mg/0.3 mL injection Inject 0.3 mL (0.15 mg total) into the shoulder, thigh, or buttocks as needed for anaphylaxis. 1 each 12     fexofenadine (ALLEGRA ALLERGY) 60 MG tablet Take 60 mg by mouth as needed.        furosemide (LASIX) 20 MG tablet Take 20 mg by mouth.       gabapentin (NEURONTIN) 100 MG capsule Take 100 mg by mouth 3  (three) times a day. 90 capsule 3     ibuprofen (ADVIL,MOTRIN) 600 MG tablet Take 1 tablet (600 mg total) by mouth every 6 (six) hours as needed for pain. 90 tablet 0     ondansetron (ZOFRAN) 4 MG tablet Take 1 tablet (4 mg total) by mouth every 8 (eight) hours as needed for nausea. 10 tablet 1     No facility-administered medications prior to visit.        Patient is allergic to bee pollen; bee venom protein (honey bee); and iodinated contrast media.    ROS:  12 part ROS is negative aside from those symptoms in the HPI    PE:  /68 (Patient Site: Right Arm, Patient Position: Sitting, Cuff Size: Adult Regular)   Pulse 69   Wt (!) 276 lb (125.2 kg)   LMP 03/03/2018           Body mass index is 39.6 kg/m .    General: obese WF, NAD  Pelvic: EG/BUS no lesions, no skin change   Vagina no lesions, no discharge, no evidence of granulation tissue   Cervix absent   Uterus absent   Adnexa not palpable secondary to body habitus   Perineum no lesions   Rectal deferred  Psych: normal mood  Neuro: CN I-XII grossly intact  MS: normal gait    Assessment: 50 y.o. SWF P3 with bleeding.    Plan: Natural history of bleeding after hysterectomy discussed with the patient.  We discussed that more than likely there was a small cut/abrasion present.  We discussed that if it happens again, call when it starts to see if she can come in while it's happening to help isolate the source of the bleeding.  I reassured her that I didn't see anything abnormal today.    Questions were answered to the best of my ability.  Approximately 20 minutes were spent with the patient with the majority in counseling.   nausea/vomiting/none

## 2021-06-12 NOTE — TELEPHONE ENCOUNTER
Spoke with Sarah and she is needing name of plastic surgeon for flap procedure to complete her reconstruction. Relayed that writer spoke with Dr. Colby earlier today and she recommends that she contact Dr. Crum for recommendation on who she would like to have  perform the flap reconstruction for her. Sarah has the contact information for Dr. Crum and will follow up with her for further surgical referral/recommendation.   We discussed her transition back to work after her treatment and it has been somewhat difficult at times. She has disability letter in place indicating that she cannot work greater than 10 hours per day. 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 She has had some difficulty with getting off of work for appointments and did get a letter from PCP to allow her absence from work for recent appointment. She has been a good advocate for her self and encouraged her to continue. Relayed that she may be able to qualify for some financial assistance during the time that she has her next surgery. She will be in contact with writer and  will initiate a momo. She was appreciative of the follow up. She has writer's contact information for future reference.

## 2021-06-12 NOTE — TELEPHONE ENCOUNTER
Called Sarah and LILI that writer was calling to follow up after clinic visit on 10/14.. Contact information provided for return call.

## 2021-06-12 NOTE — PROGRESS NOTES
[facility] Hematology and Oncology Progress Note    Patient: Sarah Gilmore  MRN: [id number]  Date of Service: 10/13/2020        Reason for Visit    Chief Complaint   Patient presents with     HE Cancer     Breast Cancer       Assessment and Plan  Cancer Staging  Infiltrating ductal carcinoma of right female breast (H)  Staging form: Breast, AJCC 8th Edition  - Clinical: No stage assigned - Unsigned  - Pathologic stage from 9/3/2019: Stage Unknown (pTX, pN1a(sn), cM0, G1, ER+, PA+, HER2-) - Signed by Ez Burroughs MD on 9/4/2019      ECOG Performance   ECOG Performance Status: 0     Distress Assessment  Distress Assessment Score: Unable to rate(high due to work stress)    Pain  Currently in Pain: Yes  Pain Score (Initial OR Reassessment): 6  Location: legs    #.  DCIS of the both breasts (upper outer quadrant, lower outer quadrant and central portion of the right breast, multifocal, grade 2; focus of DCIS approximately 2 mm grade 1 in the left breast)  #. pTx pN1a M0 invasive ductal carcinoma of the right breast, grade 1.  ER positive, PA positive, HER-2 negative.     Clinically well. No clinical evidence of breast cancer recurrence.    She is doing ok on anastrozole.    Duration of adjuvant endocrine therapy will be for minimum of 5 years and potentially 10-year in her case if she tolerates well and no major side effects   Follow-up clinical exam in about 6 months.     #. Probable folliculitis   Keep the skin dry. Monitor. Can consider biopsy or dermatology referral if not improved.     #.  Low bone density with low fracture risk.   No evidence of osteoporosis.  Prior history of osteoporotic fracture.   She will continue calcium vitamin D and daily exercises.   Will need to repeat DEXA scan in mid-late 2021.       Problem List    1. Infiltrating ductal carcinoma of right female breast (H)     2. Malignant neoplasm of right female breast, unspecified estrogen receptor status, unspecified site of breast (H)   anastrozole (ARIMIDEX) 1 mg tablet   3. 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        ______________________________________________________________________________  Diagnosis  July 2019-    DCIS of both breasts (upper outer quadrant, lower outer quadrant and central portion of the right breast, multifocal, grade 2; focus of DCIS approximately 2 mm grade 1 in the left breast)   pTx pN1a M0 invasive ductal carcinoma of the right breast, grade 1.  Multifocal DCIS.  6 right axillary sentinel lymph nodes were examined 1 lymph node was positive for macro metastases and one lymph node was positive for isolated tumor cells.  ER positive (90%), ID positive (80%), HER-2 negative.    LMP-at age 48 ( in 3/2018) after hysterectomy. Serology confirms menopause in 4/2020.    Treatment to date  7/30/2019- right breast mastectomy and right sentinel lymph node biopsy, left mastectomy.  10/9/2019-12/6/2019-radiation to the right breast and chest wall and regional lymph nodes area of 5040 cGy/28 fractions followed by additional 1000 cGy in 5 fractions to the primary tumor bed.  12/2019-initiated adjuvant tamoxifen, but stopped about 2 months after due to pain in both shins.  Restarted tamoxifen in Apirl 2020, but stopped again after a month and then switched to anastrozole in May 2020.     History of Present Illness    Oliver presented herself today.    She reports of fatigue.  Joint pain has much improved although she has achiness and heaviness in her legs remains.  Overall she tolerates anastrozole better than tamoxifen.  She is comfortable staying on it.  She is concerned about a few skin sores on left forearm, left breast/chest, left leg.     Pain Status  Currently in Pain: Yes    Review of Systems  Constitutional  Constitutional (WDL): Exceptions to WDL  Fatigue: Fatigue relieved by rest  Fever: None  Chills: None  Weight Gain: 5 - <10% from baseline(4# 9/18/20)  Weight Loss: None  Neurosensory  Neurosensory (WDL):  Exceptions to WDL  Peripheral Motor Neuropathy: None  Ataxia: None  Peripheral Sensory Neuropathy: Asymptomatic, loss of deep tendon reflexes or paresthesia(feet, legs-intermittent)  Confusion: None  Syncope: None  Eye   Eye Disorder (WDL): Exceptions to WDL  Blurred Vision: Intervention not indicated(occ)  Dry Eye: None  Eye Pain: None  Watering Eyes: None  Ear  Ear Disorder (WDL): Exceptions to WDL(says ears need to be cleaned out)  Ear Pain: None  Tinnitus: None  Cardiovascular  Cardiovascular (WDL): Exceptions to WDL  Palpitations: None  Edema: Yes  Edema Limbs: 5 - 10% inter-limb discrepancy in volume or circumference at point of greatest visible difference, swelling or obscuration of anatomic architecture on close inspection(bilat LE)  Phlebitis: None  Superficial thrombophlebitis: None  Pulmonary  Respiratory (WDL): Exceptions to WDL  Cough: None  Dyspnea: Shortness of breath with moderate exertion  Hypoxia: None  Gastrointestinal  Gastrointestinal (WDL): Exceptions to WDL  Anorexia: None  Constipation: None  Diarrhea: None  Dysphagia: None  Esophagitis: Asymptomatic, clinical or diagnostic observations only, intervention not indicated(occ)  Nausea: None  Pharyngitis: None  Vomiting: None  Dysgeusia: None  Dry Mouth: None  Genitourinary  Genitourinary (WDL): All genitourinary elements are within defined limits  Lymphatic  Lymph (WDL): All lymph disorder elements are within defined limits  Musculoskeletal and Connective Tissue  Musculoskeletal and Connetive Tissue Disorders (WDL): Exceptions to WDL  Arthralgia: None  Bone Pain: None  Muscle Weakness : None  Myalgia: Moderate pain, limiting instrumental ADL(legs)  Integumentary  Integumentary (WDL): All integumentary elements are within defined limits  Patient Coping  Patient Coping: Open/discussion  Distress Assessment  Distress Assessment Score: Unable to rate(high due to work stress)  Accompanied by  Accompanied by: Alone  Oral Chemo Adherence         Past  "History  Past Medical History:   Diagnosis Date     Anemia      Breast cancer (H)      Dilated aortic root (H)      History of anesthesia complications     slow to wake up     Osteopenia      Osteoporosis      PONV (postoperative nausea and vomiting)      Seizures (H)     h/o seizure disorder in childhood, last seizure activity  \"4 yrs ago\"     Suspected sleep apnea     sleep study set up in Aug 2019       Physical Exam    Recent Vitals 10/13/2020   Height -   Weight 277 lbs 10 oz   BSA (m2) 2.49 m2   /87   Pulse 70   Temp 98.3   Temp src 1   SpO2 95   Some recent data might be hidden     General: alert, awake, not in acute distress  HEENT: Head: Normal, normocephalic, atraumatic.  Eye: Normal external eye, conjunctiva, lids cornea, DAYSI.  Ears:  Non-tender.  Nose: Normal external nose, mucus membranes and septum.  Pharynx: Dental Hygiene adequate. Normal buccal mucosa. Normal pharynx.  Neck / Thyroid: Supple, no masses, nodes, nodules or enlargement.  Lymphatics: No abnormally enlarged lymph nodes.  Chest: Normal chest wall and respirations. Clear to auscultation.  Breasts: surgically absent bilateral breasts, left breast has implant.   Heart: S1 S2 RRR, no murmur.   Abdomen: abdomen is soft without significant tenderness, masses, organomegaly or guarding  Extremities: normal strength, tone, and muscle mass  Skin: a couple of folliculitis on the left breast, left forearm, left lower extremity with fine scales.  CNS: non focal.    Lab Results    No results found for this or any previous visit (from the past 168 hour(s)).    Imaging    Pft Complete    Result Date: 9/18/2020  Pulmonary Functions Testing Results: FEV1/FVC  is normal FEV1 is normal FVC is normal There was no improvement in spirometry after a single inhaled dose of bronchodilator TLC is normal DLCO is normal when it is corrected for hemoglobin. Impression:  Full Pulmonary Function Test is normal.      Signed by: Ez Burroughs MD  "

## 2021-06-12 NOTE — TELEPHONE ENCOUNTER
Sarah calls in with hearing and ear wax build up. She did buy a home wax cleaning kit. She used it for about 10 minutes and then she vomited and has had some dizziness since then.She does state that she has some hearing loss as well long standing.  She will schedule to see her provider. She agrees to this plan.     Reason for Disposition    Vomiting occurs with dizziness     After cleaning ears.  Might be  Vertigo.    Additional Information    Dizziness is the main symptom    Negative: Shock suspected (e.g., cold/pale/clammy skin, too weak to stand, low BP, rapid pulse)    Negative: Difficult to awaken or acting confused (e.g., disoriented, slurred speech)    Negative: Fainted, and still feels dizzy afterwards    Negative: SEVERE difficulty breathing (e.g., struggling for each breath, speaks in single words)    Negative: Overdose (accidental or intentional) of medications    Negative: New neurologic deficit that is present now: * Weakness of the face, arm, or leg on one side of the body * Numbness of the face, arm, or leg on one side of the body * Loss of speech or garbled speech    Negative: Heart beating < 50 beats per minute OR > 140 beats per minute    Negative: Sounds like a life-threatening emergency to the triager    Negative: Chest pain    Negative: Rectal bleeding, bloody stool, or tarry-black stool    Negative: Vomiting is the main symptom    Negative: Diarrhea is the main symptom    Negative: Headache is the main symptom    Negative: Heat exhaustion suspected (i.e., dehydration from heat exposure)    Negative: Patient states that he/she is having an anxiety/panic attack    Protocols used: DIZZINESS-A-OH, EARWAX-A-OH

## 2021-06-12 NOTE — TELEPHONE ENCOUNTER
Dr Allen -  scheduled this pt without a referral based on what she told me. Are you ok seeing her without a referral? Pt said she could get one if needed.  Advise writer if she needs a referral.

## 2021-06-12 NOTE — PROGRESS NOTES
[facility] Consultation Note    Thank you, Laura Lobo, for asking the [facility] team to see Sarah Gilmore in consultation today at our clinic to evaluate ascending aortic dilation.      Assessment:   1.  Mild ascending aortic dilation  2.  Ventricular ectopic beats noted on stress testing at low risk for sudden cardiac death     Plan:   Obtain a follow-up cardiac MR to remeasure her aorta in 1 year            Current History:   47-year-old female who developed an acute chest pain syndrome upon awakening in mid July.  She had an echocardiogram done at [place] that showed normal left ventricular systolic function with no significant valvular abnormalities but mild to moderate enlargement of her aortic root and ascending aorta.  She had a cardiac MRI that showed a small fixed anterolateral defect suggestive of previous myocardial infarction.  She exercised for 9 minutes with frequent premature ventricular ectopic beats noted.  Due to this fixed defect suggesting previous myocardial infarction she underwent coronary CT angiogram on  that revealed normal coronary arteries her aortic root on that study measured 4.2 cm with an ascending aortic diameter of 4.0 cm.  Patient has had no further chest pain over the last month    Past Medical History:     Past Medical History:   Diagnosis Date     Anemia      Osteopenia      Osteoporosis        Past Surgical History:     Past Surgical History:   Procedure Laterality Date      SECTION, CLASSIC       right arm surgery     Tubal ligation    Family History:     Family History   Problem Relation Age of Onset     Mental illness Mother      Diabetes Father      No Medical Problems Sister      No Medical Problems Brother      Asperger's syndrome Son      Suicidality Maternal Grandfather      Breast cancer Paternal Grandmother      Prostate cancer Paternal Grandfather      Mental illness Son    Father with cardiac arrest presumably due to  "acute myocardial infarction with an ICD in place at age 69    Social History:    reports that she has never smoked. She does not have any smokeless tobacco history on file. She reports that she does not drink alcohol or use illicit drugs.    Meds:   Scheduled Meds:  PRN Meds:.    Allergies:   Bee pollen and Iodinated contrast- oral and iv dye    Review of Systems:   General: WNL  Eyes: WNL  Ears/Nose/Throat: Hearing Loss  Lungs: Snoring  Heart: Chest Pain, Leg Swelling  Stomach: Constipation, Heartburn, Nausea  Bladder: Frequent Urination at Night  Muscle/Joints: Joint Pain, Muscle Weakness, Muscle Pain  Skin: Rash  Nervous System: Daytime Sleepiness, Dizziness  Mental Health: WNL     Blood: WNL      Objective:      Physical Exam  (!) 252 lb (114.3 kg)  5' 9\" (1.753 m)  Body mass index is 37.21 kg/(m^2).  /62 (Patient Site: Right Arm, Patient Position: Sitting, Cuff Size: Adult Large)  Pulse 60  Resp 18  Ht 5' 9\" (1.753 m)  Wt (!) 252 lb (114.3 kg)  BMI 37.21 kg/m2    General Appearance:   alert, no apparent distress   HEENT:  no scleral icterus; the mucous membranes were pink and moist                                  Neck: jugular venous pressure is normal, no thyromegaly   Chest: the spine was straight and the chest was symmetric   Lungs:   respirations unlabored; the lungs are clear to auscultation   Cardiovascular:   regular rhythm with normal first and second heart sounds and no murmurs, clicks, or gallops. Carotid, radial, femoral, and posterior tibial pulses are intact; there are no carotid or femoral bruits.   Abdomen:  no organomegaly, masses, bruits, or tenderness; bowel sounds are present   Extremities: no cyanosis, clubbing, or edema.  No obvious musculoskeletal abnormalities   Skin: no xanthelasma   Neurologic: mood and affect are appropriate         echocardiogram from July 11, 2017 results reviewed as above      Stress cardiac MR from [facility] July 11, 2017 results reviewed as " above    Coronary CT angiography [facility] July 25, 2017 results reviewed as above        Lab Review   Lab Results   Component Value Date     10/20/2016    K 4.0 10/20/2016     10/20/2016    CO2 26 10/20/2016    BUN 10 10/20/2016    CREATININE 0.78 10/20/2016    CALCIUM 8.9 10/20/2016     Lab Results   Component Value Date    WBC 6.2 06/14/2016    HGB 13.2 10/20/2016    HCT 31.1 (L) 06/14/2016    MCV 74 (L) 06/14/2016     06/14/2016     Lab Results   Component Value Date    CHOL 138 06/30/2016    TRIG 54 06/30/2016    HDL 42 (L) 06/30/2016         Gamaliel Rossi M.D., F.A.C.C.  Formerly [facility]  [phone]

## 2021-06-12 NOTE — TELEPHONE ENCOUNTER
Pt having spotting and wondering what OB GYN to see. Pt has been seen at [facility] and [facility] in past and will go there.

## 2021-06-12 NOTE — TELEPHONE ENCOUNTER
"Patient Returning Call  Reason for call:  Appointment   Information relayed to patient:  \"Left message for patient.  Please read the following message.  Can patient come in any earlier today?  Possible 2:40? Thank you\"  Patient has additional questions:  No. Patient states she can come for an earlier appointment at 2:40 PM today.  If YES, what are your questions/concerns:  NA  Okay to leave a detailed message?: No call back needed      "

## 2021-06-12 NOTE — TELEPHONE ENCOUNTER
Left message for patient.  Please read the following message.  Can patient come in any earlier today?  Possible 2:40? Thank you

## 2021-06-12 NOTE — TELEPHONE ENCOUNTER
Who is calling:  Patient   Reason for Call:  Patient states she is having pinkish spotting since  yesterday  She had couple of months ago also . Patient stated that she had Hysterectomy  And breast cancer , patient is wondering why she is having spotting . Patient is requesting providers suggestion .  Date of last appointment with primary care: unknown   Okay to leave a detailed message: No

## 2021-06-12 NOTE — TELEPHONE ENCOUNTER
Who is calling:  Patient  Reason for Call:  Patient has a visit today for the oncologist.  :giorgio's work will not let her leave as she does not have the letter to show . Please complete and call her ASAP.  Date of last appointment with primary care:NA  Okay to leave a detailed message: Yes

## 2021-06-12 NOTE — PROGRESS NOTES
Assessment:   1. Impacted cerumen of left ear  I personally removed cerumen using soft plastic curette and gentle irrigation.     Plan:   1. Care instructions given.  2. Home treatment: none.  3. Follow-up as needed.     Subjective:   Sarah Gilmore is a 50 y.o. female whom I am asked to see for evaluation of diminished hearing and otalgia in the left ear for the past 4 days. There is a prior history of cerumen impaction. The patient has been using ear drops to loosen wax immediately prior to this visit. The patient denies ear pain.    The following portions of the patient's history were reviewed and updated as appropriate: allergies, current medications, past family history, past medical history, past social history, past surgical history and problem list.    Review of Systems  A 12 point comprehensive review of systems was negative except as noted.      Objective:      Auditory canal(s) of the left ear are completely obstructed with cerumen.     Cerumen was removed using gentle irrigation and soft plastic curettes. Tympanic membranes are intact following the procedure.  Auditory canals are inflamed.

## 2021-06-12 NOTE — PATIENT INSTRUCTIONS - HE
Calcium 2742-8118 mg daily by mouth in 2 divided doses.  Vitamin D 6196-1285 units daily/divided doses.

## 2021-06-12 NOTE — TELEPHONE ENCOUNTER
Who is requesting the letter?  Patient   Why is the letter needed? Patient  Is requesting a note for work . Patient states she is on restriction for work , not working more than 10-8 hours per day . 15 minutes breaks every 2 hours to keep her legs elevated . Patient states her manger is trying to put her back in 14-15 hours of work . The letter that provider wrote is  requesting new letter with restrictions .   How would you like this letter returned? Patient requested to fax [phone] Attention  Neymar Mack   Okay to leave a detailed message? No

## 2021-06-14 ENCOUNTER — COMMUNICATION - HEALTHEAST (OUTPATIENT)
Dept: ONCOLOGY | Facility: CLINIC | Age: 51
End: 2021-06-14

## 2021-06-15 NOTE — TELEPHONE ENCOUNTER
Марина, is the diagnosis not appropriate for our endocrine providers or not appropriate for endocrinology specialists in general. I am wondering if we can refer patient to [facility] or if we need to have PCP recommend another speciality. Appreciate any feedback.

## 2021-06-15 NOTE — PROGRESS NOTES
Social Work Follow-Up Encounter Visit  Oncology Clinic    Data/Intervention:  Patient Name:  Sarah Gilmore  /Age:  1970 (50 y.o.)    Reason for Follow-Up:  Financial resources      Assessment:  UGO received referral from care coordinator that Sarah was now in reconstruction, but had financial concerns. Due to reconstruction not being considered treatment, resources are limited for grants, etc. RNCC requested sw call and offer support resources.     SW called and left message for Sarah this afternoon, introducing self and provided information and availability for self and primary  Lucia. UGO encouraged Sarah to call back when she is able to see if additional resources may be available.     Plan:  UGO will await Sarah's call back.     Edilia Pierre, [facility]  [phone]

## 2021-06-15 NOTE — TELEPHONE ENCOUNTER
Authorizing: Laura Lobo, FNP in [facility] PATIENT ACCESS    Referral: [id number] (Pending Review)           Priority: Routine   Diagnosis: Lipedema [     Please review chart and advise on scheduling.

## 2021-06-15 NOTE — PROGRESS NOTES
Patient presents at the request of Laura Lobo for a right upper extremity EMG.  She has numbness and tingling right hand digits 3-5 for approximately 6 months worse at night.  She is right-handed.    EMG/NCS  results: Please see scanned full report.    Comment NCS: Abnormal study:    1. Prolonged right median versus ulnar transcarpal latency comparison.  2.  Remainder right upper extremity nerve conduction studies normal    Comment EMG: Normal study.  1.  Normal needle EMG right upper extremity.    Interpretation: Abnormal study:    1.  Right median neuropathy at the wrist consistent with entrapment in the carpal tunnel, very mild in severity.    2. There is no electrodiagnostic evidence of cervical radiculopathy, brachial plexopathy, or ulnar neuropathy in the right upper extremity.    Note: Recommended patient obtain carpal tunnel brace to wear at night.  Will follow up with primary care provider for further recommendations.    The testing was completed in its entirety by the physician.      It was our pleasure caring for your patient today, if there any questions or concerns please do not hesitate to contact us.

## 2021-06-15 NOTE — TELEPHONE ENCOUNTER
This is not an appropriate referral for Endocrinology. PCP should recommend a different specialty.

## 2021-06-15 NOTE — TELEPHONE ENCOUNTER
Per endocrinology, Not appropriate diagnosis for Endocrinology order.  Should this be redirected to a different specialty? New order?

## 2021-06-15 NOTE — PROGRESS NOTES
[facility]  [address] [place] [id number]  Dept: [phone]  Dept Fax: [phone]  Primary Provider: Laura Williamson, IGOR  Pre-op Performing Provider: LAURA WILLIAMSON    PREOPERATIVE EVALUATION:  Today's date: 2/9/2021    Sarah Gilmore is a 50 y.o. female who presents for a preoperative evaluation.    Surgical Information:  Surgery/Procedure: reconstruction  Surgery Location: Same day   Surgeon: Dr. silva  Surgery Date: 2/25/2021  Time of Surgery: AM  Where patient plans to recover: At home with family  Fax number for surgical facility: Note does not need to be faxed, will be available electronically in Epic.    Type of Anesthesia Anticipated: General    Assessment & Plan      The proposed surgical procedure is considered LOW risk.    Malignant neoplasm of right female breast, unspecified estrogen receptor status, unspecified site of breast (H)  Preop general physical exam  - Basic Metabolic Panel  - Hemoglobin  - INR  - Post Mastectomy Bras    Possible Sleep Apnea:   STOP-Bang Total Score 2/9/2021   Total Score 5      Implanted Device:  Risks and Recommendations:  The patient has the following additional risks and recommendations for perioperative complications:   - No identified additional risk factors other than previously addressed    Medication Instructions:   - Diuretics: HOLD on the day of surgery.    RECOMMENDATION:  APPROVAL GIVEN to proceed with proposed procedure, without further diagnostic evaluation.    Review of the result(s) of each unique test - BMP. INR and HGB      27 minutes spent on the date of the encounter doing chart review, review of test results, patient visit and documentation         Subjective     HPI related to upcoming procedure:  7/30/2019- right breast mastectomy and right sentinel lymph node biopsy, left mastectomy.  10/9/2019-12/6/2019-radiation to the right breast and chest wall and regional lymph nodes area of 5040 cGy/28 fractions  "followed by additional 1000 cGy in 5 fractions to the primary tumor bed.  2019-initiated adjuvant tamoxifen, but stopped about 2 months after due to pain in both shins.  Restarted tamoxifen in 2020, but stopped again after a month and then switched to anastrozole in May 2020.   Needed reconstruction of right breast after failed implant. Patient denied chest pain shortness of breath fever and chills. Patient is still taking anastrozole.        No flowsheet data found.    Health Care Directive:  Patient does not have a Health Care Directive or Living Will: Discussed advance care planning with patient; however, patient declined at this time.    Preoperative Review of :    reviewed - no record of controlled substances prescribed.    SLEEP PROBLEM - Patient has a longstanding history of snoring.. Patient has tried OTC medications with limited success.       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     Menorrhagia with irregular cycle 2018     Past Medical History:   Diagnosis Date     Anemia      DCIS (ductal carcinoma in situ) 2019    bilateral     Dilated aortic root (H)      History of anesthesia complications     slow to wake up     Osteopenia      Osteoporosis      PONV (postoperative nausea and vomiting)      Seizures (H)     h/o seizure disorder in childhood, last seizure activity  \"4 yrs ago\"     Suspected sleep apnea     sleep study set up in Aug 2019     Past Surgical History:   Procedure Laterality Date      SECTION, LOW TRANSVERSE  07/10/1994    breech     HYSTERECTOMY Bilateral 2018    " TOTAL LAPAROSCOPIC HYSTERECTOMY BILATERAL SALPINGECTOMY, CYSTOSCOPY     MAMMO STEREOTACTIC CORE BIOPSY RIGHT Right 05/22/2019     MAMMO STEREOTACTIC CORE BIOPSY RIGHT Right 05/30/2019     NM SENTINEL NODE INJECTION  07/30/2019     MI MASTECTOMY, MODIFIED RADICAL Bilateral 07/30/2019    Procedure: BILATERAL MASTECTOMY;  Surgeon: Mckenzie Colby DO;  [place] [facility] [place]  Service: General     MI REPLACE TISSUE EXPANDER Bilateral 07/30/2019    Procedure: BILATERAL IMPLANT RECONSTRUCTION TO BREASTS;  Surgeon: Carlitos Crum MD;  [place] [facility] [place]  Service: Plastics     right arm surgery       Current Outpatient Medications   Medication Sig Dispense Refill     acetaminophen 325 mg cap Take 2 tablets by mouth as needed.       anastrozole (ARIMIDEX) 1 mg tablet Take 1 tablet (1 mg total) by mouth daily. 90 tablet 3     EPINEPHrine (EPIPEN JR) 0.15 mg/0.3 mL injection Inject 0.3 mL (0.15 mg total) into the shoulder, thigh, or buttocks as needed for anaphylaxis. 1 each 12     fexofenadine (ALLEGRA ALLERGY) 60 MG tablet Take 60 mg by mouth as needed.        furosemide (LASIX) 20 MG tablet Take 20 mg by mouth.       gabapentin (NEURONTIN) 100 MG capsule Take 100 mg by mouth 3 (three) times a day. 270 capsule 3     ibuprofen (ADVIL,MOTRIN) 600 MG tablet Take 1 tablet (600 mg total) by mouth every 6 (six) hours as needed for pain. 90 tablet 0     ondansetron (ZOFRAN) 4 MG tablet Take 1 tablet (4 mg total) by mouth every 8 (eight) hours as needed for nausea. 10 tablet 1     No current facility-administered medications for this visit.        Allergies   Allergen Reactions     Bee Pollen Anaphylaxis     Bee Venom Protein (Honey Bee) Anaphylaxis     Iodinated Contrast Media Anaphylaxis     Patient had CT scan with Omnipaque 350 on 8/30/20 while premedicated with no adverse reaction.        Social History     Tobacco Use     Smoking status: Never Smoker     Smokeless tobacco: Never Used   Substance Use Topics      Alcohol use: No      Family History   Problem Relation Age of Onset     Mental illness Mother      Sleep apnea Mother      Diabetes Father      No Medical Problems Sister      No Medical Problems Brother      Asperger's syndrome Son      Suicidality Maternal Grandfather      Breast cancer Paternal Grandmother 50     Prostate cancer Paternal Grandfather      Testicular cancer Paternal Grandfather      Mental illness Son      Leukemia Paternal Uncle      Social History     Substance and Sexual Activity   Drug Use No        Objective     LMP 03/03/2018   Physical Exam    GENERAL APPEARANCE: healthy, alert and no distress     EYES: EOMI, PERRL     HENT: ear canals and TM's normal and nose and mouth without ulcers or lesions     NECK: no adenopathy, no asymmetry, masses, or scars and thyroid normal to palpation     RESP: lungs clear to auscultation - no rales, rhonchi or wheezes     BREAST: normal without masses, tenderness or nipple discharge and no palpable axillary masses or adenopathy     CV: regular rates and rhythm, normal S1 S2, no S3 or S4 and no murmur, click or rub     ABDOMEN:  soft, nontender, no HSM or masses and bowel sounds normal     MS: extremities normal- no gross deformities noted, no evidence of inflammation in joints, FROM in all extremities.     SKIN: no suspicious lesions or rashes     NEURO: Normal strength and tone, sensory exam grossly normal, mentation intact and speech normal     PSYCH: mentation appears normal. and affect normal/bright     LYMPHATICS: No cervical adenopathy    Recent Labs   Lab Test 08/29/20  1246 09/04/19  0926   HGB 13.9 14.3    365    140   K 4.7 4.3   CREATININE 0.86 0.77        PRE-OP Diagnostics:   No results found for this or any previous visit (from the past 720 hour(s)).  No EKG required, no history of coronary heart disease, significant arrhythmia, peripheral arterial disease or other structural heart disease.    REVISED CARDIAC RISK INDEX (RCRI)    The patient has the following serious cardiovascular risks for perioperative complications:   - No serious cardiac risks = 0 points    RCRI INTERPRETATION: 0 points: Class I (very low risk - 0.4% complication rate)      Signed Electronically by: IGOR Sweet    Copy of this evaluation report is provided to requesting physician.    Preop [facility] Preop Guidelines    Revised Cardiac Risk Index

## 2021-06-15 NOTE — TELEPHONE ENCOUNTER
Reason for Call: Request for an order or referral:    Order or referral being requested: Lipedema lymphoma specialist    Date needed: as soon as possible    Has the patient been seen by the PCP for this problem? YES    Additional comments: Pt would like an order place for her to see specialist    Phone number Patient can be reached at:  Home number on file [phone] (home)    Best Time:  anytime    Can we leave a detailed message on this number?  Yes    Call taken on 2/19/2021 at 12:18 PM by Su Tinoco

## 2021-06-15 NOTE — PROGRESS NOTES
"Assessment:   1. Wrist pain, chronic, right  Likely secondary to carpal tunnel syndrome  - EMG- Right Arm; Future    2. Menorrhagia with irregular cycle  - Ambulatory referral to Obstetrics / Gynecology    3. Dermatitis  - triamcinolone (KENALOG) 0.1 % ointment; Apply topically 2 (two) times a day.  Dispense: 30 g; Refill: 0    Plan:   Natural history and expected course discussed. Questions answered.  Resr, ice, compression, and elevation (RICE) therapy.  Reduction in offending activity discussed.  NSAIDs per medication orders.     Subjective:   Sarah Gilmore is an 47 y.o. female who presents for evaluation of right wrist pain. Onset was sudden, not related to any specific activity. The pain is moderate, worsens with movement, and is relieved by rest. There is associated tingling in of the fingers. There is history of strain. Evaluation to date: none. Treatment to date: wrist splint which is somewhat effective.  Patient also complained that she has continued to have irregular menses and excessive bleeding during her menstrual.  She reported that her current OB/GYN has refused to recommend surgery but encouraged her to get an IUD which she has tried once but failed.  She stated that at this time she would like to get a second opinion about hysterectomy.    The following portions of the patient's history were reviewed and updated as appropriate: allergies, current medications, past family history, past medical history, past social history, past surgical history and problem list.    Review of Systems  A 12 point comprehensive review of systems was negative except as noted.     Objective:      /80  Pulse 72  Ht 5' 9\" (1.753 m)  Wt (!) 260 lb (117.9 kg)  LMP 01/16/2018  SpO2 98%  BMI 38.4 kg/m2  Right wrist:  positive Phalen and positive Tinel   Left wrist:  normal exam, no swelling, tenderness, instability; ligaments intact, full ROM both hands, wrists, and finger joints     Imaging:  X-ray right wrist: " not indicated

## 2021-06-15 NOTE — TELEPHONE ENCOUNTER
LM for patient to return call to clinic. Need more information on what type of referral she is asking for.

## 2021-06-15 NOTE — PATIENT INSTRUCTIONS - HE
"  Preparing for Your Surgery  Getting started  A surgery nurse will call you to review your health history and instructions. They will give you an arrival time based on your scheduled surgery time.  Please be ready to share the following:    Your doctor's clinic name and phone number    Your medical, surgical and anesthesia history    A list of allergies and sensitivities    A list of medicines, including herbal treatments and over-the-counter drugs    Whether the patient has a legal guardian (ask how to send us the papers in advance)  If your child is having surgery, please ask for a copy of Preparing for Your Child's Surgery.    Preparing for surgery    Within 30 days of surgery: Have an exam at your family clinic (primary care clinic), or go to a pre-operative clinic. This exam is called a \"History and Physical,\" or H&P.    At your H&P exam, talk to your care team about all medicines you take. If you need to stop any medicines before surgery, ask when to start taking them again.  ? We do this for your safety. Many medicines can make you bleed too much during surgery. Some change how well surgery (anesthesia) drugs work.    Call your insurance company to see what it will and won't pay for. Ask if they need to pre-approve the surgery. (If no insurance, call [phone].)    Call your surgeon's clinic if there's any change in your health. This includes signs of a cold or flu (sore throat, runny nose, cough, rash, fever). It also includes a scrape or scratch near the surgery site.    If you have questions on the day of surgery, call your surgery center.  Eating and drinking guidelines  For your safety: Unless your surgeon tells you otherwise, follow the guidelines below.    Eat and drink as usual until 8 hours before surgery. After that, no food or milk.    Drink clear liquids until 2 hours before surgery. These are liquids you can see through, like water, Gatorade and Propel Water. You may also have black coffee " and tea (no cream or milk).    Nothing by mouth within 2 hours of surgery. This includes gum, candy and breath mints.    Stop alcohol the midnight before surgery.    If your family clinic tells you to take medicine on the morning of surgery, it's okay to take it with a sip of water.  Preventing infection    Shower or bathe the night before and morning of your surgery. Follow the instructions your clinic gave you. (If no instructions, use regular soap.)    Don't shave or clip hair near your surgery site. This can lead to skin infection.    Don't smoke the morning of surgery. Smoking increases the risk of infection. You may chew nicotine gum up to 2 hours before surgery. A nicotine patch is okay.  ? Note: Some surgeries require you to completely quit smoking and nicotine. Check with your surgeon.    Your care team will make every effort to keep you safe from infection. We will:  ? Clean our hands often with soap and water (or an alcohol-based hand rub).  ? Clean the skin at your surgery site with a special soap that kills germs. We'll also remove hair from the site as needed.  ? Wear special hair covers, masks, gowns and gloves during surgery.  ? Give antibiotic medicine, if prescribed. Not all surgeries need antibiotics.  What to bring on the day of surgery    Photo ID and insurance card    Copy of your health care directive, if you have one    Glasses and hearing aides (bring cases)  ? You can't wear contacts during surgery    Inhaler and eye drops, if you use them (tell us about these when you arrive)    CPAP machine or breathing device, if you use them    A few personal items, if spending the night    If you have . . .  ? A pacemaker or ICD (cardiac defibrillator): Bring the ID card.  ? An implanted stimulator: Bring the remote control.  ? A legal guardian: Bring a copy of the certified (court-stamped) guardianship papers.  Please remove any jewelry, including body piercings. Leave jewelry and other valuables at  home.  If you're going home the day of surgery  Important: If you don't follow the rules below, we must cancel your surgery.     Arrange for someone to drive you home after surgery. You may not drive, take a taxi or take public transportation by yourself (unless you'll have local anesthesia only).    Arrange for a responsible adult to stay with you overnight. If you don't, we may keep you in the hospital overnight, and you may need to pay the costs yourself.  Questions?   If you have any questions for your care team, list them here: _________________________________________________________________________________________________________________________________________________________________________________________________________________________________________________________________________________________________________________________  For informational purposes only. Not to replace the advice of your health care provider. Copyright   3126-6337 [employer]. All rights reserved. Clinically reviewed by Amisha Gracia MD. [employer] 131963 - REV 07/19.      Before Your Procedure or Hospital Admission  Testing for COVID-19 (Coronavirus)  Thank you for choosing [facility] for your health care needs. This is a very challenging time for everyone. The World Health Organization and the State of [place] have declared the COVID-19 virus a pandemic.   Our goal is to keep you and our team here at [facility] safe and healthy. We've taken several steps to make this happen. For example:    We screen our staff, care teams and patients for COVID-19.    Everyone at [facility] must wear a mask and stay 6 feet apart.    We are limiting hospital and clinic visitors.  Before you come in  All patients must get tested for COVID-19. Your test needs to happen 2 to 4 days before you check in to the hospital or surgery site.   A clinic scheduler will call about a week in advance to set up a testing time at  "one of our labs where we'll take a swab of your nose or throat.  Note: If you go to a clinic or pharmacy like [facility] or [facility] for your test, make sure it's a \"RT-PCR\" test, not a \"rapid\" COVID-19 test. (See Questions and Answers below.)  After the test, please stay at home and stay out of contact with other people. It will be harder for you to recover if you get COVID-19 before your treatment.  Please follow all current safety guidelines, including:    Limit trips outside your home.    Limit the number of people you see.    Always wear a mask outside your home.    Use social distancing. (Stay 6 feet away from others whenever you can.)    Wash your hands often.  If your test shows you have COVID-19  If your test is positive, we'll let you know. A positive test means that you have the virus.   We'll probably have to postpone your admission, surgery or procedure. Your doctor will discuss this with you. After that, we'll let you know what to do and when you can reschedule.   We may need to cancel your treatment on short notice for other reasons, too.  If your test shows you DON'T have COVID-19  Even if your test is negative, you may still get COVID-19. It's rare but, sometimes, the test result is wrong. You could also catch the virus after taking the test.   There's a very small chance that you could catch COVID-19 in the hospital or surgery center. [facility] has taken many steps to prevent this from happening.   Day of your surgery or procedure    Please come wearing a mask or something else that covers both your nose and mouth.    When you arrive, we'll ask you some questions to find out if you have any signs or symptoms of COVID-19.    Ask your care team if you can have visitors. All visitors must wear masks and will be screened for signs of COVID-19.  ? Even if no visitors are allowed, you can still have with you:    Your legal guardian or legal decision maker    A parent and one other visitor, if you are " "younger than 18 years old    A partner and a , if you are in labor  ? We might need to teach you about taking care of yourself after surgery. If so, a visitor can come into the hospital to learn about it, too.  ? The rules for visitors change often, depending on how much the virus is spreading. To learn more, see Visiting a Loved One in the Hospital during the COVID-19 Outbreak.  Please call your care team, hospital or surgery center if you have any questions. We thank you for your understanding and for choosing [facility] for your care.   Questions and Answers  Does it matter where I get tested for COVID-19?  Yes. We urge you to get tested at one of our [facility] COVID-19 testing sites. We process these tests in our lab and can get the results quickly. Your [facility] care team needs to get your results before you check in.  What should I do if I can't get tested at [facility]?  You can get tested somewhere else, but you'll need to take these extra steps:  1. Contact your family doctor or clinic to arrange your test.  2. Take the test within 4 days of your surgery or procedure. We can't accept tests older than 4 days.  3. Make sure your doctor or clinic faxes your results to [facility] at [phone].  If we don't get your results in time, we may have to postpone or cancel your treatment.  Ask if you're getting a \"RT-PCR\" COVID-19 test. It should NOT be a \"rapid\" COVID-19 test. Many drug stores use \"rapid\" tests, but they may not be as accurate. We don't accept the results of \"rapid\" tests.  For informational purposes only. Not to replace the advice of your health care provider. Copyright   2020 [facility] [employer]. All rights reserved. Clinically reviewed by Infection Prevention and the [facility] COVID-19 Clinical Team. [employer] [id number] - REV 10/20.    How to Take Your Medication Before Surgery  - HOLD (do not take) your LASIX (FUROSEMIDE) on the morning of " surgery.   - STOP taking all vitamins and herbal supplements 14 days before surgery.

## 2021-06-15 NOTE — TELEPHONE ENCOUNTER
Spoke with Sarah. She had fat grafting for breast reconstruction surgery last week and is healing well but continues to have discomfort at the donor site. She shared about needing multiple more grafting procedures to achieve the cosmetic result that she wants as she is unable to have an implant on the affected side, s/p radiation. She is still considering a CAM flap as it would be one larger surgery with possible susequent  fat grafting procedure afterward. Due to her lower extremity edema, she cannot use her legs as a donor site for fat grafting. She has follow up next week with plastics  and will further discuss. 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 She knows she will have further surgery in the future and is wondering if there may be any financial assistance to help her bridge the gap during that time. Reviewed breast cancer related grants and since she is not in active treatment she would not qualify as,unfortunately, reconstruction is not considered active treatment. Will discuss with [employer] to see if there are other programs that she may qualify for and follow up with her afterward.  She has writer's contact information for future reference.

## 2021-06-16 NOTE — TELEPHONE ENCOUNTER
Writer called and LILI Smith responding to her message that she had left earlier this morning acknowledging that she would like to talk further about financial assistance. Invited return call back.

## 2021-06-16 NOTE — TELEPHONE ENCOUNTER
Telephone Encounter by Kaila Akins RN at 3/18/2020  8:38 AM     Author: Kaila Akins RN Service: -- Author Type: Registered Nurse    Filed: 3/18/2020  8:42 AM Encounter Date: 3/17/2020 Status: Signed    : Kaila Akins RN (Registered Nurse)       Ez Burroughs MD Dobbelaere, Margaret A, RN    Caller: Unspecified (Yesterday,  3:30 PM)               Ok to hold tamoxifen. Vision problem is not a typical side effects from tamoxifen. Will need to check her blood hormone level and can switch to AI if she is menopause. We can revisit that in a month or so.     thanks      Left message for patient to return call.   Kaila Akins RN

## 2021-06-16 NOTE — PROGRESS NOTES
Preoperative Exam  Scheduled Procedure: Hysterectomy  Surgery Date:  3/13/18  Surgery Location: [facility], fax [phone]    Surgeon:  Dr. White    Assessment/Plan:   1. Menorrhagia with irregular cycle  2. Preop examination  Patient approved for surgery with general or local anesthesia.  - Hemoglobin  - Basic Metabolic Panel  - Pregnancy, Urine    Surgical Procedure Risk: Low (reported cardiac risk generally < 1%)  Have you had prior anesthesia?: Yes  Have you or any family members had a previous anesthesia reaction:  No  Do you or any family members have a history of a clotting or bleeding disorder?: No  Cardiac Risk Assessment: no increased risk for major cardiac complications. Noted history Enlarged aorta     Patient approved for surgery with general or local anesthesia.    Please Note:  Patient was diagnosed with enlarged aorta last year and has a scheduled cardiac MRI in August, 2018    Functional Status: Independent  Patient plans to recover at home with family.     Subjective:   Sarah Gilmore is a 47 y.o. female who presents for a preoperative consultation. Patient complained that she has continued to have irregular menses and excessive bleeding during her menstrual.    All other systems reviewed and are negative, other than those listed in the HPI.    Pertinent History  Do you have difficulty breathing or chest pain after walking up a flight of stairs: No  History of obstructive sleep apnea: Not diagnosed, but stops breathing when sleeping  Steroid use in the last 6 months: No  Frequent Aspirin/NSAID use: No  Prior Blood Transfusion: Yes: over 10 years ago, she thinks at [facility] - anemia  Prior Blood Transfusion Reaction: No  If for some reason prior to, during or after the procedure, if it is medically indicated, would you be willing to have a blood transfusion?:  There is no transfusion refusal.    Current Outpatient Prescriptions   Medication Sig Dispense Refill      "cholecalciferol, vitamin D3, (VITAMIN D3) 2,000 unit Tab Take 1 tablet (2,000 Units total) by mouth daily. 60 tablet 4     EPINEPHrine (EPIPEN JR) 0.15 mg/0.3 mL injection Inject 0.3 mL (0.15 mg total) into the shoulder, thigh, or buttocks as needed for anaphylaxis. 1 each 12     ferrous sulfate (IRON) 325 (65 FE) MG tablet Take 325 mg by mouth.       triamcinolone (KENALOG) 0.1 % ointment Apply topically 2 (two) times a day. 30 g 0     megestrol (MEGACE) 40 MG tablet Take 40 mg by mouth. As directed       No current facility-administered medications for this visit.         Allergies   Allergen Reactions     Bee Pollen      Iodinated Contrast- Oral And Iv Dye        Patient Active Problem List   Diagnosis     Menorrhagia with irregular cycle       Past Medical History:   Diagnosis Date     Anemia      Osteopenia      Osteoporosis        Past Surgical History:   Procedure Laterality Date      SECTION, CLASSIC       right arm surgery         Social History     Social History     Marital status: Single     Spouse name: N/A     Number of children: N/A     Years of education: N/A     Occupational History     Not on file.     Social History Main Topics     Smoking status: Never Smoker     Smokeless tobacco: Never Used     Alcohol use No     Drug use: No     Sexual activity: No     Other Topics Concern     Not on file     Social History Narrative       Patient Care Team:  IGOR Sweet as PCP - General (Nurse Practitioner)    Objective:     Vitals:    18 1332   BP: 104/76   Pulse: 74   Temp: 98.3  F (36.8  C)   SpO2: 98%   Weight: (!) 260 lb (117.9 kg)   Height: 5' 9.5\" (1.765 m)     Physical Exam:  /76 (Patient Site: Right Arm, Patient Position: Sitting, Cuff Size: Adult Regular)  Pulse 74  Temp 98.3  F (36.8  C)  Ht 5' 9.5\" (1.765 m)  Wt (!) 260 lb (117.9 kg)  SpO2 98%  BMI 37.84 kg/m2  Physical Examination: General appearance - alert, well appearing, and in no distress  Eyes: pupils " equal and reactive, extraocular eye movements intact  Mouth: mucous membranes moist, pharynx normal without lesions  Neck: supple, no significant adenopathy  Lymphatics: no palpable lymphadenopathy  Chest: clear to auscultation, no wheezes, rales or rhonchi, symmetric air entry  Heart: normal rate, regular rhythm, normal S1, S2, no murmurs, rubs, clicks or gallops  Abdomen: soft, nontender, nondistended, no masses or organomegaly  Neurological: alert, oriented, normal speech, no focal findings or movement disorder noted  Extremities: No edema, no clubbing or cyanosis  Psychiatric: Normal affect. Does not appear anxious or depressed.      There are no Patient Instructions on file for this visit.    Labs:  Labs pending at this time.  Results will be reviewed when available.    Immunization History   Administered Date(s) Administered     Tdap 06/23/2016       Electronically signed by IGOR Sweet 02/20/18 1:35 PM

## 2021-06-16 NOTE — PROGRESS NOTES
Patient is here today for provider visit for Infiltrating ductal carcinoma of right female breast (H).

## 2021-06-16 NOTE — ANESTHESIA PREPROCEDURE EVALUATION
Anesthesia Evaluation      No history of anesthetic complications (History of motion sickness)     Airway   Mallampati: II  Neck ROM: full   Pulmonary - negative ROS and normal exam                          Cardiovascular - normal exam  Rhythm: regular  Rate: normal,      ROS comment: Dilated aorta: stable per H&P     Neuro/Psych - negative ROS     Endo/Other       Comments: Anemia: history of transfusion in the past: hemoglobin 12.6    GI/Hepatic/Renal            Dental      Comment: Denies loose or chipped teeth                       Anesthesia Plan  Planned anesthetic: general endotracheal  PONV ppx: zofran, decadron, scopolamine, background prop  ASA 2     Anesthetic plan and risks discussed with: patient    Post-op plan: routine recovery

## 2021-06-16 NOTE — ANESTHESIA CARE TRANSFER NOTE
Last vitals:   Vitals:    03/13/18 1306   BP: 126/60   Pulse: 64   Resp: 14   Temp: 36.9  C (98.5  F)   SpO2: 97%     Patient's level of consciousness is drowsy  Spontaneous respirations: yes  Maintains airway independently: yes  Dentition unchanged: yes  Oropharynx: oropharynx clear of all foreign objects    QCDR Measures:  ASA# 20 - Surgical Safety Checklist: WHO surgical safety checklist completed prior to induction  PQRS# 430 - Adult PONV Prevention: 4558F - Pt received => 2 anti-emetic agents (different classes) preop & intraop  ASA# 8 - Peds PONV Prevention: NA - Not pediatric patient, not GA or 2 or more risk factors NOT present  PQRS# 424 - Niesha-op Temp Management: 4559F - At least one body temp DOCUMENTED => 35.5C or 95.9F within required timeframe  PQRS# 426 - PACU Transfer Protocol: - Transfer of care checklist used  ASA# 14 - Acute Post-op Pain: ASA14B - Patient did NOT experience pain >= 7 out of 10

## 2021-06-16 NOTE — TELEPHONE ENCOUNTER
Called Sarah and LILI that writer found a momo that she would qualify for as the parameters for qualifying changed. Requested call back to further discuss.

## 2021-06-16 NOTE — TELEPHONE ENCOUNTER
4-23-21  Reason for Call:  appointment for labs    Detailed comments: patient called stated she was @ [facility] in March for Cellulitis and was DX with low blood sugers before being discharged [facility] informed pt she would need to have blood sugars checked again at some point, pt states she has a future surgery coming up for CAM Flap (possible June 2021 date not scheduled yet) , pt wants to know should she wait till her pre-op to have her blood sugers checked or come in sooner for a lab only.    Phone Number Patient can be reached at: Cell number on file:    Telephone Information:   Mobile [phone]       Best Time: anytime    Can we leave a detailed message on this number?: Yes    Call taken on 4/23/2021 at 8:20 AM by Yaneli Willis

## 2021-06-16 NOTE — ANESTHESIA POSTPROCEDURE EVALUATION
Patient: Sarah Gilmore  TOTAL LAPAROSCOPIC HYSTERECTOMY BILATERAL SALPINGECTOMY, CYSTOSCOPY  Anesthesia type: general    Patient location: PACU  Last vitals:   Vitals:    03/13/18 1400   BP: 112/75   Pulse: 61   Resp: 16   Temp: 36.9  C (98.4  F)   SpO2: 94%     Post vital signs: stable  Level of consciousness: awake and responds to simple questions  Post-anesthesia pain: pain controlled  Post-anesthesia nausea and vomiting: no  Pulmonary: unassisted, return to baseline  Cardiovascular: stable and blood pressure at baseline  Hydration: adequate  Anesthetic events: no    QCDR Measures:  ASA# 11 - Niesha-op Cardiac Arrest: ASA11B - Patient did NOT experience unanticipated cardiac arrest  ASA# 12 - Niesha-op Mortality Rate: ASA12B - Patient did NOT die  ASA# 13 - PACU Re-Intubation Rate: ASA13B - Patient did NOT require a new airway mgmt  ASA# 10 - Composite Anes Safety: ASA10A - No serious adverse event    Additional Notes:

## 2021-06-17 NOTE — PROGRESS NOTES
[facility] Rehabilitation Daily Progress     Patient Name: Sarah Gilmore  Date: 5/11/2021  Visit #: 3  PTA visit #:  na  Referral Diagnosis: Swelling:  Venous and Lymphatic with leg lipedema;  Right arm, Right leg and Left leg, Contracture(s):  Right  shoulder, Post mastectomy/lumpectomy/LND, Post hysterectomy/LND, Axillary LND and Fibrosis/Scarring  Referring provider: Simon Mora CNP  Visit Diagnosis:     ICD-10-CM    1. Lymphedema of right arm  I89.0    2. Infiltrating ductal carcinoma of right female breast (H)  C50.911        Diagnoses and all orders for this visit:    Lymphedema of right arm    Infiltrating ductal carcinoma of right female breast (H)             Assessment:     Patient is more concerned about her legs than her arms.bilateral lower extremities with edema and features of lipedema. Provided information about the lipedema project on line resources.    HEP/POC compliance is  good .  Patient is benefitting from skilled physical therapy and is making steady progress toward functional goals.  Patient is appropriate to continue with skilled physical therapy intervention, as indicated by initial plan of care.  Therapy interventions being performed are medically necessary, are being delivered according to accepted standards of medical practice, and require the skills of a therapist to perform the services.      Goal Status:  Patient will have a decreased volume in : right;UE;by 5%;to decrease risk of infection;for better fit of clothing;for improved body image;for ease of movement;in 4 weeks  Patient will have decreased fibrosis, scar tissue for improved lymphatic mobilitiy : in 4 weeks  Patient will perform, verbalize self-management of: skin care;self-monitoring;exercise;massage;self-compression;compression wear;compression care;infection prevention;in 4 weeks      Plan / Patient Education:     Continue with initial plan of care.  Progress with home program as tolerated. Continue with  "MODIFIED MLD for BLE's and right UE, compression as needed, exercise for UE strengthening and chest stretching    Subjective:     Pain Rating: 3 more in her legs  Patient states:\" I'm more concerned for my legs than the arm\".\"I'm working on loosing weight\".      Objective:     Caregiver present: Yes    Observation of swelling: R upper extremity is better. Bilateral hips with lipedema.    Volume measurements taken:  No      Skin condition is:  unchanged     Compression:  No compression    Medication for infection:  No    Treatment Today     TREATMENT MINUTES COMMENTS   Evaluation     Self-care/ Home management     Manual therapy 45 Manual lymph drainage for right upper extremity and bilateral lower extremities modified:  Neck effleurage, short neck, shoulder collectors, left axilla,  anterior axilla to axilla anastomosis from right to left,abdomen, bilateral inguinal, bilateral LEs, abdomen.   Medical compression bandaging to left lower extremity from forefoot to below the knee: Applied  Eucerin skin lotion, stockinet,,artiflex 01 roll from ankle to below the knee, comprilan 03 rolls from forefoot to below the knee.Used a small piece of comperm LF size \"G\" on top to protect bandages.  Instructed to remove them if uncomfortable.    Educated on OTC compression barak solidea and wear ease purchase to assist with lymphatic drainage for lower extremities.       Neuromuscular Re-education     Therapeutic Activity     Therapeutic Exercises 8 Exercises:  Exercise #1: reach to roll, pectoral stretch, latissimus dorsi hold x 5 x 5 repetitions each  Comment #1: standing lymphedema exercise sequence, provided written instructions.  Exercise #2: Current exercises with 5# bicep curl, overhead reach, flexion, added: abd to shoulder height    PTRx: [id number]   Gait training     Modality__________________                Total 53    Blank areas are intentional and mean the treatment did not include these items.       Marlee ANDERSON" Gabi SMITH,CANDELARIO-YONY  5/11/2021

## 2021-06-17 NOTE — PATIENT INSTRUCTIONS - HE
Patient Instructions by Ez Burroughs MD at 12/19/2019  2:15 PM     Author: Ez Burroughs MD Service: -- Author Type: Physician    Filed: 12/19/2019  2:55 PM Encounter Date: 12/19/2019 Status: Signed    : Ez Burroughs MD (Physician)         Calcium 2295-6844 mg daily in 2 divided doses.  Vitamin D 800-1000 units daily in 2 divided doses.    Tamoxifen oral tablet  Brand Name: Nolvadex  What is this medicine?  TAMOXIFEN (ta MOX i fen) blocks the effects of estrogen. It is commonly used to treat breast cancer. It is also used to decrease the chance of breast cancer coming back in women who have received treatment for the disease. It may also help prevent breast cancer in women who have a high risk of developing breast cancer.  How should I use this medicine?  Take this medicine by mouth with a glass of water. Follow the directions on the prescription label. You can take it with or without food. Take your medicine at regular intervals. Do not take your medicine more often than directed. Do not stop taking except on your doctor's advice.  A special MedGuide will be given to you by the pharmacist with each prescription and refill. Be sure to read this information carefully each time.  Talk to your pediatrician regarding the use of this medicine in children. While this drug may be prescribed for selected conditions, precautions do apply.  What side effects may I notice from receiving this medicine?  Side effects that you should report to your doctor or health care professional as soon as possible:    allergic reactions like skin rash, itching or hives, swelling of the face, lips, or tongue    changes in vision    changes in your menstrual cycle    difficulty walking or talking    new breast lumps    numbness    pelvic pain or pressure    redness, blistering, peeling or loosening of the skin, including inside the mouth    signs and symptoms of a dangerous change in heartbeat or heart rhythm like chest  pain, dizziness, fast or irregular heartbeat, palpitations, feeling faint or lightheaded, falls, breathing problems    sudden chest pain    swelling, pain or tenderness in your calf or leg    unusual bruising or bleeding    vaginal discharge that is bloody, brown, or rust    weakness    yellowing of the whites of the eyes or skin  Side effects that usually do not require medical attention (report to your doctor or health care professional if they continue or are bothersome):    fatigue    hair loss, although uncommon and is usually mild    headache    hot flashes    impotence (in men)    nausea, vomiting (mild)    vaginal discharge (white or clear)  What may interact with this medicine?  Do not take this medicine with any of the following medications:    cisapride    certain medicines for irregular heart beat like dofetilide, dronedarone, quinidine    certain medicines for fungal infection like fluconazole, posaconazole    pimozide    saquinavir    thioridazine  This medicine may also interact with the following medications:    aminoglutethimide    anastrozole    bromocriptine    chemotherapy drugs    female hormones, like estrogens and birth control pills    letrozole    medroxyprogesterone    phenobarbital    rifampin    warfarin  What if I miss a dose?  If you miss a dose, take it as soon as you can. If it is almost time for your next dose, take only that dose. Do not take double or extra doses.  Where should I keep my medicine?  Keep out of the reach of children.  Store at room temperature between 20 and 25 degrees C (68 and 77 degrees F). Protect from light. Keep container tightly closed. Throw away any unused medicine after the expiration date.  What should I tell my health care provider before I take this medicine?  They need to know if you have any of these conditions:    blood clots    blood disease    cataracts or impaired eyesight    endometriosis    high calcium levels    high cholesterol    irregular  menstrual cycles    liver disease    stroke    uterine fibroids    an unusual reaction to tamoxifen, other medicines, foods, dyes, or preservatives    pregnant or trying to get pregnant    breast-feeding  What should I watch for while using this medicine?  Visit your doctor or health care professional for regular checks on your progress. You will need regular pelvic exams, breast exams, and mammograms. If you are taking this medicine to reduce your risk of getting breast cancer, you should know that this medicine does not prevent all types of breast cancer. If breast cancer or other problems occur, there is no guarantee that it will be found at an early stage.  Do not become pregnant while taking this medicine or for 2 months after stopping this medicine. Stop taking this medicine if you get pregnant or think you are pregnant and contact your doctor. This medicine may harm your unborn baby. Women who can possibly become pregnant should use birth control methods that do not use hormones during tamoxifen treatment and for 2 months after therapy has stopped. Talk with your health care provider for birth control advice.  Do not breast feed while taking this medicine.  NOTE:This sheet is a summary. It may not cover all possible information. If you have questions about this medicine, talk to your doctor, pharmacist, or health care provider. Copyright  2018 [employer]

## 2021-06-17 NOTE — PROGRESS NOTES
[facility] Rehabilitation Daily Progress     Patient Name: Sarah Gilmore  Date: 5/13/2021  Visit #: 4  PTA visit #:  na  Referral Diagnosis: Swelling:  Venous and Lymphatic with leg lipedema;  Right arm, Right leg and Left leg, Contracture(s):  Right  shoulder, Post mastectomy/lumpectomy/LND, Post hysterectomy/LND, Axillary LND and Fibrosis/Scarring  Referring provider: Simon Mora CNP  Visit Diagnosis:     ICD-10-CM    1. Lymphedema of right arm  I89.0    2. Infiltrating ductal carcinoma of right female breast (H)  C50.911    3. Acquired lymphedema of leg  I89.0        Diagnoses and all orders for this visit:    Lymphedema of right arm    Infiltrating ductal carcinoma of right female breast (H)    Acquired lymphedema of leg             Assessment:     Patient reports the compression bandages for her LEs did not stay up while at work. She has used Coban compression in the past and requested to trial that today. She feels her R arm and chest is doing well.     HEP/POC compliance is  good .  Patient is benefitting from skilled physical therapy and is making steady progress toward functional goals.  Patient is appropriate to continue with skilled physical therapy intervention, as indicated by initial plan of care.  Therapy interventions being performed are medically necessary, are being delivered according to accepted standards of medical practice, and require the skills of a therapist to perform the services.      Goal Status:  Patient will have a decreased volume in : right;UE;by 5%;to decrease risk of infection;for better fit of clothing;for improved body image;for ease of movement;in 4 weeks  Patient will have decreased fibrosis, scar tissue for improved lymphatic mobilitiy : in 4 weeks  Patient will perform, verbalize self-management of: skin care;self-monitoring;exercise;massage;self-compression;compression wear;compression care;infection prevention;in 4 weeks      Plan / Patient Education:  "    Continue with initial plan of care.  Progress with home program as tolerated. Continue with MODIFIED MLD for BLE's and right UE, compression as needed, exercise for UE strengthening and chest stretching    Subjective:     Pain Rating: did not rate today  Pt reports the compression wraps did not stay up well on her leg. She is standing a lot at work and they fell down.       Objective:     Caregiver present: Yes    Observation of swelling: R upper extremity is better. Bilateral hips with lipedema.    Volume measurements taken:  No      Skin condition is:  unchanged     Compression:  No compression- medical compression bandages fell off     Medication for infection:  No    Treatment Today     TREATMENT MINUTES COMMENTS   Evaluation     Self-care/ Home management     Manual therapy 54 Manual lymph drainage for right upper extremity and bilateral lower extremities modified:  Neck effleurage, short neck, shoulder collectors, left axilla,  anterior axilla to axilla anastomosis from right to left,abdomen, bilateral inguinal, bilateral LEs, abdomen.       Medical compression bandaging to left lower extremity from forefoot to below the knee: Applied  Eucerin skin lotion, stockinet,,artiflex 01 roll from ankle to below the knee, comprilan 03 rolls from forefoot to below the knee.Used a small piece of comperm LF size \"G\" on top to protect bandages. (SKIPPED TODAY) pt requesting trialing Coban 2 layer compression as she has used that in the past with success. Completed 2 layer Coban compression on sara LEs from foot to knee. Pt instructed that depending on response can continue with Coban 2 or can modify medical compression to help stay up during the day      Instructed to remove them if uncomfortable.    Reviewed education on OTC compression barak solidea and wear ease purchase to assist with lymphatic drainage for lower extremities.       Neuromuscular Re-education     Therapeutic Activity     Therapeutic Exercises 0 " Exercises:  Exercise #1: reach to roll, pectoral stretch, latissimus dorsi hold x 5 x 5 repetitions each  Comment #1: standing lymphedema exercise sequence, provided written instructions.  Exercise #2: Current exercises with 5# bicep curl, overhead reach, flexion, abd to shoulder height    PTRx: [id number]   Gait training     Modality__________________                Total 54    Blank areas are intentional and mean the treatment did not include these items.       Tamiko Aguila PT,CLT-YONY  5/13/2021

## 2021-06-17 NOTE — TELEPHONE ENCOUNTER
Left message for pt to call back to discuss options.     Per Dr. Hook-         She can check with her insurance to see what they will cover.  Any other weight loss/bariatric programs?

## 2021-06-17 NOTE — TELEPHONE ENCOUNTER
Called and LM that [employer] was approved. Per her request, the entire stipend was applied to gas and grocery cards and they should arrive in the mail within the next week. Contact information was provided for future reference and calls invited with any questions, concerns or additional support needs. Will follow up in the future.

## 2021-06-17 NOTE — TELEPHONE ENCOUNTER
Telephone Encounter by Kaila Akins RN at 7/21/2020  8:13 AM     Author: Kaila Akins RN Service: -- Author Type: Registered Nurse    Filed: 7/21/2020  8:13 AM Encounter Date: 7/21/2020 Status: Signed    : Kaila Akins RN (Registered Nurse)       Cancer Care Refill Protocol Passed     Rerun Protocol  (7/21/2020 12:13 AM)       Cancer Care visit in the last 12 months      Last office visit: 4/14/2020 Ez Burroughs MD Next office visit within 3 mo: 10/13/2020 Ez Burroughs MD  Last MTM visit: Visit date not found    Prescriber or current provider: Ez Burroughs MD  Last diagnosis associated with med order:   1. Malignant neoplasm of right female breast, unspecified estrogen receptor status, unspecified site of breast (H)  - anastrozole (ARIMIDEX) 1 mg tablet [Pharmacy Med Name: ANASTROZOLE 1 MG TABLET]; TAKE 1 TABLET BY MOUTH EVERY DAY  Dispense: 30 tablet; Refill: 1

## 2021-06-17 NOTE — PATIENT INSTRUCTIONS - HE
Patient Instructions by Gordon Carreno MD at 3/28/2019  1:10 PM     Author: Gordon Carreno MD Service: -- Author Type: Physician    Filed: 3/28/2019  2:04 PM Encounter Date: 3/28/2019 Status: Addendum    : Gordon Carreno MD (Physician)    Related Notes: Original Note by Gordon Carreno MD (Physician) filed at 3/28/2019  2:04 PM       - Rapid strep test is negative.   - A confirmatory strep test is in process and will be finalized tomorrow. We will only reach out to you if this test is positive. An antibiotic will be prescribed if this test is positive.   - Recommend rest, hydration, and over the counter pain relievers as needed for fever and discomfort.       Patient Education     Viral Upper Respiratory Illness (Adult)  You have a viral upper respiratory illness (URI), which is another term for the common cold. This illness is contagious during the first few days. It is spread through the air by coughing and sneezing. It may also be spread by direct contact (touching the sick person and then touching your own eyes, nose, or mouth). Frequent handwashing will decrease risk of spread. Most viral illnesses go away within 7 to 10 days with rest and simple home remedies. Sometimes the illness may last for several weeks. Antibiotics will not kill a virus, and they are generally not prescribed for this condition.    Home care    If symptoms are severe, rest at home for the first 2 to 3 days. When you resume activity, don't let yourself get too tired.    Avoid being exposed to cigarette smoke (yours or others).    You may use acetaminophen or ibuprofen to control pain and fever, unless another medicine was prescribed. If you have chronic liver or kidney disease, have ever had a stomach ulcer or gastrointestinal bleeding, or are taking blood-thinning medicines, talk with your healthcare provider before using these medicines. Aspirin should never be given to anyone under 18 years of age who is ill  with a viral infection or fever. It may cause severe liver or brain damage.    Your appetite may be poor, so a light diet is fine. Avoid dehydration by drinking 6 to 8 glasses of fluids per day (water, soft drinks, juices, tea, or soup). Extra fluids will help loosen secretions in the nose and lungs.    Over-the-counter cold medicines will not shorten the length of time youre sick, but they may be helpful for the following symptoms: cough, sore throat, and nasal and sinus congestion. (Note: Do not use decongestants if you have high blood pressure.)  Follow-up care  Follow up with your healthcare provider, or as advised.  When to seek medical advice  Call your healthcare provider right away if any of these occur:    Cough with lots of colored sputum (mucus)    Severe headache; face, neck, or ear pain    Difficulty swallowing due to throat pain    Fever of 100.4 F (38 C) or higher, or as directed by your healthcare provider  Call 911  Call 911 if any of these occur:    Chest pain, shortness of breath, wheezing, or difficulty breathing    Coughing up blood    Inability to swallow due to throat pain  Date Last Reviewed: 9/13/2015 2000-2017 [employer]. [address] [place] [address], [place] [address]. All rights reserved. This information is not intended as a substitute for professional medical care. Always follow your healthcare professional's instructions.

## 2021-06-17 NOTE — PATIENT INSTRUCTIONS - HE
Patient Instructions by Darshan Christianson DO at 8/29/2019  2:20 PM     Author: Darshan Christianson DO Service: -- Author Type: Physician    Filed: 8/29/2019  2:49 PM Encounter Date: 8/29/2019 Status: Addendum    : Darshan Christianson DO (Physician)    Related Notes: Original Note by Darshan Christianson DO (Physician) filed at 8/29/2019  2:44 PM       Follow-up with PCP regarding elevated blood pressure:   BP Readings from Last 3 Encounters:   08/29/19 (!) 122/93   08/19/19 108/66   07/31/19 (!) 85/53     Please bring one tab of low dose melatonin 3 mg or less to the night of the study.    If the patient wishes to take the melatonin. It is completely voluntary.    Patient may take own melatonin after arrival to sleep center. Do not drive or operate machinery after intake of melatonin.       Patient education: What is a sleep study?     What is a sleep study? -- A sleep study is a test that measures how well you sleep and checks for sleep problems. For some sleep studies, you stay overnight in a sleep lab at a hospital or sleep center.     What happens during a sleep study? -- Before you go to sleep, a technician attaches small, sticky patches called electrodes to your head, chest, and legs. He or she will also place a small tube beneath your nose and might wrap 1 or 2 belts around your chest.   Each of these items has wires that connect to monitors. The monitors record your movement, brain activity, breathing, and other body functions while you sleep.  If you have a history of trouble falling asleep, your doctor might prescribe a medicine to help you fall asleep in the lab. If you have never taken the medicine before, your doctor might ask you take it on a night before your sleep study to see how it affects you.   Why might my doctor order a sleep study? -- Your doctor will order a sleep study if he or she thinks you have sleep apnea or a different condition that makes you:   ?Have sudden jerking leg movements while  you sleep, called periodic limb movements.   ?Feel very sleepy during the day and fall asleep all of a sudden, called narcolepsy.   ?Have trouble falling asleep or staying asleep over a long period of time, called chronic insomnia.   ?Do odd things while you sleep, such as walking.  How should I prepare for a sleep study? -- On the day of your sleep study, you should:   ?Avoid alcohol   ?Avoid drinking coffee, tea, sodas, and other drinks that have caffeine in the afternoon and evening   ?Take all of your regular medicines    The cost of care estimate line is [phone]. They are able to give the patient an estimate of the charges and also an estimate of their insurance coverage/patient responsibility.

## 2021-06-17 NOTE — TELEPHONE ENCOUNTER
Relayed msg to pt; no questions on US.     Patient would like to inform Dr. Hook that she did attempt to schedule an appointment with bariatrics/nutrition, but the copay for her was about $500. Patient is wondering what other options she has if any.

## 2021-06-17 NOTE — TELEPHONE ENCOUNTER
Patient will check with her [facility] Care coordinator to suggest nutrition/bariatric options. She will call back if a referral is needed.

## 2021-06-17 NOTE — TELEPHONE ENCOUNTER
Spoke with Sarah. She shared about her recent fat grafting procedure and subsequent infection. She had consult this past week with Dr. Preciado and has made decision to move forward with CAM, under his care, once insurance approves. She anticipates moving forward with surgery in the next month and will be at [facility] for her surgery. She has researched the CAM very thoroughly and feels very comfortable with her decision. She will need to take several weeks off from work for healing. She will be on short term disability during this time with reduced income. Shared that [facility] recently changed the criteria from momo and she would qualify based on being on endocrine therapy at this time. Writer will submit an application on her behalf. If granted, she would like an equal split of gas and grocery cards. Writer will call her when momo is approved. Confirmed current address as she moved since last momo was initiated. Will also e-mail her link to [employer] for reduced price groceries. Support and encouragement provided. She has writer's contact information for future reference and calls invited with any questions or further support needs. Will follow up in the future.

## 2021-06-17 NOTE — PATIENT INSTRUCTIONS - HE
Lymphedema Therapy:  A referral was sent to [facility]. You will receive a phone call in 1-2 business days to schedule you appointment. If for some reason you do not hear from them or wish to schedule sooner please call [phone] to schedule.  [facility] - [facility]  [address], [address]  [place] [address]      For compression garments:  Please call one of the [place] locations below to schedule an appointment. If you received a prescription please bring it with you to your appointment. Some locations are limited to what they carry.    Office Locations    [facility]  [address] [place] [address]  Home Medical Equipment, [address]   Phone: [phone]   Orthotics and Prost[facility], [address]   Phone: [phone]    [facility]  Home Medical Equipment  [address], [address], [place] [address]   Phone: [phone]    [facility] ([facility])    [address], [address], [place] [address]  Phone: [phone]    [facility]  [address]  [address]   [place] [address]   Phone: [phone]    [facility]  [address]  [place] [address]  Phone: [phone]    [facility]   [address]  [place] [address]  Phone: [phone]    [facility]  [address]  [place] [address]  Phone: [phone]    [facility]  [address]  [place] [address]  Phone: [phone]    [place]   [address] [place] [address]   Phone: [phone]          A referral was sent to [facility]. You will receive a phone call in 1-2 business days to  schedule you appointment. If for some reason you do not hear from them or wish to schedule sooner please call [phone] to schedule.    Locations    [facility],[address]  [address] [place] [address]    [facility]  [address] [place] [address]    [facility]    Your care provider has referred you to [facility] for evaluation of weight loss options. We understand that patients who successfully get rid of excess weight can significantly improve other serious medical conditions.  [facility] offers two choices for patients who require significant weight loss, a surgical program and a non-surgical program.  You don't need to decide which is best for you right now, our team will help you determine the treatment that fits your unique circumstances best.

## 2021-06-17 NOTE — TELEPHONE ENCOUNTER
----- Message from Carolina Hook MD sent at 5/10/2021  4:02 PM CDT -----  Let patient know the venous study was fine.  She should continue the plan of care as outlined at her last visit.

## 2021-06-17 NOTE — PROGRESS NOTES
[facility] Daily Progress     Patient Name: Sarah Gilmore  Date: 5/25/2021  Visit #: 5/8  PTA visit #:  na  Referral Diagnosis: Swelling:  Venous and Lymphatic with leg lipedema;  Right arm, Right leg and Left leg, Contracture(s):  Right  shoulder, Post mastectomy/lumpectomy/LND, Post hysterectomy/LND, Axillary LND and Fibrosis/Scarring    Date of evaluation: 5/4/2021  Referral Diagnosis: Swelling:  Venous and Lymphatic with leg lipedema;  Right arm, Right leg and Left leg, Contracture(s):  Right  shoulder, Post mastectomy/lumpectomy/LND, Post hysterectomy/LND, Axillary LND and Fibrosis/Scarring  Referring provider: Carolina Hook    Visit Diagnosis:     ICD-10-CM    1. Acquired lymphedema of leg  I89.0    2. Swelling in right armpit  M79.89    3. Venous hypertension of lower extremity, bilateral  I87.303    4. Lipedema  R60.9    5. Class 2 severe obesity with serious comorbidity and body mass index (BMI) of 38.0 to 38.9 in adult, unspecified obesity type (H)  E66.01     Z68.38        Diagnoses and all orders for this visit:    Acquired lymphedema of leg    Swelling in right armpit    Venous hypertension of lower extremity, bilateral    Lipedema    Class 2 severe obesity with serious comorbidity and body mass index (BMI) of 38.0 to 38.9 in adult, unspecified obesity type (H)             Assessment:     Patient had good tolerance to 2 layer coban bandage.bilateral lower extremities with softer edema.  Right upper extremity with mild skin redness and warmth, no fever or chill, instructed patient about infecctions and pateient is aware of signs and symptoms of infections.     HEP/POC compliance is  good .  Patient is benefitting from skilled physical therapy and is making steady progress toward functional goals.  Patient is appropriate to continue with skilled physical therapy intervention, as indicated by initial plan of care.  Therapy interventions being performed are medically necessary,  are being delivered according to accepted standards of medical practice, and require the skills of a therapist to perform the services.      Goal Status:  Patient will have a decreased volume in : right;UE;by 5%;to decrease risk of infection;for better fit of clothing;for improved body image;for ease of movement;in 4 weeks;Progressing toward  Patient will have decreased fibrosis, scar tissue for improved lymphatic mobilitiy : in 4 weeks;Progressing toward  Patient will perform, verbalize self-management of: skin care;self-monitoring;exercise;massage;self-compression;compression wear;compression care;infection prevention;in 4 weeks;Progressing toward      Plan / Patient Education:     Continue with initial plan of care.  Progress with home program as tolerated. Continue with MODIFIED MLD for BLE's and right UE, compression as needed, exercise for UE strengthening and chest stretching    Subjective:     Pain Rating: no pain.  Pt reports the 2 layer wraps stayed up well on her leg for 3 days      Objective:     Caregiver present: Yes    Observation of swelling: R upper extremity is better. Bilateral hips with lipedema.    Volume measurements taken:  No      Skin condition is:  Right elbow fold with mild skin redness and warmth, no fever or chills, patient will continue to observe and call MD if it increases.    Compression:  No compression.     Medication for infection:  No    Treatment Today     TREATMENT MINUTES COMMENTS   Evaluation     Self-care/ Home management     Manual therapy 54 Manual lymph drainage for right upper extremity and bilateral lower extremities modified:  Neck effleurage, short neck, shoulder collectors, left axilla,  anterior axilla to axilla anastomosis from right to left,abdomen, bilateral inguinal, bilateral LEs, abdomen.        pt requesting  Coban 2 layer compression as she has used that in the past with success. Completed 2 layer Coban compression on sara LEs from foot to knee. Pt instructed  that depending on response can continue with Coban 2 or can modify medical compression to help stay up during the day      Instructed to remove them if uncomfortable.    Reviewed education on infection signs and symptoms, patient able to verbalize them.       Neuromuscular Re-education     Therapeutic Activity     Therapeutic Exercises 0 Exercises:  Exercise #1: reach to roll, pectoral stretch, latissimus dorsi hold x 5 x 5 repetitions each  Comment #1: standing lymphedema exercise sequence, provided written instructions.  Exercise #2: Current exercises with 5# bicep curl, overhead reach, flexion, abd to shoulder height    PTRx: [id number]   Gait training     Modality__________________                Total 54    Blank areas are intentional and mean the treatment did not include these items.       Marlee Ashley PT,CLT-YONY  5/25/2021

## 2021-06-17 NOTE — PATIENT INSTRUCTIONS - HE
"Patient Instructions by Darshan Christianson DO at 12/3/2019  9:00 AM     Author: Darshan Christianson DO Service: -- Author Type: Physician    Filed: 12/3/2019  8:58 AM Encounter Date: 12/3/2019 Status: Addendum    : Darshan Christianson DO (Physician)    Related Notes: Original Note by Darshan Christianson DO (Physician) filed at 12/3/2019  8:56 AM         What is sleep apnea?    Sleep apnea is a condition that makes you stop breathing for short periods while you are asleep. There are 2 types of sleep apnea. One is called \"obstructive sleep apnea,\" and the other is called \"central sleep apnea.\"  In obstructive sleep apnea, you stop breathing because your throat narrows or closes (figure 1). In central sleep apnea, you stop breathing because your brain does not send the right signals to your muscles to make you breathe. When people talk about sleep apnea, they are usually referring to obstructive sleep apnea, which is what this article is about.  People with sleep apnea do not know that they stop breathing when they are asleep. But they do sometimes wake up startled or gasping for breath. They also often hear from loved ones that they snore.  What are the symptoms of sleep apnea? -- The main symptoms of sleep apnea are loud snoring, tiredness, and daytime sleepiness. Other symptoms can include:  ?Restless sleep  ?Waking up choking or gasping  ?Morning headaches, dry mouth, or sore throat  ?Waking up often to urinate  ?Waking up feeling unrested or groggy  ?Trouble thinking clearly or remembering things  Some people with sleep apnea don't have symptoms, or they don't know they have them. They might figure that it's normal to be tired or to snore a lot.  Should I see a doctor or nurse? -- Yes. If you think you might have sleep apnea, see your doctor.  Is there a test for sleep apnea? -- Yes. If your doctor or nurse suspects you have sleep apnea, he or she might send you for a \"sleep study.\" Sleep studies can sometimes be " "done at home, but they are usually done in a sleep lab. For the study, you spend the night in the lab, and you are hooked up to different machines that monitor your heart rate, breathing, and other body functions. The results of the test will tell your doctor or nurse if you have the disorder.  Is there anything I can do on my own to help my sleep apnea? -- Yes. Here are some things that might help:  ?Stay off your back when sleeping. (This is not always practical, because people cannot control their position while asleep. Plus, it only helps some people.)  ?Lose weight, if you are overweight  ?Avoid alcohol, because it can make sleep apnea worse  How is sleep apnea treated? -- The most effective treatment for sleep apnea is a device that keeps your airway open while you sleep. Treatment with this device is called \"continuous positive airway pressure,\" or CPAP. People getting CPAP wear a face mask at night that keeps them breathing (figure 2).  If your doctor or nurse recommends a CPAP machine, try to be patient about using it. The mask might seem uncomfortable to wear at first, and the machine might seem noisy, but using the machine can really pay off. People with sleep apnea who use a CPAP machine feel more rested and generally feel better.  There is also another device that you wear in your mouth called an \"oral appliance\" or \"mandibular advancement device.\" It also helps keep your airway open while you sleep.  In rare cases, when nothing else helps, doctors recommend surgery to keep the airway open. Surgery to do this is not always effective, and even when it is, the problem can come back.  Is sleep apnea dangerous? -- It can be. People with sleep apnea do not get good-quality sleep, so they are often tired and not alert. This puts them at risk for car accidents and other types of accidents. Plus, studies show that people with sleep apnea are more likely than others to have high blood pressure, heart attacks, " and other serious heart problems. In people with severe sleep apnea, getting treated (for example, with a CPAP machine) can help prevent some of these problems.    GRAPHICS  Airway in a person with sleep apnea    Normally when a person sleeps, the airway remains open, and air can pass from the nose and mouth to the lungs. In a person with sleep apnea, parts of the throat and mouth drop into the airway and block off the flow of air. This can cause loud snoring and interrupt breathing for short periods.  Graphic 59991 Version 5.0    Continuous positive airway pressure (CPAP) for sleep apnea    The CPAP mask gently blows air into your nose while you sleep. It puts just enough pressure on your airway to keep it from closing. The mask in this picture fits over just the nose. Other CPAP devices have masks that fit over the nose and mouth.     Please bring your machine, mask, hose and power cord on the next clinical visit with me. Thank you.

## 2021-06-17 NOTE — PROGRESS NOTES
[facility] Daily Progress     Patient Name: Sarah Gilmore  Date: 5/6/2021  Visit #: 2  PTA visit #:  na  Referral Diagnosis: Swelling:  Venous and Lymphatic with leg lipedema;  Right arm, Right leg and Left leg, Contracture(s):  Right  shoulder, Post mastectomy/lumpectomy/LND, Post hysterectomy/LND, Axillary LND and Fibrosis/Scarring  Referring provider: Simon Mora CNP  Visit Diagnosis:     ICD-10-CM    1. Lymphedema of right arm  I89.0    2. Infiltrating ductal carcinoma of right female breast (H)  C50.911    3. 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  D05.11     D05.12    4. Arthralgia of right lower leg  M25.561    5. 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  HEP/POC compliance is  good .  Patient is benefitting from skilled physical therapy and is making steady progress toward functional goals.  Patient is appropriate to continue with skilled physical therapy intervention, as indicated by initial plan of care.  Therapy interventions being performed are medically necessary, are being delivered according to accepted standards of medical practice, and require the skills of a therapist to perform the services.      Goal Status:  No data recorded  Patient will have a decreased volume in : right;UE;by 5%;to decrease risk of infection;for better fit of clothing;for improved body image;for ease of movement;in 4 weeks  Patient will have decreased fibrosis, scar tissue for improved lymphatic mobilitiy : in 4 weeks  Patient will perform, verbalize self-management of: skin  care;self-monitoring;exercise;massage;self-compression;compression wear;compression care;infection prevention;in 4 weeks      Plan / Patient Education:     Continue with initial plan of care.  Progress with home program as tolerated. Continue with MLD, compression as needed, exercise for UE strengthening and chest stretching    Subjective:     Pain Ratin more in her legs  Pt has been doing the exercises daily. She feels things are going well and still has some tightness in her chest and with end range shoulder AROM.      Objective:     Caregiver present: Yes    Observation of swelling: R chest is better.     Volume measurements taken:  No      Skin condition is:  unchanged     Compression:  No compression    Medication for infection:  No    Treatment Today     TREATMENT MINUTES COMMENTS   Evaluation     Self-care/ Home management     Manual therapy 45 Manual lymph drainage for right upper extremity:  Neck effleurage, short neck, shoulder collectors, left axilla, right inguinal, anterior axilla to axilla anastomosis from right to left,right anterior axilla to inguinal anastomosis, right upper extremity, anterior axilla to axilla, neck effleurage. Then, placed in left side-lying for right posterior trunk drainage     Educated on OTC compression tank purchase to assist with lymphatic drainage and improvement after MLD treatment (could consider addition of Komprex II if needed)    Supine R shoulder flexion and abduction end range stretching 5 x 10-20 sec holds   Neuromuscular Re-education     Therapeutic Activity     Therapeutic Exercises 8 Exercises:  Exercise #1: reach to roll, pectoral stretch, latissimus dorsi hold x 5 x 5 repetitions each  Comment #1: standing lymphedema exercise sequence, provided written instructions.  Exercise #2: Current exercises with 5# bicep curl, overhead reach, flexion, added: abd to shoulder height    -see flow sheet for date completed    PTRx: [id number]   Gait training      Modality__________________                Total 53    Blank areas are intentional and mean the treatment did not include these items.       Tamiko Aguila PT, DPT, CLT-YONY  5/6/2021

## 2021-06-17 NOTE — TELEPHONE ENCOUNTER
I called and spoke with patient, relayed documentation, patient verbalized understanding, no further questions/concerns at this time.    Scheduled pre-op for 06/01/2021.

## 2021-06-17 NOTE — PROGRESS NOTES
[facility]   Lymphedema Initial Evaluation      Patient Name: Sarah Gilmore  Date of evaluation: 5/4/2021  Referral Diagnosis: Swelling:  Venous and Lymphatic with leg lipedema;  Right arm, Right leg and Left leg, Contracture(s):  Right  shoulder, Post mastectomy/lumpectomy/LND, Post hysterectomy/LND, Axillary LND and Fibrosis/Scarring  Referring provider: Carolina Hook  Visit Diagnosis:     ICD-10-CM    1. Lymphedema of right arm  I89.0 PT/OT lymphedema eval and treat   2. Infiltrating ductal carcinoma of right female breast (H)  C50.911 PT/OT lymphedema eval and treat   3. 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  D05.11 PT/OT lymphedema eval and treat    D05.12        Assessment:     Sarah Gilmore is a 50 y.o. female who presents to therapy today with chief complaints of right arm And chest wall  pull and concern about legs lipedema. Onset date of sx was a month ago.  Pt reported h/o breast cancer. Previous treatments have included bilateral mastectomies with right 8LND, radiation  33 treatments in 2019, has implants and will have CAM June 7, 2021, compression panty hose for her legs. Swelling does not improve with elevation. Pain symptoms are 1-8/10.  Functional impairments include reaching, lifting.  Pt demo's signs and sx consistent with right arm lymphedema, patient has history of lipedema.   Lymphedema Assessment 5/4/2021   Right Upper Extremity Total Estimated Volume (cm3) 4140.1   Left Upper Extremity Total Estimated Volume (cm3) 4341   Swelling Description Left>Right   Upper Extremity Swelling Comparison (%) 4.63        Impairments in  pain, posture, joint mobility,  integumentary integrity, swelling  Prognosis to achieve goals is  good      Pt. would be a good candidate for edema management and to develop a home management program.    Diagnoses and all orders for this visit:    Lymphedema of right arm  -     PT/OT lymphedema eval and treat    Infiltrating ductal  "carcinoma of right female breast (H)  -     PT/OT lymphedema eval and treat    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  -     PT/OT lymphedema eval and treat         Goals:   No data recorded  Patient will have a decreased volume in : right;UE;by 5%;to decrease risk of infection;for better fit of clothing;for improved body image;for ease of movement;in 4 weeks  Patient will have decreased fibrosis, scar tissue for improved lymphatic mobilitiy : in 4 weeks  Patient will perform, verbalize self-management of: skin care;self-monitoring;exercise;massage;self-compression;compression wear;compression care;infection prevention;in 4 weeks      Patient's goal:\"strength-break up fibrosis in right breast after, learning self MLD.\".    Goals and plan of care were set in collaboration with the patient.    Patient's expectations/goals are realistic.    Barriers to Learning or Achieving Goals:  Co-morbidities or other medical factors.  obesity.    Lymphedema Category:  V - Stage 2 with Low Functional Demand       Plan / Patient Instructions:      Plan of Care:   Authorization / Certification Start Date: 05/04/21  Authorization / Certification End Date: 06/25/21  Authorization / Certification Number of Visits: 8  Communication with: Referral Source;Patient Caregiver  Patient Related Instruction: Nature of Condition;Treatment plan and rationale;Self Care instruction;Basis of treatment;Precautions;Next steps;Expected outcome  Times per Week: 2  Number of Weeks: 4  Number of Visits: 8  Discharge Planning: to self care when goals met or plateau.  Therapeutic Exercise: ROM;Stretching;Strengthening;Lymphedema  Manual Therapy: myofascial release;lymphatic drainage massage    Treatment techniques, plan of care, and goals were discussed with the patient. The patient agrees to the plan as outlined. The plan of care is dynamic and will be modified on an ongoing basis.  Plan for next visit: Continue with MFR, manual lymph drainage, " medical compression bandages, exercises, skin care, and patient education.Coordinate compression garment fitting.       Subjective:         Social information:   Living Situation:single family home   Occupation:[employer] technician.   Work Status:Working full time   Equipment Available: None    History of Present Illness:    Sarah is a 50 y.o. female who presents to therapy today with complaints of right arm /chest pull. Date of onset/duration of symptoms is a month ago. Onset was gradual. Symptoms are intermittent and not improving. She denies history of similar symptoms. She describes their previous level of function as not limited    Pain Ratin  Pain rating at best: 1  Pain rating at worst: 8  Pain description: aching and pulling.    Functional limitations are described as occurring with:   Reaching, lifting.    Patient reports benefit from:  rest         Objective:      Patient Outcome Measures :    Lymphedema Life Impact Score (%): 62     Scores range from %, where a score of 20% represents the least impact on the individual's life (minimal physical, psychosocial and functional concerns).     Upper Extremity Lymphedema  2021   cm to wrist (cm) 9   cm to Tip of 3rd Finger 10   R Thumb (cm) 8.2   R Index (cm) 8.3   R Middle (cm) 7.7   R Ring (cm) 7.1   R Little (cm) 6.6   R --cm from 3rd fingertip 21.4   R --cm to smallest wrist 19.7   R 8cm Proximal to Wrist (cm) 22.6   R 16cm Proximal to Wrist (cm) 30   R 24cm Proximal to Wrist (cm) 32.1   R 32cm Proximal to Wrist (cm) 34   R 40cm Proximal to Wrist (cm) 38   R 48cm Proximal to Wrist (cm) 40.2   Right Upper Extremity Total Estimated Volume (cm3) 4140.1   L Thumb (cm) 7.8   L Index (cm) 8.2   L Middle (cm) 7.7   L Ring (cm) 6.8   L Little (cm) 6.7   L --cm from 3rd fingertip (cm) 20.9   L --cm to smallest wrist (cm) 19.7   L 8cm Proximal to Wrist (cm) 23.6   L 16cm Proximal to Wrist (cm) 30   L 24cm Proximal to Wrist (cm) 33.5   L 32cm Proximal to  Wrist (cm) 35.4   L 40cm Proximal to Wrist (cm) 38.4   L 48cm Proximal to Wrist (cm) 41.7   Left Upper Extremity Total Estimated Volume (cm3) 4341   Swelling Description Left>Right   Upper Extremity Swelling Comparison (%) 4.63            Degree of swelling: Minimal < 10%    Areas of most significant swelling: right deltoid.    Right shoulder flexion 145o. Abduction 90o    Examination  1. Lymphedema of right arm  PT/OT lymphedema eval and treat   2. Infiltrating ductal carcinoma of right female breast (H)  PT/OT lymphedema eval and treat   3. 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  General standing posture is normal.  Past Surgical History:   Procedure Laterality Date      SECTION, LOW TRANSVERSE  07/10/1994    breech     HYSTERECTOMY Bilateral 2018    TOTAL LAPAROSCOPIC HYSTERECTOMY BILATERAL SALPINGECTOMY, CYSTOSCOPY     MAMMO STEREOTACTIC CORE BIOPSY RIGHT Right 2019     MAMMO STEREOTACTIC CORE BIOPSY RIGHT Right 2019     NM SENTINEL NODE INJECTION  2019     MA MASTECTOMY, MODIFIED RADICAL Bilateral 2019    Procedure: BILATERAL MASTECTOMY;  Surgeon: Mckenzie Colby DO;  Location: [facility];  Service: General     MA REPLACEMENT TISSUE EXPANDER W/PERMANENT IMPLANT Bilateral 2019    Procedure: BILATERAL IMPLANT RECONSTRUCTION TO BREASTS;  Surgeon: Carlitos Crum MD;  Location: [facility];  Service: Plastics     right arm surgery           Treatments: Radiation  Precautions/Restrictions: None  Involved area: Right Arm  Edema: 1+ and Minimal  Induration: No  Fibrosis: mild  Temperature: Normal  Sensation: Hyposensitive  Skin color: Normal  Skin texture: Dry  Scars: Location: bilateral chest wall.  Size: 10 cm.  Wounds: Absent  Muscle tone: Normal  Gait: independent.        Treatment Today     TREATMENT MINUTES COMMENTS    Evaluation 30 low   Self-care/ Home management     Manual therapy 25 Manual Therapy: Manual lymph drainage for right upper extremity:  Neck effleurage, short neck, shoulder collectors, left axilla, right inguinal, anterior axilla to axilla anastomosis from right to left,right anterior axilla to inguinal anastomosis, right upper extremity, anterior axilla to axilla, neck effleurage.     Neuromuscular Re-education     Therapeutic Activity     Therapeutic Exercises 5 [email or web address]  Exercises:  Exercise #1: reach to roll, pectoral stretch, latissimus dorsi hold x 5 x 5 repetitions each  Comment #1: standing lymphedema exercise sequence, provided written instructions.       Gait training     Modality__________________                Total 60    Blank areas are intentional and mean the treatment did not include these items.       PT Evaluation Code: (Please list factors)  Patient History/Comorbidities: breast CA.  Examination: right UE lymphedema.  Clinical Presentation: stable.  Clinical Decision Making: low.    Patient History/  Comorbidities Examination  (body structures and functions, activity limitations, and/or participation restrictions) Clinical Presentation Clinical Decision Making (Complexity)   No documented Comorbidities or personal factors 1-2 Elements Stable and/or uncomplicated Low   1-2 documented comorbidities or personal factor 3 Elements Evolving clinical presentation with changing characteristics Moderate   3-4 documented comorbidities or personal factors 4 or more Unstable and unpredictable High            CANDELARIO Harris-YONY  5/4/2021  9:21 AM

## 2021-06-18 ENCOUNTER — OFFICE VISIT - HEALTHEAST (OUTPATIENT)
Dept: FAMILY MEDICINE | Facility: CLINIC | Age: 51
End: 2021-06-18

## 2021-06-18 ENCOUNTER — COMMUNICATION - HEALTHEAST (OUTPATIENT)
Dept: FAMILY MEDICINE | Facility: CLINIC | Age: 51
End: 2021-06-18

## 2021-06-18 DIAGNOSIS — I89.0 LYMPHEDEMA: ICD-10-CM

## 2021-06-18 NOTE — PATIENT INSTRUCTIONS - HE
Patient Instructions by Gordon Carreno MD at 8/29/2020 11:20 AM     Author: Gordon Carreno MD Service: -- Author Type: Physician    Filed: 8/29/2020 12:35 PM Encounter Date: 8/29/2020 Status: Addendum    : Gordon Carreno MD (Physician)    Related Notes: Original Note by Gordon Carreno MD (Physician) filed at 8/29/2020 12:32 PM       -Checking your blood cell counts, electrolyte levels, kidney function tests, D-Dimer level, Troponin level, BNP level, and thyroid function tests (both TSH and Free T4).  -Repeating an ultrasound of your legs to rule out blood clots (DVT) due to your worsening swelling and elevated D-Dimer.  -Have ordered a CTA of your chest to rule out blood clots (PE) due to your symptoms and elevated D-Dimer.  This is scheduled for 1:00 PM on Sunday 8/30/2020.  Do not eat or drink anything for 2 hours prior to your CTA.  You will need to have someone drive you to and from [facility] since you will have diphenhydramine in your system.  Please take both methylprednisolone and diphenhydramine as directed prior to your CTA due to your contrast allergy.  -Go to the ER if your cardiopulmonary symptoms worsen in the meantime.  -Otherwise follow up with [facility] this coming week to discuss your symptoms and laboratory results.  Patient Education     Leg Swelling in Both Legs    Swelling of the feet, ankles, and legs is called edema. It is caused by excess fluid that has collected in the tissues. Extra fluid in the body settles in the lowest part because of gravity. This is why the legs and feet are most affected.  Some of the causes for edema include:    Disease of the heart like congestive heart failure    Standing or sitting for long periods of time    Infection of the feet or legs    Blood pooling in the veins of your legs (venous insufficiency)    Dilated veins in your lower leg (varicose veins)    Garters or other clothing that is tight on your legs. This will cause blood  to pool in your legs because the clothing limits blood flow.    Some medicines such as hormones like birth control pills, some blood pressure medicines like calcium channel blockers (amlodipine) and steroids, some antidepressants like MAO inhibitors and tricyclics    Menstrual periods that cause you to retain fluids    Many types of renal disease    Liver failure or cirrhosis    Pregnancy, some swelling is normal, but a sudden increase in leg swelling or weight gain can be a sign of a dangerous complication of pregnancy    Poor nutrition    Thyroid disease  Medical treatment will depend on what is causing the swelling in your legs. Your healthcare provider may prescribe water pills (diuretics) to get rid of the extra fluid.  Home care  Follow these guidelines when caring for yourself at home:    Don't wear clothing like garters that is tight on your legs.    Keep your legs up while lying or sitting.    If infection, injury, or recent surgery is causing the swelling, stay off your legs as much as possible until symptoms get better.    If your healthcare provider says that your leg swelling is caused by venous insufficiency or varicose veins, don't sit or  one place for long periods of time. Take breaks and walk about every few hours. Brisk walking is a good exercise. It helps circulate the blood that has collected in your leg. Talk with your provider about using support stockings to stop daytime leg swelling.    If your provider says that heart disease is causing your leg swelling, follow a low-salt diet to stop extra fluid from staying in your body. You may also need medicine.  Follow-up care  Follow up with your healthcare provider, or as advised.  When to seek medical advice  Call your healthcare provider right away if any of these occur:    New shortness of breath or chest pain    Shortness of breath or chest pain that gets worse    Swelling in both legs or ankles that gets worse    Swelling of the  abdomen    Redness, warmth, or swelling in one leg    Fever of 100.4 F (38 C) or higher, or as directed by your healthcare provider    Yellow color to your skin or eyes    Rapid, unexplained weight gain    Having to sleep upright or use an increased number of pillows  Date Last Reviewed: 3/31/2016    8716-4935 [employer]. [address] [place] [address]. All rights reserved. This information is not intended as a substitute for professional medical care. Always follow your healthcare professional's instructions.           Patient Education     Shortness of Breath (Dyspnea)  Shortness of breath is the feeling that you can't catch your breath or get enough air. It is also known as dyspnea.  Dyspnea can be caused by many different conditions. They include:    Acute asthma attack    Worsening of chronic lung diseases such as chronic bronchitis and emphysema    Heart failure. This is when weak heart muscle allows extra fluid to collect in the lungs.    Panic attacks or anxiety. Fear can cause rapid breathing (hyperventilation).    Pneumonia, or an infection in the lung tissue    Exposure to toxic substances, fumes, smoke, or certain medicines    Blood clot in the lung (pulmonary embolism). This is often from a piece of blood clot in a deep vein of the leg (deep vein thrombosis) that breaks off and travels to the lungs.    Heart attack or heart-related chest pain (angina)    Anemia    Collapsed lung (pneumothorax)    Dehydration    Pregnancy  Based on your visit today, the exact cause of your shortness of breath is not certain. Your tests dont show any of the serious causes of dyspnea. You may need other tests to find out if you have a serious problem. Its important to watch for any new symptoms or symptoms that get worse. Follow up with your healthcare provider as directed.  Home care  Follow these tips to take care of yourself at home:    When your symptoms are better, go back to your usual  activities.    If you smoke, you should stop. Join a quit-smoking program or ask your healthcare provider for help.    Eat a healthy diet and get plenty of sleep.    Get regular exercise. Talk with your healthcare provider before starting to exercise, especially if you have other medical problems.    Cut down on the amount of caffeine and stimulants you consume.  Follow-up care  Follow up with your healthcare provider, or as advised.  If tests were done, you will be told if your treatment needs to be changed. You can call as directed for the results.  If an X-ray was taken, a specialist will review it. You will be notified of any new findings that may affect your care.  Call 911  Shortness of breath may be a sign of a serious medical problem. For example, it may be a problem with your heart or lungs. Call 911 if you have worsening shortness of breath or trouble breathing, especially with any of the symptoms below:    You are confused or its difficult to wake you.    You faint or lose consciousness.    You have a fast heartbeat, or your heartbeat is irregular.    You are coughing up blood.    You have pain in your chest, arm, shoulder, neck, or upper back.    You break out in a sweat.  When to seek medical advice  Call your healthcare provider right away if any of these occur:    Slight shortness of breath or wheezing    Redness, pain or swelling in your leg, arm, or other body area    Swelling in both legs or ankles    Fast weight gain    Dizziness or weakness    Fever of 100.4 F (38 C) or higher, or as directed by your healthcare provider  Date Last Reviewed: 9/13/2015 2000-2017 [employer]. [address] [place] [address]. All rights reserved. This information is not intended as a substitute for professional medical care. Always follow your healthcare professional's instructions.           Patient Education     Uncertain Causes of Chest Pain    Chest pain can happen for a number of  reasons. Sometimes the cause can't be determined. If your condition does not seem serious, and your pain does not appear to be coming from your heart, your healthcare provider may recommend watching it closely. Sometimes the signs of a serious problem take more time to appear. Many problems not related to your heart can cause chest pain.These include:    Musculoskeletal. Costochondritis, an inflammation of the tissues around the ribs that can occur from trauma or overuse injuries    Respiratory. Pneumonia, pneumothorax, or pneumonitis (inflammation of the lining of the chest and lungs)    Gastrointestinal. Esophageal reflux, heartburn, or gallbladder disease    Anxiety and panic disorders    Nerve compression and neuritis    Miscellaneous problems such as aortic aneurysm or pulmonary embolism (a blood clot in the lungs)  Home care  After your visit, follow these recommendations:    Rest today and avoid strenuous activity.    Take any prescribed medicine as directed.    Be aware of any recurrent chest pain and notice any changes  Follow-up care  Follow up with your healthcare provider if you do not start to feel better within 24 hours, or as advised.  Call 911  Call 911 if any of these occur:    A change in the type of pain: if it feels different, becomes more severe, lasts longer, or begins to spread into your shoulder, arm, neck, jaw or back    Shortness of breath or increased pain with breathing    Weakness, dizziness, or fainting    Rapid heart beat    Crushing sensation in your chest  When to seek medical advice  Call your healthcare provider right away if any of the following occur:    Cough with dark colored sputum (phlegm) or blood    Fever of 100.4 F (38 C) or higher, or as directed by your healthcare provider    Swelling, pain or redness in one leg    Shortness of breath  Date Last Reviewed: 12/30/2015 2000-2017 [employer]. [address] [place] [address]. All rights reserved.  This information is not intended as a substitute for professional medical care. Always follow your healthcare professional's instructions.

## 2021-06-18 NOTE — PATIENT INSTRUCTIONS - HE
Patient Instructions by Marlee Ashley PT at 5/4/2021  9:30 AM     Author: Marlee Ashley PT Service: -- Author Type: Physical Therapist    Filed: 5/4/2021 10:24 AM Encounter Date: 5/4/2021 Status: Signed    : Marlee Ashley PT (Physical Therapist)        SUPINE FLEXION    While lying on your back with your arm at your side, slowly raise it up and forward towards overhead.                SUPINE HORIZONTAL ABDUCTION    Lie on your back. Hold band up toward ceiling. Pull arms out to sides and gently allow shoulder blades to squeeze together.      [place]    Lay on your back lengthwise on a foam roller, knees bent up.  Slide your hands on the floor up towards your head until you feel a stretch across your pecs.  Keep your hands resting on the floor.      SIDELYING TRUNK ROTATION    While lying on your side with your arms out-stretched in front of your body, slowly twist your upper body to the side and rotated your spine. Your arms and head should also be rotating along with the spine as shown. Follow your hand with your eyes.               Patient was given the following handouts: Lower Extremity Lymphedema Exercises

## 2021-06-19 NOTE — LETTER
Letter by Laura Lobo FNP at      Author: Laura Lobo FNP Service: -- Author Type: --    Filed:  Encounter Date: 8/12/2019 Status: (Other)         Sarah Gilmore  [address] [place] [address]             August 12, 2019         Dear MsMelanie Cortezmartinez,    Our records show that you are due for a colonoscopy.  We do want to inform you that there are a couple of other options besides the traditional colonoscopy that we offer.  If you would like to do the traditional colonoscopy, please contact a [facility] near you according to the card enclosed.  If you would like to do one of the other options available to you, please let you primary doctor know and they will get that ordered.  Enclosed is some information on the other options for you to read over.  If you have had any of these done at another facility, please arrange for us to receive those records so we can update your chart.    Please call with questions or contact us using [employer].    Sincerely,        Electronically signed by IGOR Sweet

## 2021-06-19 NOTE — LETTER
Letter by Bhavya Britton RN at      Author: Bhavya Britton RN Service: -- Author Type: --    Filed:  Encounter Date: 8/20/2019 Status: (Other)       Dear Sarah:   MEDICAL ONCOLOGY CONSULT    Thank you for choosing [facility] for your care.  We are committed to providing you with the highest quality and compassionate healthcare services.  The following information pertains to your first medical oncology appointment with our clinic.    Date/Time of appointment:  Wednesday, September 4, 2019--please arrive no later than 10:30am for your 11:00am consult with Dr. Burroughs      Note: We have you arrive 30 minutes prior to your appointment time.  This allows time to complete forms, possible labs and nursing assessment.    Name of your Physician:  Ez Burroughs MD    What to bring to your appointment:    Completed Patient History/Initial Nursing Assessment and Medication/Allergy List (these forms were sent to you).    Any paperwork or films from your physician that we have asked you to bring.    Your current insurance card(s).    Parking:    Please refer to the map included to direct you to [facility].    You can park in any visitor/patient parking area you choose. There is no charge for parking at [facility].     Enter the hospital at the front/main entrance.      Please check in with our  representatives who will escort you to the clinic located in [address] of the [facility].    We hope these instructions are helpful to you.  If you have any questions or concerns, please call us at [phone].  It is our pleasure to assist you.    Warm Regards,        Bhavya Britton RN, BSN, OCN, CBCN  Cancer Care Navigator  [phone]

## 2021-06-19 NOTE — LETTER
Letter by Qi Stein, Genetic Counselor at      Author: Qi Stein, Genetic Counselor Service: -- Author Type: --    Filed:  Encounter Date: 7/3/2019 Status: (Other)         Sarah Gilmore  [address] [place] [address]      July 3, 2019      Dear Ms. Slyjen,    It was a pleasure speaking with you on the phone on 07/03/2019.  Here is a copy of the progress note from our discussion.  If you have any additional questions, please feel free to call.    Referring Provider: Dr. Colby    Presenting Information:  I spoke with Sarah over the phone today to discuss her genetic testing results. Her blood was drawn on 06/05/2019. OvaNext testing was ordered from [facility]. This testing was done because of her personal and family history of breast cancer.    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 This variant is called p.[id number] (also known as c.8507T>C).  No other variants or mutations were found in the BLOSSOM gene. Given the uncertain significance of this result, medical management decisions should NOT be made based on this test result alone.    Sarah is negative for pathogenic (harmful) mutations in the BLOSSOM, BARD1, BRCA1, BRCA2, BRIP1, CDH1, CHEK2, DICER1, EPCAM, MLH1, MRE11A, MSH2, MSH6, MUTYH, NBN, NF1, PALB2, PMS2, PTEN, RAD50, RAD51C, RAD51D, SMARCA4, STK11, and TP53 genes. No harmful (pathogenic) mutations were found in any of the 25 genes analyzed. This test involved sequencing and deletion/duplication analysis of all genes with the exceptions of EPCAM (deletions/duplications only).    Testing did not detect an identifiable pathogenic (harmful) mutation associated with Hereditary Breast and Ovarian Cancer syndrome (BRCA1, BRCA2), Cotton syndrome (MLH1, MSH2, MSH6, PMS2, EPCAM), Hereditary Diffuse Gastric Cancer (CDH1), Cowden syndrome (PTEN), Li Fraumeni syndrome (TP53), Peutz-Jeghers syndrome  "(STK11), MUTYH Associated Polyposis (MUTYH), or Neurofibromatosis type 1 (NF1).    We reviewed the autosomal dominant inheritance of these genes. Sarah cannot pass on a mutation in any of these genes to her children based on this test result. Mutations in these genes do not skip generations.      A copy of the test report can be found in the Media tab and named \"Genetics Scan -[facility]\". The report is scanned in as a linked document.    Interpretation:  We discussed several different interpretations of this inconclusive test result. It is not clear if this variant in the BLOSSOM gene is associated with increased cancer risk.  1. This variant may be a benign change that does not increase cancer risk.  2. This variant may be a harmful mutation that causes increased risk for breast cancer.     Cancer Risks:    Individuals with an BLOSSOM mutation are at increased risk of certain cancers.  These risks may be influenced by family history:     The lifetime breast cancer risk for women who carry one BLOSSOM mutation is approximately 24-48%, which is a 2-4 fold increase compared to the general population lifetime population risk of 12%. Some mutations in the BLOSSOM gene may confer a higher risk for breast cancer.    We also discussed the association of BLOSSOM mutations with increased risk for pancreatic and prostate cancer; however data is still limited in this area.  Though no exact lifetime risks are available at this time, there may be an increased risk for other cancers.      We reviewed that the BLOSSOM gene is currently considered a moderate-risk gene. This means that mutations in this gene increase the risk for certain cancers, but are unlikely to be the single cause for an individual's cancer. There are likely other genetic and/or environmental risk factors that, in combination with the BLOSSOM gene mutation, may be associated with the cancers in her and her family.   We also discussed reproductive implications of having an BLOSSOM " mutation. In rare situations in which both parents have a mutation in the BLOSSOM gene, their children each have a 25% risk for ataxia-telangiectasia. Ataxia-telangiectasia is a rare autosomal recessive disorder that typically presents in childhood and can present with widened blood vessels called telangiectasias, progressive neurologic symptoms, immune deficiency, and increased risk for childhood cancers. If this variant is later classified as harmful, Sarah would be considered a carrier for Ataxia telangectasia. In that case, genetic counseling and genetic testing may be advised for her partner.    The laboratory is working to determine if this variant is harmful or benign, and they will contact me if it is reclassified. If this variant is determined to be a benign change, there may be a different gene or combination of genes and environment that are associated with the cancers in Sarah and/or her relatives.      It is also important to consider that her paternal grandmother may have had a mutation in one of the genes tested and she did not inherit it.     Inheritance:  We reviewed the autosomal dominant inheritance of this variant in the BLOSSOM gene.  We discussed that Sarah has a 50% chance to pass this variant to each of her children.  Likewise, each of her siblings has a 50% risk of having the same variant. Because it is unclear what, if any, risk is associated with this variant, clinical genetic testing for this BLOSSOM variant alone is not recommended for relatives.    Family Studies:  The laboratory may offer additional research based testing for specific relatives in order to help reclassify this variant.    Screening:  Based on this inconclusive test result, it is important for Sarah and her relatives to refer back to the family history for appropriate cancer screening.      Sarahs close female relatives remain at increased risk for breast cancer given their family history. Breast cancer screening is generally  recommended to begin approximately 10 years younger than the earliest age of breast cancer diagnosis in the family, or at age 40, whichever comes first. In this family, screening may begin at age 39. Breast screening options should be discussed with an individual's primary care provider and a genetic counselor, to determine at what age to begin screening, what screening is appropriate, and if additional screening (such as breast MRI) is necessary based on personal/family history factors.      We discussed that Sarah likely has some increased risk for pancreatic cancer and colon cancer given her maternal aunt's and uncles' histories as well as her paternal grandfather's history, but routine screening is typically not recommended.  Sarah is encouraged to discuss this history with her care providers.      Other population cancer screening options, such as those recommended by the American Cancer Society and the National Comprehensive Cancer Network (NCCN), are also appropriate for Sarah and her family. These screening recommendations may change if there are changes to Sarah's personal and/or family history. Final screening recommendations should be made by each individual's managing physician.    Additional Testing Considerations:  It is possible Sarah does carry a currently identifiable gene or combination of genes and environment that increase her risk for pancreatic and/or colon cancer. We again reviewed the option of larger gene panels covering more moderate risk genes associated with pancreatic and colon cancer. Sarah is encouraged to contact me if she wishes to readdress these larger gene panel options.    Although Sarah's genetic testing result is inconclusive, other relatives may still carry a harmful gene mutation associated with pancreatic and/or colon cancer. Genetic counseling is recommended for Sarah's relatives with a personal/family history of pancreatic cancer histories and her siblings to discuss  genetic testing options.  If any of these relatives do pursue genetic testing, Sarah is encouraged to contact me so that we may review the impact of their test results on Sarah.    Summary:  We do not have an explanation for her breast cancer. Because of that, it is important that she continue with cancer screening based on her personal and family history as discussed above.    Genetic testing is rapidly advancing, and new cancer susceptibility genes will most likely be identified in the future. Therefore, I encouraged Sarah to contact me annually or if there are changes in her personal or family history. This may change how we assess her cancer risk, screening, and the testing we would offer.    Plan:  1.  I provided Sraah with a copy of her test results today.    2. She plans to follow-up with her oncology care team.  3. She should contact me annually, or sooner if her family history changes.  4. I will contact Sarah if the laboratory informs me that this VUS has been reclassified.  This may change screening and testing recommendations for Sarah and her relatives.    If Sarah has any further questions, I encouraged her to contact me at [phone].    Qi Stein MS, [facility]  Licensed Genetic Counselor  [facility]  [phone]

## 2021-06-19 NOTE — LETTER
Letter by Laura Lobo FNP at      Author: Laura Lobo FNP Service: -- Author Type: --    Filed:  Encounter Date: 10/23/2019 Status: Signed         Sarah Gilmore  [address] [place] [id number]             October 24, 2019         Dear Ms. Gilmore,    Below are the results from your recent visit:    Resulted Orders   Culture, Wound   Result Value Ref Range    Culture 3+ MRSA Coagulase Positive Staph (!)       Comment:      Methicillin resistant Staph aureus, patient may be an isolation risk.        Positive wound culture.  Recommend starting clindamycin.  Prescription sent to pharmacy.     Please call with questions or contact us using [employer].    Sincerely,        Electronically signed by IGOR Sweet

## 2021-06-19 NOTE — LETTER
Letter by Bhavya Britton RN at      Author: Bhavya Britton RN Service: -- Author Type: --    Filed:  Encounter Date: 8/20/2019 Status: (Other)       Dear aSrah:   RADIATION ONCOLOGY CONSULT    Thank you for choosing [facility] for your care.  We are committed to providing you with the highest quality and compassionate healthcare services.  The following information pertains to your first radiation oncology appointment with our clinic.    Date/Time of appointment:  Wednesday, September 4, 2019--please arrive no later than 1:00pm for your 1:30pm consult with Dr. Renee      Note: We have you arrive 30 minutes prior to your appointment time.  This allows time to complete forms, possible labs and nursing assessment.    Name of your Physician: Dr. Shira Renee    Parking:    Please refer to the map included to direct you to [facility].    The Radiation Oncology parking lot is west of the main entrance, this is free parking and is right next to the Cancer Care Entrance.    Come in the Cancer Care Entrance and check in.      We hope these instructions are helpful to you.  If you have any questions or concerns, please call us at [phone].  It is our pleasure to assist you.    Warm Regards,      Bhavya Britton RN, BSN, OCN, CBCN  Cancer Care Navigator  [phone]

## 2021-06-19 NOTE — LETTER
Letter by Darshan Christianson DO at      Author: Darshan Christianson DO Service: -- Author Type: --    Filed:  Encounter Date: 11/27/2019 Status: Signed       Sarah Gilmore  [address] [place] [address]       November 27, 2019      Dear MsMelanie Heartjen,      Enclosed is the report from your recent sleep study.     Please call [phone] to schedule follow up visit if you would like to review these results in person with Dr. Christianson. We are scheduling out approximately 2-3 months. If you would like to be seen sooner, please ask to be added to our wait/cancellation list.           Sincerely,      Anaya GLASS [facility]  Sleep Medicine  11/27/2019 2:21 PM    C/O      Darshan Christianson DO

## 2021-06-19 NOTE — LETTER
Letter by Laura Loob FNP at      Author: Laura Lobo FNP Service: -- Author Type: --    Filed:  Encounter Date: 10/23/2019 Status: Signed         Sarah Gilmore  [address] [place] [id number]             October 23, 2019         Dear Ms. Gilmore,    Below are the results from your recent visit:    Resulted Orders   Culture, Wound   Result Value Ref Range    Culture 3+ MRSA Coagulase Positive Staph (!)       Comment:      Methicillin resistant Staph aureus, patient may be an isolation risk.        Positive wound culture.  Recommend starting clindamycin.  Prescription sent to pharmacy.     Please call with questions or contact us using [employer].    Sincerely,        Electronically signed by IGOR Sweet

## 2021-06-19 NOTE — LETTER
Letter by Gordon Carreno MD at      Author: Gordon Carreno MD Service: -- Author Type: --    Filed:  Encounter Date: 3/28/2019 Status: (Other)         March 28, 2019     Patient: Sarah Gilmore   YOB: 1970   Date of Visit: 3/28/2019       To Whom it May Concern:    Sarah Gilmore was seen in my clinic on 3/28/2019. She has tested negative for strep throat at this time.     If you have any questions or concerns, please don't hesitate to call.    Sincerely,         Electronically signed by Gordon Carreno MD

## 2021-06-19 NOTE — LETTER
Letter by Qi Stein, Genetic Counselor at      Author: Qi Stein, Genetic Counselor Service: -- Author Type: --    Filed:  Encounter Date: 7/3/2019 Status: (Other)         Mckenzie Colby DO  [address] [address]                                  July 3, 2019    Patient: Sarah Gilmore   MR Number: [id number]   YOB: 1970   Date of Visit: 7/3/2019     Dear Dr. Colby, DO:    Thank you for referring Sarah Gilmore to me for genetic counseling. Below are the relevant portions of my assessment and plan of care.    If you have questions, please do not hesitate to call me.    Sincerely,        Qi tSein, Genetic Counselor            IOGR Sweet Anna R, Genetic Counselor  7/3/2019  8:48 AM  Sign at close encounter    Referring Provider: Dr. Colby    Presenting Information:  I spoke with Sarah over the phone today to discuss her genetic testing results. Her blood was drawn on 06/05/2019. OvaNext testing was ordered from [facility]. This testing was done because of her personal and family history of breast cancer.    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 This variant is called p.R5191P (also known as c.8507T>C).  No other variants or mutations were found in the BLOSSOM gene. Given the uncertain significance of this result, medical management decisions should NOT be made based on this test result alone.    Sarah is negative for pathogenic (harmful) mutations in the BLOSSOM, BARD1, BRCA1, BRCA2, BRIP1, CDH1, CHEK2, DICER1, EPCAM, MLH1, MRE11A, MSH2, MSH6, MUTYH, NBN, NF1, PALB2, PMS2, PTEN, RAD50, RAD51C, RAD51D, SMARCA4, STK11, and TP53 genes. No harmful (pathogenic) mutations were found in any of the 25 genes analyzed. This test involved sequencing and deletion/duplication analysis of all genes with the exceptions of EPCAM  "(deletions/duplications only).    Testing did not detect an identifiable pathogenic (harmful) mutation associated with Hereditary Breast and Ovarian Cancer syndrome (BRCA1, BRCA2), Cotton syndrome (MLH1, MSH2, MSH6, PMS2, EPCAM), Hereditary Diffuse Gastric Cancer (CDH1), Cowden syndrome (PTEN), Li Fraumeni syndrome (TP53), Peutz-Jeghers syndrome (STK11), MUTYH Associated Polyposis (MUTYH), or Neurofibromatosis type 1 (NF1).    We reviewed the autosomal dominant inheritance of these genes. Sarah cannot pass on a mutation in any of these genes to her children based on this test result. Mutations in these genes do not skip generations.      A copy of the test report can be found in the Media tab and named \"Genetics Scan -[facility]\". The report is scanned in as a linked document.    Interpretation:  We discussed several different interpretations of this inconclusive test result. It is not clear if this variant in the BLOSSOM gene is associated with increased cancer risk.  1. This variant may be a benign change that does not increase cancer risk.  2. This variant may be a harmful mutation that causes increased risk for breast cancer.     Cancer Risks:    Individuals with an BLOSSOM mutation are at increased risk of certain cancers.  These risks may be influenced by family history:     The lifetime breast cancer risk for women who carry one BLOSSOM mutation is approximately 24-48%, which is a 2-4 fold increase compared to the general population lifetime population risk of 12%. Some mutations in the BLOSSOM gene may confer a higher risk for breast cancer.    We also discussed the association of BLOSSOM mutations with increased risk for pancreatic and prostate cancer; however data is still limited in this area.  Though no exact lifetime risks are available at this time, there may be an increased risk for other cancers.      We reviewed that the BLOSSOM gene is currently considered a moderate-risk gene. This means that mutations in this gene " increase the risk for certain cancers, but are unlikely to be the single cause for an individual's cancer. There are likely other genetic and/or environmental risk factors that, in combination with the BLOSSOM gene mutation, may be associated with the cancers in her and her family.   We also discussed reproductive implications of having an BLOSSOM mutation. In rare situations in which both parents have a mutation in the BLOSSOM gene, their children each have a 25% risk for ataxia-telangiectasia. Ataxia-telangiectasia is a rare autosomal recessive disorder that typically presents in childhood and can present with widened blood vessels called telangiectasias, progressive neurologic symptoms, immune deficiency, and increased risk for childhood cancers. If this variant is later classified as harmful, Sarah would be considered a carrier for Ataxia telangectasia. In that case, genetic counseling and genetic testing may be advised for her partner.    The laboratory is working to determine if this variant is harmful or benign, and they will contact me if it is reclassified. If this variant is determined to be a benign change, there may be a different gene or combination of genes and environment that are associated with the cancers in Sarah and/or her relatives.      It is also important to consider that her paternal grandmother may have had a mutation in one of the genes tested and she did not inherit it.     Inheritance:  We reviewed the autosomal dominant inheritance of this variant in the BLOSSOM gene.  We discussed that Sarah has a 50% chance to pass this variant to each of her children.  Likewise, each of her siblings has a 50% risk of having the same variant. Because it is unclear what, if any, risk is associated with this variant, clinical genetic testing for this BLOSSOM variant alone is not recommended for relatives.    Family Studies:  The laboratory may offer additional research based testing for specific relatives in order to help  reclassify this variant.    Screening:  Based on this inconclusive test result, it is important for Sarah and her relatives to refer back to the family history for appropriate cancer screening.      Sarahs close female relatives remain at increased risk for breast cancer given their family history. Breast cancer screening is generally recommended to begin approximately 10 years younger than the earliest age of breast cancer diagnosis in the family, or at age 40, whichever comes first. In this family, screening may begin at age 39. Breast screening options should be discussed with an individual's primary care provider and a genetic counselor, to determine at what age to begin screening, what screening is appropriate, and if additional screening (such as breast MRI) is necessary based on personal/family history factors.      We discussed that Sarah likely has some increased risk for pancreatic cancer and colon cancer given her maternal aunt's and uncles' histories as well as her paternal grandfather's history, but routine screening is typically not recommended.  Sarah is encouraged to discuss this history with her care providers.      Other population cancer screening options, such as those recommended by the American Cancer Society and the National Comprehensive Cancer Network (NCCN), are also appropriate for Sarah and her family. These screening recommendations may change if there are changes to Sarah's personal and/or family history. Final screening recommendations should be made by each individual's managing physician.    Additional Testing Considerations:  It is possible Sarah does carry a currently identifiable gene or combination of genes and environment that increase her risk for pancreatic and/or colon cancer. We again reviewed the option of larger gene panels covering more moderate risk genes associated with pancreatic and colon cancer. Sarah is encouraged to contact me if she wishes to readdress these  larger gene panel options.    Although Sarah's genetic testing result is inconclusive, other relatives may still carry a harmful gene mutation associated with pancreatic and/or colon cancer. Genetic counseling is recommended for Sarah's relatives with a personal/family history of pancreatic cancer histories and her siblings to discuss genetic testing options.  If any of these relatives do pursue genetic testing, Sarah is encouraged to contact me so that we may review the impact of their test results on Sarah.    Summary:  We do not have an explanation for her breast cancer. Because of that, it is important that she continue with cancer screening based on her personal and family history as discussed above.    Genetic testing is rapidly advancing, and new cancer susceptibility genes will most likely be identified in the future. Therefore, I encouraged Sarah to contact me annually or if there are changes in her personal or family history. This may change how we assess her cancer risk, screening, and the testing we would offer.    Plan:  1.  I provided Sarah with a copy of her test results today.    2. She plans to follow-up with her oncology care team.  3. She should contact me annually, or sooner if her family history changes.  4. I will contact Sarah if the laboratory informs me that this VUS has been reclassified.  This may change screening and testing recommendations for Sarah and her relatives.    If Sarah has any further questions, I encouraged her to contact me at [phone].    Qi Stein MS, [facility]  Licensed Genetic Counselor  [facility]  [phone]

## 2021-06-19 NOTE — LETTER
Letter by Qi Stein, Genetic Counselor at      Author: Qi Stein, Genetic Counselor Service: -- Author Type: --    Filed:  Encounter Date: 6/5/2019 Status: (Other)         Sarah Gilmore  [address] [place] [address]      June 5, 2019      Dear MsMelanie Heartjen,    It was a pleasure meeting with you at the [facility] on 06/05/2019.  Here is a copy of the progress note from your recent genetic counseling visit.  If you have any additional questions, please feel free to call.    Referring Provider: Mckenzie Colby DO    Presenting Information:   I met with Sarah Gilmore today for genetic counseling at the [facility] to discuss her personal history of breast cancer and family history of breast, pancreatic, and colon cancer.  She is here today to review this history, cancer screening recommendations, and available genetic testing options.    Personal History:  Sarah is a 48 y.o. female. She was recently diagnosed with a right breast cancer at the end of May 2019 as the result of a screening mammogram. 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 Sarah met with Dr. Colby on June 3rd to discuss surgical options. Sarah stated that the results from genetic testing will help determine whether bilateral mastectomy is the right surgical option for her.      She had her first menstrual period at age 10, her first child at age 18, and is postmenopausal. Sarah had a total abdominal hysterectomy at age 47 due to abnormal bleeding. She reports about a 2-year history of history of oral contraceptive use in he 20's and that she has never been on hormone replacement therapy.      She has not had a colonoscopy yet due to her age. She knows that she is due for one now that she is 48.  She does not regularly do any other cancer screening at this time.  Sarah reported no tobacco use, and no alcohol  use.    Family History: (Please see scanned pedigree for detailed family history information)    Sarah's maternal uncle passed away at age 55 due to pancreatic cancer.     Sarah's maternal uncle, who is in his late 60's, has a history of pancreatic cancer diagnosed in his 60's.     Sarah's maternal aunt passed away in her late 80's with a history of colon cancer diagnosed in her 60's.    Sarah's maternal aunt passed away in her 70's with a history of lung cancer diagnosed in her 60's. She was a smoker.     Sarah's paternal aunt, who is in her late 60's has a history of and unknown type of cancer. Diagnosed at an unknown age.     Sarah's paternal grndmother passed away in her 50's due to breast cancer diagnosed at age 53.    Her maternal ethnicity is [place] and [place]. Her paternal ethnicity is [place] and Citizen of [place].  There is no known Ashkenazi Caodaism ancestry on either side of her family. There is no reported consanguinity.    Discussion:    Sarah's personal history of breast cancer and family history of pancreatic and colon cancer is suggestive of a hereditary cancer syndrome.    We reviewed the features of sporadic, familial, and hereditary cancers. In looking at Sarah's family history, it is possible that a cancer susceptibility gene is present due to her breast cancer, her paternal grandmother's breast cancer, her maternal uncles' histories of pancreatic cancer, and her paternal aunt's history of colon cancer.    We discussed the natural history and genetics of hereditary breast, pancreatic, and colon cancers. A detailed handout regarding the information we discussed was provided to Sarah at the end of our appointment today and can be found in the after visit summary. Topics included: inheritance pattern, cancer risks, cancer screening recommendations, and also risks, benefits and limitations of testing.    We reviewed that the most common cause of hereditary breast cancer is Hereditary Breast and Ovarian Cancer  (HBOC) syndrome, which is caused by mutations in the genes BRCA1 and BRCA2. BRCA1 and BRCA2 are two genes that increase the risk for breast and ovarian cancers, among others. Women who inherit a BRCA mutation have a 50 to 85% lifetime risk of breast cancer and a 15 to 45% lifetime risk of ovarian cancer. This is higher than the general population lifetime risks of 12% for breast cancer and less than 2% for ovarian cancer. Men with BRCA gene mutations have up to a 7% risk of breast cancer and 20% risk of prostate cancer. Other cancers, such as pancreatic cancer and melanoma, have also been associated with BRCA mutations.    Given her maternal family history of pancreatic and colon cancers, we also discussed Cotton syndrome. Cotton syndrome can be caused by a mutation in one of five genes:  MLH1, MSH2, MSH6, PMS2, and EPCAM.  Cotton syndrome may present with polyps, but typically a small number.  The highest cancer risks associated with Cotton syndrome include colon cancer, endometrial/uterine cancer, gastric cancer, and ovarian cancer.    Based on her personal and family history, Sarah meets current National Comprehensive Cancer Network (NCCN) criteria for genetic testing of BRCA1 and BRCA2 and Cotton syndrome.      We discussed that there are additional genes that could cause increased risk for breast, pancreatic, and colon cancer. As many of these genes present with overlapping features in a family and accurate cancer risk cannot always be established based upon the pedigree analysis alone, it would be reasonable for Sarah to consider panel genetic testing to analyze multiple genes at once. After our discussion, Sarah opted to proceed with OvaNext through [employer] [facility] [employer].  Genetic testing is available for 25 genes associated with hereditary gynecologic, breast, and related cancers: OvaNext (BLOSSOM, BARD1, BRCA1, BRCA2, BRIP1, CDH1, CHEK2, DICER1, EPCAM, MLH1, MRE11A, MSH2, MSH6, MUTYH, NBN, NF1, PALB2, PMS2, PTEN, RAD50,  RAD51C, RAD51D, SMARCA4, STK11, and TP53).  We discussed that some of the genes in the OvaNext panel are associated with specific hereditary cancer syndromes and published management guidelines: Hereditary Breast and Ovarian Cancer syndrome (BRCA1, BRCA2), Cotton syndrome (MLH1, MSH2, MSH6, PMS2, EPCAM), Hereditary Diffuse Gastric Cancer (CDH1), Cowden syndrome (PTEN), Li Fraumeni syndrome (TP53), Peutz-Jeghers syndrome (STK11), MUTYH Associated Polyposis (MUTYH), and Neurofibromatosis type 1 (NF1).    Risk-reducing salpingo-oophorectomy can be considered in women with mutations in BRIP1, RAD51C, or RAD51D. Breast and/or other cancer risk management guidelines are available for BLOSSOM, CHEK2, PALB2, NF1, and NBN.  The remaining genes (BARD1, DICER1, MRE11A, RAD50, and SMARCA4) are associated with increased cancer risk and may allow us to make medical recommendations when mutations are identified.    Consent was obtained and genetic testing for OvaNext was sent to [facility].    Medical Management: For  Sarah, we reviewed that the information from genetic testing may determine:    surgery to treat Sarah's active cancer diagnosis (i.e. lumpectomy versus bilateral mastectomy, partial versus total colectomy, etc.),    additional cancer screening for which Sarah may qualify (i.e. mammogram and breast MRI, more frequent colonoscopies, more frequent dermatologic exams, etc.),    options for risk reducing surgeries Sarah could consider (i.e. bilateral mastectomy, surgery to remove her ovaries and/or uterus, etc.),      and targeted chemotherapies for Sarah's active cancer, or if she were to develop certain cancers in the future (i.e. immunotherapy for individuals with Cotton syndrome, PARP inhibitors, etc.).     These recommendations and possible targeted chemotherapies will be discussed in detail once genetic testing is completed.   We discussed that Sarah's surgical decisions are pending genetic testing results.  For that reason, I will place a STAT request on the BRCAPlus genes (BLOSSOM, BRCA1, BRCA2, CDH1, CHEK2, PALB2, PTEN, and TP53) in order to get these results back as soon as possible.    Plan:  1) Today Sarah elected to proceed with OvaNext genetic testing (25 genes) through [employer].  2) A STAT request was placed on the BRCAPlus genes (including BRCA1 and BRCA2) and we will get these results back in about 2 weeks. I will call Sarah with these results as soon as they are available.   3) The results from the rest of the testing will be available in approximately 4 weeks.   4) Sarah will be contacted by our scheduling department to set up a result disclosure appointment either over the phone or in person.     Qi Stein MS, [facility]  Licensed Genetic Counselor  [facility]  [phone]

## 2021-06-20 NOTE — LETTER
Letter by Ez Burroughs MD at      Author: Ez Burroughs MD Service: -- Author Type: --    Filed:  Encounter Date: 1/14/2020 Status: Signed         Sarah Gilmore  [address]  [place] [facility] [address]             January 14, 2020         Dear Sarah,     Your Xray looks very good and no signs of old fracture or new. It does not look like you had compression fracture before.  Below is the Xray result. Please let me know if you have any questions or concerns.    Resulted Orders   XR Lumbar Spine 2 or 3 VWS    Narrative    EXAM: XR LUMBAR SPINE 2 OR 3 VWS  LOCATION: [facility]  DATE/TIME: 1/13/2020 6:11 PM    INDICATION: Low back pain.  COMPARISON: 02/18/2014 lumbar spine plain film.    FINDINGS: Anatomic alignment maintained. Vertebral body heights essentially maintained. There is a trace of T12 superior endplate concavity, stable. Mild degenerative facet changes at L5-S1 right more than left. Slight degenerative SI joint changes.      Impression    No significant change, no acute lumbar spine pathology.                  Please call with questions or contact us using [employer].    Sincerely,    Ez Burroughs MD      Electronically signed by Ez Burroughs MD

## 2021-06-20 NOTE — LETTER
Letter by Laura Lobo FNP at      Author: Laura Lobo FNP Service: -- Author Type: --    Filed:  Encounter Date: 7/6/2020 Status: (Other)         July 6, 2020     Patient: Sarah Gilmore   YOB: 1970   Date of Visit: 7/6/2020       To Whom It May Concern:    Sarah Gilmore was seen in my clinic on 7/6/2020. It is my medical opinion that Sarah Gilmore may return to work immediately with the following restrictions: Not working more than 12 hours per day. 15 minutes breaks every 2 hours to keep legs elevated.    If you have any questions or concerns, please don't hesitate to call.    Sincerely,        Electronically signed by IGOR Sweet        None known

## 2021-06-20 NOTE — LETTER
Letter by Laura Lobo FNP at      Author: Laura Lobo FNP Service: -- Author Type: --    Filed:  Encounter Date: 12/6/2019 Status: Signed         December 10, 2019     Patient: Sarah Gilmore   YOB: 1970   Date of Visit: 12/6/2019       To Whom It May Concern:    Patient is currently going through treatment for an acute illness and uses her cats for emotional support. It is my medical opinion that Sarah Gilmore and her emotional support animals be accommodated at her current apartment without extra charges.     If you have any questions or concerns, please don't hesitate to call.    Sincerely,        Electronically signed by IGOR Sweet

## 2021-06-20 NOTE — LETTER
Letter by Laura Lobo FNP at      Author: Laura Lobo FNP Service: -- Author Type: --    Filed:  Encounter Date: 10/12/2020 Status: (Other)         October 13, 2020     Patient: Sarah Gilmore   YOB: 1970   Date of Visit: 08/13/2020       To Whom It May Concern:    Sarah Gilmore was seen in my clinic on 8/13/2020. It is my medical opinion that Sarah Gilmore may return to work immediately with the following restrictions: Not working more than 10 - 8 hours per day. 15 minutes breaks every 2 hours to keep legs elevated.  Please accommodate patient for her appointments to her specialist and primary care as needed due to current health condition.    If you have any questions or concerns, please don't hesitate to call.    Sincerely,        Electronically signed by IGOR Sweet

## 2021-06-20 NOTE — LETTER
Letter by Laura Lobo FNP at      Author: Laura Lobo FNP Service: -- Author Type: --    Filed:  Encounter Date: 8/31/2020 Status: (Other)         August 31, 2020     Patient: Sarah Gilmore   YOB: 1970   Date of Visit: 8/31/2020       To Whom It May Concern:    Sarah Gilmore was seen in my clinic on 7/6/2020. It is my medical opinion that Sarah Gilmore may return to work immediately with the following restrictions: Not working more than 10 - 8 hours per day. 15 minutes breaks every 2 hours to keep legs elevated    If you have any questions or concerns, please don't hesitate to call.    Sincerely,        Electronically signed by IGOR Sweet

## 2021-06-20 NOTE — LETTER
Letter by Rocco Lobo MD at      Author: Rocco Lobo MD Service: -- Author Type: --    Filed:  Encounter Date: 9/4/2020 Status: (Other)         Sarah Gilmore  [address]  [place] [address]    September 4, 2020    Dear Ms. Gilmore,    Welcome to [facility]! Your appointment information is below.   Please bring the following to your appointment:    Insurance Card, so we may scan it for our records    Drivers license or valid ID, so we may scan it for our records    Co-pay (as applicable per your insurance plan)    A current list of your medications including over the counter products such as vitamins and supplements    Your medical records including copies of X-Ray films if you are transferring your care from another clinic.  If you do not have your records, please fill out the release of information form and we will request those records.     Provider: Rocco Mishra MD  Appointment Date:  09/18/20  Appointment Time: PFT- [facility] at 7:30 am, Consult- [facility] at 10:30am     Location: [facility]         [address]        [place], [address]    **Please allow adequate time for your commute and parking. If you are more than 10 minutes late, you may be asked to reschedule.     If you need to cancel or reschedule your appointment, please notify us at least 24 hours prior to your appointment time so we are able to make this time available for another patient.    Thank you for choosing the [facility] for your health care needs. If you have any questions, please do not hesitate to contact us at any time at   [phone]. We look forward to caring for you.     Sincerely,     [facility] staff

## 2021-06-25 NOTE — PROGRESS NOTES
Assessment:   1. Pain of right lower leg  Likely secondary to cyst of the lower leg. US to rule out DVT  - US Venous Leg Right; Future    EXAM: US VENOUS LEG RIGHT  LOCATION: [facility]  DATE/TIME: 3/19/2019 2:38 PM     INDICATION: Pain and swelling.  COMPARISON: None.  TECHNIQUE: Routine exam without and with compression, augmentation, and duplex utilizing 2D gray-scale imaging, Doppler interrogation with color-flow and spectral waveform analysis.     FINDINGS: The common femoral, femoral, popliteal, and segmentally visualized calf veins were evaluated. The opposite CFV was also included in the evaluation.     Right leg veins are negative for deep venous thrombosis. No popliteal cysts.    2. Visit for screening mammogram  - Mammo Screening Bilateral; Future    3. Screen for colon cancer  - Ambulatory referral for Colonoscopy     Plan:   Natural history and expected course discussed. Questions answered.  Rest, ice, compression, and elevation (RICE)  therapy.  NSAIDs per medication orders.  Ultrasound of right lower leg.  Follow up in 2 weeks.     Subjective:   Sarah Gilmore is a 48 y.o. female who presents with right lower leg pain/inflamation. Onset of the symptoms was sudden, not related to any specific activity. The swelling is noted on her lower leg, close to her ankle. She reports that she gets lower extremity edema everyday due to the nature of her job. Associated  symptoms include: none. Patient has had no prior leg problems. Evaluation to date: none. Treatment to date: none. Past musculoskeletal history: negative for previous injuries or other musculoskeletal conditions.    The following portions of the patient's history were reviewed and updated as appropriate: allergies, current medications, past family history, past medical history, past social history, past surgical history and problem list.    Review of Systems  Pertinent items are noted in HPI.       Objective:      /72   Pulse 60   Wt  (!) 253 lb 6.4 oz (114.9 kg)   LMP 03/03/2018   BMI 36.88 kg/m    Right leg:  positive exam findings: effusion   Left leg:  normal and no effusion, full active range of motion, no joint line tenderness, ligamentous structures intact.     Imaging:  Ultrasound; right: Negative for DVT

## 2021-06-25 NOTE — TELEPHONE ENCOUNTER
Data:   Pt calling to indicate they had a COVID test done on Thursday which was negative and they are calling now to get the results faxed to [facility] because they are having surgery on Monday morning and they need these results before having surgery.     Action:   Advised that clinics are closed and we have no ability to fax to pt will need to access a computer to print off the results and send to [facility].      Response:   Pt verbalizes understanding.    Balaji Gilbert RN 6/4/2021 7:23 PM  [facility] Nurse Advisor.

## 2021-06-25 NOTE — TELEPHONE ENCOUNTER
Called Sarah to check in. LM that due to clinic reorganization, writer will transition to an new role within the clinic and would only be available at current extension for the next week. Future support needs can be directed to Alicia, nurse that works withDr Burroughs, by calling the main clinic number. Share that she would qualify for Lizeth's momo again next calendar year if she contitnues on active treatment. Contact information provided and calls invited.

## 2021-06-25 NOTE — PROGRESS NOTES
[facility] Daily Progress     Patient Name: Sarah Gilmore  Date: 5/27/2021  Visit #: 6/8  PTA visit #:  na  Referral Diagnosis: Swelling:  Venous and Lymphatic with leg lipedema;  Right arm, Right leg and Left leg, Contracture(s):  Right  shoulder, Post mastectomy/lumpectomy/LND, Post hysterectomy/LND, Axillary LND and Fibrosis/Scarring  Date of evaluation: 5/4/2021  Referral Diagnosis: Swelling:  Venous and Lymphatic with leg lipedema;  Right arm, Right leg and Left leg, Contracture(s):  Right  shoulder, Post mastectomy/lumpectomy/LND, Post hysterectomy/LND, Axillary LND and Fibrosis/Scarring  Referring provider: Carolina Hook    Visit Diagnosis:     ICD-10-CM    1. Acquired lymphedema of leg  I89.0    2. Swelling in right armpit  M79.89    3. Venous hypertension of lower extremity, bilateral  I87.303    4. Lipedema  R60.9          Assessment:     HEP/POC compliance is  good .  Patient demonstrates understanding/independence with home program.  Response to Intervention Pt has met goals or is progressing towards. She is having surgery next week and will be discharged from PT at this time.  Therapy interventions being performed are medically necessary, are being delivered according to accepted standards of medical practice, and require the skills of a therapist to perform the services.      Goal Status:  Patient will have a decreased volume in : right;UE;by 5%;to decrease risk of infection;for better fit of clothing;for improved body image;for ease of movement;in 4 weeks;Progressing toward  Patient will have decreased fibrosis, scar tissue for improved lymphatic mobilitiy : in 4 weeks;Met  Patient will perform, verbalize self-management of: skin care;self-monitoring;exercise;massage;self-compression;compression wear;compression care;infection prevention;in 4 weeks;Met      Plan / Patient Education:     Pt is appropriate for discharge to self-management at this time. She is having surgery  on 6/7/21.     Subjective:     Pain Rating: no pain.  Pt reports she took the compression bandages off last night. Things are going well.      Objective:     Caregiver present: Yes    Observation of swelling: R upper extremity is better. Bilateral hips with lipedema.    Volume measurements taken:  Yes  Lymphedema Assessment 5/4/2021 5/27/2021   Right Upper Extremity Total Estimated Volume (cm3) 4140.1 4227.44   Right UE change of volume from last visit (%) - 2.11   Right UE change of volume from initial (%) - 2.11   Left Upper Extremity Total Estimated Volume (cm3) 4341 4201.15   Left UE change of volume from last visit (%) - -3.22   Left UE change of volume from initial (%) - -3.22   Swelling Description Left>Right Right>Left   Upper Extremity Swelling Comparison (%) 4.63 0.62       Skin condition is:  Right elbow     Compression:  Pt has LE compression tights- will order new ones after surgery    Medication for infection:  No    Treatment Today     TREATMENT MINUTES COMMENTS   Evaluation     Self-care/ Home management     Manual therapy 56 Manual lymph drainage for right upper extremity and bilateral lower extremities modified:  Neck effleurage, short neck, shoulder collectors, left axilla,  anterior axilla to axilla anastomosis from right to left,abdomen, bilateral inguinal, bilateral LEs, abdomen.     Re-measured UEs      Pt requesting  Coban 2 layer compression as she has used that in the past with success. Completed 2 layer Coban compression on sara LEs from foot to knee.     Instructed to remove them if uncomfortable.    Reviewed education on infection signs and symptoms, patient able to verbalize them.       Neuromuscular Re-education     Therapeutic Activity     Therapeutic Exercises 0 Exercises:  Exercise #1: reach to roll, pectoral stretch, latissimus dorsi hold x 5 x 5 repetitions each  Comment #1: standing lymphedema exercise sequence, provided written instructions.  Exercise #2: Current exercises with  5# bicep curl, overhead reach, flexion, abd to shoulder height    PTRx: [id number]   Gait training     Modality__________________                Total 56    Blank areas are intentional and mean the treatment did not include these items.       Tamiko Aguila PT, DPT, CLT-YONY  5/27/2021

## 2021-06-25 NOTE — PROGRESS NOTES
[facility]  [address] [address]  Dept: [phone]  Dept Fax: [phone]  Primary Provider: Laura Williamson FNP  Pre-op Performing Provider: LAURA WILLIAMSON      PREOPERATIVE EVALUATION:  Today's date: 6/1/2021    Sarah Gilmore is a 50 y.o. female who presents for a preoperative evaluation.    Surgical Information:  Surgery/Procedure: Deep flap  Surgery Location: [facility]  Surgeon:   Surgery Date: 6/7/21  Time of Surgery: unknown  Where patient plans to recover: At home with family  Fax number for surgical facility: [phone]    Type of Anesthesia Anticipated: General    Assessment & Plan      The proposed surgical procedure is considered LOW risk.    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     RECOMMENDATION:  APPROVAL GIVEN to proceed with proposed procedure, without further diagnostic evaluation.    Review of external notes as documented above       30 minutes spent on the date of the encounter doing chart review, review of test results, patient visit and documentation         Subjective     HPI related to upcoming procedure: 50-year-old female patient with history of ductal carcinoma in situ of both breasts, infiltrating ductal carcinoma of right female breast, acquired lymphedema of the legs, venous hypertension of lower extremity bilaterally, lipedema and class II severe obesity who presented to the clinic today for preop of her breast  surgery.  Patient will be going back to have a flap done to her breast.  The previous surgeries did not go well and she has continued to have infections and cellulitis.  Patient denies chest pain, shortness of breath, fever and chills.    Preop Questions 6/1/2021   Have you ever had a heart attack or stroke? No   Have you ever had surgery on your heart or blood vessels, such as a stent placement, a coronary artery bypass, or surgery on an artery in your head, neck, heart, or legs? No   Do you have chest pain with activity? No   Do you have a history of  heart failure? No   Do you currently have a cold, bronchitis or symptoms of other infection? No   Do you have a cough, shortness of breath, or wheezing? No   Do you or anyone in your family have previous history of blood clots? YES - Father, stable   Do you or does anyone in your family have a serious bleeding problem such as prolonged bleeding following surgeries or cuts? No   Have you ever had problems with anemia or been told to take iron pills? YES - Stable   Have you had any abnormal blood loss such as black, tarry or bloody stools, or abnormal vaginal bleeding? No   Have you ever had a blood transfusion? No   Are you willing to have a blood transfusion if it is medically needed before, during, or after your surgery? Yes   Have you or any of your relatives ever had problems with anesthesia? No   Do you have sleep apnea, excessive snoring or daytime drowsiness? YES - Uses CPAP machine   Do you have a CPAP machine? Yes   Do you have any artifical heart valves or other implanted medical devices like a pacemaker, defibrillator, or continuous glucose monitor? No   Do you have artificial joints? No   Are you allergic to latex? No   Is there any chance that you may be pregnant? No     Health Care Directive:  Patient does not have a Health Care Directive or Living Will: Patient states has Advance Directive and will bring in a copy to clinic.    Preoperative Review of  :    reviewed - no record of controlled substances prescribed.    See problem list for active medical problems.  Problems all longstanding and stable, except as noted/documented.  See ROS for pertinent symptoms related to these conditions.      Review of Systems  CONSTITUTIONAL: NEGATIVE for fever, chills, change in weight  INTEGUMENTARY/SKIN: NEGATIVE for worrisome rashes, moles or lesions  EYES: NEGATIVE for vision changes or irritation  ENT/MOUTH: NEGATIVE for ear, mouth and throat problems  RESP: NEGATIVE for significant cough or SOB  BREAST: NEGATIVE for masses, tenderness or discharge  CV: NEGATIVE for chest pain, palpitations or peripheral edema  GI: NEGATIVE for nausea, abdominal pain, heartburn, or change in bowel habits  : NEGATIVE for frequency, dysuria, or hematuria  MUSCULOSKELETAL: NEGATIVE for significant arthralgias or myalgia  NEURO: NEGATIVE for weakness, dizziness or paresthesias  ENDOCRINE: NEGATIVE for temperature intolerance, skin/hair changes  HEME: NEGATIVE for bleeding problems  PSYCHIATRIC: NEGATIVE for changes in mood or affect    Patient Active Problem List    Diagnosis Date Noted     Swelling in right armpit 05/03/2021     Acquired lymphedema of leg 05/03/2021     Leg pain, bilateral 05/03/2021     Venous hypertension of lower extremity, bilateral 05/03/2021     Lipedema 05/03/2021     Class 2 severe obesity with serious comorbidity and body mass index (BMI) of 38.0 to 38.9 in adult, unspecified obesity type (H) 05/03/2021     Infiltrating ductal carcinoma of right female breast (H) 07/30/2019     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 05/28/2019     Menorrhagia with irregular cycle 01/16/2018     Past Medical History:   Diagnosis Date     Anemia      DCIS (ductal carcinoma in situ) 2019    bilateral     Dilated aortic root (H)      History of anesthesia complications     slow to wake up     Osteopenia      Osteoporosis      PONV (postoperative nausea and vomiting)       "Seizures (H)     h/o seizure disorder in childhood, last seizure activity  \"4 yrs ago\"     Suspected sleep apnea     sleep study set up in Aug 2019     Past Surgical History:   Procedure Laterality Date      SECTION, LOW TRANSVERSE  07/10/1994    breech     HYSTERECTOMY Bilateral 2018    TOTAL LAPAROSCOPIC HYSTERECTOMY BILATERAL SALPINGECTOMY, CYSTOSCOPY     MAMMO STEREOTACTIC CORE BIOPSY RIGHT Right 2019     MAMMO STEREOTACTIC CORE BIOPSY RIGHT Right 2019     NM SENTINEL NODE INJECTION  2019     VA MASTECTOMY, MODIFIED RADICAL Bilateral 2019    Procedure: BILATERAL MASTECTOMY;  Surgeon: Mckenzie Colby DO;  Location: [facility] [place];  Service: General     VA REPLACEMENT TISSUE EXPANDER W/PERMANENT IMPLANT Bilateral 2019    Procedure: BILATERAL IMPLANT RECONSTRUCTION TO BREASTS;  Surgeon: Carlitos Crum MD;  Location: [facility] [place];  Service: Plastics     right arm surgery       Current Outpatient Medications   Medication Sig Dispense Refill     acetaminophen 325 mg cap Take 2 tablets by mouth as needed.       anastrozole (ARIMIDEX) 1 mg tablet Take 1 tablet (1 mg total) by mouth daily. 90 tablet 3     docosahexaenoic acid/epa (FISH OIL ORAL) Take by mouth.       EPINEPHrine (EPIPEN JR) 0.15 mg/0.3 mL injection Inject 0.3 mL (0.15 mg total) into the shoulder, thigh, or buttocks as needed for anaphylaxis. 1 each 12     furosemide (LASIX) 20 MG tablet Take 20 mg by mouth.       gabapentin (NEURONTIN) 100 MG capsule Take 100 mg by mouth 3 (three) times a day. 270 capsule 3     ibuprofen (ADVIL,MOTRIN) 600 MG tablet Take 1 tablet (600 mg total) by mouth every 6 (six) hours as needed for pain. 90 tablet 0     MAGNESIUM ORAL Take by mouth.       MULTIVITAMIN WITH IRON ORAL Take by mouth.       turmeric 400 mg cap Take by mouth.       No current facility-administered medications for this visit.        Allergies   Allergen Reactions     Bee Pollen Anaphylaxis     Bee " Venom Protein (Honey Bee) Anaphylaxis     Iodinated Contrast Media Anaphylaxis     Patient had CT scan with Omnipaque 350 on 8/30/20 while premedicated with no adverse reaction.        Social History     Tobacco Use     Smoking status: Never Smoker     Smokeless tobacco: Never Used   Substance Use Topics     Alcohol use: No      Family History   Problem Relation Age of Onset     Mental illness Mother      Sleep apnea Mother      Diabetes Father      No Medical Problems Sister      No Medical Problems Brother      Asperger's syndrome Son      Suicidality Maternal Grandfather      Breast cancer Paternal Grandmother 50     Prostate cancer Paternal Grandfather      Testicular cancer Paternal Grandfather      Mental illness Son      Leukemia Paternal Uncle      Social History     Substance and Sexual Activity   Drug Use No        Objective     LMP 03/03/2018   Physical Exam    GENERAL APPEARANCE: healthy, alert and no distress     EYES: EOMI, PERRL     HENT: ear canals and TM's normal and nose and mouth without ulcers or lesions     NECK: no adenopathy, no asymmetry, masses, or scars and thyroid normal to palpation     RESP: lungs clear to auscultation - no rales, rhonchi or wheezes     BREAST: normal without masses, tenderness or nipple discharge and no palpable axillary masses or adenopathy     CV: regular rates and rhythm, normal S1 S2, no S3 or S4 and no murmur, click or rub     ABDOMEN:  soft, nontender, no HSM or masses and bowel sounds normal     MS: extremities normal- no gross deformities noted, no evidence of inflammation in joints, FROM in all extremities.     SKIN: no suspicious lesions or rashes     NEURO: Normal strength and tone, sensory exam grossly normal, mentation intact and speech normal     PSYCH: mentation appears normal. and affect normal/bright     LYMPHATICS: No cervical adenopathy    Recent Labs   Lab Test 02/09/21  1508 08/29/20  1246 09/04/19  0926   HGB 13.4 13.9 14.3   PLT  --  286 365   INR  1.06  --   --     140 140   K 4.3 4.7 4.3   CREATININE 0.83 0.86 0.77        PRE-OP Diagnostics:   No results found for this or any previous visit (from the past 720 hour(s)).  No EKG required for low risk surgery (cataract, skin procedure, breast biopsy, etc).  No EKG required, no history of coronary heart disease, significant arrhythmia, peripheral arterial disease or other structural heart disease.    REVISED CARDIAC RISK INDEX (RCRI)   The patient has the following serious cardiovascular risks for perioperative complications:   - No serious cardiac risks = 0 points    RCRI INTERPRETATION: 0 points: Class I (very low risk - 0.4% complication rate)         Signed Electronically by: IGOR Sweet    Copy of this evaluation report is provided to requesting physician.

## 2021-06-26 NOTE — TELEPHONE ENCOUNTER
Reason for Call:  POST OP CONCERN/PT REFERRAL    Detailed comments:     Pt calling, pt had deep flat surgery, reconstruction.  Pt ended up with 3 surgeries, patient is having swelling in feet and per the   surgeon should address with her pcp for possible lasix.   Pt was also doing PT before surgery, patient may need another [facility] PT referral placed.  If patient is referral is placed that is at [facility]- phone: [phone]    Please advise,      Phone Number Patient can be reached at: Cell number on file:    Telephone Information:   Mobile [phone]       Best Time: ANYTIME    Can we leave a detailed message on this number?: Yes    Call taken on 6/18/2021 at 8:37 AM by Naomi Mistry

## 2021-06-26 NOTE — TELEPHONE ENCOUNTER
Discussed patient's case with Dr. Carlson who verbalized that if this has been on going patient can wait until next week for f/u. Patient informed me that the swelling started 06/13 and/or 06/14 and has worsened. I informed patient that prescribing lasix over the phone is not something our providers will be comforatble with, Patient verbalized understanding, no further questions/concerns at this time.    Patient will present to [facility] today, verbally discussed with Dr. Carlson who verbalized understanding.

## 2021-06-26 NOTE — PATIENT INSTRUCTIONS - HE
"  Preparing for Your Surgery  Getting started  A surgery nurse will call you to review your health history and instructions. They will give you an arrival time based on your scheduled surgery time.  Please be ready to share the following:    Your doctor's clinic name and phone number    Your medical, surgical and anesthesia history    A list of allergies and sensitivities    A list of medicines, including herbal treatments and over-the-counter drugs    Whether the patient has a legal guardian (ask how to send us the papers in advance)  If your child is having surgery, please ask for a copy of Preparing for Your Child's Surgery.    Preparing for surgery    Within 30 days of surgery: Have an exam at your family clinic (primary care clinic), or go to a pre-operative clinic. This exam is called a \"History and Physical,\" or H&P.    At your H&P exam, talk to your care team about all medicines you take. If you need to stop any medicines before surgery, ask when to start taking them again.  ? We do this for your safety. Many medicines can make you bleed too much during surgery. Some change how well surgery (anesthesia) drugs work.    Call your insurance company to see what it will and won't pay for. Ask if they need to pre-approve the surgery. (If no insurance, call [phone].)    Call your surgeon's clinic if there's any change in your health. This includes signs of a cold or flu (sore throat, runny nose, cough, rash, fever). It also includes a scrape or scratch near the surgery site.    If you have questions on the day of surgery, call your surgery center.  Eating and drinking guidelines  For your safety: Unless your surgeon tells you otherwise, follow the guidelines below.    Eat and drink as usual until 8 hours before surgery. After that, no food or milk.    Drink clear liquids until 2 hours before surgery. These are liquids you can see through, like water, Gatorade and Propel Water. You may also have black coffee " and tea (no cream or milk).    Nothing by mouth within 2 hours of surgery. This includes gum, candy and breath mints.    Stop alcohol the midnight before surgery.    If your family clinic tells you to take medicine on the morning of surgery, it's okay to take it with a sip of water.  Preventing infection    Shower or bathe the night before and morning of your surgery. Follow the instructions your clinic gave you. (If no instructions, use regular soap.)    Don't shave or clip hair near your surgery site. This can lead to skin infection.    Don't smoke the morning of surgery. Smoking increases the risk of infection. You may chew nicotine gum up to 2 hours before surgery. A nicotine patch is okay.  ? Note: Some surgeries require you to completely quit smoking and nicotine. Check with your surgeon.    Your care team will make every effort to keep you safe from infection. We will:  ? Clean our hands often with soap and water (or an alcohol-based hand rub).  ? Clean the skin at your surgery site with a special soap that kills germs. We'll also remove hair from the site as needed.  ? Wear special hair covers, masks, gowns and gloves during surgery.  ? Give antibiotic medicine, if prescribed. Not all surgeries need antibiotics.  What to bring on the day of surgery    Photo ID and insurance card    Copy of your health care directive, if you have one    Glasses and hearing aides (bring cases)  ? You can't wear contacts during surgery    Inhaler and eye drops, if you use them (tell us about these when you arrive)    CPAP machine or breathing device, if you use them    A few personal items, if spending the night    If you have . . .  ? A pacemaker or ICD (cardiac defibrillator): Bring the ID card.  ? An implanted stimulator: Bring the remote control.  ? A legal guardian: Bring a copy of the certified (court-stamped) guardianship papers.  Please remove any jewelry, including body piercings. Leave jewelry and other valuables at  home.  If you're going home the day of surgery  Important: If you don't follow the rules below, we must cancel your surgery.     Arrange for someone to drive you home after surgery. You may not drive, take a taxi or take public transportation by yourself (unless you'll have local anesthesia only).    Arrange for a responsible adult to stay with you overnight. If you don't, we may keep you in the hospital overnight, and you may need to pay the costs yourself.  Questions?   If you have any questions for your care team, list them here: _________________________________________________________________________________________________________________________________________________________________________________________________________________________________________________________________________________________________________________________  For informational purposes only. Not to replace the advice of your health care provider. Copyright   4920-1482 [employer]. All rights reserved. Clinically reviewed by Amisha Gracia MD. SMARTworks 159918 - REV 07/19.      Before Your Procedure or Hospital Admission  Testing for COVID-19 (Coronavirus)  Thank you for choosing [facility] for your health care needs. This is a very challenging time for everyone. The World Health Organization and the State of [place] have declared the COVID-19 virus a pandemic.   Our goal is to keep you and our team here at [facility] safe and healthy. We've taken several steps to make this happen. For example:    We screen our staff, care teams and patients for COVID-19.    Everyone at [facility] must wear a mask and stay 6 feet apart.    We are limiting hospital and clinic visitors.  Before you come in  All patients must get tested for COVID-19. Your test needs to happen 2 to 4 days before you check in to the hospital or surgery site.   A clinic scheduler will call about a week in advance to set up a testing time at  "one of our labs where we'll take a swab of your nose or throat.  Note: If you go to a clinic or pharmacy like [facility] or [facility] for your test, make sure it's a \"RT-PCR\" test, not a \"rapid\" COVID-19 test. (See Questions and Answers below.)  After the test, please stay at home and stay out of contact with other people. It will be harder for you to recover if you get COVID-19 before your treatment.  Please follow all current safety guidelines, including:    Limit trips outside your home.    Limit the number of people you see.    Always wear a mask outside your home.    Use social distancing. (Stay 6 feet away from others whenever you can.)    Wash your hands often.  If your test shows you have COVID-19  If your test is positive, we'll let you know. A positive test means that you have the virus.   We'll probably have to postpone your admission, surgery or procedure. Your doctor will discuss this with you. After that, we'll let you know what to do and when you can reschedule.   We may need to cancel your treatment on short notice for other reasons, too.  If your test shows you DON'T have COVID-19  Even if your test is negative, you may still get COVID-19. It's rare but, sometimes, the test result is wrong. You could also catch the virus after taking the test.   There's a very small chance that you could catch COVID-19 in the hospital or surgery center. [facility] has taken many steps to prevent this from happening.   Day of your surgery or procedure    Please come wearing a mask or something else that covers both your nose and mouth.    When you arrive, we'll ask you some questions to find out if you have any signs or symptoms of COVID-19.    Ask your care team if you can have visitors. All visitors must wear masks and will be screened for signs of COVID-19.  ? Even if no visitors are allowed, you can still have with you:    Your legal guardian or legal decision maker    A parent and one other visitor, if you are " "younger than 18 years old    A partner and a , if you are in labor  ? We might need to teach you about taking care of yourself after surgery. If so, a visitor can come into the hospital to learn about it, too.  ? The rules for visitors change often, depending on how much the virus is spreading. To learn more, see Visiting a Loved One in the Hospital during the COVID-19 Outbreak.  Please call your care team, hospital or surgery center if you have any questions. We thank you for your understanding and for choosing [facility] for your care.   Questions and Answers  Does it matter where I get tested for COVID-19?  Yes. We urge you to get tested at one of our [facility] COVID-19 testing sites. We process these tests in our lab and can get the results quickly. Your [facility] care team needs to get your results before you check in.  What should I do if I can't get tested at [facility]?  You can get tested somewhere else, but you'll need to take these extra steps:  1. Contact your family doctor or clinic to arrange your test.  2. Take the test within 4 days of your surgery or procedure. We can't accept tests older than 4 days.  3. Make sure your doctor or clinic faxes your results to [facility] at [phone].  If we don't get your results in time, we may have to postpone or cancel your treatment.  Ask if you're getting a \"RT-PCR\" COVID-19 test. It should NOT be a \"rapid\" COVID-19 test. Many drug stores use \"rapid\" tests, but they may not be as accurate. We don't accept the results of \"rapid\" tests.  For informational purposes only. Not to replace the advice of your health care provider. Copyright   2020 [employer]. All rights reserved. Clinically reviewed by Infection Prevention and the [facility] COVID-19 Clinical Team. SMARTworks 509304 - REV 10/20.    How to Take Your Medication Before Surgery  - Take all of your medications before surgery as usual    "

## 2021-06-30 NOTE — PROGRESS NOTES
Progress Notes by Carolina Hook MD at 5/3/2021  7:45 AM     Author: Carolina Hook MD Service: -- Author Type: Physician    Filed: 5/3/2021  9:12 AM Encounter Date: 5/3/2021 Status: Signed    : Carolina Hook MD (Physician)               [facility] Lymphedema/Swelling Consult    I have been asked to see Sarah Gilmore referred by No att. providers found     Date of Service: 05/03/21     Chief Complaint:  Right arm swelling and lower extremity swelling    History of Present Illness:  This 50 y.o. female presents to the [facility] as a new consult to evaluate right arm swelling with leg swelling.  She has had leg swelling most of her life which began during puberty.  She noted she was very happy.  She has easy bruising in the legs with pain and aching.  It is a little bit worse at the end of the day but mainly all the time.  It does not wake her up at night. The patient's problems in the right breast upper arm area began in 7/2019 when she was diagnosed with right breast invasive ductal carcinoma  (pTx pN1a M0) and L breast DCIS, ER/TX +, HER2 -, treated with R mod rad mast and L simple, followed by radiation right breast, tamoxifen then anastrozole.  The patient began to notice swelling of the right axilla with tightness after radiation.  The swelling has been stable since onset.  There has been no new numbness, tingling or weakness.  There have been no new masses, rashes, or swellings of any other joints. There have been no infections.  There has been no new unexplained weight loss, loss of appetite, nausea and/or vomiting, shortness of breath, weight loss and chest pain. The swelling decreases with elevation, but only slightly the swelling is worse at the end of the day. Sarah A Deirdre notices aching in the legs but no severe pain.  She does not have any pain in the arm.      Past Medical History:    Past Medical History:   Diagnosis Date  "  ? Anemia    ? DCIS (ductal carcinoma in situ) 2019    bilateral   ? Dilated aortic root (H)    ? History of anesthesia complications     slow to wake up   ? Osteopenia    ? Osteoporosis    ? PONV (postoperative nausea and vomiting)    ? Seizures (H)     h/o seizure disorder in childhood, last seizure activity  \"4 yrs ago\"   ? Suspected sleep apnea     sleep study set up in Aug 2019       Surgical History:   Past Surgical History:   Procedure Laterality Date   ?  SECTION, LOW TRANSVERSE  07/10/1994    breech   ? HYSTERECTOMY Bilateral 2018    TOTAL LAPAROSCOPIC HYSTERECTOMY BILATERAL SALPINGECTOMY, CYSTOSCOPY   ? MAMMO STEREOTACTIC CORE BIOPSY RIGHT Right 2019   ? MAMMO STEREOTACTIC CORE BIOPSY RIGHT Right 2019   ? NM SENTINEL NODE INJECTION  2019   ? NJ MASTECTOMY, MODIFIED RADICAL Bilateral 2019    Procedure: BILATERAL MASTECTOMY;  Surgeon: Mckenzie Colby DO;  Location: [facility];  Service: General   ? NJ REPLACEMENT TISSUE EXPANDER W/PERMANENT IMPLANT Bilateral 2019    Procedure: BILATERAL IMPLANT RECONSTRUCTION TO BREASTS;  Surgeon: Carlitos Crum MD;  Location: [facility];  Service: Plastics   ? right arm surgery         Medications:    Current Outpatient Medications:   ?  acetaminophen 325 mg cap, Take 2 tablets by mouth as needed., Disp: , Rfl:   ?  anastrozole (ARIMIDEX) 1 mg tablet, Take 1 tablet (1 mg total) by mouth daily., Disp: 90 tablet, Rfl: 3  ?  docosahexaenoic acid/epa (FISH OIL ORAL), Take by mouth., Disp: , Rfl:   ?  EPINEPHrine (EPIPEN JR) 0.15 mg/0.3 mL injection, Inject 0.3 mL (0.15 mg total) into the shoulder, thigh, or buttocks as needed for anaphylaxis., Disp: 1 each, Rfl: 12  ?  gabapentin (NEURONTIN) 100 MG capsule, Take 100 mg by mouth 3 (three) times a day., Disp: 270 capsule, Rfl: 3  ?  ibuprofen (ADVIL,MOTRIN) 600 MG tablet, Take 1 tablet (600 mg total) by mouth every 6 (six) hours as needed for pain., Disp: 90 tablet, " Rfl: 0  ?  MAGNESIUM ORAL, Take by mouth., Disp: , Rfl:   ?  MULTIVITAMIN WITH IRON ORAL, Take by mouth., Disp: , Rfl:   ?  turmeric 400 mg cap, Take by mouth., Disp: , Rfl:   ?  furosemide (LASIX) 20 MG tablet, Take 20 mg by mouth., Disp: , Rfl:     Allergies:   Allergies   Allergen Reactions   ? Bee Pollen Anaphylaxis   ? Bee Venom Protein (Honey Bee) Anaphylaxis   ? Iodinated Contrast Media Anaphylaxis     Patient had CT scan with Omnipaque 350 on 8/30/20 while premedicated with no adverse reaction.        Family history:   Family History   Problem Relation Age of Onset   ? Mental illness Mother    ? Sleep apnea Mother    ? Diabetes Father    ? No Medical Problems Sister    ? No Medical Problems Brother    ? Asperger's syndrome Son    ? Suicidality Maternal Grandfather    ? Breast cancer Paternal Grandmother 50   ? Prostate cancer Paternal Grandfather    ? Testicular cancer Paternal Grandfather    ? Mental illness Son    ? Leukemia Paternal Uncle        Social History:   Social History     Socioeconomic History   ? Marital status: Single     Spouse name: Not on file   ? Number of children: Not on file   ? Years of education: Not on file   ? Highest education level: Not on file   Occupational History   ? Not on file   Social Needs   ? Financial resource strain: Not on file   ? Food insecurity     Worry: Not on file     Inability: Not on file   ? Transportation needs     Medical: Not on file     Non-medical: Not on file   Tobacco Use   ? Smoking status: Never Smoker   ? Smokeless tobacco: Never Used   Substance and Sexual Activity   ? Alcohol use: No   ? Drug use: No   ? Sexual activity: Never   Lifestyle   ? Physical activity     Days per week: Not on file     Minutes per session: Not on file   ? Stress: Not on file   Relationships   ? Social connections     Talks on phone: Not on file     Gets together: Not on file     Attends Yazdanism service: Not on file     Active member of club or organization: Not on file      Attends meetings of clubs or organizations: Not on file     Relationship status: Not on file   ? Intimate partner violence     Fear of current or ex partner: Not on file     Emotionally abused: Not on file     Physically abused: Not on file     Forced sexual activity: Not on file   Other Topics Concern   ? Not on file   Social History Narrative   ? Not on file         Labs:   I personally reviewed the following lab results today and those on care everywhere, if indicated    No results found for: SEDRATE      No results found for: CRP        Lab Results   Component Value Date    CREATININE 0.83 02/09/2021      No results found for: HGBA1C        Lab Results   Component Value Date    BUN 13 02/09/2021              Lab Results   Component Value Date    ALBUMIN 4.0 07/18/2019       Vitamin D, Total (25-Hydroxy)   Date Value Ref Range Status   10/20/2016 26.5 (L) 30.0 - 80.0 ng/mL Final       Lab Results   Component Value Date    TSH 3.88 08/29/2020     Lab Results   Component Value Date    WBC 4.0 08/29/2020    HGB 13.4 02/09/2021    HCT 40.9 08/29/2020    MCV 93 08/29/2020     08/29/2020       Imaging:  I personally reviewed the following imaging results today and those on care everywhere, if indicated    EXAM: ULTRASOUND BREAST UNILATERAL LEFT LIMITED  LOCATION: [facility]  DATE/TIME: 12/7/2020 4:11 PM     INDICATION: 50-year-old female presents with a change in the sensation of the reconstructed left breast. Personal history of right breast DCIS status post bilateral mastectomies on 07/30/2019 with subsequent implant reconstruction. Left breast DCIS was   also noted following pathologic evaluation of the left breast mastectomy. Patient denies any associated palpable left breast lumps.     COMPARISON: N/A.     ULTRASOUND FINDINGS: Patient directed physical examination of the area of changing sensation within the reconstructed left lower inner quadrant does not demonstrate a  discrete palpable mass. The overlying skin is normal-appearing without associated   erythema or thickening.     Sonographic evaluation at the area of changing sensation within the left lower inner quadrant was performed with representative images demonstrated at the 8:00 position, 5 cm from the nipple. There are postsurgical changes of left mastectomy with implant   reconstruction. Otherwise, there are no underlying suspicious masses or other sonographic abnormalities.     IMPRESSION:   1.  No imaging correlate to reported change in sensation involving the left lower inner quadrant, for which management should be based clinically.  2.  Postsurgical changes of left mastectomy with implant reconstruction.     ACR BI-RADS Category 2: Benign.       EXAM: CTA CHEST PE RUN  LOCATION: [facility]  DATE/TIME: 08/30/2020, 1:14 PM     INDICATION: 50-year-old woman with history of breast cancer presents with exertional dyspnea, atypical chest pain and lower extremity edema. Elevated serum d-dimer.  COMPARISON: PET/CT 09/19/2019 and CT coronary angiogram 07/25/2017.  TECHNIQUE: CT chest pulmonary angiogram during arterial phase injection of IV contrast. Multiplanar reformats and MIP reconstructions were performed. Dose reduction techniques were used.   CONTRAST: Iohexol (Omni) 100 mL.     FINDINGS:  ANGIOGRAM CHEST: No acute pulmonary emboli. No signs of chronic pulmonary emboli. Mild to moderate enlargement of the central pulmonary arteries with main pulmonary artery diameter 3.7 cm may indicate some degree of pulmonary arterial hypertension. The   aortic root is mildly dilated at 3.9 cm diameter and the ascending thoracic aorta is mildly dilated at 4.2 cm diameter, unchanged.     LUNGS AND PLEURA: Mild upper lung centrilobular emphysema. Mosaic attenuation pattern throughout both lungs consistent with patchy air trapping. Mild mural thickening of the normal caliber bronchi could indicate some degree of bronchial  inflammation.   Benign calcified granuloma right lower lobe. Lungs otherwise clear. No pleural effusion.     MEDIASTINUM/AXILLAE: Borderline mild cardiac enlargement. Small esophageal hiatal hernia. No lymphadenopathy. Bilateral mastectomies. Left breast implant.     UPPER ABDOMEN: Diminutive hepatic cyst is unchanged and requires no follow-up. Benign adenoma left adrenal gland is stable and requires no follow-up.     MUSCULOSKELETAL: Bones appear demineralized. No bone lesions.     IMPRESSION:   1.  No pulmonary emboli.  2.  Mild to moderate enlargement of the central pulmonary arteries may indicate some degree of pulmonary arterial hypertension.  3.  Mild stable dilatation of the aortic root and mid ascending thoracic aorta.  4.  Borderline cardiac enlargement.  5.  Mild centrilobular emphysema, air trapping and CT signs of possible bronchial inflammation.    EXAM: US VENOUS LEGS BILATERAL  LOCATION: [facility]  DATE/TIME: 8/29/2020 2:13 PM     INDICATION: Bilateral lower extremity edema, elevated D-Dimer,   COMPARISON: Venous ultrasound 03/19/2019  TECHNIQUE: Venous Duplex ultrasound of bilateral lower extremities with and without compression, augmentation and duplex. Color flow and spectral Doppler with waveform analysis performed.     FINDINGS: Exam includes the common femoral, femoral, popliteal veins as well as segmentally visualized deep calf veins and greater saphenous vein.      RIGHT: No deep vein thrombosis. No superficial thrombophlebitis. No popliteal cyst.     LEFT: No deep vein thrombosis. No superficial thrombophlebitis. No popliteal cyst.     IMPRESSION:   1.  No deep venous thrombosis in the bilateral lower extremities.             Review of Symptoms:  Full 12 point review of systems is negative, except as noted above.    Physical Exam:      Vital Signs: /64 (Patient Site: Left Arm, Patient Position: Sitting, Cuff Size: Adult Large)   Pulse 74   Temp 99.3  F (37.4  C) (Oral)    "Resp 20   Ht 5' 10\" (1.778 m)   Wt (!) 269 lb 8 oz (122.2 kg)   LMP 03/03/2018   SpO2 98%   BMI 38.67 kg/m       Circumferential Volume Measures:    Vasc Edema 5/3/2021   Right just above MTP 25.2   Right Ankle 32.7   Right Widest Calf 55.4   Right Thigh Up 10cm 68   Left - just above MTP 26   Left Ankle 34   Left Widest Calf 56.8   Left Thigh Up 10cm 64.5       General: In no apparent distress.     Psych: Alert and oriented X 3. Affect normal.    HEENT: Atraumatic, normocephalic    Range of Motion:  Range of motion of shoulders, elbows, wrists, hips, knees and ankles is normal bilaterally without active joint synovitis, erythema, joint swelling, crepitus or joint laxity.  At end-stage range of motion of forward flexion of the right shoulder there is some axillary cording anteriorly which is painful to palpation.  Range of motion of the neck is full with Spurling's maneuver negative.    Lungs: Clear to auscultation throughout with full inspiration.    CV: Normal S1 and S2, without murmurs, gallops, or rubs.  No audible carotid bruits. Regular rhythm.    Abdominal:  Normal bowel sounds.  No masses, tenderness, guarding or rigidity.  No inguinal lymphadenopathy or fullness palpated.      Sensation: Intact to pinprick and light touch in the upper and lower extremities bilaterally.      Strength:  Normal strength testing in hip flexion, knee flexion, knee extension, ankle dorsiflexion, great toe extension, shoulder abduction, elbow flexion, elbow extension, wrist extension, forearm supination, forearm pronation, hand intrinsics, internal rotation and external rotation of the shoulders bilaterally.      Deep Tendon Reflexes:  Normal knee jerks, ankle jerks, biceps, triceps and brachioradialis bilaterally    Lymph nodes: No cervical, supraclavicular, infraclavicular, or axillary lymphadenopathy palpated.    Vascular: No unusual venous distention in the arms or legs. Radial arterial pulses strong and equal at the " wrists bilaterally. Normal dorsalis pedis, posterior tibial pulses bilaterally with biphasic audible pulses heard with hand-held Doppler.  Stemmer's sign negative in both feet with spider veins, telangiectasias and slight medial ankle flaring.    Skin: No unusual rubor, calor, masses or rashes along the skin in either arm or in the legs.  No significant fibrosity or scarring.  No pain to palpation.    Impression:   1. Right shoulder tightness  2. Right shoulder contracture with fibrosis  3. Secondary post mastectomy lymphedema  4. Leg swelling with lipedema  5. Venous hypertension of legs with pain  6. Obesity class II  7. Bilateral breast carcinoma   (7/19 R invasive ductal CA  (pTx pN1a M0), L DCIS, ER/SD +, HER2 -, R mod rad mast, L simple, rad)     Plan:  1. Swelling was discussed in detail with the patient.  Questions were answered.  We discussed the perception of swelling in the right axilla area.  We discussed risk of right chest wall and right arm secondary postmastectomy lymphedema.  The lipedema in the legs and the discomfort was reviewed along with the recommendation to check the vneous system for insufficiency.   2. Physical/Occupational Therapy for swelling. This will include manual lymphatic drainage, lymphatic bandaging, exercise, range of motion work on shoulder contracture and and education.  They will also check her compression garments.  I reviewed some other options for compression garments for her.  Prescription was completed.  3. Once the swelling has decreased additional compression garments will be obtained.  This will include a right arm sleeve.  We discussed the appropriate wearing of this.  She does not have to wear it all the time.  Script was completed.  4. The benefits of exercise were reviewed in detail.  Dragon Boat racing information provided.    5. Weight loss would be beneficial and she has been working on the without much success.  Bariatric medicine referral was  written.  6. Follow up with me 3-4 months, or when needed. She will be having breast reconstructive surgery Muna 3.  I will see her after she heals to see if any further would be beneficial.  If any questions or concerns they are to contact the clinic.     Thank you very much for referring Sarah Gilmore.  If you have any questions please feel free to contact me at [phone].      On day of encounter time spent in chart review and with patient in consultation, exam, education and coordination of care:  65 minutes     Carolina Hook MD, Founding Diplomate ABJONELLE, FACCWS, FAAPMR  Medical Director Wound Care and Lymphedema  [facility]  [phone]      This note was dictated using a voice recognition software.  Any grammatical or context distortion are unintentional and inherent to the software.

## 2021-07-03 NOTE — ADDENDUM NOTE
Addendum Note by Laura Williamson FNP at 10/21/2019  1:50 PM     Author: Laura Williamson FNP Service: -- Author Type: Nurse Practitioner    Filed: 10/23/2019 12:56 PM Encounter Date: 10/21/2019 Status: Signed    : Laura Williamson FNP (Nurse Practitioner)    Addended by: LAURA WILLIAMSON on: 10/23/2019 12:56 PM        Modules accepted: Orders

## 2021-07-03 NOTE — ANESTHESIA PREPROCEDURE EVALUATION
Anesthesia Preprocedure Evaluation by Rubén Gomez MD at 7/30/2019  9:47 AM     Author: Rubén Gomez MD Service: -- Author Type: Physician    Filed: 7/30/2019  9:53 AM Date of Service: 7/30/2019  9:47 AM Status: Signed    : Rubén Gomez MD (Physician)       Anesthesia Evaluation      Patient summary reviewed   History of anesthetic complications     Airway   Mallampati: III  Neck ROM: full   Pulmonary - negative ROS and normal exam                          Cardiovascular - negative ROS and normal exam  Exercise tolerance: > or = 4 METS   Neuro/Psych - negative ROS     Endo/Other    (+) obesity,      GI/Hepatic/Renal - negative ROS      Other findings: Had hysterectomy 3/13/18, felt she was slow to wake up, had nausea afterwards and vomited in car on way home. Has motion sickness.  Hg 13.7, K 4.4, GFR>60.      Dental                             Anesthesia Plan  Planned anesthetic: general endotracheal and peripheral nerve block  Consented for paravertebral blocks.  ASA 2   Induction: intravenous   Anesthetic plan and risks discussed with: patient  Anesthesia plan special considerations: antiemetics,   Post-op plan: routine recovery

## 2021-07-03 NOTE — ADDENDUM NOTE
Addendum Note by Kristina Sanchez CMA at 6/3/2019  8:25 AM     Author: Kristina Sanchez CMA Service: -- Author Type: Certified Medical Assistant    Filed: 6/3/2019 11:53 AM Date of Service: 6/3/2019  8:25 AM Status: Signed    : Kristina Sanchez CMA (Certified Medical Assistant)    Encounter addended by: Kristina Sanchez CMA on: 6/3/2019 11:53 AM      Actions taken: Sign clinical note, Charge Capture section accepted

## 2021-07-03 NOTE — ADDENDUM NOTE
Addendum Note by Jessy Diego CMA at 6/3/2019  8:25 AM     Author: Jessy Diego CMA Service: -- Author Type: Medical Assistant    Filed: 6/20/2019  1:53 PM Date of Service: 6/3/2019  8:25 AM Status: Signed    : Jessy Diego CMA (Medical Assistant)    Encounter addended by: Jessy Diego CMA on: 6/20/2019  1:53 PM      Actions taken: Visit diagnoses modified, Order list changed, Diagnosis   association updated

## 2021-07-03 NOTE — ADDENDUM NOTE
Addendum Note by Lombardi, Susan L, RN at 8/19/2019  9:16 AM     Author: Lombardi, Susan L, RN Service: -- Author Type: RN, Care Manager    Filed: 8/19/2019 10:29 AM Date of Service: 8/19/2019  9:16 AM Status: Signed    : Lombardi, Susan L, RN (RN, Care Manager)    Encounter addended by: Lombardi, Susan L, RN on: 8/19/2019 10:29 AM      Actions taken: Sign clinical note, Charge Capture section accepted

## 2021-07-03 NOTE — ADDENDUM NOTE
Addendum Note by Laura Williamson FNP at 10/21/2019  1:50 PM     Author: Laura Williamson FNP Service: -- Author Type: Nurse Practitioner    Filed: 10/22/2019  4:36 PM Encounter Date: 10/21/2019 Status: Signed    : Laura Williamson FNP (Nurse Practitioner)    Addended by: LAURA WILLIAMSON on: 10/22/2019 04:36 PM        Modules accepted: Orders

## 2021-07-04 NOTE — ADDENDUM NOTE
Addendum Note by Laura Williamson FNP at 3/1/2021  1:49 PM     Author: Laura Williamson FNP Service: -- Author Type: Nurse Practitioner    Filed: 3/1/2021  1:49 PM Encounter Date: 2/19/2021 Status: Signed    : Laura Williamson FNP (Nurse Practitioner)    Addended by: LAURA WILLIAMSON on: 3/1/2021 01:49 PM        Modules accepted: Orders

## 2021-07-04 NOTE — PATIENT INSTRUCTIONS - HE
Patient Instructions by Salena Jameson PA-C at 6/18/2021  1:40 PM     Author: Salena Jameson PA-C Service: -- Author Type: Physician Assistant    Filed: 6/18/2021  2:16 PM Encounter Date: 6/18/2021 Status: Signed    : Salena Jameson PA-C (Physician Assistant)       Patient Education     Lymphadenopathy  Lymphadenopathy is swelling of the lymph nodes. Lymph nodes are small, bean-shaped glands around the body.  What are lymph nodes?  Lymph nodes are part of your immune system. These glands are found in your neck, over your clavicle, armpits, groin, chest, and belly (abdomen). They act as filters for lymph fluid as it flows through your body. Lymph fluid contains white blood cells (lymphocytes) that help the body fight infection and disease.   Why lymph nodes swell  Lymphadenopathy is very common. The glands often get larger during a viral or bacterial infection. It can happen during a cold, the flu, or strep throat. The nodes may swell in just one area of the body, such as the neck (localized). Or nodes may swell all over the body (generalized). The neck (cervical) lymph nodes are the most common site of lymphadenopathy.  What causes lymphadenopathy?  Dead cells and fluid build up in the lymph nodes as they help fight infection or disease. This causes them to swell in size. Enlarged lymph nodes are often near the source of infection. This can help to find the cause of an infection. For example, swollen lymph nodes around the jaw may be because of an infection in the teeth or mouth. But lymphadenopathy may also be generalized. This is common in some viral illnesses such as infectious mononucleosis, HIV, or chickenpox (varicella).  Lymphadenopathy can also be caused by:    Infection of a lymph node or small group of nodes (lymphadenitis)    Cancer    Reactions to medicines such as antibiotics and certain blood pressure, gout, and seizure medicines    Other health conditions, such as lupus or  sarcoidosis  Symptoms of lymphadenopathy  Lymphadenopathy can cause symptoms such as:    Lumps under the jaw, on the sides or back of the neck, in the armpits, in the groin, or in the chest or belly (abdomen)    Pain or soreness in any of these areas    Redness or warmth in any of these areas  You may also have symptoms from an infection causing the swollen glands. These symptoms may include fever, sore throat, body aches, or cough.  Diagnosing lymphadenopathy  Your healthcare provider will ask about your health history and symptoms. He or she will give you a physical exam and check the areas where lymph nodes are enlarged. Your provider will check the size, texture, and location of the nodes. He or she will ask how long they have been swollen and if they are painful. You may be advised to have diagnostic tests and referral to specialists may be advised. The tests may include:    Blood tests. These are done to check for signs of infection and other problems.    Urine test. This is also done to check for infection and other problems.    Chest X-ray, ultrasound, CT scan, or MRI scans. These tests can show enlarged lymph nodes or other problems.    Lymph node biopsy. The cause of enlarged lymph nodes may be checked with a biopsy. Small samples of lymph node tissue are taken and checked in a lab for signs of infection, cancer, and other causes. You may be referred to other specialists for their opinion as well.  Treatment for lymphadenopathy  The treatment of enlarged lymph nodes depends on the cause. Enlarged lymph nodes are often harmless and go away without any treatment. Treatment is most often done on the cause of the enlarged nodes and may include:    Antibiotic or antiviral medicine to treat a bacterial or viral infection    Incision and drainage of a lymph node for lymphadenitis    Other medicines or procedures to treat the cause of the enlarged nodes  You may need a follow-up exam in 3 to 4 weeks to recheck  enlarged nodes.  When to call your healthcare provider  Call your healthcare provider if:    Your symptoms get worse    You have new symptoms    You have a fever of 100.4 F (38 C) or higher, or as directed by your provider    Your lymph nodes that are still swollen after 3 to 4 weeks    Date Last Reviewed: 6/1/2019 2000-2019 [employer]. [address] [place], [place] [id number]. All rights reserved. This information is not intended as a substitute for professional medical care. Always follow your healthcare professional's instructions.

## 2021-07-04 NOTE — PROGRESS NOTES
Progress Notes by Salena Jameson PA-C at 6/18/2021  1:40 PM     Author: Salena Jameson PA-C Service: -- Author Type: Physician Assistant    Filed: 6/18/2021  2:28 PM Encounter Date: 6/18/2021 Status: Signed    : Salena Jameson PA-C (Physician Assistant)         Assessment & Plan:       1. Lymphedema  furosemide (LASIX) 20 MG tablet      Medical Decision Making  Patient with history of lymphedema presents with acute onset swelling of the bilateral lower extremities.  No signs of cellulitis or DVT given no erythema and no tenderness to palpation.  Patient further had no signs of shortness of breath, clear lung auscultation, and good oxygen saturation at 100% on room air.  Patient's creatinine clearance from labs on 6/1 appeared normal.  Will start patient on 20 mg once daily Lasix and recommended follow-up with primary care within 1 week.  Discussed signs of worsening symptoms and when to be seen immediately for reevaluation.    Patient Instructions   Patient Education     Lymphadenopathy  Lymphadenopathy is swelling of the lymph nodes. Lymph nodes are small, bean-shaped glands around the body.  What are lymph nodes?  Lymph nodes are part of your immune system. These glands are found in your neck, over your clavicle, armpits, groin, chest, and belly (abdomen). They act as filters for lymph fluid as it flows through your body. Lymph fluid contains white blood cells (lymphocytes) that help the body fight infection and disease.   Why lymph nodes swell  Lymphadenopathy is very common. The glands often get larger during a viral or bacterial infection. It can happen during a cold, the flu, or strep throat. The nodes may swell in just one area of the body, such as the neck (localized). Or nodes may swell all over the body (generalized). The neck (cervical) lymph nodes are the most common site of lymphadenopathy.  What causes lymphadenopathy?  Dead cells and fluid build up in the lymph nodes as they help  fight infection or disease. This causes them to swell in size. Enlarged lymph nodes are often near the source of infection. This can help to find the cause of an infection. For example, swollen lymph nodes around the jaw may be because of an infection in the teeth or mouth. But lymphadenopathy may also be generalized. This is common in some viral illnesses such as infectious mononucleosis, HIV, or chickenpox (varicella).  Lymphadenopathy can also be caused by:    Infection of a lymph node or small group of nodes (lymphadenitis)    Cancer    Reactions to medicines such as antibiotics and certain blood pressure, gout, and seizure medicines    Other health conditions, such as lupus or sarcoidosis  Symptoms of lymphadenopathy  Lymphadenopathy can cause symptoms such as:    Lumps under the jaw, on the sides or back of the neck, in the armpits, in the groin, or in the chest or belly (abdomen)    Pain or soreness in any of these areas    Redness or warmth in any of these areas  You may also have symptoms from an infection causing the swollen glands. These symptoms may include fever, sore throat, body aches, or cough.  Diagnosing lymphadenopathy  Your healthcare provider will ask about your health history and symptoms. He or she will give you a physical exam and check the areas where lymph nodes are enlarged. Your provider will check the size, texture, and location of the nodes. He or she will ask how long they have been swollen and if they are painful. You may be advised to have diagnostic tests and referral to specialists may be advised. The tests may include:    Blood tests. These are done to check for signs of infection and other problems.    Urine test. This is also done to check for infection and other problems.    Chest X-ray, ultrasound, CT scan, or MRI scans. These tests can show enlarged lymph nodes or other problems.    Lymph node biopsy. The cause of enlarged lymph nodes may be checked with a biopsy. Small  "samples of lymph node tissue are taken and checked in a lab for signs of infection, cancer, and other causes. You may be referred to other specialists for their opinion as well.  Treatment for lymphadenopathy  The treatment of enlarged lymph nodes depends on the cause. Enlarged lymph nodes are often harmless and go away without any treatment. Treatment is most often done on the cause of the enlarged nodes and may include:    Antibiotic or antiviral medicine to treat a bacterial or viral infection    Incision and drainage of a lymph node for lymphadenitis    Other medicines or procedures to treat the cause of the enlarged nodes  You may need a follow-up exam in 3 to 4 weeks to recheck enlarged nodes.  When to call your healthcare provider  Call your healthcare provider if:    Your symptoms get worse    You have new symptoms    You have a fever of 100.4 F (38 C) or higher, or as directed by your provider    Your lymph nodes that are still swollen after 3 to 4 weeks    Date Last Reviewed: 6/1/2019 2000-2019 [employer]. [address] [place] [address]. All rights reserved. This information is not intended as a substitute for professional medical care. Always follow your healthcare professional's instructions.                 Subjective:       Sarah Gilmore is a 50 y.o. female with history of lymphedema, here for evaluation of bilateral lower extremity swelling.  Onset of symptoms was 1 to 2 weeks ago.  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  Patient swelling of the legs did not develop until several days after these procedures.  She notes some tingling sensation in the feet described as \"vibrating\".  She otherwise denies leg pains, fevers, shortness of breath, and rashes.  Patient was previously on Lasix once daily, but was discontinued when she was diagnosed with lipedema.    The following portions of the patient's history were reviewed and " updated as appropriate: allergies, current medications and problem list.    Review of Systems  Pertinent items are noted in HPI.     Allergies  Allergies   Allergen Reactions   ? Bee Pollen Anaphylaxis   ? Bee Venom Protein (Honey Bee) Anaphylaxis   ? Iodinated Contrast Media Anaphylaxis     Patient had CT scan with Omnipaque 350 on 8/30/20 while premedicated with no adverse reaction.    ? Seafood Anaphylaxis       Family History   Problem Relation Age of Onset   ? Mental illness Mother    ? Sleep apnea Mother    ? Diabetes Father    ? No Medical Problems Sister    ? No Medical Problems Brother    ? Asperger's syndrome Son    ? Suicidality Maternal Grandfather    ? Breast cancer Paternal Grandmother 50   ? Prostate cancer Paternal Grandfather    ? Testicular cancer Paternal Grandfather    ? Mental illness Son    ? Leukemia Paternal Uncle        Social History     Socioeconomic History   ? Marital status: Single     Spouse name: None   ? Number of children: None   ? Years of education: None   ? Highest education level: None   Occupational History   ? None   Social Needs   ? Financial resource strain: None   ? Food insecurity     Worry: None     Inability: None   ? Transportation needs     Medical: None     Non-medical: None   Tobacco Use   ? Smoking status: Never Smoker   ? Smokeless tobacco: Never Used   Substance and Sexual Activity   ? Alcohol use: No   ? Drug use: No   ? Sexual activity: Never   Lifestyle   ? Physical activity     Days per week: None     Minutes per session: None   ? Stress: None   Relationships   ? Social connections     Talks on phone: None     Gets together: None     Attends [employer] service: None     Active member of club or organization: None     Attends meetings of clubs or organizations: None     Relationship status: None   ? Intimate partner violence     Fear of current or ex partner: None     Emotionally abused: None     Physically abused: None     Forced sexual activity: None   Other  Topics Concern   ? None   Social History Narrative   ? None         Objective:       /75   Pulse 72   Temp 98.2  F (36.8  C)   Resp 16   Wt (!) 277 lb (125.6 kg)   LMP 03/03/2018   SpO2 100%   Breastfeeding No   BMI 39.75 kg/m    General appearance: alert, appears stated age, cooperative, no distress and non-toxic  Extremities: Bilateral lower extremity edema from the knees down to the patient's feet bilaterally, no specific point tenderness, calves are nontender, full range of motion of all joints without difficulty  Pulses: 2+ and symmetric  Skin: Lower extremities: Swelling of the bilateral lower extremities from the knees down, no erythema or increased warmth to touch, no cyanosis

## 2021-07-06 VITALS
HEART RATE: 84 BPM | SYSTOLIC BLOOD PRESSURE: 114 MMHG | BODY MASS INDEX: 38.51 KG/M2 | DIASTOLIC BLOOD PRESSURE: 72 MMHG | WEIGHT: 268.4 LBS

## 2021-07-06 VITALS
DIASTOLIC BLOOD PRESSURE: 75 MMHG | WEIGHT: 277 LBS | RESPIRATION RATE: 16 BRPM | OXYGEN SATURATION: 100 % | SYSTOLIC BLOOD PRESSURE: 126 MMHG | TEMPERATURE: 98.2 F | BODY MASS INDEX: 39.75 KG/M2 | HEART RATE: 72 BPM

## 2021-07-20 ENCOUNTER — HOSPITAL ENCOUNTER (OUTPATIENT)
Dept: PHYSICAL THERAPY | Facility: REHABILITATION | Age: 51
End: 2021-07-20
Payer: COMMERCIAL

## 2021-07-20 DIAGNOSIS — E66.812 CLASS 2 SEVERE OBESITY WITH SERIOUS COMORBIDITY AND BODY MASS INDEX (BMI) OF 38.0 TO 38.9 IN ADULT, UNSPECIFIED OBESITY TYPE (H): ICD-10-CM

## 2021-07-20 DIAGNOSIS — E66.01 CLASS 2 SEVERE OBESITY WITH SERIOUS COMORBIDITY AND BODY MASS INDEX (BMI) OF 38.0 TO 38.9 IN ADULT, UNSPECIFIED OBESITY TYPE (H): ICD-10-CM

## 2021-07-20 DIAGNOSIS — I89.0 ACQUIRED LYMPHEDEMA OF LEG: Primary | ICD-10-CM

## 2021-07-20 DIAGNOSIS — I89.0 LYMPHEDEMA OF RIGHT ARM: ICD-10-CM

## 2021-07-20 DIAGNOSIS — M79.89 SWELLING IN RIGHT ARMPIT: ICD-10-CM

## 2021-07-20 DIAGNOSIS — Z98.890 H/O BREAST RECONSTRUCTION: ICD-10-CM

## 2021-07-20 PROCEDURE — 97161 PT EVAL LOW COMPLEX 20 MIN: CPT | Mod: GP | Performed by: PHYSICAL THERAPIST

## 2021-07-20 PROCEDURE — 97140 MANUAL THERAPY 1/> REGIONS: CPT | Mod: GP | Performed by: PHYSICAL THERAPIST

## 2021-07-22 ENCOUNTER — HOSPITAL ENCOUNTER (OUTPATIENT)
Dept: PHYSICAL THERAPY | Facility: REHABILITATION | Age: 51
End: 2021-07-22
Payer: COMMERCIAL

## 2021-07-22 DIAGNOSIS — I89.0 ACQUIRED LYMPHEDEMA OF LEG: Primary | ICD-10-CM

## 2021-07-22 DIAGNOSIS — R60.9 LIPEDEMA: ICD-10-CM

## 2021-07-22 DIAGNOSIS — M79.89 SWELLING IN RIGHT ARMPIT: ICD-10-CM

## 2021-07-22 DIAGNOSIS — I87.303 VENOUS HYPERTENSION OF LOWER EXTREMITY, BILATERAL: ICD-10-CM

## 2021-07-22 DIAGNOSIS — Z98.890 H/O BREAST RECONSTRUCTION: ICD-10-CM

## 2021-07-22 PROCEDURE — 97140 MANUAL THERAPY 1/> REGIONS: CPT | Mod: GP | Performed by: PHYSICAL THERAPIST

## 2021-07-22 PROCEDURE — 97110 THERAPEUTIC EXERCISES: CPT | Mod: GP | Performed by: PHYSICAL THERAPIST

## 2021-07-22 NOTE — DISCHARGE INSTRUCTIONS
Inhale through nose making navel move out toward hands. Exhale through puckered lips, hands follow navel in.  Repeat 5-10 times.  Rest 2 seconds between reps.  Do 1-2 x/day.   Parallel hands on either side of navel, make 10 in-place circles. Proceed in sections to side of waist.  Do 1-2 x/day.

## 2021-07-24 ENCOUNTER — HEALTH MAINTENANCE LETTER (OUTPATIENT)
Age: 51
End: 2021-07-24

## 2021-07-29 ENCOUNTER — HOSPITAL ENCOUNTER (OUTPATIENT)
Dept: PHYSICAL THERAPY | Facility: REHABILITATION | Age: 51
End: 2021-07-29
Payer: COMMERCIAL

## 2021-07-29 DIAGNOSIS — E66.812 CLASS 2 SEVERE OBESITY WITH SERIOUS COMORBIDITY AND BODY MASS INDEX (BMI) OF 38.0 TO 38.9 IN ADULT, UNSPECIFIED OBESITY TYPE (H): ICD-10-CM

## 2021-07-29 DIAGNOSIS — M79.89 SWELLING IN RIGHT ARMPIT: ICD-10-CM

## 2021-07-29 DIAGNOSIS — I89.0 ACQUIRED LYMPHEDEMA OF LEG: Primary | ICD-10-CM

## 2021-07-29 DIAGNOSIS — Z98.890 H/O BREAST RECONSTRUCTION: ICD-10-CM

## 2021-07-29 DIAGNOSIS — C50.911 INFILTRATING DUCTAL CARCINOMA OF RIGHT FEMALE BREAST (H): ICD-10-CM

## 2021-07-29 DIAGNOSIS — R60.9 LIPEDEMA: ICD-10-CM

## 2021-07-29 DIAGNOSIS — I87.303 VENOUS HYPERTENSION OF LOWER EXTREMITY, BILATERAL: ICD-10-CM

## 2021-07-29 DIAGNOSIS — I89.0 LYMPHEDEMA OF RIGHT ARM: ICD-10-CM

## 2021-07-29 DIAGNOSIS — E66.01 CLASS 2 SEVERE OBESITY WITH SERIOUS COMORBIDITY AND BODY MASS INDEX (BMI) OF 38.0 TO 38.9 IN ADULT, UNSPECIFIED OBESITY TYPE (H): ICD-10-CM

## 2021-07-29 PROCEDURE — 97140 MANUAL THERAPY 1/> REGIONS: CPT | Mod: GP | Performed by: PHYSICAL THERAPIST

## 2021-07-29 PROCEDURE — 97110 THERAPEUTIC EXERCISES: CPT | Mod: GP | Performed by: PHYSICAL THERAPIST

## 2021-08-12 ENCOUNTER — OFFICE VISIT (OUTPATIENT)
Dept: FAMILY MEDICINE | Facility: CLINIC | Age: 51
End: 2021-08-12
Payer: COMMERCIAL

## 2021-08-12 ENCOUNTER — HOSPITAL ENCOUNTER (OUTPATIENT)
Dept: PHYSICAL THERAPY | Facility: REHABILITATION | Age: 51
End: 2021-08-12
Payer: COMMERCIAL

## 2021-08-12 VITALS
SYSTOLIC BLOOD PRESSURE: 128 MMHG | WEIGHT: 262.5 LBS | DIASTOLIC BLOOD PRESSURE: 82 MMHG | BODY MASS INDEX: 37.58 KG/M2 | HEIGHT: 70 IN | HEART RATE: 74 BPM

## 2021-08-12 DIAGNOSIS — M79.89 SWELLING IN RIGHT ARMPIT: ICD-10-CM

## 2021-08-12 DIAGNOSIS — C50.911 INFILTRATING DUCTAL CARCINOMA OF RIGHT FEMALE BREAST (H): Primary | ICD-10-CM

## 2021-08-12 DIAGNOSIS — Z00.00 ROUTINE GENERAL MEDICAL EXAMINATION AT A HEALTH CARE FACILITY: Primary | ICD-10-CM

## 2021-08-12 DIAGNOSIS — R60.9 LIPEDEMA: ICD-10-CM

## 2021-08-12 DIAGNOSIS — Z98.890 H/O BREAST RECONSTRUCTION: ICD-10-CM

## 2021-08-12 LAB
CHOLEST SERPL-MCNC: 169 MG/DL
FASTING STATUS PATIENT QL REPORTED: YES
FASTING STATUS PATIENT QL REPORTED: YES
GLUCOSE BLD-MCNC: 74 MG/DL (ref 70–125)
HDLC SERPL-MCNC: 46 MG/DL
LDLC SERPL CALC-MCNC: 105 MG/DL
TRIGL SERPL-MCNC: 91 MG/DL

## 2021-08-12 PROCEDURE — 97140 MANUAL THERAPY 1/> REGIONS: CPT | Mod: GP | Performed by: PHYSICAL THERAPIST

## 2021-08-12 PROCEDURE — 97110 THERAPEUTIC EXERCISES: CPT | Mod: GP | Performed by: PHYSICAL THERAPIST

## 2021-08-12 PROCEDURE — 80061 LIPID PANEL: CPT | Performed by: NURSE PRACTITIONER

## 2021-08-12 PROCEDURE — 82947 ASSAY GLUCOSE BLOOD QUANT: CPT | Performed by: NURSE PRACTITIONER

## 2021-08-12 PROCEDURE — 99396 PREV VISIT EST AGE 40-64: CPT | Performed by: NURSE PRACTITIONER

## 2021-08-12 PROCEDURE — 36415 COLL VENOUS BLD VENIPUNCTURE: CPT | Performed by: NURSE PRACTITIONER

## 2021-08-12 ASSESSMENT — PATIENT HEALTH QUESTIONNAIRE - PHQ9
SUM OF ALL RESPONSES TO PHQ QUESTIONS 1-9: 12
SUM OF ALL RESPONSES TO PHQ QUESTIONS 1-9: 12
10. IF YOU CHECKED OFF ANY PROBLEMS, HOW DIFFICULT HAVE THESE PROBLEMS MADE IT FOR YOU TO DO YOUR WORK, TAKE CARE OF THINGS AT HOME, OR GET ALONG WITH OTHER PEOPLE: VERY DIFFICULT

## 2021-08-12 ASSESSMENT — MIFFLIN-ST. JEOR: SCORE: 1875.94

## 2021-08-12 NOTE — PROGRESS NOTES
SUBJECTIVE:   CC: Sarah Gilmore is an 51 year old woman who presents for preventive health visit.     Patient report that she has had about 4 surgeries in 4 days on her breast.  She reported that her right breast lost blood supply and the left one sustained a loss of the left flap and partial ischemic loss of the right side due to previous liposuction of her abdomen region for fat transfer.      Patient has been advised of split billing requirements and indicates understanding: Yes  Healthy Habits:     Getting at least 3 servings of Calcium per day:  Yes    Bi-annual eye exam:  Yes    Dental care twice a year:  NO    Sleep apnea or symptoms of sleep apnea:  Sleep apnea    Diet:  Low salt, Carbohydrate counting and Gluten-free/reduced    Frequency of exercise:  2-3 days/week    Duration of exercise:  15-30 minutes    Taking medications regularly:  Yes    Medication side effects:  None    PHQ-2 Total Score: 4    Additional concerns today:  Yes    PROBLEMS TO ADD ON...    Today's PHQ-2 Score:   PHQ-2 ( 1999 [employer]) 8/12/2021   Q1: Little interest or pleasure in doing things 2   Q2: Feeling down, depressed or hopeless 2   PHQ-2 Score 4   Q1: Little interest or pleasure in doing things More than half the days   Q2: Feeling down, depressed or hopeless More than half the days   PHQ-2 Score 4       Abuse: Current or Past (Physical, Sexual or Emotional) - No  Do you feel safe in your environment? Yes    Have you ever done Advance Care Planning? (For example, a Health Directive, POLST, or a discussion with a medical provider or your loved ones about your wishes): Yes, patient states has an Advance Care Planning document and will bring a copy to the clinic.    Social History     Tobacco Use     Smoking status: Never Smoker     Smokeless tobacco: Never Used   Substance Use Topics     Alcohol use: No     If you drink alcohol do you typically have >3 drinks per day or >7 drinks per week? No    Alcohol Use 8/12/2021  "  Prescreen: >3 drinks/day or >7 drinks/week? Not Applicable   No flowsheet data found.    Reviewed orders with patient.  Reviewed health maintenance and updated orders accordingly - Yes  Lab work is in process    Breast Cancer Screening:  Any new diagnosis of family breast, ovarian, or bowel cancer? No    FHS-7: No flowsheet data found.  click delete button to remove this line now  Mammogram Screening: Recommended annual mammography  Pertinent mammograms are reviewed under the imaging tab.    History of abnormal Pap smear: Status post benign hysterectomy. Health Maintenance and Surgical History updated.  PAP / HPV 2016   PAP Negative for squamous intraepithelial lesion or malignancy  Electronically signed by Vilma Arnold CT (ASCP) on 2016 at  1:57 PM   - -   PAP (Historical) - NIL WNL     Reviewed and updated as needed this visit by clinical staff                 Reviewed and updated as needed this visit by Provider                Past Medical History:   Diagnosis Date     Anemia      DCIS (ductal carcinoma in situ)     bilateral     Dilated aortic root (H)      Foot fracture 6/3/11     History of anesthesia complications     slow to wake up     Osteopenia      Osteoporosis      Other specified anemias      PONV (postoperative nausea and vomiting)      Radial head fracture 6/3/11     Seizures (H)     h/o seizure disorder in childhood, last seizure activity  \"4 yrs ago\"     Suspected sleep apnea     sleep study set up in Aug 2019      Past Surgical History:   Procedure Laterality Date     C  DELIVERY ONLY       C/SECTION, LOW TRANSVERSE  07/10/1994    breech     HYSTERECTOMY Bilateral 2018    TOTAL LAPAROSCOPIC HYSTERECTOMY BILATERAL SALPINGECTOMY, CYSTOSCOPY     MAMMO STEREOTACTIC CORE BIOPSY RIGHT Right 2019     MAMMO STEREOTACTIC CORE BIOPSY RIGHT Right 2019     NM SENTINEL NODE INJECTION  2019     OTHER SURGICAL HISTORY      right arm " surgery     ID MASTECTOMY, MODIFIED RADICAL Bilateral 2019    Procedure: BILATERAL MASTECTOMY;  Surgeon: Mckenzie Colby DO;  Location: [facility] [place];  Service: General     ID REPLACEMENT TISSUE EXPANDER W/PERMANENT IMPLANT Bilateral 2019    Procedure: BILATERAL IMPLANT RECONSTRUCTION TO BREASTS;  Surgeon: Carlitos Crum MD;  Location: [facility] [place];  Service: Plastics     OB History    Para Term  AB Living   3 3 2 1 0 3   SAB TAB Ectopic Multiple Live Births   0 0 0 0 0      # Outcome Date GA Lbr Osiel/2nd Weight Sex Delivery Anes PTL Lv   3 Term            2 Term            1                 Review of Systems  CONSTITUTIONAL: NEGATIVE for fever, chills, change in weight  INTEGUMENTARY/SKIN: NEGATIVE for worrisome rashes, moles or lesions  EYES: NEGATIVE for vision changes or irritation  ENT: NEGATIVE for ear, mouth and throat problems  RESP: NEGATIVE for significant cough or SOB  BREAST: NEGATIVE for masses, tenderness or discharge  CV: NEGATIVE for chest pain, palpitations or peripheral edema  GI: NEGATIVE for nausea, abdominal pain, heartburn, or change in bowel habits  : NEGATIVE for unusual urinary or vaginal symptoms. No vaginal bleeding.  MUSCULOSKELETAL: NEGATIVE for significant arthralgias or myalgia  NEURO: NEGATIVE for weakness, dizziness or paresthesias  PSYCHIATRIC: NEGATIVE for changes in mood or affect      OBJECTIVE:   There were no vitals taken for this visit.  Physical Exam  GENERAL: healthy, alert and no distress  EYES: Eyes grossly normal to inspection, PERRL and conjunctivae and sclerae normal  HENT: ear canals and TM's normal, nose and mouth without ulcers or lesions  NECK: no adenopathy, no asymmetry, masses, or scars and thyroid normal to palpation  RESP: lungs clear to auscultation - no rales, rhonchi or wheezes  BREAST: normal without masses, tenderness or nipple discharge, no palpable axillary masses or adenopathy and s/p mastectomy  "bilateral  CV: regular rate and rhythm, normal S1 S2, no S3 or S4, no murmur, click or rub, no peripheral edema and peripheral pulses strong  ABDOMEN: soft, nontender, no hepatosplenomegaly, no masses and bowel sounds normal  MS: no gross musculoskeletal defects noted, no edema  SKIN: no suspicious lesions or rashes  NEURO: Normal strength and tone, mentation intact and speech normal  PSYCH: mentation appears normal, affect normal/bright    Diagnostic Test Results:  Labs reviewed in Epic    ASSESSMENT/PLAN:   1. Routine general medical examination at a health care facility  Healthy female exam  - Lipid panel  - Glucose    2. Lipedema  Courage patient to continue to wrap her lower extremities, exercise and continue with therapy.      Patient has been advised of split billing requirements and indicates understanding: Yes  COUNSELING:  Reviewed preventive health counseling, as reflected in patient instructions    Estimated body mass index is 39.75 kg/m  as calculated from the following:    Height as of 5/3/21: 1.778 m (5' 10\").    Weight as of 6/18/21: 125.6 kg (277 lb).    Weight management plan: Discussed healthy diet and exercise guidelines    She reports that she has never smoked. She has never used smokeless tobacco.      Counseling Resources:  ATP IV Guidelines  Pooled Cohorts Equation Calculator  Breast Cancer Risk Calculator  BRCA-Related Cancer Risk Assessment: FHS-7 Tool  FRAX Risk Assessment  ICSI Preventive Guidelines  Dietary Guidelines for Americans, 2010  USDA's MyPlate  ASA Prophylaxis  Lung CA Screening    EMMY Maxwell CNP [facility]  Answers for HPI/ROS submitted by the patient on 8/12/2021  If you checked off any problems, how difficult have these problems made it for you to do your work, take care of things at home, or get along with other people?: Very difficult  PHQ9 TOTAL SCORE: 12      "

## 2021-08-13 ASSESSMENT — PATIENT HEALTH QUESTIONNAIRE - PHQ9: SUM OF ALL RESPONSES TO PHQ QUESTIONS 1-9: 12

## 2021-08-24 ENCOUNTER — HOSPITAL ENCOUNTER (OUTPATIENT)
Dept: PHYSICAL THERAPY | Facility: REHABILITATION | Age: 51
End: 2021-08-24
Payer: COMMERCIAL

## 2021-08-24 DIAGNOSIS — D05.12 DUCTAL CARCINOMA IN SITU (DCIS) OF BOTH BREASTS: ICD-10-CM

## 2021-08-24 DIAGNOSIS — I89.0 ACQUIRED LYMPHEDEMA OF LEG: ICD-10-CM

## 2021-08-24 DIAGNOSIS — E66.812 CLASS 2 SEVERE OBESITY WITH SERIOUS COMORBIDITY AND BODY MASS INDEX (BMI) OF 38.0 TO 38.9 IN ADULT, UNSPECIFIED OBESITY TYPE (H): ICD-10-CM

## 2021-08-24 DIAGNOSIS — D05.11 DUCTAL CARCINOMA IN SITU (DCIS) OF BOTH BREASTS: ICD-10-CM

## 2021-08-24 DIAGNOSIS — M62.81 GENERALIZED MUSCLE WEAKNESS: ICD-10-CM

## 2021-08-24 DIAGNOSIS — Z98.890 H/O BREAST RECONSTRUCTION: ICD-10-CM

## 2021-08-24 DIAGNOSIS — M25.561 ARTHRALGIA OF RIGHT LOWER LEG: ICD-10-CM

## 2021-08-24 DIAGNOSIS — R60.9 LIPEDEMA: ICD-10-CM

## 2021-08-24 DIAGNOSIS — E66.01 CLASS 2 SEVERE OBESITY WITH SERIOUS COMORBIDITY AND BODY MASS INDEX (BMI) OF 38.0 TO 38.9 IN ADULT, UNSPECIFIED OBESITY TYPE (H): ICD-10-CM

## 2021-08-24 DIAGNOSIS — I89.0 LYMPHEDEMA OF RIGHT ARM: ICD-10-CM

## 2021-08-24 DIAGNOSIS — C50.911 INFILTRATING DUCTAL CARCINOMA OF RIGHT FEMALE BREAST (H): Primary | ICD-10-CM

## 2021-08-24 DIAGNOSIS — I87.303 VENOUS HYPERTENSION OF LOWER EXTREMITY, BILATERAL: ICD-10-CM

## 2021-08-24 DIAGNOSIS — M79.89 SWELLING IN RIGHT ARMPIT: ICD-10-CM

## 2021-08-24 PROCEDURE — 97110 THERAPEUTIC EXERCISES: CPT | Mod: GP | Performed by: PHYSICAL THERAPY ASSISTANT

## 2021-08-24 PROCEDURE — 97140 MANUAL THERAPY 1/> REGIONS: CPT | Mod: GP | Performed by: PHYSICAL THERAPY ASSISTANT

## 2021-08-25 ENCOUNTER — TELEPHONE (OUTPATIENT)
Dept: VASCULAR SURGERY | Facility: CLINIC | Age: 51
End: 2021-08-25

## 2021-08-25 DIAGNOSIS — I97.2 POST-MASTECTOMY LYMPHEDEMA SYNDROME: Primary | ICD-10-CM

## 2021-08-25 NOTE — TELEPHONE ENCOUNTER
Patient calling with a few inquiries for Dr. Hook:   -Pt would like to have orders for lymphedema therapy on her legs added to her current therapies as her legs are starting to effect her mobility.   -Requesting orders for compression stockings and sleeve be sent to [facility]  -Wondering if she can be referred to a different bariatrics clinic; she states the one that she was referred to required over $500 down up front plus copays etc     Ph: [phone]

## 2021-08-30 NOTE — TELEPHONE ENCOUNTER
Spoke to patient, she wears the pantyhose compression during the work week. She wanted to wait to get new compression until after her surgery.  She is seeing you next on 9/20. She would still like the lymphedema therapy for legs. Things have not significantly worsened since Jne.

## 2021-08-31 ENCOUNTER — HOSPITAL ENCOUNTER (OUTPATIENT)
Dept: PHYSICAL THERAPY | Facility: REHABILITATION | Age: 51
End: 2021-08-31
Payer: COMMERCIAL

## 2021-08-31 DIAGNOSIS — Z98.890 H/O BREAST RECONSTRUCTION: ICD-10-CM

## 2021-08-31 DIAGNOSIS — I87.303 VENOUS HYPERTENSION OF LOWER EXTREMITY, BILATERAL: ICD-10-CM

## 2021-08-31 DIAGNOSIS — E66.01 CLASS 2 SEVERE OBESITY WITH SERIOUS COMORBIDITY AND BODY MASS INDEX (BMI) OF 38.0 TO 38.9 IN ADULT, UNSPECIFIED OBESITY TYPE (H): ICD-10-CM

## 2021-08-31 DIAGNOSIS — E66.812 CLASS 2 SEVERE OBESITY WITH SERIOUS COMORBIDITY AND BODY MASS INDEX (BMI) OF 38.0 TO 38.9 IN ADULT, UNSPECIFIED OBESITY TYPE (H): ICD-10-CM

## 2021-08-31 DIAGNOSIS — M25.561 ARTHRALGIA OF RIGHT LOWER LEG: ICD-10-CM

## 2021-08-31 DIAGNOSIS — M62.81 GENERALIZED MUSCLE WEAKNESS: ICD-10-CM

## 2021-08-31 DIAGNOSIS — C50.911 INFILTRATING DUCTAL CARCINOMA OF RIGHT FEMALE BREAST (H): Primary | ICD-10-CM

## 2021-08-31 DIAGNOSIS — I89.0 ACQUIRED LYMPHEDEMA OF LEG: ICD-10-CM

## 2021-08-31 DIAGNOSIS — D05.12 DUCTAL CARCINOMA IN SITU (DCIS) OF BOTH BREASTS: ICD-10-CM

## 2021-08-31 DIAGNOSIS — I89.0 LYMPHEDEMA OF RIGHT ARM: ICD-10-CM

## 2021-08-31 DIAGNOSIS — D05.11 DUCTAL CARCINOMA IN SITU (DCIS) OF BOTH BREASTS: ICD-10-CM

## 2021-08-31 DIAGNOSIS — M79.89 SWELLING IN RIGHT ARMPIT: ICD-10-CM

## 2021-08-31 DIAGNOSIS — R60.9 LIPEDEMA: ICD-10-CM

## 2021-08-31 PROCEDURE — 97140 MANUAL THERAPY 1/> REGIONS: CPT | Mod: GP | Performed by: PHYSICAL THERAPY ASSISTANT

## 2021-09-01 DIAGNOSIS — M79.89 LEG SWELLING: Primary | ICD-10-CM

## 2021-09-02 ENCOUNTER — TRANSCRIBE ORDERS (OUTPATIENT)
Dept: ONCOLOGY | Facility: CLINIC | Age: 51
End: 2021-09-02

## 2021-09-02 DIAGNOSIS — M79.89 LEG SWELLING: Primary | ICD-10-CM

## 2021-09-12 ENCOUNTER — HEALTH MAINTENANCE LETTER (OUTPATIENT)
Age: 51
End: 2021-09-12

## 2021-09-20 ENCOUNTER — OFFICE VISIT (OUTPATIENT)
Dept: VASCULAR SURGERY | Facility: CLINIC | Age: 51
End: 2021-09-20
Attending: PHYSICAL MEDICINE & REHABILITATION
Payer: COMMERCIAL

## 2021-09-20 VITALS
DIASTOLIC BLOOD PRESSURE: 64 MMHG | BODY MASS INDEX: 37.81 KG/M2 | SYSTOLIC BLOOD PRESSURE: 100 MMHG | WEIGHT: 263.5 LBS | TEMPERATURE: 98.8 F | RESPIRATION RATE: 16 BRPM | HEART RATE: 80 BPM

## 2021-09-20 DIAGNOSIS — M79.89 LEG SWELLING: Primary | ICD-10-CM

## 2021-09-20 DIAGNOSIS — E66.01 CLASS 2 SEVERE OBESITY WITH SERIOUS COMORBIDITY AND BODY MASS INDEX (BMI) OF 38.0 TO 38.9 IN ADULT, UNSPECIFIED OBESITY TYPE (H): ICD-10-CM

## 2021-09-20 DIAGNOSIS — I89.0 ACQUIRED LYMPHEDEMA OF LEG: ICD-10-CM

## 2021-09-20 DIAGNOSIS — R60.9 LIPEDEMA: ICD-10-CM

## 2021-09-20 DIAGNOSIS — I97.2 POST-MASTECTOMY LYMPHEDEMA SYNDROME: ICD-10-CM

## 2021-09-20 DIAGNOSIS — E66.812 CLASS 2 SEVERE OBESITY WITH SERIOUS COMORBIDITY AND BODY MASS INDEX (BMI) OF 38.0 TO 38.9 IN ADULT, UNSPECIFIED OBESITY TYPE (H): ICD-10-CM

## 2021-09-20 DIAGNOSIS — Z85.3 PERSONAL HISTORY OF MALIGNANT NEOPLASM OF BREAST: ICD-10-CM

## 2021-09-20 DIAGNOSIS — I87.303 VENOUS HYPERTENSION OF LOWER EXTREMITY, BILATERAL: ICD-10-CM

## 2021-09-20 DIAGNOSIS — M79.89 SWELLING IN RIGHT ARMPIT: ICD-10-CM

## 2021-09-20 PROCEDURE — G0463 HOSPITAL OUTPT CLINIC VISIT: HCPCS

## 2021-09-20 PROCEDURE — 99214 OFFICE O/P EST MOD 30 MIN: CPT | Performed by: PHYSICAL MEDICINE & REHABILITATION

## 2021-09-20 ASSESSMENT — PAIN SCALES - GENERAL: PAINLEVEL: MODERATE PAIN (4)

## 2021-09-20 NOTE — PATIENT INSTRUCTIONS
For sleeve and stockings:  [facility]   [facility]  [address], [address]  [place], [place].  Phone: [phone]

## 2021-09-20 NOTE — PROGRESS NOTES
Leg Swelling Follow Up     Date of Service: September 20, 2021     Date last seen by Dr. Hook:  May 5, 2021    PCP: EMMY Maxwell CNP     Impression:   1. Right shoulder tightness-improved greatly  2. Right shoulder contracture with fibrosis-improved  3. Secondary post mastectomy lymphedema  4. Leg swelling with lipedema-unchanged  5. Venous hypertension of legs with pain  6. Obesity class II  7. Bilateral breast carcinoma   (7/19 R invasive ductal CA  (pTx pN1a M0), L DCIS, ER/MD +, HER2 -, R mod rad mast, L simple, rad)      Plan:  1. Questions were answered.    2. Better to get all her compression from [facility].  Right arm sleeve would be better with attached gauntlet.  Okay for compression socks. Appropriate wearing of this was reviewed.  She does not have to wear it all the time.  I will ask nursing to help her coordinate from one site.   3. The benefits of exercise were reviewed along with how to do.   4. Follow up with Dr. Beatty in 6-8 months.  She will be having further breast reconstructive surgery.  After she heals she will be seen to see if any further would be beneficial.  If any questions or concerns they are to contact the clinic.        On day of encounter time spent in chart review and with patient in consultation, exam, education and coordination of care, excluding procedures:  37 minutes          ---------------------------------------------------------------------------------------------------------------------     Chief Complaint:  Right arm swelling and lower extremity swelling     History of Present Illness:  Sarah returns to the [facility] for follow up of right arm swelling due to post mastectomy lymphedema with legs swelling due to venous hypertension, acquired lymphedema and lipedema complicated by severe obesity class 2 and history of bilateral breast cancer (7/19 R invasive ductal CA  (pTx pN1a M0), L DCIS, ER/MD +, HER2 -, R mod rad  "mast, L simple, rad).  When I saw her in consult I sent her to therapy for the arm to start with attention to range of motion of the shoulder on the right.  She then was to get a sleeve.  I recommended bariatrics as weight loss was difficult for her.  Unfortunately, her co-pay was too high for this and she did not go.  Since I last saw her she had R breast flap necrosis excision per Dr. Preciado on 7/2/21. She will need additional surgery as she continues to have problems with necrosis and deformity.  Her PCP has been writing for leg lymphatic therapy.  She has not noticed much change.  She was told to go to the [facility] for her sleeve and they measured her for a sleeve and separate glove, yet, she was to do to Jennifer at the [facility] for her compression stockings.  She says she is understandably confused about this.  Today she reports the right shoulder is much better and her range of motion is better.  The skin is softer along the armpit and the right upper lateral chest wall that she points to.   There has been no new numbness, tingling or weakness.  There have been no new masses, rashes, or swellings of any other joints. There have been no infections.  There has been no new unexplained weight loss, loss of appetite, nausea and/or vomiting, shortness of breath, weight loss and chest pain. Exercise has been limited due to her numerous surgeries.     Past Medical History:    Past Medical History:   Diagnosis Date     Anemia      DCIS (ductal carcinoma in situ) 2019    bilateral     Dilated aortic root (H)      Foot fracture 6/3/11     History of anesthesia complications     slow to wake up     Osteopenia      Osteoporosis      Other specified anemias      PONV (postoperative nausea and vomiting)      Radial head fracture 6/3/11     Seizures (H)     h/o seizure disorder in childhood, last seizure activity  \"4 yrs ago\"     Suspected sleep apnea     sleep study set up in Aug 2019    "     Surgical History:   Past Surgical History:   Procedure Laterality Date     C  DELIVERY ONLY       C/SECTION, LOW TRANSVERSE  07/10/1994    breech     HYSTERECTOMY Bilateral 2018    TOTAL LAPAROSCOPIC HYSTERECTOMY BILATERAL SALPINGECTOMY, CYSTOSCOPY     MAMMO STEREOTACTIC CORE BIOPSY RIGHT Right 2019     MAMMO STEREOTACTIC CORE BIOPSY RIGHT Right 2019     NM SENTINEL NODE INJECTION  2019     OTHER SURGICAL HISTORY      right arm surgery     NJ MASTECTOMY, MODIFIED RADICAL Bilateral 2019    Procedure: BILATERAL MASTECTOMY;  Surgeon: Mckenzie Colby DO;  Location: [facility];  Service: General     NJ REPLACEMENT TISSUE EXPANDER W/PERMANENT IMPLANT Bilateral 2019    Procedure: BILATERAL IMPLANT RECONSTRUCTION TO BREASTS;  Surgeon: Carlitos Crum MD;  Location: [facility];  Service: Plastics        Medications:    Current Outpatient Medications   Medication     anastrozole (ARIMIDEX) 1 MG tablet     EPI E-Z PEN JOELLEN 1:1000  IJ     ergocalciferol (ERGOCALCIFEROL) 23469 UNIT capsule     FERROUS SULFATE  MG OR TBCR     fish oil-omega-3 fatty acids 1000 MG capsule     gabapentin (NEURONTIN) 100 MG capsule     Multiple Vitamin (MULTI-VITAMINS) TABS     OSCO MAGNESIUM CITRATE OR     Turmeric 400 MG CAPS     No current facility-administered medications for this visit.        Allergies:    Allergies   Allergen Reactions     Bee Pollen Anaphylaxis     Seafood Anaphylaxis     Bees      Noted in 09 ER     Contrast Dye      airway problems        Family history:   Family History   Problem Relation Age of Onset     Psychotic Disorder Mother      Sleep Apnea Mother      C.A.D. Father      Heart Disease Father      Snoring Father      Psychotic Disorder Maternal Grandfather         commit suicide     Breast Cancer Paternal Grandmother 50.00     Mental Illness Mother      Diabetes Father      No Known Problems Sister      No Known Problems Brother       Asperger's syndrome Son      Suicidality Maternal Grandfather      Prostate Cancer Paternal Grandfather      Testicular cancer Paternal Grandfather      Mental Illness Son      Leukemia Paternal Uncle         Social History:   Social History     Tobacco Use     Smoking status: Never Smoker     Smokeless tobacco: Never Used   Substance Use Topics     Alcohol use: No     Drug use: No          Review of Systems:     ROS negative except as noted in HPI.     Labs:      I personally reviewed the following lab results today and those on care everywhere, if indicated     No results found for: CRP   Sed Rate   Date Value Ref Range Status   04/07/2009 26 (H) 0 - 20 mm/h Final      Last Renal Panel:  Sodium   Date Value Ref Range Status   06/01/2021 139 136 - 145 mmol/L Final   06/07/2011 143 133 - 144 mmol/L Final     Potassium   Date Value Ref Range Status   06/01/2021 4.2 3.5 - 5.0 mmol/L Final   06/07/2011 4.3 3.4 - 5.3 mmol/L Final     Chloride   Date Value Ref Range Status   06/01/2021 107 98 - 107 mmol/L Final   06/07/2011 109 94 - 109 mmol/L Final     Carbon Dioxide   Date Value Ref Range Status   06/07/2011 26 20 - 32 mmol/L Final     Carbon Dioxide (CO2)   Date Value Ref Range Status   06/01/2021 19 (L) 22 - 31 mmol/L Final     Anion Gap   Date Value Ref Range Status   06/01/2021 13 5 - 18 mmol/L Final   06/07/2011 8 6 - 17 mmol/L Final     Glucose   Date Value Ref Range Status   08/12/2021 74 70 - 125 mg/dL Final   06/07/2011 76 60 - 99 mg/dL Final     Comment:     Non Fasting     Urea Nitrogen   Date Value Ref Range Status   06/01/2021 10 8 - 22 mg/dL Final   06/07/2011 12 5 - 24 mg/dL Final     Creatinine   Date Value Ref Range Status   06/01/2021 0.71 0.60 - 1.10 mg/dL Final   06/07/2011 0.83 0.52 - 1.04 mg/dL Final     GFR Estimate   Date Value Ref Range Status   06/01/2021 >60 >60 mL/min/1.73m2 Final   06/07/2011 75 >60 mL/min/1.7m2 Final     Calcium   Date Value Ref Range Status   06/01/2021 9.5 8.5 - 10.5  mg/dL Final   06/07/2011 8.4 (L) 8.5 - 10.4 mg/dL Final     Albumin   Date Value Ref Range Status   07/18/2019 4.0 3.5 - 5.0 g/dL Final   06/07/2011 3.4 (L) 3.9 - 5.1 g/dL Final      Lab Results   Component Value Date    WBC 4.0 08/29/2020    WBC 5.7 06/07/2011     Lab Results   Component Value Date    RBC 4.39 08/29/2020    RBC 4.05 06/07/2011     Lab Results   Component Value Date    HGB 14.2 06/01/2021    HGB 10.2 06/07/2011     Lab Results   Component Value Date    HCT 40.9 08/29/2020    HCT 32.1 06/07/2011     No components found for: MCT  Lab Results   Component Value Date    MCV 93 08/29/2020    MCV 79 06/07/2011     Lab Results   Component Value Date    MCH 31.7 08/29/2020    MCH 25.2 06/07/2011     Lab Results   Component Value Date    MCHC 34.1 08/29/2020    MCHC 31.8 06/07/2011     Lab Results   Component Value Date    RDW 11.7 08/29/2020    RDW 14.9 06/07/2011     Lab Results   Component Value Date     08/29/2020     06/07/2011      No results found for: A1C   TSH   Date Value Ref Range Status   08/29/2020 3.88 0.30 - 5.00 uIU/mL Final   03/11/2008 2.25 0.4 - 5.0 mU/L Final      No results found for: SARAH     @[facility][cult:*@      Imaging:     I personally reviewed the following imaging results today and those on care everywhere, if indicated     BILATERAL Venous Insufficiency Ultrasound (05/06/21)  BILATERAL Lower Extremity        Indication:  SURVEILLANCE BILATERAL LEG VARICOSE VEINS, PAIN, AND SWELLING.      Previous: NONE     Patient History: Swelling     Presenting Symptoms:  Swelling, Varicose Veins and Pain     Technique:   Supine and Upright Ultrasound of the Deep and Superficial Veins with Valsalva and Compression Augmentation Maneuvers. Duplex Imaging is performed utilizing gray-scale, Two-dimensional images, color-flow imaging, Doppler waveform analysis, and Spectral   doppler imaging done with provacative maneuvers.      Incompetency Criteria:  Deep vein reflux reported when  greater than 1000 msec flow reversal. Superficial vein reflux reported when equal to or greater than 600 msec flow reversal.  vein reflux reported as greater than 350 msec flow reversal.      Right  Leg Deep Veins   CFV SFJ PFV PROX FV   PROX FV MID FV DIST POP V. PERON.   V. PTV'S   Compressibility  (FC,PC,NC) FC FC FC FC FC FC FC FC FC   Reflux -  - - - - -  -         Right Leg Saphenous Veins  Location SFJ PROX THIGH KNEE MID CALF SPJ PROX CALF MID CALF   RT GSV (mm) 5.6 5.6 5.1 3.1      Reflux - - - -      RT SSV (mm)     10.2 5.2 3.6   Reflux     - - -         Left Leg Deep Veins   CFV SFJ PFV PROX SFV PROX SFV MID SFV DIST POP V. PERON. V. PTV'S   Compressibility  (FC,PC,NC) FC FC FC FC FC FC FC FC FC   Reflux -  - - - - -  -         Lt Leg Saphenous Veins   Location SFJ PROX THIGH KNEE MID CALF SPJ PROX CALF MID CALF   LT GSV (mm) 9.1 4.5 5.3 2.9      Reflux - - - -      LT SSV (mm)     6.0 3.6 5.4   Reflux     - - -         Comments:       Impression:       Right Deep Vein Findings: Patent deep venous system with no evidence of reflux     Left Deep Vein Findings: Patent deep venous system with no evidence of reflux     Superficial Vein Findings:      Right Greater Saphenous vein: Patent Greater Saphenous Vein without evidence of reflux.     Right Small Saphenous Vein: Patent Small Saphenous Vein without evidence of reflux.     Left Greater Saphenous Vein: Patent Greater Saphenous Vein without evidence of reflux.     Left Small Saphenous Vein: Patent Small Saphenous Vein without evidence of reflux.     Perforating and Accessory Veins: No reflux noted          Physical Exam:     Vital Signs: /64   Pulse 80   Temp 98.8  F (37.1  C) (Oral)   Resp 16   Wt 263 lb 8 oz (119.5 kg)   BMI 37.81 kg/m   Body mass index is 37.81 kg/m .   Wt Readings from Last 2 Encounters:   09/20/21 263 lb 8 oz (119.5 kg)   08/12/21 262 lb 8 oz (119.1 kg)   .    Circumferential volume measures:    Circumferential  Measures 5/3/2021 9/20/2021   Right-just above MCP - 21.2   Right Wrist - 19   Right Up 10cm - 26.4   Right Up 10cm From Elbow - 37.7   Left-just above MCP - 21.5   Left Wrist - 19.4   Left Up 10cm - 28.1   Left Up 10cm From Elbow - 37.1   Right just above MTP 25.2 25   Right Ankle 32.7 32   Right Widest Calf 55.4 55.7   Right Thigh Up 10cm 68 -   Right Knee to Ankle 44 -   Left - just above MTP 26 25.4   Left Ankle 34 33.3   Left Widest Calf 56.8 55.8   Left Thigh Up 10cm 64.5 -   Left Knee to Ankle 44 -     Measures stable    General: In no apparent distress.      Psych: Alert and oriented X 3. Affect normal.     HEENT: Atraumatic, normocephalic     Range of Motion:  Range of motion of shoulders, elbows, wrists, hips, knees and ankles is normal bilaterally without active joint synovitis, erythema, joint swelling, crepitus or joint laxity.  At end-stage range of motion of forward flexion of the right shoulder there is no longer any significant axillary cording.  There is no pain to palpation.     Sensation: Intact to pinprick and light touch in the upper and lower extremities bilaterally.      Strength:  Normal strength testing in hip flexion, knee flexion, knee extension, ankle dorsiflexion, great toe extension, shoulder abduction, elbow flexion, elbow extension, wrist extension and hand intrinsics bilaterally.       Lymph nodes: No cervical, supraclavicular, infraclavicular, or axillary lymphadenopathy palpated.     Vascular: No unusual venous distention in the arms or legs. Radial arterial pulses strong and equal at the wrists bilaterally. Normal dorsalis pedis, posterior tibial pulses bilaterally with biphasic audible pulses heard with hand-held Doppler.  Stemmer's sign negative in both feet with spider veins, telangiectasias and slight medial ankle flaring. Stemmers sign negative in the hands.     Skin: No unusual rubor, calor, masses or rashes along the skin in either arm or in the legs.  No significant  fibrosity or scarring.  No pain to palpation.        Carolina Hook MD, Founding Diplomate ABWMS, FACCWS, FAAPMR   Medical Director Wound Care and Lymphedema   [facility]   [phone]         This note was dictated using a voice recognition software.  Any grammatical or context distortion are unintentional and inherent to the software.

## 2021-09-20 NOTE — PROGRESS NOTES
Leg swelling seems to be getting worse. Needs new pantyhose order. Will be going to [facility] in October for new sleeve. She was trying to make an appointment with bariatric but they needed $600 down payment. She went to therapy and they mostly focused on her breast and then eventually started with the legs. The wraps made her dizzy. Wondering about lipedema surgery because her insurance is changing to [employer], and she is wondering if this is an option for her. She is having trouble with working 10 hour shifts at work. She is approved now for light exercise and swimming.

## 2021-09-22 DIAGNOSIS — M79.89 LEG SWELLING: Primary | ICD-10-CM

## 2021-10-05 ENCOUNTER — DOCUMENTATION ONLY (OUTPATIENT)
Dept: ORTHOPEDICS | Facility: CLINIC | Age: 51
End: 2021-10-05

## 2021-10-05 NOTE — PROGRESS NOTES
S: Patient was seen in [facility] to be measured for compression pantyhose. Ivanna reports she has lipedema. Sarah reports [facility] Medi [employer]fort pantyhose have worked well in the past.    O: RX on-file is current for one pair of pantyhose from Dr. Hook. A request for three more pairs in 15-20 mmHg open toe was requested today. Goal is to evaluate and measure patient for a compression garment that will help control her lower leg swelling. Observations show there is swelling present in both lower extremities. The expected outcome with compliant use is to reduce swelling and control the patient s condition.     A: Measurements were taken again for Medi Comfort pantyhose 15-20 mmHg open toe (6, standard) in natural and black. Four pairs have been ordered from [facility]. Sarah has been wearing these items and is familiar with use, care, as well as the fit/feel of the garments. Sarah already got four compression garments in this past three month period so she will wait to pick the items up from the [facility]  on 12/27/2021 which is three months a part from the other four garments received on 9/27/2021.    P: Sarah is to call with any further needs and will plan to come in on 12/27 or later that week to  the garments from the .

## 2021-10-06 DIAGNOSIS — M79.89 LEG SWELLING: Primary | ICD-10-CM

## 2021-10-12 NOTE — PROGRESS NOTES
Outpatient Physical Therapy Discharge Note     Patient: Sarah Gilmore  : 1970    Beginning/End Dates of Reporting Period:  2021 to 2021    Referring Provider: Carolina Freed Diagnosis: right upper extremity lymphedema     Client Self Report: Pt reports she is not having soreness in her R breast. Pt states the abdomen incision feels tight. Pt has been compliant with her hEP. Pt will call to schedule her garment fit.       Goals: met      Plan:  Discharge from therapy.    Discharge:    Reason for Discharge: Patient has met all goals.    Equipment Issued: lymphedema written instructions.    Discharge Plan: Patient to continue home program.     21 0900   Signing Clinician's Name / Credentials   Signing clinician's name / credentials Mague Monroy PTACLT   Session Number   Session Number 6   Authorization status 8   Session Tracking, Supervision and Quick Adds   PT Assistant Visit Number 2   Progress Note/Recertification   Progress Note Due Date 10/15/21   Goal 1   Goal identifier Edema   Goal description decreased RUE edema by 5% to decrease risk for inffections   Target date 21   Goal 2   Goal identifier self care   Goal description patient will be independent with home exercises   Goal Progress MET   Target date 21   Date met 21   Goal 3   Goal identifier skin health   Goal description decrease risk of infections   Target date 21       Present No   Subjective Report   Subjective Report Pt reports she is not having soreness in her R breast. Pt states the abdomen incision feels tight. Pt has been compliant with her hEP. Pt will call to schedule her garment fit.    Objective Measures   Objective Measures Objective Measure 1   Objective Measure 1   Objective Measure Shoulder AROM:   Details Flexion: WNL sara; abd: WNL sara, stiffness in right shoulder with each at end range   Therapeutic Procedure/exercise   Skilled Intervention  reviewed UE ROM  exercises   Manual Therapy   Manual Therapy: Mobilization, MFR, MLD, friction massage minutes (44054) 45   Skilled Intervention MFR to anterior chest wall, MLD sequence: short neck and abdomen, right breast clearing, end range stretching for R shoulder flexion, abd, ER, IR   Patient Response relaxes well   Plan   Home program UE lymphedema exercises, PTRx   Plan for next session MLD, MFR, re-measure R UE   Comments   Comments Pt is appropriate for continued skilled PT intervention. Pt is making progress towards goals and responding well to CDT.   Total Session Time   Timed Code Treatment Minutes 45   Total Treatment Time (sum of timed and untimed services) 45   AMBULATORY CLINICS ONLY-MEDICAL AND TREATMENT DIAGNOSIS   Medical diagnosis lymphedema   Therapy diagnosis RUE and chest lymphedema

## 2021-10-12 NOTE — ADDENDUM NOTE
Encounter addended by: Marlee Ashley, PT on: 10/12/2021 10:48 AM   Actions taken: Episode resolved, Clinical Note Signed, Flowsheet accepted

## 2021-10-14 ENCOUNTER — OFFICE VISIT (OUTPATIENT)
Dept: FAMILY MEDICINE | Facility: CLINIC | Age: 51
End: 2021-10-14
Payer: COMMERCIAL

## 2021-10-14 ENCOUNTER — ONCOLOGY VISIT (OUTPATIENT)
Dept: ONCOLOGY | Facility: CLINIC | Age: 51
End: 2021-10-14
Attending: INTERNAL MEDICINE
Payer: COMMERCIAL

## 2021-10-14 VITALS
HEART RATE: 62 BPM | TEMPERATURE: 98.7 F | WEIGHT: 253 LBS | OXYGEN SATURATION: 97 % | RESPIRATION RATE: 16 BRPM | SYSTOLIC BLOOD PRESSURE: 120 MMHG | DIASTOLIC BLOOD PRESSURE: 72 MMHG | BODY MASS INDEX: 36.3 KG/M2

## 2021-10-14 VITALS
BODY MASS INDEX: 38.24 KG/M2 | SYSTOLIC BLOOD PRESSURE: 132 MMHG | WEIGHT: 266.5 LBS | DIASTOLIC BLOOD PRESSURE: 64 MMHG | HEART RATE: 64 BPM

## 2021-10-14 DIAGNOSIS — Z13.820 SCREENING FOR OSTEOPOROSIS: ICD-10-CM

## 2021-10-14 DIAGNOSIS — Z79.811 LONG TERM CURRENT USE OF AROMATASE INHIBITOR: ICD-10-CM

## 2021-10-14 DIAGNOSIS — N65.1 BREAST ASYMMETRY FOLLOWING RECONSTRUCTIVE SURGERY: ICD-10-CM

## 2021-10-14 DIAGNOSIS — Z01.818 PREOP GENERAL PHYSICAL EXAM: Primary | ICD-10-CM

## 2021-10-14 DIAGNOSIS — C50.911 INFILTRATING DUCTAL CARCINOMA OF RIGHT BREAST (H): Primary | ICD-10-CM

## 2021-10-14 DIAGNOSIS — D05.11 DUCTAL CARCINOMA IN SITU (DCIS) OF RIGHT BREAST: ICD-10-CM

## 2021-10-14 PROBLEM — D05.10 DCIS (DUCTAL CARCINOMA IN SITU): Status: ACTIVE | Noted: 2019-01-01

## 2021-10-14 LAB
ANION GAP SERPL CALCULATED.3IONS-SCNC: 10 MMOL/L (ref 5–18)
BUN SERPL-MCNC: 7 MG/DL (ref 8–22)
CALCIUM SERPL-MCNC: 9.4 MG/DL (ref 8.5–10.5)
CHLORIDE BLD-SCNC: 106 MMOL/L (ref 98–107)
CO2 SERPL-SCNC: 24 MMOL/L (ref 22–31)
CREAT SERPL-MCNC: 0.78 MG/DL (ref 0.6–1.1)
GFR SERPL CREATININE-BSD FRML MDRD: 87 ML/MIN/1.73M2
GLUCOSE BLD-MCNC: 72 MG/DL (ref 70–125)
HGB BLD-MCNC: 12.3 G/DL (ref 11.7–15.7)
INR PPP: 0.93 (ref 0.85–1.15)
POTASSIUM BLD-SCNC: 4.1 MMOL/L (ref 3.5–5)
SODIUM SERPL-SCNC: 140 MMOL/L (ref 136–145)

## 2021-10-14 PROCEDURE — 80048 BASIC METABOLIC PNL TOTAL CA: CPT | Performed by: NURSE PRACTITIONER

## 2021-10-14 PROCEDURE — 85018 HEMOGLOBIN: CPT | Performed by: NURSE PRACTITIONER

## 2021-10-14 PROCEDURE — 36415 COLL VENOUS BLD VENIPUNCTURE: CPT | Performed by: NURSE PRACTITIONER

## 2021-10-14 PROCEDURE — G0463 HOSPITAL OUTPT CLINIC VISIT: HCPCS

## 2021-10-14 PROCEDURE — 99214 OFFICE O/P EST MOD 30 MIN: CPT | Performed by: INTERNAL MEDICINE

## 2021-10-14 PROCEDURE — 85610 PROTHROMBIN TIME: CPT | Performed by: NURSE PRACTITIONER

## 2021-10-14 PROCEDURE — 99214 OFFICE O/P EST MOD 30 MIN: CPT | Performed by: NURSE PRACTITIONER

## 2021-10-14 ASSESSMENT — PAIN SCALES - GENERAL: PAINLEVEL: NO PAIN (0)

## 2021-10-14 NOTE — PATIENT INSTRUCTIONS
Preparing for Your Surgery  Getting started  A nurse will call you to review your health history and instructions. They will give you an arrival time based on your scheduled surgery time.  Please be ready to share the following:    Your doctor's clinic name and phone number    Your medical, surgical and anesthesia history    A list of allergies and sensitivities    A list of medicines, including herbal treatments and over-the-counter drugs    Whether the patient has a legal guardian (ask how to send us the papers in advance)  If you have a child who's having surgery, please ask for a copy of Preparing for Your Child's Surgery.    Preparing for surgery    Within 30 days of surgery: Have a pre-op exam (sometimes called an H&P, or History and Physical). This can be done at a clinic or pre-operative center.  ? If you're having a , you may not need this exam. Talk to your care team    At your pre-op exam, talk to your care team about all medicines you take. If you need to stop any medicines before surgery, ask when to start taking them again.  ? We do this for your safety. Many medicines can make you bleed too much during surgery. Some change how well surgery (anesthesia) drugs work.    Call your insurance company to let them know you're having surgery. (If you don't have insurance, call [phone].)    Call your clinic if there's any change in your health. This includes signs of a cold or flu (sore throat, runny nose, cough, rash, fever). It also includes a scrape or scratch near the surgery site.    If you have questions on the day of surgery, call your hospital or surgery center.  Eating and drinking guidelines  For your safety: Unless your surgeon tells you otherwise, follow the guidelines below.    Eat and drink as usual until 8 hours before surgery. After that, no food or milk.    Drink clear liquids until 2 hours before surgery. These are liquids you can see through, like water, Gatorade and Propel  Water. You may also have black coffee and tea (no cream or milk).    Nothing by mouth within 2 hours of surgery. This includes gum, candy and breath mints.    If you drink, stop drinking alcohol the night before surgery.    If your care team tells you to take medicine on the morning of surgery, it's okay to take it with a sip of water.  Preventing infection    Shower or bathe the night before and morning of your surgery. Follow the instructions your clinic gave you. (If no instructions, use regular soap.)    Don't shave or clip hair near your surgery site. We'll remove the hair if needed.    Don't smoke or vape the morning of surgery. You may chew nicotine gum up to 2 hours before surgery. A nicotine patch is okay.  ? Note: Some surgeries require you to completely quit smoking and nicotine. Check with your surgeon.    Your care team will make every effort to keep you safe from infection. We will:  ? Clean our hands often with soap and water (or an alcohol-based hand rub).  ? Clean the skin at your surgery site with a special soap that kills germs.  ? Give you a special gown to keep you warm. (Cold raises the risk of infection.)  ? Wear special hair covers, masks, gowns and gloves during surgery.  ? Give antibiotic medicine, if prescribed. Not all surgeries need antibiotics.  What to bring on the day of surgery    Photo ID and insurance card    Copy of your health care directive, if you have one    Glasses and hearing aides (bring cases)  ? You can't wear contacts during surgery    Inhaler and eye drops, if you use them (tell us about these when you arrive)    CPAP machine or breathing device, if you use them    A few personal items, if spending the night    If you have . . .  ? A pacemaker or ICD (cardiac defibrillator): Bring the ID card.  ? An implanted stimulator: Bring the remote control.  ? A legal guardian: Bring a copy of the certified (court-stamped) guardianship papers.  Please remove any jewelry, including  body piercings. Leave jewelry and other valuables at home.  If you're going home the day of surgery  Important: If you don't follow the rules below, we must cancel your surgery.     Arrange for someone to drive you home after surgery. You may not drive, take a taxi or take public transportation by yourself (unless you'll have local anesthesia only).    Arrange for a responsible adult to stay with you overnight. If you don't, we may keep you in the hospital overnight, and you may need to pay the costs yourself.  Questions?   If you have any questions for your care team, list them here: _________________________________________________________________________________________________________________________________________________________________________________________________________________________________________________________________________________________________________________________  For informational purposes only. Not to replace the advice of your health care provider. Copyright   2003, 2019 [employer]. All rights reserved. Clinically reviewed by Amisha Gracia MD. [employer] 629196 - REV 4/20.    Before Your Procedure or Hospital Admission  Testing for COVID-19 (Coronavirus)  Thank you for choosing [facility] for your health care needs. The COVID-19 pandemic is a very challenging time for everyone.   Our goal is to keep you and our team here at [facility] safe and healthy. We've taken many steps to make this happen. For example:    We test and screen our staff, care teams and patients for COVID-19.    Everyone at [facility] must wear a mask and stay 6 feet apart.    We are limiting hospital and clinic visitors.  Before you come in  All patients must get tested for COVID-19. Your test needs to happen 2 to 4 days before you check in to the hospital or surgery site.   A clinic scheduler will call about a week in advance to set up a testing time at one of our labs. We'll take  "a swab of your nose or throat.  Note: If you go to a clinic or pharmacy like [facility] or [facility] for your test, make sure you get a test inside the nose. Tests inside the nose are:    A naso-pharyngeal (NP) RT-PCR test    An anterior nares RT-PCR test  Do NOT get a \"rapid\" test or a saliva (spit) test.  After the test, please stay at home and stay out of contact with other people. It will be harder for you to recover if you get COVID-19 before your treatment.  Please follow all current safety guidelines, including:    Limit trips outside your home.    Limit the number of people you see.    Always wear a mask outside your home.    Use social distancing. Stay 6 feet away from others whenever you can.    Wash your hands often.  If your test shows you have COVID-19  If your test is positive, we'll let you know. A positive test means that you have the virus.   We'll probably have to postpone your admission, surgery or procedure. Your doctor will discuss this with you. After that, we'll let you know what to do and when you can re-schedule.   We may need to cancel your treatment on short notice for other reasons, too.  If your test shows you DON'T have COVID-19  Even if your test is negative, you can still get COVID-19. It's rare but, sometimes, the test result is wrong. You could also catch the virus after taking the test.   There's a very small chance that you could catch COVID-19 in the hospital or surgery center. [facility] has taken many steps to prevent this from happening.   Day of your surgery or procedure    Please come wearing a face covering that covers both your nose and mouth.    When you arrive, we'll ask you some questions to find out if you have any signs or symptoms of COVID-19.    Ask your care team if you can have visitors. All visitors must wear face coverings and will be screened for signs of COVID-19.  ? Even if no visitors are allowed, you can still have with you:    Your legal guardian or legal " "decision maker    A parent and one other visitor, if you are younger than 18 years old    A partner and a , if you are in labor  ? We might need to teach you about taking care of yourself after surgery. If so, a visitor can come into the hospital to learn about it, too.  ? The rules for visitors change often, depending on how much the virus is spreading. To learn more, see Visiting a Loved One in the Hospital during the COVID-19 Outbreak.  Please call your care team, hospital or surgery center if you have any questions. We thank you for your understanding and for choosing [facility] for your care.   Questions and answers  Does it matter where I get tested for COVID-19?  Yes. We urge you to get tested at one of our [facility] COVID-19 testing sites. We process these tests in our lab and can get the results quickly. Your [facility] care team needs to get your results before you check in.  What should I do if I can't get tested at [facility]?  You can get tested somewhere else, but you'll need to take these extra steps:   1. Contact your family doctor or clinic to arrange your test.  2. Take the test within 4 days of your surgery or procedure. We can't accept tests older than 4 days.  3. Make sure you're getting a test inside the nose. Tests inside the nose are:  ? A naso-pharyngeal (NP) RT-PCR test  ? An anterior nares RT-PCR test  Many pharmacies use \"rapid\" tests or saliva (spit) tests. We do NOT accept those tests before surgery or procedures. Tests from inside the nose are the most accurate tests.  1. Make sure your doctor or clinic faxes your results to [facility] at [phone].  If we don't get your results in time, we may have to delay or cancel your treatment.  For informational purposes only. Not to replace the advice of your health care provider. Copyright   2020 [facility]. All rights reserved. Clinically reviewed by Infection Prevention and the " [facility] COVID-19 Clinical Team. [employer] 592637 - Rev 09/09/21.    How to Take Your Medication Before Surgery  - STOP taking all vitamins and herbal supplements 14 days before surgery.

## 2021-10-14 NOTE — PROGRESS NOTES
"Oncology Rooming Note    October 14, 2021 12:56 PM   Sarah Gilmore is a 51 year old female who presents for:    Chief Complaint   Patient presents with     Oncology Clinic Visit     Initial Vitals: /72 (Patient Position: Sitting)   Pulse 62   Temp 98.7  F (37.1  C) (Oral)   Resp 16   Wt 114.8 kg (253 lb)   SpO2 97%   BMI 36.30 kg/m   Estimated body mass index is 36.3 kg/m  as calculated from the following:    Height as of 8/12/21: 1.778 m (5' 10\").    Weight as of this encounter: 114.8 kg (253 lb). Body surface area is 2.38 meters squared.  No Pain (0) Comment: Data Unavailable   No LMP recorded.  Allergies reviewed: Yes  Medications reviewed: Yes    Medications: Medication refills not needed today.  Pharmacy name entered into [facility]:    [id number] - [place], [place] - [facility] [address]    Clinical concerns: Pre op today- if lab draws need can we draw all lab.       PETROS WEBBER"

## 2021-10-14 NOTE — LETTER
"    10/14/2021         RE: Sarah Gilmore  [address]  [place] [address]        Dear Colleague,    Thank you for referring your patient, Sarah Gilmore, to the [facility] [place]. Please see a copy of my visit note below.    Oncology Rooming Note    October 14, 2021 12:56 PM   Sarah Gilmore is a 51 year old female who presents for:    Chief Complaint   Patient presents with     Oncology Clinic Visit     Initial Vitals: /72 (Patient Position: Sitting)   Pulse 62   Temp 98.7  F (37.1  C) (Oral)   Resp 16   Wt 114.8 kg (253 lb)   SpO2 97%   BMI 36.30 kg/m   Estimated body mass index is 36.3 kg/m  as calculated from the following:    Height as of 8/12/21: 1.778 m (5' 10\").    Weight as of this encounter: 114.8 kg (253 lb). Body surface area is 2.38 meters squared.  No Pain (0) Comment: Data Unavailable   No LMP recorded.  Allergies reviewed: Yes  Medications reviewed: Yes    Medications: Medication refills not needed today.  Pharmacy name entered into [facility]:    [id number] - [place], [place] - [address] [place] [address] AVE     Clinical concerns: Pre op today- if lab draws need can we draw all lab.       PETROS WEBBER                        [facility]ematology and Oncology Progress Note    Patient: Sarah Gilmore  MRN: [id number]  Date of Service: Oct 14, 2021         Reason for Visit    Chief Complaint   Patient presents with     Oncology Clinic Visit       Assessment and Plan    Cancer Staging  Infiltrating ductal carcinoma of right breast (H)  Staging form: Breast, AJCC 8th Edition  - Pathologic stage from 9/4/2019: Stage Unknown (pTX, pN1a(sn), cM0, G1, ER+, CT+, HER2-) - Signed by Ez Burroughs MD on 10/18/2021  - Clinical: No stage assigned - Unsigned      ECOG Performance    0 - Independent     Pain  Pain Score: No Pain (0)    #.  DCIS of the both breasts (upper outer quadrant, lower outer quadrant and central portion of the right breast, multifocal, grade 2; focus of " DCIS approximately 2 mm grade 1 in the left breast)  #. pTx pN1a M0 invasive ductal carcinoma of the right breast, grade 1.  ER positive, MT positive, HER-2 negative.   Clinically well without signs and symptoms suggestive of breast cancer recurrence or progression.  She tolerates anastrozole fairly well and comfortable continuing it.   She will continue anastrozole.  Anastrozole refilled.  Duration of endocrine therapy is for minimum of 5 years and she is considering to extend to 10 years as long as she does not have major side effects.   Follow-up clinical exam in about 6 months.    #.  Okay to hold anastrozole for upcoming surgery/hospitalization and to resume upon discharge.    #.  Low bone density with low fracture risk.   No evidence of osteoporosis.  Prior history of osteoporotic fracture.   She will continue calcium vitamin D and daily exercises.   To obtain DEXA scan prior to next visit. Ordered.       Encounter Diagnoses:    Problem List Items Addressed This Visit        Oncology Diagnoses    DCIS (ductal carcinoma in situ)    Infiltrating ductal carcinoma of right breast (H) - Primary      Other Visit Diagnoses     Screening for osteoporosis        Relevant Orders    DX Hip/Pelvis/Spine    Long term current use of aromatase inhibitor        Relevant Orders    DX Hip/Pelvis/Spine             CC: EMMY Maxwell CNP   ______________________________________________________________________________  Diagnosis  July 2019-    DCIS of both breasts (upper outer quadrant, lower outer quadrant and central portion of the right breast, multifocal, grade 2; focus of DCIS approximately 2 mm grade 1 in the left breast)   pTx pN1a M0 invasive ductal carcinoma of the right breast, grade 1.  Multifocal DCIS.  6 right axillary sentinel lymph nodes were examined 1 lymph node was positive for macro metastases and one lymph node was positive for isolated tumor cells.  ER positive (90%), MT positive (80%), HER-2  "negative.    LMP-at age 48 ( in 3/2018) after hysterectomy. Serology confirms menopause in 2020.    Treatment to date  2019- right breast mastectomy and right sentinel lymph node biopsy, left mastectomy.  10/9/2019-2019-radiation to the right breast and chest wall and regional lymph nodes area of 5040 cGy/28 fractions followed by additional 1000 cGy in 5 fractions to the primary tumor bed.  2019-initiated adjuvant tamoxifen, but stopped about 2 months after due to pain in both shins.  Restarted tamoxifen in 2020, but stopped again after a month and then switched to anastrozole in May 2020.     History of Present Illness    Oliver presented herself today.    She is doing overall well.  No pain.  She has right breast reconstruction surgery planned later this month.  She takes anastrozole regularly.  She also takes vitamin D regularly.    Review of systems  Apart from describing in HPI, the remainder of comprehensive ROS was negative.    Past History    Past Medical History:   Diagnosis Date     Anemia      DCIS (ductal carcinoma in situ)     bilateral     Dilated aortic root (H)      Foot fracture 6/3/11     History of anesthesia complications     slow to wake up     Osteopenia      Osteoporosis      Other specified anemias      PONV (postoperative nausea and vomiting)      Radial head fracture 6/3/11     Seizures (H)     h/o seizure disorder in childhood, last seizure activity  \"4 yrs ago\"     Suspected sleep apnea     sleep study set up in Aug 2019       Past Surgical History:   Procedure Laterality Date     C  DELIVERY ONLY       C/SECTION, LOW TRANSVERSE  07/10/1994    breech     HYSTERECTOMY Bilateral 2018    TOTAL LAPAROSCOPIC HYSTERECTOMY BILATERAL SALPINGECTOMY, CYSTOSCOPY     MAMMO STEREOTACTIC CORE BIOPSY RIGHT Right 2019     MAMMO STEREOTACTIC CORE BIOPSY RIGHT Right 2019     NM SENTINEL NODE INJECTION  2019     OTHER SURGICAL HISTORY      right " arm surgery     OK MASTECTOMY, MODIFIED RADICAL Bilateral 07/30/2019    Procedure: BILATERAL MASTECTOMY;  Surgeon: Mckenzie Colby DO;  Location: [facility];  Service: General     OK REPLACEMENT TISSUE EXPANDER W/PERMANENT IMPLANT Bilateral 07/30/2019    Procedure: BILATERAL IMPLANT RECONSTRUCTION TO BREASTS;  Surgeon: Carlitos Crum MD;  Location: [facility];  Service: Plastics         Physical Exam    /72 (Patient Position: Sitting)   Pulse 62   Temp 98.7  F (37.1  C) (Oral)   Resp 16   Wt 114.8 kg (253 lb)   SpO2 97%   BMI 36.30 kg/m      General: alert, awake, not in acute distress  HEENT: Head: Normal, normocephalic, atraumatic.  Eye: Normal external eye, conjunctiva, lids cornea, DAYSI.  Nose: Normal external nose, mucus membranes and septum.  Pharynx: Normal buccal mucosa. Normal pharynx.  Neck / Thyroid: Supple, no masses, nodes, nodules or enlargement.  Lymphatics: No abnormally enlarged lymph nodes.  Chest: Normal chest wall and respirations. Clear to auscultation.  Breasts: No palpable abnormalities.  Postsurgical scars.  Heart: S1 S2 RRR, no murmur.   Abdomen: abdomen is soft without significant tenderness, masses, organomegaly or guarding  Extremities: normal strength, tone, and muscle mass  Skin: normal. no rash or abnormalities  CNS: non focal.      Lab Results    Recent Results (from the past 168 hour(s))   Basic Metabolic Panel   Result Value Ref Range    Sodium 140 136 - 145 mmol/L    Potassium 4.1 3.5 - 5.0 mmol/L    Chloride 106 98 - 107 mmol/L    Carbon Dioxide (CO2) 24 22 - 31 mmol/L    Anion Gap 10 5 - 18 mmol/L    Urea Nitrogen 7 (L) 8 - 22 mg/dL    Creatinine 0.78 0.60 - 1.10 mg/dL    Calcium 9.4 8.5 - 10.5 mg/dL    Glucose 72 70 - 125 mg/dL    GFR Estimate 87 >60 mL/min/1.73m2   INR   Result Value Ref Range    INR 0.93 0.85 - 1.15   Hemoglobin   Result Value Ref Range    Hemoglobin 12.3 11.7 - 15.7 g/dL       Imaging    No results found.    I spent greater than  50% of the 30-minute visit was spent coordinating care regarding patient's medication side effect management, medication adherence, importance of diet and exercise in breast cancer survivors.    Signed by: Ez Burroughs MD          Again, thank you for allowing me to participate in the care of your patient.        Sincerely,        Ez Burroughs MD

## 2021-10-14 NOTE — PROGRESS NOTES
[facility]  [address] [place] [address]  Phone: [phone]  Fax: [phone]  Primary Provider: Laura Williamson  Pre-op Performing Provider: LAURA WILLIAMSON      PREOPERATIVE EVALUATION:  Today's date: 10/14/2021    Sarah Gilmore is a 51 year old female who presents for a preoperative evaluation.    Surgical Information:  Surgery/Procedure: Right breast reconstruction   Surgery Location: [facility]  Surgeon: Dr Mir Preciado  Surgery Date: 10/29/21  Time of Surgery: 7:30am  Where patient plans to recover: At home with family  Fax number for surgical facility: Note does not need to be faxed, will be available electronically in Epic.    Type of Anesthesia Anticipated: General    Assessment & Plan     The proposed surgical procedure is considered LOW risk.    Preop general physical exam  Breast asymmetry following reconstructive surgery  - Basic Metabolic Panel  - INR  - Hemoglobin  - Asymptomatic COVID-19 Virus (Coronavirus) by PCR          Possible Sleep Apnea: Yes, CPAP   STOP-Bang Total Score 2/9/2021   Total Score 5          Risks and Recommendations:  The patient has the following additional risks and recommendations for perioperative complications:   - No identified additional risk factors other than previously addressed    Medication Instructions:  Patient is on no chronic medications    RECOMMENDATION:  APPROVAL GIVEN to proceed with proposed procedure, without further diagnostic evaluation.    Review of external notes as documented above     35 minutes spent on the date of the encounter doing chart review, patient visit and documentation         Subjective     HPI related to upcoming procedure: Patient with history of lipedema, bilateral breast cancer, postmastectomy lymphedema syndrome presents for preop for reconstruction of her right breast. Patient has no cardiac concerns today. She denies chest pain, shortness of breath, fever and  chills.    Preop Questions 10/13/2021   1. Have you ever had a heart attack or stroke? No   2. Have you ever had surgery on your heart or blood vessels, such as a stent placement, a coronary artery bypass, or surgery on an artery in your head, neck, heart, or legs? No   3. Do you have chest pain with activity? No   4. Do you have a history of  heart failure? No   5. Do you currently have a cold, bronchitis or symptoms of other infection? No   6. Do you have a cough, shortness of breath, or wheezing? No   7. Do you or anyone in your family have previous history of blood clots? YES -    8. Do you or does anyone in your family have a serious bleeding problem such as prolonged bleeding following surgeries or cuts? No   9. Have you ever had problems with anemia or been told to take iron pills? YES -    10. Have you had any abnormal blood loss such as black, tarry or bloody stools, or abnormal vaginal bleeding? YES -    11. Have you ever had a blood transfusion? No   12. Are you willing to have a blood transfusion if it is medically needed before, during, or after your surgery? Yes   13. Have you or any of your relatives ever had problems with anesthesia? No   14. Do you have sleep apnea, excessive snoring or daytime drowsiness? YES - Use CPAP   14a. Do you have a CPAP machine? Yes   15. Do you have any artifical heart valves or other implanted medical devices like a pacemaker, defibrillator, or continuous glucose monitor? No   16. Do you have artificial joints? No   17. Are you allergic to latex? No   18. Is there any chance that you may be pregnant? No       Health Care Directive:  Patient does not have a Health Care Directive or Living Will: Discussed advance care planning with patient; information given to patient to review.    Preoperative Review of :   reviewed - no record of controlled substances prescribed.      Status of Chronic Conditions:  See problem list for active medical problems.  Problems all  longstanding and stable, except as noted/documented.  See ROS for pertinent symptoms related to these conditions.      Review of Systems  CONSTITUTIONAL: NEGATIVE for fever, chills, change in weight  INTEGUMENTARY/SKIN: NEGATIVE for worrisome rashes, moles or lesions  EYES: NEGATIVE for vision changes or irritation  ENT/MOUTH: NEGATIVE for ear, mouth and throat problems  RESP: NEGATIVE for significant cough or SOB  CV: NEGATIVE for chest pain, palpitations or peripheral edema  GI: NEGATIVE for nausea, abdominal pain, heartburn, or change in bowel habits  : NEGATIVE for frequency, dysuria, or hematuria  MUSCULOSKELETAL: NEGATIVE for significant arthralgias or myalgia  NEURO: NEGATIVE for weakness, dizziness or paresthesias  ENDOCRINE: NEGATIVE for temperature intolerance, skin/hair changes  HEME: NEGATIVE for bleeding problems  PSYCHIATRIC: NEGATIVE for changes in mood or affect    Patient Active Problem List    Diagnosis Date Noted     Swelling in right armpit 09/20/2021     Priority: Medium     Post-mastectomy lymphedema syndrome 09/20/2021     Priority: Medium     Venous hypertension of lower extremity, bilateral 09/20/2021     Priority: Medium     Lipedema 09/20/2021     Priority: Medium     Class 2 severe obesity with serious comorbidity and body mass index (BMI) of 38.0 to 38.9 in adult, unspecified obesity type (H) 09/20/2021     Priority: Medium     Acquired lymphedema of leg 05/03/2021     Priority: Medium     Personal history of malignant neoplasm of breast 12/22/2020     Priority: Medium     Formatting of this note might be different from the original.  Added automatically from request for surgery [id number]       H/O breast reconstruction 08/30/2019     Priority: Medium     Formatting of this note might be different from the original.  Added automatically from request for surgery [id number]       DCIS (ductal carcinoma in situ) 2019     Priority: Medium     bilateral       Vitamin D deficiency 06/10/2011      "Priority: Medium     (Problem list name updated by automated process. Provider to review and confirm.)       Osteopenia 2011     Priority: Medium     Hymenoptera allergy 2009     Priority: Medium     Excessive or frequent menstruation 2008     Priority: Medium     Iron deficiency anemia 2007     Priority: Medium     Problem list name updated by automated process. Provider to review        Past Medical History:   Diagnosis Date     Anemia      DCIS (ductal carcinoma in situ)     bilateral     Dilated aortic root (H)      Foot fracture 6/3/11     History of anesthesia complications     slow to wake up     Osteopenia      Osteoporosis      Other specified anemias      PONV (postoperative nausea and vomiting)      Radial head fracture 6/3/11     Seizures (H)     h/o seizure disorder in childhood, last seizure activity  \"4 yrs ago\"     Suspected sleep apnea     sleep study set up in Aug 2019     Past Surgical History:   Procedure Laterality Date     C  DELIVERY ONLY       C/SECTION, LOW TRANSVERSE  07/10/1994    breech     HYSTERECTOMY Bilateral 2018    TOTAL LAPAROSCOPIC HYSTERECTOMY BILATERAL SALPINGECTOMY, CYSTOSCOPY     MAMMO STEREOTACTIC CORE BIOPSY RIGHT Right 2019     MAMMO STEREOTACTIC CORE BIOPSY RIGHT Right 2019     NM SENTINEL NODE INJECTION  2019     OTHER SURGICAL HISTORY      right arm surgery     MI MASTECTOMY, MODIFIED RADICAL Bilateral 2019    Procedure: BILATERAL MASTECTOMY;  Surgeon: Mckenzie Colby DO;  Location: [facility] OR;  Service: General     MI REPLACEMENT TISSUE EXPANDER W/PERMANENT IMPLANT Bilateral 2019    Procedure: BILATERAL IMPLANT RECONSTRUCTION TO BREASTS;  Surgeon: Carlitos Crum MD;  Location: [facility] OR;  Service: Plastics     Current Outpatient Medications   Medication Sig Dispense Refill     anastrozole (ARIMIDEX) 1 MG tablet Take 1 mg by mouth       EPI E-Z PEN JOELLEN 1:1000  IJ 1 TIME ONLY  " PRN bee sting 2 Device 20     ergocalciferol (ERGOCALCIFEROL) 17457 UNIT capsule Take 1 capsule by mouth every 14 days. 12 capsule 1     FERROUS SULFATE  MG OR TBCR 1 TABLET BID  after food withm orange juice 100 3     fish oil-omega-3 fatty acids 1000 MG capsule        gabapentin (NEURONTIN) 100 MG capsule Take 100 mg by mouth       Multiple Vitamin (MULTI-VITAMINS) TABS Take 1 tablet by mouth       OSCO MAGNESIUM CITRATE OR        Turmeric 400 MG CAPS          Allergies   Allergen Reactions     Bee Pollen Anaphylaxis     Seafood Anaphylaxis     Bees      Noted in 1/17/09 ER     Contrast Dye      airway problems        Social History     Tobacco Use     Smoking status: Never Smoker     Smokeless tobacco: Never Used   Substance Use Topics     Alcohol use: No     Family History   Problem Relation Age of Onset     Psychotic Disorder Mother      Sleep Apnea Mother      C.A.D. Father      Heart Disease Father      Snoring Father      Psychotic Disorder Maternal Grandfather         commit suicide     Breast Cancer Paternal Grandmother 50.00     Mental Illness Mother      Diabetes Father      No Known Problems Sister      No Known Problems Brother      Asperger's syndrome Son      Suicidality Maternal Grandfather      Prostate Cancer Paternal Grandfather      Testicular cancer Paternal Grandfather      Mental Illness Son      Leukemia Paternal Uncle      History   Drug Use No         Objective     There were no vitals taken for this visit.    Physical Exam  GENERAL APPEARANCE: healthy, alert and no distress  HENT: ear canals and TM's normal and nose and mouth without ulcers or lesions  RESP: lungs clear to auscultation - no rales, rhonchi or wheezes  CV: regular rate and rhythm, normal S1 S2, no S3 or S4 and no murmur, click or rub   ABDOMEN: soft, nontender, no HSM or masses and bowel sounds normal  NEURO: Normal strength and tone, sensory exam grossly normal, mentation intact and speech normal    Recent Labs   Lab  Test 06/01/21  1146 02/09/21  1508 02/09/21  1508 02/09/21  1508 08/29/20  1246 08/29/20  1246   HGB 14.2  --  13.4  --    < > 13.9   PLT  --   --   --   --   --  286   INR 0.97 1.06  --   --   --   --      --   --  141   < > 140   POTASSIUM 4.2  --   --  4.3   < > 4.7   CR 0.71  --   --  0.83   < > 0.86    < > = values in this interval not displayed.        Diagnostics:  No results found for this or any previous visit (from the past 720 hour(s)).   No EKG required, no history of coronary heart disease, significant arrhythmia, peripheral arterial disease or other structural heart disease.    Revised Cardiac Risk Index (RCRI):  The patient has the following serious cardiovascular risks for perioperative complications:   - No serious cardiac risks = 0 points     RCRI Interpretation: 0 points: Class I (very low risk - 0.4% complication rate)           Signed Electronically by: EMMY Maxwell CNP  Copy of this evaluation report is provided to requesting physician.

## 2021-10-17 DIAGNOSIS — D05.11 DUCTAL CARCINOMA IN SITU (DCIS) OF RIGHT BREAST: Primary | ICD-10-CM

## 2021-10-18 ENCOUNTER — PATIENT OUTREACH (OUTPATIENT)
Dept: CARE COORDINATION | Facility: CLINIC | Age: 51
End: 2021-10-18

## 2021-10-18 PROBLEM — C50.911 INFILTRATING DUCTAL CARCINOMA OF RIGHT BREAST (H): Status: ACTIVE | Noted: 2021-10-18

## 2021-10-18 RX ORDER — ANASTROZOLE 1 MG/1
TABLET ORAL
Qty: 90 TABLET | Refills: 2 | Status: SHIPPED | OUTPATIENT
Start: 2021-10-18 | End: 2023-04-04

## 2021-10-18 NOTE — TELEPHONE ENCOUNTER
Refilled request received from [facility] for anastrozole  Last refilled by Dr. Burroughs on 10/13/20.  Quantity #90. 3 refills. Last seen by Dr. Burroughs on 10/14/21.  Message sent to Dr. Burroughs/SLIM Maravilla RN

## 2021-10-18 NOTE — PROGRESS NOTES
Oncology Distress Screening Follow-up  Clinical Social Work  [facility]    Identified Concern and Score From Distress Screenin. How concerned are you about work and home life issues that may be affected by your cancer?   6Abnormal      Date of Distress Screening: 10/14/21    Data: Sarah is a 51 year old woman diagnosed with breast cancer. Sarah arrived in clinic for a pre-op appointment and expressed concern re: how her cancer will impact her work.home life.     Intervention: Sw attempted to reach out to Sarah today to discuss her concerns further. Sw received no answer and a message was left with SW contact info encouraging a call back. SW also sent a [email or web address] message with contact info.    Education Provided: Oncology Clinic SW contact info    Follow-up Required:  SW awaits a call back. SW will remain available for ongoing resource/support needs.       YUE Lewis, [facility]  Adult Oncology Clinic  Phone: [phone]

## 2021-10-18 NOTE — PROGRESS NOTES
[facility] Hematology and Oncology Progress Note    Patient: Sarah Gilmore  MRN: [id number]  Date of Service: Oct 14, 2021         Reason for Visit    Chief Complaint   Patient presents with     Oncology Clinic Visit       Assessment and Plan    Cancer Staging  Infiltrating ductal carcinoma of right breast (H)  Staging form: Breast, AJCC 8th Edition  - Pathologic stage from 9/4/2019: Stage Unknown (pTX, pN1a(sn), cM0, G1, ER+, OH+, HER2-) - Signed by Ez Burroughs MD on 10/18/2021  - Clinical: No stage assigned - Unsigned      ECOG Performance    0 - Independent     Pain  Pain Score: No Pain (0)    #.  DCIS of the both breasts (upper outer quadrant, lower outer quadrant and central portion of the right breast, multifocal, grade 2; focus of DCIS approximately 2 mm grade 1 in the left breast)  #. pTx pN1a M0 invasive ductal carcinoma of the right breast, grade 1.  ER positive, OH positive, HER-2 negative.   Clinically well without signs and symptoms suggestive of breast cancer recurrence or progression.  She tolerates anastrozole fairly well and comfortable continuing it.   She will continue anastrozole.  Anastrozole refilled.  Duration of endocrine therapy is for minimum of 5 years and she is considering to extend to 10 years as long as she does not have major side effects.   Follow-up clinical exam in about 6 months.    #.  Okay to hold anastrozole for upcoming surgery/hospitalization and to resume upon discharge.    #.  Low bone density with low fracture risk.   No evidence of osteoporosis.  Prior history of osteoporotic fracture.   She will continue calcium vitamin D and daily exercises.   To obtain DEXA scan prior to next visit. Ordered.       Encounter Diagnoses:    Problem List Items Addressed This Visit        Oncology Diagnoses    DCIS (ductal carcinoma in situ)    Infiltrating ductal carcinoma of right breast (H) - Primary      Other Visit Diagnoses     Screening for osteoporosis        Relevant  Orders    DX Hip/Pelvis/Spine    Long term current use of aromatase inhibitor        Relevant Orders    DX Hip/Pelvis/Spine             CC: EMMY Maxwell CNP   ______________________________________________________________________________  Diagnosis  July 2019-    DCIS of both breasts (upper outer quadrant, lower outer quadrant and central portion of the right breast, multifocal, grade 2; focus of DCIS approximately 2 mm grade 1 in the left breast)   pTx pN1a M0 invasive ductal carcinoma of the right breast, grade 1.  Multifocal DCIS.  6 right axillary sentinel lymph nodes were examined 1 lymph node was positive for macro metastases and one lymph node was positive for isolated tumor cells.  ER positive (90%), PA positive (80%), HER-2 negative.    LMP-at age 48 ( in 3/2018) after hysterectomy. Serology confirms menopause in 4/2020.    Treatment to date  7/30/2019- right breast mastectomy and right sentinel lymph node biopsy, left mastectomy.  10/9/2019-12/6/2019-radiation to the right breast and chest wall and regional lymph nodes area of 5040 cGy/28 fractions followed by additional 1000 cGy in 5 fractions to the primary tumor bed.  12/2019-initiated adjuvant tamoxifen, but stopped about 2 months after due to pain in both shins.  Restarted tamoxifen in Apirl 2020, but stopped again after a month and then switched to anastrozole in May 2020.     History of Present Illness    Oliver presented herself today.    She is doing overall well.  No pain.  She has right breast reconstruction surgery planned later this month.  She takes anastrozole regularly.  She also takes vitamin D regularly.    Review of systems  Apart from describing in HPI, the remainder of comprehensive ROS was negative.    Past History    Past Medical History:   Diagnosis Date     Anemia      DCIS (ductal carcinoma in situ) 2019    bilateral     Dilated aortic root (H)      Foot fracture 6/3/11     History of anesthesia complications     slow to  "wake up     Osteopenia      Osteoporosis      Other specified anemias      PONV (postoperative nausea and vomiting)      Radial head fracture 6/3/11     Seizures (H)     h/o seizure disorder in childhood, last seizure activity  \"4 yrs ago\"     Suspected sleep apnea     sleep study set up in Aug 2019       Past Surgical History:   Procedure Laterality Date     C  DELIVERY ONLY       C/SECTION, LOW TRANSVERSE  07/10/1994    breech     HYSTERECTOMY Bilateral 2018    TOTAL LAPAROSCOPIC HYSTERECTOMY BILATERAL SALPINGECTOMY, CYSTOSCOPY     MAMMO STEREOTACTIC CORE BIOPSY RIGHT Right 2019     MAMMO STEREOTACTIC CORE BIOPSY RIGHT Right 2019     NM SENTINEL NODE INJECTION  2019     OTHER SURGICAL HISTORY      right arm surgery     KY MASTECTOMY, MODIFIED RADICAL Bilateral 2019    Procedure: BILATERAL MASTECTOMY;  Surgeon: Mckenzie Colby DO;  Location: [facility];  Service: General     KY REPLACEMENT TISSUE EXPANDER W/PERMANENT IMPLANT Bilateral 2019    Procedure: BILATERAL IMPLANT RECONSTRUCTION TO BREASTS;  Surgeon: Carlitos Crum MD;  Location: [facility];  Service: Plastics         Physical Exam    /72 (Patient Position: Sitting)   Pulse 62   Temp 98.7  F (37.1  C) (Oral)   Resp 16   Wt 114.8 kg (253 lb)   SpO2 97%   BMI 36.30 kg/m      General: alert, awake, not in acute distress  HEENT: Head: Normal, normocephalic, atraumatic.  Eye: Normal external eye, conjunctiva, lids cornea, DAYSI.  Nose: Normal external nose, mucus membranes and septum.  Pharynx: Normal buccal mucosa. Normal pharynx.  Neck / Thyroid: Supple, no masses, nodes, nodules or enlargement.  Lymphatics: No abnormally enlarged lymph nodes.  Chest: Normal chest wall and respirations. Clear to auscultation.  Breasts: No palpable abnormalities.  Postsurgical scars.  Heart: S1 S2 RRR, no murmur.   Abdomen: abdomen is soft without significant tenderness, masses, organomegaly or " guarding  Extremities: normal strength, tone, and muscle mass  Skin: normal. no rash or abnormalities  CNS: non focal.      Lab Results    Recent Results (from the past 168 hour(s))   Basic Metabolic Panel   Result Value Ref Range    Sodium 140 136 - 145 mmol/L    Potassium 4.1 3.5 - 5.0 mmol/L    Chloride 106 98 - 107 mmol/L    Carbon Dioxide (CO2) 24 22 - 31 mmol/L    Anion Gap 10 5 - 18 mmol/L    Urea Nitrogen 7 (L) 8 - 22 mg/dL    Creatinine 0.78 0.60 - 1.10 mg/dL    Calcium 9.4 8.5 - 10.5 mg/dL    Glucose 72 70 - 125 mg/dL    GFR Estimate 87 >60 mL/min/1.73m2   INR   Result Value Ref Range    INR 0.93 0.85 - 1.15   Hemoglobin   Result Value Ref Range    Hemoglobin 12.3 11.7 - 15.7 g/dL       Imaging    No results found.    I spent greater than 50% of the 30-minute visit was spent coordinating care regarding patient's medication side effect management, medication adherence, importance of diet and exercise in breast cancer survivors.    Signed by: Ez Burroughs MD

## 2021-10-26 ENCOUNTER — LAB (OUTPATIENT)
Dept: LAB | Facility: CLINIC | Age: 51
End: 2021-10-26
Payer: COMMERCIAL

## 2021-10-26 DIAGNOSIS — Z01.818 PREOP GENERAL PHYSICAL EXAM: ICD-10-CM

## 2021-10-26 PROCEDURE — U0003 INFECTIOUS AGENT DETECTION BY NUCLEIC ACID (DNA OR RNA); SEVERE ACUTE RESPIRATORY SYNDROME CORONAVIRUS 2 (SARS-COV-2) (CORONAVIRUS DISEASE [COVID-19]), AMPLIFIED PROBE TECHNIQUE, MAKING USE OF HIGH THROUGHPUT TECHNOLOGIES AS DESCRIBED BY CMS-2020-01-R: HCPCS

## 2021-10-26 PROCEDURE — U0005 INFEC AGEN DETEC AMPLI PROBE: HCPCS

## 2021-10-27 LAB — SARS-COV-2 RNA RESP QL NAA+PROBE: NEGATIVE

## 2021-10-29 ENCOUNTER — TRANSFERRED RECORDS (OUTPATIENT)
Dept: HEALTH INFORMATION MANAGEMENT | Facility: CLINIC | Age: 51
End: 2021-10-29
Payer: COMMERCIAL

## 2021-11-24 ENCOUNTER — TELEPHONE (OUTPATIENT)
Dept: FAMILY MEDICINE | Facility: CLINIC | Age: 51
End: 2021-11-24
Payer: COMMERCIAL

## 2021-11-24 NOTE — TELEPHONE ENCOUNTER
Reason for Call: Request for an order or referral:    Order or referral being requested: Covid Test Order for 12/3/21 surgery    Date needed: Nov 29 or 30    Has the patient been seen by the PCP for this problem? NO    Additional comments: Patient has no current PCP but Pre-op exam is scheduled with Dr Joseph at [place] 12/1/22    Phone number Patient can be reached at:  Cell number on file:    Telephone Information:   Mobile [phone]       Best Time:  As soon as possible    Can we leave a detailed message on this number?  YES    Call taken on 11/24/2021 at 11:53 AM by Laura Kanavel

## 2021-12-01 ENCOUNTER — OFFICE VISIT (OUTPATIENT)
Dept: FAMILY MEDICINE | Facility: CLINIC | Age: 51
End: 2021-12-01
Payer: COMMERCIAL

## 2021-12-01 VITALS
HEIGHT: 70 IN | TEMPERATURE: 98.5 F | HEART RATE: 72 BPM | OXYGEN SATURATION: 98 % | BODY MASS INDEX: 37.65 KG/M2 | DIASTOLIC BLOOD PRESSURE: 70 MMHG | SYSTOLIC BLOOD PRESSURE: 130 MMHG | WEIGHT: 263 LBS

## 2021-12-01 DIAGNOSIS — Z90.11 STATUS POST RIGHT MASTECTOMY: ICD-10-CM

## 2021-12-01 DIAGNOSIS — G47.33 OSA (OBSTRUCTIVE SLEEP APNEA): ICD-10-CM

## 2021-12-01 DIAGNOSIS — Z01.818 PREOP GENERAL PHYSICAL EXAM: Primary | ICD-10-CM

## 2021-12-01 PROCEDURE — 99214 OFFICE O/P EST MOD 30 MIN: CPT | Performed by: FAMILY MEDICINE

## 2021-12-01 ASSESSMENT — MIFFLIN-ST. JEOR: SCORE: 1878.21

## 2021-12-01 NOTE — PATIENT INSTRUCTIONS

## 2021-12-01 NOTE — PROGRESS NOTES
[facility]  [address]  [place] [facility] [address]  Phone: [phone]  Fax: [phone]  Primary Provider: No Ref-Primary, Physician  Pre-op Performing Provider: BORIS FRENCH    PREOPERATIVE EVALUATION:  Today's date: 12/1/2021    Sarah Gilmore is a 51 year old female who presents for a preoperative evaluation.    Surgical Information:  Surgery/Procedure: LEFT BREAST RECONSTRUCTION WITH LATISSIMUS DORSI FLAP  **PLEASE NOTE THAT THIS PROCEDURE IS TO BE PERFORMED ON THE LEFT SIDE**  Surgery Location: [facility]  Surgeon: Dr. Preciado  Surgery Date: 12/3/21  Time of Surgery:1030am  Where patient plans to recover: At home with family  Fax number for surgical facility: [phone]    Type of Anesthesia Anticipated: General    Assessment & Plan     The proposed surgical procedure is considered INTERMEDIATE risk.    Sarah was seen today for pre-op exam.    Diagnoses and all orders for this visit:    Preop general physical exam    Status post right mastectomy        Medication Instructions:  Patient is to take all scheduled medications on the day of surgery EXCEPT for modifications listed below:   No fish oil or vitamins/supplements prior to surgery    RECOMMENDATION:  APPROVAL GIVEN to proceed with proposed procedure, without further diagnostic evaluation.   956}  Subjective     HPI related to upcoming procedure: patient s/p bilateral mastectomy and multiple complications from previous breast reconstructions.       Preop Questions 12/1/2021   1. Have you ever had a heart attack or stroke? No   2. Have you ever had surgery on your heart or blood vessels, such as a stent placement, a coronary artery bypass, or surgery on an artery in your head, neck, heart, or legs? No   3. Do you have chest pain with activity? No   4. Do you have a history of  heart failure? No   5. Do you currently have a cold, bronchitis or symptoms of other infection? No    6. Do you have a cough, shortness of breath, or wheezing? No   7. Do you or anyone in your family have previous history of blood clots? YES - FATHER HAD HISTORY OF CARDIAC ARREST RELATED TO BLOOD CLOTS     8. Do you or does anyone in your family have a serious bleeding problem such as prolonged bleeding following surgeries or cuts? No   9. Have you ever had problems with anemia or been told to take iron pills? YES - history of anemia in the past prompting hysterectomy    10. Have you had any abnormal blood loss such as black, tarry or bloody stools, or abnormal vaginal bleeding? No   11. Have you ever had a blood transfusion? No   12. Are you willing to have a blood transfusion if it is medically needed before, during, or after your surgery? Yes   13. Have you or any of your relatives ever had problems with anesthesia? YES - PATIENT REPORTS HISTORY OF NAUSEA FOLLOWING anesthesia.  Also notes history of slow recovery from anesthesia in the past   14. Do you have sleep apnea, excessive snoring or daytime drowsiness? YES     14a. Do you have a CPAP machine? Yes   15. Do you have any artifical heart valves or other implanted medical devices like a pacemaker, defibrillator, or continuous glucose monitor? No   16. Do you have artificial joints? No   17. Are you allergic to latex? No   18. Is there any chance that you may be pregnant? No       Health Care Directive:  Patient does not have a Health Care Directive or Living Will: Patient states has Advance Directive and will bring in a copy to clinic.    Preoperative Review of :   reviewed - no record of controlled substances prescribed.    Status of Chronic Conditions:  See problem list for active medical problems.  Problems all longstanding and stable, except as noted/documented.  See ROS for pertinent symptoms related to these conditions.    Review of Systems  CONSTITUTIONAL: NEGATIVE for fever, chills, change in weight  INTEGUMENTARY/SKIN: NEGATIVE for worrisome  rashes, moles or lesions  EYES: NEGATIVE for vision changes or irritation  ENT/MOUTH: NEGATIVE for ear, mouth and throat problems  RESP: NEGATIVE for significant cough or SOB  CV: NEGATIVE for chest pain, palpitations or peripheral edema  GI: NEGATIVE for nausea, abdominal pain, heartburn, or change in bowel habits  : NEGATIVE for frequency, dysuria, or hematuria  NEURO: NEGATIVE for weakness, dizziness or paresthesias  HEME: NEGATIVE for bleeding problems  PSYCHIATRIC: NEGATIVE for changes in mood or affect    Patient Active Problem List    Diagnosis Date Noted     Infiltrating ductal carcinoma of right breast (H) 10/18/2021     Priority: Medium     Swelling in right armpit 09/20/2021     Priority: Medium     Post-mastectomy lymphedema syndrome 09/20/2021     Priority: Medium     Venous hypertension of lower extremity, bilateral 09/20/2021     Priority: Medium     Lipedema 09/20/2021     Priority: Medium     Class 2 severe obesity with serious comorbidity and body mass index (BMI) of 38.0 to 38.9 in adult, unspecified obesity type (H) 09/20/2021     Priority: Medium     Acquired lymphedema of leg 05/03/2021     Priority: Medium     Personal history of malignant neoplasm of breast 12/22/2020     Priority: Medium     Formatting of this note might be different from the original.  Added automatically from request for surgery [id number]       H/O breast reconstruction 08/30/2019     Priority: Medium     Formatting of this note might be different from the original.  Added automatically from request for surgery [id number]       DCIS (ductal carcinoma in situ) 2019     Priority: Medium     bilateral       Vitamin D deficiency 06/10/2011     Priority: Medium     (Problem list name updated by automated process. Provider to review and confirm.)       Osteopenia 06/08/2011     Priority: Medium     Hymenoptera allergy 07/07/2009     Priority: Medium     Excessive or frequent menstruation 03/11/2008     Priority: Medium     Iron  "deficiency anemia 2007     Priority: Medium     Problem list name updated by automated process. Provider to review        Past Medical History:   Diagnosis Date     Anemia      DCIS (ductal carcinoma in situ)     bilateral     Dilated aortic root (H)      Foot fracture 6/3/11     History of anesthesia complications     slow to wake up     Osteopenia      Osteoporosis      Other specified anemias      PONV (postoperative nausea and vomiting)      Radial head fracture 6/3/11     Seizures (H)     h/o seizure disorder in childhood, last seizure activity  \"4 yrs ago\"     Suspected sleep apnea     sleep study set up in Aug 2019     Past Surgical History:   Procedure Laterality Date     C  DELIVERY ONLY  1994     C/SECTION, LOW TRANSVERSE  07/10/1994    breech     HYSTERECTOMY Bilateral 2018    TOTAL LAPAROSCOPIC HYSTERECTOMY BILATERAL SALPINGECTOMY, CYSTOSCOPY     MAMMO STEREOTACTIC CORE BIOPSY RIGHT Right 2019     MAMMO STEREOTACTIC CORE BIOPSY RIGHT Right 2019     NM SENTINEL NODE INJECTION  2019     OTHER SURGICAL HISTORY      right arm surgery     WY MASTECTOMY, MODIFIED RADICAL Bilateral 2019    Procedure: BILATERAL MASTECTOMY;  Surgeon: Mckenzie Colby DO;  Location: [facility];  Service: General     WY REPLACEMENT TISSUE EXPANDER W/PERMANENT IMPLANT Bilateral 2019    Procedure: BILATERAL IMPLANT RECONSTRUCTION TO BREASTS;  Surgeon: Carlitos Crum MD;  Location: [facility] OR;  Service: Plastics     RIGHT BREAST RECONSTRUCTION WITH LATISSIMUS DORSI FLAP    10/29/2021     Current Outpatient Medications   Medication Sig Dispense Refill     anastrozole (ARIMIDEX) 1 MG tablet TAKE 1 TABLET BY MOUTH EVERY DAY (Patient not taking: Reported on 2021) 90 tablet 2     EPI E-Z PEN JOELLEN 1:1000  IJ 1 TIME ONLY  PRN bee sting (Patient not taking: Reported on 2021) 2 Device 20     ergocalciferol (ERGOCALCIFEROL) 57686 UNIT capsule Take 1 capsule by " "mouth every 14 days. (Patient not taking: Reported on 12/1/2021) 12 capsule 1     FERROUS SULFATE  MG OR TBCR 1 TABLET BID  after food withm orange juice (Patient not taking: Reported on 12/1/2021) 100 3     fish oil-omega-3 fatty acids 1000 MG capsule  (Patient not taking: Reported on 12/1/2021)       gabapentin (NEURONTIN) 100 MG capsule Take 100 mg by mouth (Patient not taking: Reported on 12/1/2021)       Multiple Vitamin (MULTI-VITAMINS) TABS Take 1 tablet by mouth (Patient not taking: Reported on 12/1/2021)       OSCO MAGNESIUM CITRATE OR  (Patient not taking: Reported on 12/1/2021)       Turmeric 400 MG CAPS  (Patient not taking: Reported on 12/1/2021)         Allergies   Allergen Reactions     Bee Pollen Anaphylaxis     Seafood Anaphylaxis     Bees      Noted in 1/17/09 ER     Contrast Dye      airway problems        Social History     Tobacco Use     Smoking status: Never Smoker     Smokeless tobacco: Never Used   Substance Use Topics     Alcohol use: No     Family History   Problem Relation Age of Onset     Psychotic Disorder Mother      Sleep Apnea Mother      C.A.D. Father      Heart Disease Father      Snoring Father      Psychotic Disorder Maternal Grandfather         commit suicide     Breast Cancer Paternal Grandmother 50.00     Mental Illness Mother      Diabetes Father      No Known Problems Sister      No Known Problems Brother      Asperger's syndrome Son      Suicidality Maternal Grandfather      Prostate Cancer Paternal Grandfather      Testicular cancer Paternal Grandfather      Mental Illness Son      Leukemia Paternal Uncle      History   Drug Use No         Objective     /70 (BP Location: Left arm, Patient Position: Sitting, Cuff Size: Adult Regular)   Pulse 72   Temp 98.5  F (36.9  C) (Oral)   Ht 1.778 m (5' 10\")   Wt 119.3 kg (263 lb)   LMP  (LMP Unknown)   SpO2 98%   Breastfeeding No   BMI 37.74 kg/m      Physical Exam    GENERAL APPEARANCE: healthy, alert and no " distress     EYES: EOMI, PERRL     HENT: ear canals and TM's normal and nose and mouth without ulcers or lesions     NECK: no adenopathy, no asymmetry, masses, or scars and thyroid normal to palpation     RESP: lungs clear to auscultation - no rales, rhonchi or wheezes     CV: regular rates and rhythm, normal S1 S2, no S3 or S4 and no murmur, click or rub     ABDOMEN:  soft, nontender, no HSM or masses and bowel sounds normal     SKIN: no suspicious lesions or rashes     NEURO:  mentation intact and speech normal     PSYCH: affect normal/bright     LYMPHATICS: No cervical adenopathy    Recent Labs   Lab Test 10/14/21  1534 06/01/21  1146 02/09/21  1508 08/29/20  1246   HGB 12.3 14.2   < > 13.9   PLT  --   --   --  286   INR 0.93 0.97   < >  --     139   < > 140   POTASSIUM 4.1 4.2   < > 4.7   CR 0.78 0.71   < > 0.86    < > = values in this interval not displayed.        Diagnostics:  No labs were ordered during this visit.   No EKG required, no history of coronary heart disease, significant arrhythmia, peripheral arterial disease or other structural heart disease.    Revised Cardiac Risk Index (RCRI):  The patient has the following serious cardiovascular risks for perioperative complications:   - No serious cardiac risks = 0 points     RCRI Interpretation: 0 points: Class I (very low risk - 0.4% complication rate)    Signed Electronically by: Asif Joseph MD  Copy of this evaluation report is provided to requesting physician.

## 2021-12-07 NOTE — PROGRESS NOTES
Provider opened note to discharge patient. Pt already discharged by another treating therapist.    Tamiko Aguila, PT, DPT, ANDREW

## 2022-01-26 ENCOUNTER — TELEPHONE (OUTPATIENT)
Dept: ONCOLOGY | Facility: CLINIC | Age: 52
End: 2022-01-26
Payer: COMMERCIAL

## 2022-01-26 NOTE — TELEPHONE ENCOUNTER
Sarah called and relayed that she needs to schedule her follow up with Dr. Burroughs and Dexa scan prior. Order is in Epic for dexa and she is on the recall list. She was transferred to scheduling but unable to take call. Will send a message for them to reach out to Sarah at confirmed mobile number in Epic to arrange appointments/Rupali [place] RN

## 2022-02-08 ENCOUNTER — OFFICE VISIT (OUTPATIENT)
Dept: FAMILY MEDICINE | Facility: CLINIC | Age: 52
End: 2022-02-08
Payer: COMMERCIAL

## 2022-02-08 VITALS
DIASTOLIC BLOOD PRESSURE: 80 MMHG | OXYGEN SATURATION: 98 % | WEIGHT: 266 LBS | HEART RATE: 83 BPM | SYSTOLIC BLOOD PRESSURE: 124 MMHG | BODY MASS INDEX: 38.17 KG/M2

## 2022-02-08 DIAGNOSIS — I89.0 ACQUIRED LYMPHEDEMA OF LEG: Primary | ICD-10-CM

## 2022-02-08 PROCEDURE — 99213 OFFICE O/P EST LOW 20 MIN: CPT | Performed by: NURSE PRACTITIONER

## 2022-02-08 RX ORDER — GABAPENTIN 100 MG/1
100 CAPSULE ORAL 3 TIMES DAILY
Qty: 271 CAPSULE | Refills: 1 | Status: SHIPPED | OUTPATIENT
Start: 2022-02-08 | End: 2023-01-26

## 2022-02-08 NOTE — PATIENT INSTRUCTIONS
Refills of the gabapentin provided today.    I do not know about the selenium.  You may want to ask your pharmacist about that.    I can do your preop in the future if needed.    Consider Mikala Leach CNP or Teressa Tidwell MD as new PCP moving forward

## 2022-02-08 NOTE — PROGRESS NOTES
"  Assessment & Plan     Acquired lymphedema of leg  She has some pain associated to the lymphedema in her lower extremities.    The pain is well controlled with gabapentin. She is looking for refills today.    - gabapentin (NEURONTIN) 100 MG capsule; Take 1 capsule (100 mg) by mouth 3 times daily    Patient Instructions   Refills of the gabapentin provided today.    I do not know about the selenium.  You may want to ask your pharmacist about that.    I can do your preop in the future if needed.    Consider Mikala Leach CNP or Teressa Tidwell MD as new PCP moving forward      Prescription drug management  18 minutes spent on the date of the encounter doing chart review, history and exam, documentation and further activities per the note     BMI:   Estimated body mass index is 38.17 kg/m  as calculated from the following:    Height as of 12/1/21: 1.778 m (5' 10\").    Weight as of this encounter: 120.7 kg (266 lb).   Weight management plan: Discussed healthy diet and exercise guidelines    See Patient Instructions    Return in about 3 months (around 5/8/2022) for Follow up.    Niko Kennedy CNP  [facility]   Sarah is a 51 year old who presents for the following health issues     HPI     Here for medication refills. She is looking for refills of gabapentin. She uses this for lymphedema. Also helps her sleep.    She does not have any complications from the medication or adverse effects reported today      Review of Systems   Constitutional, HEENT, cardiovascular, pulmonary, gi and gu systems are negative, except as otherwise noted.      Objective    /80 (BP Location: Left arm, Patient Position: Sitting, Cuff Size: Adult Large)   Pulse 83   Wt 120.7 kg (266 lb)   LMP  (LMP Unknown)   SpO2 98%   BMI 38.17 kg/m    Body mass index is 38.17 kg/m .  Physical Exam     Patient is alert oriented and in no acute distress  "

## 2022-02-28 ENCOUNTER — OFFICE VISIT (OUTPATIENT)
Dept: FAMILY MEDICINE | Facility: CLINIC | Age: 52
End: 2022-02-28
Payer: COMMERCIAL

## 2022-02-28 VITALS
HEART RATE: 72 BPM | SYSTOLIC BLOOD PRESSURE: 110 MMHG | WEIGHT: 265 LBS | HEIGHT: 70 IN | OXYGEN SATURATION: 98 % | DIASTOLIC BLOOD PRESSURE: 72 MMHG | BODY MASS INDEX: 37.94 KG/M2

## 2022-02-28 DIAGNOSIS — Z01.818 PREOP EXAMINATION: Primary | ICD-10-CM

## 2022-02-28 DIAGNOSIS — R60.9 LIPEDEMA: ICD-10-CM

## 2022-02-28 LAB
ANION GAP SERPL CALCULATED.3IONS-SCNC: 12 MMOL/L (ref 5–18)
BUN SERPL-MCNC: 13 MG/DL (ref 8–22)
CALCIUM SERPL-MCNC: 9.2 MG/DL (ref 8.5–10.5)
CHLORIDE BLD-SCNC: 106 MMOL/L (ref 98–107)
CO2 SERPL-SCNC: 23 MMOL/L (ref 22–31)
CREAT SERPL-MCNC: 0.84 MG/DL (ref 0.6–1.1)
ERYTHROCYTE [DISTWIDTH] IN BLOOD BY AUTOMATED COUNT: 13.4 % (ref 10–15)
GFR SERPL CREATININE-BSD FRML MDRD: 83 ML/MIN/1.73M2
GLUCOSE BLD-MCNC: 83 MG/DL (ref 70–125)
HCT VFR BLD AUTO: 37.3 % (ref 35–47)
HGB BLD-MCNC: 11.9 G/DL (ref 11.7–15.7)
MCH RBC QN AUTO: 27 PG (ref 26.5–33)
MCHC RBC AUTO-ENTMCNC: 31.9 G/DL (ref 31.5–36.5)
MCV RBC AUTO: 85 FL (ref 78–100)
PLATELET # BLD AUTO: 320 10E3/UL (ref 150–450)
POTASSIUM BLD-SCNC: 4.2 MMOL/L (ref 3.5–5)
RBC # BLD AUTO: 4.4 10E6/UL (ref 3.8–5.2)
SODIUM SERPL-SCNC: 141 MMOL/L (ref 136–145)
WBC # BLD AUTO: 5.6 10E3/UL (ref 4–11)

## 2022-02-28 PROCEDURE — 36415 COLL VENOUS BLD VENIPUNCTURE: CPT | Performed by: FAMILY MEDICINE

## 2022-02-28 PROCEDURE — 80048 BASIC METABOLIC PNL TOTAL CA: CPT | Performed by: FAMILY MEDICINE

## 2022-02-28 PROCEDURE — 85027 COMPLETE CBC AUTOMATED: CPT | Performed by: FAMILY MEDICINE

## 2022-02-28 PROCEDURE — 99214 OFFICE O/P EST MOD 30 MIN: CPT | Performed by: FAMILY MEDICINE

## 2022-02-28 ASSESSMENT — PATIENT HEALTH QUESTIONNAIRE - PHQ9: SUM OF ALL RESPONSES TO PHQ QUESTIONS 1-9: 0

## 2022-02-28 NOTE — PROGRESS NOTES
[facility]  [address] [facility] [address]  Phone: [phone]  Fax: [phone]  Primary Provider: No Ref-Primary, Physician  Pre-op Performing Provider: ABY ESCALANTE       PREOPERATIVE EVALUATION:  Today's date: 2/28/2022    Sarah Gilmore is a 51 year old female who presents for a preoperative evaluation.    Surgical Information:  Surgery/Procedure: Liposuction bilateral legs  Surgery Location: [facility]  Surgeon: Dr Preciado  Surgery Date: 3/1/2022  Time of Surgery:   Where patient plans to recover: At home with family  Fax number for surgical facility: [facility] [phone]    Type of Anesthesia Anticipated: General    Assessment & Plan     The proposed surgical procedure is considered LOW risk.    Preop examination  For leg procedure as above  - Basic metabolic panel  - CBC with platelets    Lipedema  Chronic and longstanding    PLAN:  1.  CBC and basic metabolic profile reviewed, results normal  2.  Patient is otherwise cleared for surgery        Risks and Recommendations:  The patient has the following additional risks and recommendations for perioperative complications:   - No identified additional risk factors other than previously addressed    Medication Instructions:  Patient is to take all scheduled medications on the day of surgery    RECOMMENDATION:  APPROVAL GIVEN to proceed with proposed procedure, without further diagnostic evaluation.   :[id number]}    Subjective     HPI related to upcoming procedure: Patient is seen preoperatively prior to liposuction for underlying lymphedema of her legs bilaterally.  Patient does wear some compression hose which has been helpful but she is scheduled for the surgical procedure as above for more definitive treatment.    Patient has a history of underlying sleep apnea she is on nightly CPAP.    She is not on any blood thinners but she is already stopped her fish oil supplements as an example.  No other  change in her health status.    Patient reports that she has been told she is a bit slow to wake up from anesthesia and does have some postoperative nausea but otherwise has done well with prior surgical procedures.      Preop Questions 2/28/2022   1. Have you ever had a heart attack or stroke? No   2. Have you ever had surgery on your heart or blood vessels, such as a stent placement, a coronary artery bypass, or surgery on an artery in your head, neck, heart, or legs? No   3. Do you have chest pain with activity? No   4. Do you have a history of  heart failure? No   5. Do you currently have a cold, bronchitis or symptoms of other infection? No   6. Do you have a cough, shortness of breath, or wheezing? No   7. Do you or anyone in your family have previous history of blood clots? No   8. Do you or does anyone in your family have a serious bleeding problem such as prolonged bleeding following surgeries or cuts? No   9. Have you ever had problems with anemia or been told to take iron pills? YES -patient is not currently anemic   10. Have you had any abnormal blood loss such as black, tarry or bloody stools, or abnormal vaginal bleeding? No   11. Have you ever had a blood transfusion? No   12. Are you willing to have a blood transfusion if it is medically needed before, during, or after your surgery? Yes   13. Have you or any of your relatives ever had problems with anesthesia? No   14. Do you have sleep apnea, excessive snoring or daytime drowsiness? YES -patient with known underlying sleep apnea   14a. Do you have a CPAP machine? Yes   15. Do you have any artifical heart valves or other implanted medical devices like a pacemaker, defibrillator, or continuous glucose monitor? No   16. Do you have artificial joints? No   17. Are you allergic to latex? No   18. Is there any chance that you may be pregnant? No       Health Care Directive:  Patient does not have a Health Care Directive or Living Will: Advance Directive  received and scanned. Click on Code in the patient header to view.    Preoperative Review of :   reviewed - no record of controlled substances prescribed.       Status of Chronic Conditions:  See problem list for active medical problems.  Problems all longstanding and stable, except as noted/documented.  See ROS for pertinent symptoms related to these conditions.      Review of Systems  CONSTITUTIONAL: NEGATIVE for fever, chills, change in weight  INTEGUMENTARY/SKIN: NEGATIVE for worrisome rashes, moles or lesions  EYES: NEGATIVE for vision changes or irritation  ENT/MOUTH: NEGATIVE for ear, mouth and throat problems  RESP: NEGATIVE for significant cough or SOB  CV: NEGATIVE for chest pain, palpitations or peripheral edema  GI: NEGATIVE for nausea, abdominal pain, heartburn, or change in bowel habits  : NEGATIVE for frequency, dysuria, or hematuria  MUSCULOSKELETAL: NEGATIVE for significant arthralgias or myalgia  NEURO: NEGATIVE for weakness, dizziness or paresthesias  ENDOCRINE: NEGATIVE for temperature intolerance, skin/hair changes  HEME: NEGATIVE for bleeding problems  PSYCHIATRIC: NEGATIVE for changes in mood or affect    Patient Active Problem List    Diagnosis Date Noted     Infiltrating ductal carcinoma of right breast (H) 10/18/2021     Priority: Medium     Swelling in right armpit 09/20/2021     Priority: Medium     Post-mastectomy lymphedema syndrome 09/20/2021     Priority: Medium     Venous hypertension of lower extremity, bilateral 09/20/2021     Priority: Medium     Lipedema 09/20/2021     Priority: Medium     Class 2 severe obesity with serious comorbidity and body mass index (BMI) of 38.0 to 38.9 in adult, unspecified obesity type (H) 09/20/2021     Priority: Medium     Acquired lymphedema of leg 05/03/2021     Priority: Medium     Personal history of malignant neoplasm of breast 12/22/2020     Priority: Medium     Formatting of this note might be different from the original.  Added  "automatically from request for surgery [id number]       H/O breast reconstruction 08/30/2019     Priority: Medium     Formatting of this note might be different from the original.  Added automatically from request for surgery [id number]       DCIS (ductal carcinoma in situ) 2019     Priority: Medium     bilateral       Vitamin D deficiency 06/10/2011     Priority: Medium     (Problem list name updated by automated process. Provider to review and confirm.)       Osteopenia 06/08/2011     Priority: Medium     Hymenoptera allergy 07/07/2009     Priority: Medium     Excessive or frequent menstruation 03/11/2008     Priority: Medium     Iron deficiency anemia 08/21/2007     Priority: Medium     Problem list name updated by automated process. Provider to review        Past Medical History:   Diagnosis Date     Anemia      DCIS (ductal carcinoma in situ) 2019    bilateral     Dilated aortic root (H)      Foot fracture 6/3/11     History of anesthesia complications     slow to wake up     Osteopenia      Osteoporosis      Other specified anemias      PONV (postoperative nausea and vomiting)      Radial head fracture 6/3/11     Seizures (H)     h/o seizure disorder in childhood, last seizure activity  \"4 yrs ago\"     Suspected sleep apnea     sleep study set up in Aug 2019     Past Surgical History:   Procedure Laterality Date     C/SECTION, LOW TRANSVERSE  07/10/1994    breech     HYSTERECTOMY Bilateral 03/13/2018    TOTAL LAPAROSCOPIC HYSTERECTOMY BILATERAL SALPINGECTOMY, CYSTOSCOPY     MAMMO STEREOTACTIC CORE BIOPSY RIGHT Right 05/22/2019     MAMMO STEREOTACTIC CORE BIOPSY RIGHT Right 05/30/2019     NM SENTINEL NODE INJECTION  07/30/2019     OTHER SURGICAL HISTORY      right arm surgery     OR MASTECTOMY, MODIFIED RADICAL Bilateral 07/30/2019    Procedure: BILATERAL MASTECTOMY;  Surgeon: Mckenzie Colby DO;  Location: [facility] OR;  Service: General     OR REPLACEMENT TISSUE EXPANDER W/PERMANENT IMPLANT Bilateral 07/30/2019 "    Procedure: BILATERAL IMPLANT RECONSTRUCTION TO BREASTS;  Surgeon: Carlitos Crum MD;  Location: [facility] [place];  Service: Plastics     RIGHT BREAST RECONSTRUCTION WITH LATISSIMUS DORSI FLAP    10/29/2021     [facility]  DELIVERY ONLY  1994     Current Outpatient Medications   Medication Sig Dispense Refill     anastrozole (ARIMIDEX) 1 MG tablet TAKE 1 TABLET BY MOUTH EVERY DAY 90 tablet 2     EPI E-Z PEN JOELLEN 1:1000  IJ 1 TIME ONLY  PRN bee sting 2 Device 20     ergocalciferol (ERGOCALCIFEROL) 94504 UNIT capsule Take 1 capsule by mouth every 14 days. 12 capsule 1     FERROUS SULFATE  MG OR TBCR 1 TABLET BID  after food withm orange juice 100 3     fish oil-omega-3 fatty acids 1000 MG capsule        gabapentin (NEURONTIN) 100 MG capsule Take 1 capsule (100 mg) by mouth 3 times daily 271 capsule 1     Multiple Vitamin (MULTI-VITAMINS) TABS Take 1 tablet by mouth        OSCO MAGNESIUM CITRATE OR        Turmeric 400 MG CAPS          Allergies   Allergen Reactions     Bee Pollen Anaphylaxis     Seafood Anaphylaxis     Bees      Noted in 09 ER     Contrast Dye      airway problems        Social History     Tobacco Use     Smoking status: Never Smoker     Smokeless tobacco: Never Used   Substance Use Topics     Alcohol use: No     Family History   Problem Relation Age of Onset     Psychotic Disorder Mother      Sleep Apnea Mother      Mental Illness Mother      C.A.D. Father      Diabetes Father      Sleep Apnea Father      No Known Problems Sister      No Known Problems Brother      Psychotic Disorder Maternal Grandfather         commit suicide     Suicidality Maternal Grandfather      Breast Cancer Paternal Grandmother 50     Prostate Cancer Paternal Grandfather      Testicular cancer Paternal Grandfather      Asperger's syndrome Son      Mental Illness Son      Leukemia Paternal Uncle      History   Drug Use No         Objective     LMP  (LMP Unknown)     Physical Exam    GENERAL APPEARANCE:  healthy, alert and no distress     EYES: EOMI, PERRL     HENT: ear canals and TM's normal and nose and mouth without ulcers or lesions     NECK: no adenopathy, no asymmetry, masses, or scars and thyroid normal to palpation     RESP: lungs clear to auscultation - no rales, rhonchi or wheezes     CV: regular rates and rhythm, normal S1 S2, no S3 or S4 and no murmur, click or rub     ABDOMEN:  soft, nontender, no HSM or masses and bowel sounds normal     MS: extremities normal- no gross deformities noted, no evidence of inflammation in joints, FROM in all extremities.     SKIN: no suspicious lesions or rashes     NEURO: Normal strength and tone, sensory exam grossly normal, mentation intact and speech normal     PSYCH: mentation appears normal. and affect normal/bright     LYMPHATICS: No cervical adenopathy    Recent Labs   Lab Test 10/14/21  1534 06/01/21  1146 02/09/21  1508 08/29/20  1246   HGB 12.3 14.2   < > 13.9   PLT  --   --   --  286   INR 0.93 0.97   < >  --     139   < > 140   POTASSIUM 4.1 4.2   < > 4.7   CR 0.78 0.71   < > 0.86    < > = values in this interval not displayed.        Diagnostics:  Recent Results (from the past 48 hour(s))   Basic metabolic panel    Collection Time: 02/28/22  2:40 PM   Result Value Ref Range    Sodium 141 136 - 145 mmol/L    Potassium 4.2 3.5 - 5.0 mmol/L    Chloride 106 98 - 107 mmol/L    Carbon Dioxide (CO2) 23 22 - 31 mmol/L    Anion Gap 12 5 - 18 mmol/L    Urea Nitrogen 13 8 - 22 mg/dL    Creatinine 0.84 0.60 - 1.10 mg/dL    Calcium 9.2 8.5 - 10.5 mg/dL    Glucose 83 70 - 125 mg/dL    GFR Estimate 83 >60 mL/min/1.73m2   CBC with platelets    Collection Time: 02/28/22  2:40 PM   Result Value Ref Range    WBC Count 5.6 4.0 - 11.0 10e3/uL    RBC Count 4.40 3.80 - 5.20 10e6/uL    Hemoglobin 11.9 11.7 - 15.7 g/dL    Hematocrit 37.3 35.0 - 47.0 %    MCV 85 78 - 100 fL    MCH 27.0 26.5 - 33.0 pg    MCHC 31.9 31.5 - 36.5 g/dL    RDW 13.4 10.0 - 15.0 %    Platelet Count 320  150 - 450 10e3/uL      No EKG required, no history of coronary heart disease, significant arrhythmia, peripheral arterial disease or other structural heart disease.    Revised Cardiac Risk Index (RCRI):  The patient has the following serious cardiovascular risks for perioperative complications:   - No serious cardiac risks = 0 points     RCRI Interpretation: 0 points: Class I (very low risk - 0.4% complication rate)           Signed Electronically by: Yaya Carlson MD  Copy of this evaluation report is provided to requesting physician.

## 2022-02-28 NOTE — LETTER
March 1, 2022      Sarah LEDBETTER Deirdre  [address]  [place] [address]        Dear ,    We are writing to inform you of your test results.  Kidney tests are normal, blood counts are normal, no anemia      Resulted Orders   Basic metabolic panel   Result Value Ref Range    Sodium 141 136 - 145 mmol/L    Potassium 4.2 3.5 - 5.0 mmol/L    Chloride 106 98 - 107 mmol/L    Carbon Dioxide (CO2) 23 22 - 31 mmol/L    Anion Gap 12 5 - 18 mmol/L    Urea Nitrogen 13 8 - 22 mg/dL    Creatinine 0.84 0.60 - 1.10 mg/dL    Calcium 9.2 8.5 - 10.5 mg/dL    Glucose 83 70 - 125 mg/dL    GFR Estimate 83 >60 mL/min/1.73m2      Comment:      Effective December 21, 2021 eGFRcr in adults is calculated using the 2021 CKD-EPI creatinine equation which includes age and gender (Malena et al., NE, DOI: 10.1056/BFVRzw0763595)   CBC with platelets   Result Value Ref Range    WBC Count 5.6 4.0 - 11.0 10e3/uL    RBC Count 4.40 3.80 - 5.20 10e6/uL    Hemoglobin 11.9 11.7 - 15.7 g/dL    Hematocrit 37.3 35.0 - 47.0 %    MCV 85 78 - 100 fL    MCH 27.0 26.5 - 33.0 pg    MCHC 31.9 31.5 - 36.5 g/dL    RDW 13.4 10.0 - 15.0 %    Platelet Count 320 150 - 450 10e3/uL       If you have any questions or concerns, please call the clinic at the number listed above.       Sincerely,      Yaya Carlson MD

## 2022-03-30 PROBLEM — Z90.710 HISTORY OF HYSTERECTOMY: Status: ACTIVE | Noted: 2022-03-30

## 2022-04-05 ENCOUNTER — ANCILLARY PROCEDURE (OUTPATIENT)
Dept: BONE DENSITY | Facility: CLINIC | Age: 52
End: 2022-04-05
Attending: INTERNAL MEDICINE
Payer: COMMERCIAL

## 2022-04-05 DIAGNOSIS — Z13.820 SCREENING FOR OSTEOPOROSIS: ICD-10-CM

## 2022-04-05 DIAGNOSIS — Z79.811 LONG TERM CURRENT USE OF AROMATASE INHIBITOR: ICD-10-CM

## 2022-04-05 PROCEDURE — 77080 DXA BONE DENSITY AXIAL: CPT | Mod: TC | Performed by: RADIOLOGY

## 2022-04-07 ENCOUNTER — HOSPITAL ENCOUNTER (OUTPATIENT)
Dept: PHYSICAL THERAPY | Facility: REHABILITATION | Age: 52
Discharge: HOME | End: 2022-04-07
Attending: STUDENT IN AN ORGANIZED HEALTH CARE EDUCATION/TRAINING PROGRAM
Payer: COMMERCIAL

## 2022-04-07 ENCOUNTER — ONCOLOGY VISIT (OUTPATIENT)
Dept: ONCOLOGY | Facility: CLINIC | Age: 52
End: 2022-04-07
Attending: INTERNAL MEDICINE
Payer: COMMERCIAL

## 2022-04-07 VITALS
OXYGEN SATURATION: 95 % | RESPIRATION RATE: 16 BRPM | HEART RATE: 71 BPM | WEIGHT: 267 LBS | TEMPERATURE: 98.2 F | SYSTOLIC BLOOD PRESSURE: 155 MMHG | BODY MASS INDEX: 38.31 KG/M2 | DIASTOLIC BLOOD PRESSURE: 82 MMHG

## 2022-04-07 DIAGNOSIS — M79.89 LEG SWELLING: Primary | ICD-10-CM

## 2022-04-07 DIAGNOSIS — M81.0 AGE-RELATED OSTEOPOROSIS WITHOUT CURRENT PATHOLOGICAL FRACTURE: Primary | ICD-10-CM

## 2022-04-07 PROCEDURE — G0463 HOSPITAL OUTPT CLINIC VISIT: HCPCS

## 2022-04-07 PROCEDURE — 97140 MANUAL THERAPY 1/> REGIONS: CPT | Mod: GP | Performed by: PHYSICAL THERAPIST

## 2022-04-07 PROCEDURE — 97161 PT EVAL LOW COMPLEX 20 MIN: CPT | Mod: GP | Performed by: PHYSICAL THERAPIST

## 2022-04-07 PROCEDURE — 99214 OFFICE O/P EST MOD 30 MIN: CPT | Performed by: INTERNAL MEDICINE

## 2022-04-07 RX ORDER — DIPHENHYDRAMINE HYDROCHLORIDE 50 MG/ML
50 INJECTION INTRAMUSCULAR; INTRAVENOUS
Status: CANCELLED
Start: 2022-04-07

## 2022-04-07 RX ORDER — ZOLEDRONIC ACID 5 MG/100ML
5 INJECTION, SOLUTION INTRAVENOUS ONCE
Status: CANCELLED
Start: 2022-04-07 | End: 2022-04-07

## 2022-04-07 RX ORDER — METHYLPREDNISOLONE SODIUM SUCCINATE 125 MG/2ML
125 INJECTION, POWDER, LYOPHILIZED, FOR SOLUTION INTRAMUSCULAR; INTRAVENOUS
Status: CANCELLED
Start: 2022-04-07

## 2022-04-07 RX ORDER — HEPARIN SODIUM (PORCINE) LOCK FLUSH IV SOLN 100 UNIT/ML 100 UNIT/ML
5 SOLUTION INTRAVENOUS
Status: CANCELLED | OUTPATIENT
Start: 2022-04-07

## 2022-04-07 RX ORDER — EPINEPHRINE 1 MG/ML
0.3 INJECTION, SOLUTION, CONCENTRATE INTRAVENOUS EVERY 5 MIN PRN
Status: CANCELLED | OUTPATIENT
Start: 2022-04-07

## 2022-04-07 RX ORDER — ALBUTEROL SULFATE 90 UG/1
1-2 AEROSOL, METERED RESPIRATORY (INHALATION)
Status: CANCELLED
Start: 2022-04-07

## 2022-04-07 RX ORDER — MEPERIDINE HYDROCHLORIDE 50 MG/ML
25 INJECTION INTRAMUSCULAR; INTRAVENOUS; SUBCUTANEOUS EVERY 30 MIN PRN
Status: CANCELLED | OUTPATIENT
Start: 2022-04-07

## 2022-04-07 RX ORDER — HEPARIN SODIUM,PORCINE 10 UNIT/ML
5 VIAL (ML) INTRAVENOUS
Status: CANCELLED | OUTPATIENT
Start: 2022-04-07

## 2022-04-07 RX ORDER — NALOXONE HYDROCHLORIDE 0.4 MG/ML
0.2 INJECTION, SOLUTION INTRAMUSCULAR; INTRAVENOUS; SUBCUTANEOUS
Status: CANCELLED | OUTPATIENT
Start: 2022-04-07

## 2022-04-07 RX ORDER — ALBUTEROL SULFATE 0.83 MG/ML
2.5 SOLUTION RESPIRATORY (INHALATION)
Status: CANCELLED | OUTPATIENT
Start: 2022-04-07

## 2022-04-07 RX ORDER — OXYCODONE HYDROCHLORIDE 5 MG/1
TABLET ORAL
COMMUNITY
Start: 2022-03-01 | End: 2022-04-07

## 2022-04-07 ASSESSMENT — PAIN SCALES - GENERAL: PAINLEVEL: NO PAIN (0)

## 2022-04-07 NOTE — LETTER
"    4/7/2022         RE: Sarah Gilmore  [address]  [place] [address]        Dear Colleague,    Thank you for referring your patient, Sarah Gilmore, to the [facility]. Please see a copy of my visit note below.    Oncology Rooming Note    April 7, 2022 4:16 PM   Sarah Gilmore is a 51 year old female who presents for:    Chief Complaint   Patient presents with     Oncology Clinic Visit     Initial Vitals: LMP  (LMP Unknown)  Estimated body mass index is 38.02 kg/m  as calculated from the following:    Height as of 2/28/22: 1.778 m (5' 10\").    Weight as of 2/28/22: 120.2 kg (265 lb). There is no height or weight on file to calculate BSA.  Data Unavailable Comment: Data Unavailable   No LMP recorded (lmp unknown). Patient has had a hysterectomy.  Allergies reviewed: Yes  Medications reviewed: Yes    Medications: Medication refills not needed today.  Pharmacy name entered into [employer]:    [id number] [place] [place] [facility] [address] [facility]    Clinical concerns: Has slight discomfort in right axilla and rib area.    CHRISTOPHER SERNA RN              [facility] Hematology and Oncology Progress Note    Patient: Sarah Gilmore  MRN: [id number]  Date of Service: Apr 7, 2022         Reason for Visit    Chief Complaint   Patient presents with     Oncology Clinic Visit       Assessment and Plan    Cancer Staging  Infiltrating ductal carcinoma of right breast (H)  Staging form: Breast, AJCC 8th Edition  - Pathologic stage from 9/4/2019: Stage Unknown (pTX, pN1a(sn), cM0, G1, ER+, AR+, HER2-) - Signed by Ez Burroughs MD on 10/18/2021  - Clinical: No stage assigned - Unsigned      ECOG Performance    0 - Independent     Pain  Pain Score: No Pain (0) (mild discomfort in right axilla, intermittent)    #.  DCIS of the both breasts (upper outer quadrant, lower outer quadrant and central portion of the right breast, multifocal, grade 2; focus of DCIS approximately 2 mm grade 1 in the " left breast)  #. pTx pN1a M0 invasive ductal carcinoma of the right breast, grade 1.  ER positive, NY positive, HER-2 negative.   Clinically well.  No signs or symptoms suggestive of breast cancer recurrence.     She tolerates anastrozole well.  Recommended to continue anastrozole in will start intervention for her bone density as below.    Duration of endocrine therapy is for minimum of 5 years and she is considering to extend to 10 years as long as she does not have major side effects.   Follow-up clinical exam in about 6 months.    #.  Osteoporosis    Reviewed DEXA scan from 4/5/2022.  She has 12.7% decline in L-spine T score in L-spine T score is now-2.6 meeting osteoporosis.  There is also decline in her right hip T score of-1.8, left hip T score of-1.9.   I recommended bisphosphonate therapy.  I reviewed Fosamax, Reclast or Xgeva.  I reviewed how the RN administer, potential side effects and schedule.  We decided to proceed with Reclast.    I recommended to see her dentist for dental clearance.     Continue calcium and vitamin D.  I strongly encouraged her to continue with weightbearing exercises.     Plan to obtain DEXA scan in 1 year.     Encounter Diagnoses:    Problem List Items Addressed This Visit     Age-related osteoporosis without current pathological fracture - Primary             CC: EMMY Maxwell CNP   ______________________________________________________________________________  Diagnosis  July 2019-    DCIS of both breasts (upper outer quadrant, lower outer quadrant and central portion of the right breast, multifocal, grade 2; focus of DCIS approximately 2 mm grade 1 in the left breast)   pTx pN1a M0 invasive ductal carcinoma of the right breast, grade 1.  Multifocal DCIS.  6 right axillary sentinel lymph nodes were examined 1 lymph node was positive for macro metastases and one lymph node was positive for isolated tumor cells.  ER positive (90%), NY positive (80%), HER-2  "negative.    LMP-at age 48 ( in 3/2018) after hysterectomy. Serology confirms menopause in 4/2020.    Treatment to date  7/30/2019- right breast mastectomy and right sentinel lymph node biopsy, left mastectomy.  10/9/2019-12/6/2019-radiation to the right breast and chest wall and regional lymph nodes area of 5040 cGy/28 fractions followed by additional 1000 cGy in 5 fractions to the primary tumor bed.  12/2019-initiated adjuvant tamoxifen, but stopped about 2 months after due to pain in both shins.  Restarted tamoxifen in Apirl 2020, but stopped again after a month and then switched to anastrozole in May 2020.     History of Present Illness    Oliver presented herself today.    She had WALS lymphatic drainage for lymphedema of the lower extremity and she noted improvement.  No other new health concerns since last visit.  She takes anastrozole regularly.  She takes calcium, vitamin D.  She noted intermittent twinges in the right axilla.  No masses.  No headaches.  No back pain.    Review of systems  Apart from describing in HPI, the remainder of comprehensive ROS was negative.    Past History    Past Medical History:   Diagnosis Date     Dilated aortic root (H)      Foot fracture 06/03/2011     History of anesthesia complications     slow to wake up     PONV (postoperative nausea and vomiting)      Radial head fracture 06/03/2011     Seizures (H)     h/o seizure disorder in childhood, last seizure activity  \"4 yrs ago\"     Suspected sleep apnea     sleep study set up in Aug 2019       Past Surgical History:   Procedure Laterality Date     C/SECTION, LOW TRANSVERSE  07/10/1994    breech     HYSTERECTOMY, PAP NO LONGER INDICATED Bilateral      LAPAROSCOPIC HYSTERECTOMY TOTAL  03/13/2018    cervix benign     MAMMO STEREOTACTIC CORE BIOPSY RIGHT Right 05/22/2019     MAMMO STEREOTACTIC CORE BIOPSY RIGHT Right 05/30/2019     NM SENTINEL NODE INJECTION  07/30/2019     OTHER SURGICAL HISTORY      right arm surgery     PA " MASTECTOMY, MODIFIED RADICAL Bilateral 2019    Procedure: BILATERAL MASTECTOMY;  Surgeon: Mckenzie Colby DO;  Location[place] [facility];  Service: General     NM REPLACEMENT TISSUE EXPANDER W/PERMANENT IMPLANT Bilateral 2019    Procedure: BILATERAL IMPLANT RECONSTRUCTION TO BREASTS;  Surgeon: Carlitos Crum MD;  Location: [facility];  Service: Plastics     RIGHT BREAST RECONSTRUCTION WITH LATISSIMUS DORSI FLAP    10/29/2021     [facility]  DELIVERY ONLY  1994         Physical Exam    BP (!) 155/82   Pulse 71   Temp 98.2  F (36.8  C) (Oral)   Resp 16   Wt 121.1 kg (267 lb)   LMP  (LMP Unknown)   SpO2 95%   BMI 38.31 kg/m      General: alert, awake, not in acute distress  HEENT: Head: Normal, normocephalic, atraumatic.  Eye: Normal external eye, conjunctiva, lids cornea, DAYSI.  Nose: Normal external nose, mucus membranes and septum.  Pharynx: Normal buccal mucosa. Normal pharynx.  Neck / Thyroid: Supple, no masses, nodes, nodules or enlargement.  Lymphatics: No abnormally enlarged lymph nodes.  Chest: Normal chest wall and respirations. Clear to auscultation.  Breasts: Reconstructed breast bilaterally.  No palpable abnormalities.  Heart: S1 S2 RRR, no murmur.   Abdomen: abdomen is soft without significant tenderness, masses, organomegaly or guarding  Extremities: normal strength, tone, and muscle mass  Skin: normal. no rash or abnormalities  CNS: non focal.      Lab Results    No results found for this or any previous visit (from the past 168 hour(s)).    Imaging    DX Hip/Pelvis/Spine    Result Date: 2022  EXAM: DX HIP/PELVIS/SPINE LOCATION: [facility] DATE/TIME: 2022 11:10 AM INDICATION: Screening for osteoporosis, long-term current use of aromatase inhibitor. COMPARISON: 2019. TECHNIQUE: Dual-energy x-ray absorptiometry performed with routine technique. FINDINGS: Lumbar Spine: L2-L4: BMD: 0.885 g/cm2. T-score: -2.6. Z-score: -1.9 RIGHT Hip  Total: BMD: 0.775 g/cm2. T-score: -1.8. Z-score: -1.2 RIGHT Hip Femoral neck: BMD: 71364 g/cm2. T-score: -1.4. Z-score: -0.5 LEFT Hip Total: BMD: 0.763 g/cm2. T-score: -1.9. Z-score: -1.3 LEFT Hip Femoral neck: BMD: 0.833 g/cm2. T-score: -1.5. Z-score: -0.5 WHO Criteria: Normal: T score at or above -1 SD Osteopenia: T score between -1 and -2.5 SD Osteoporosis: T score at or below -2.5 SD COMPARISON: 12/24/2019. Trend L2-L4: -12.7%. Trend total left hip: -7.4% FRAX Results: Not performed secondary to osteoporosis.     IMPRESSION: OSTEOPOROSIS. T score meets the World Health Organization (WHO) criteria for osteoporosis at one or more measured sites. The risk of osteoporotic fracture increased approximately two-fold for each SD decrease in T-score.     I spent 30 minutes on the date of service, of which greater than 50% of the time was spent on counseling of the patent about the above medical conditions, educating patient on the medical conditions, treatment plan and coordination of care.    Signed by: Ez Burroughs MD        Again, thank you for allowing me to participate in the care of your patient.        Sincerely,        zE Burroughs MD

## 2022-04-07 NOTE — PATIENT INSTRUCTIONS
Patient Education     Reclast Injection 0.05 mg/mL  Uses  This medicine is used for the following purposes:    bone disease    bone strength  Instructions  This is an IV medicine. It is given through a sterile tube directly into the vein by a healthcare provider.  This medicine is given gradually through the IV line.  Please ask your doctor, nurse, or pharmacist how to discard unused medicines safely.  This medicine should be given by a trained health care provider.  Drink plenty of water while on this medicine.  Drink at least 2 glasses of water before treatment, unless instructed otherwise.  It may take several weeks for this medicine to fully work.  It is important that you keep taking each dose of this medicine on time even if you are feeling well.  If you miss a dose, contact your doctor for instructions.  Please tell your doctor and pharmacist about all the medicines you take. Include both prescription and over-the-counter medicines. Also tell them about any vitamins, herbal medicines, or anything else you take for your health.  Talk to your doctor before taking other medicines, including aspirins and ibuprofen containing products. Speak to your doctor about which medicines are safe to use while you are on this medicine.  Do not suddenly stop taking this medicine. Check with your doctor before stopping.  This medicine may affect the strength of your bones. If you have or are at increased risk for developing osteoporosis (weakening of the bones), your doctor may recommend adding foods containing calcium and vitamin D while on this medicine. Please talk to your doctor for more information.  You may need vitamin and mineral supplements while on this medicine. Please speak with your doctor or pharmacist.  Visit your dentist regularly. Proper care of your teeth is very important while taking this medicine. Brush your teeth and floss regularly.  It is very important that you follow your doctor's instructions for all  blood tests.  It is very important that you keep all appointments for medical exams and tests while on this medicine.  Cautions  Tell your doctor and pharmacist if you ever had an allergic reaction to a medicine. Symptoms of an allergic reaction can include trouble breathing, skin rash, itching, swelling, or severe dizziness.  Some patients taking this medicine have experienced serious side effects. Please speak with your doctor to understand the risks and benefits associated with this medicine.  Do not use the medication any more than instructed.  This medicine may cause dizziness or fainting, especially after exercising or in hot weather. Be very careful when standing or sitting up quickly.  Your ability to stay alert or to react quickly may be impaired by this medicine. Do not drive or operate machinery until you know how this medicine will affect you.  Please check with your doctor before drinking alcohol while on this medicine.  Avoid smoking while on this medicine. Smoking may increase your risk for bone fractures.  If possible, avoid using with marijuana or other medicines that can cause dizziness or drowsiness. These include allergy/cold products, muscle relaxers, sleep aids, and pain relievers.  Contact your doctor if you notice a change in the amount or darkening of your urine.  Tell the doctor or pharmacist if you are pregnant, planning to be pregnant, or breastfeeding.  Do not use this medicine if you are pregnant. If you become pregnant while on this medicine, contact your doctor immediately.  This medicine can hurt a new baby in the womb. If you become pregnant while on this medicine, tell your doctor immediately. Your doctor may switch you to a different medicine.  Ask your pharmacist if this medicine can interact with any of your other medicines. Be sure to tell them about all the medicines you take.  Please tell all your doctors and dentists that you are on this medicine before they provide  care.  Do not start or stop any other medicines without first speaking to your doctor or pharmacist.  This medicine can cause serious side effects in some patients. Important information from the U[place] Food and Drug Administration (FDA) is available from your pharmacist. Please review it carefully with your pharmacist to understand the risks associated with this medicine.  Side Effects  The following is a list of some common side effects from this medicine. Please speak with your doctor about what you should do if you experience these or other side effects.    dizziness    lack of energy and tiredness    flu-like symptoms    headaches    reaction at the area of the injection (pain, redness, swelling)    nausea  Call your doctor or get medical help right away if you notice any of these more serious side effects:    bone pain    fever or chills    high fever    fast or irregular heart beats    jaw pain    pain in the joints    kidney problems    mouth sores or irritation    muscle cramps    muscle pain    tight or rigid muscles    muscle weakness    feeling of numbness or tingling in your hands and feet    eye pain or swelling    skin irritation such as redness, itching, rash, or burning    redness of eyes    seizures    sensitivity to light    shortness of breath    skin tingling, burning or prickly feeling    unusual or unexplained tiredness or weakness    urinating less often    dark urine    blurring or changes of vision    weakness  A few people may have an allergic reactions to this medicine. Symptoms can include difficulty breathing, skin rash, itching, swelling, or severe dizziness. If you notice any of these symptoms, seek medical help quickly.  Extra  Please speak with your doctor, nurse, or pharmacist if you have any questions about this medicine.  [email or web address]  IMPORTANT NOTE: This document tells you briefly how to take your medicine, but it does not tell you all there is to  know about it.Your doctor or pharmacist may give you other documents about your medicine. Please talk to them if you have any questions.Always follow their advice. There is a more complete description of this medicine available in English.Scan this code on your smartphone or tablet or use the web address below. You can also ask your pharmacist for a printout. If you have any questions, please ask your pharmacist.     2021 [employer].

## 2022-04-07 NOTE — PROGRESS NOTES
[facility] Hematology and Oncology Progress Note    Patient: Sarah Gilmore  MRN: [id number]  Date of Service: Apr 7, 2022         Reason for Visit    Chief Complaint   Patient presents with     Oncology Clinic Visit       Assessment and Plan    Cancer Staging  Infiltrating ductal carcinoma of right breast (H)  Staging form: Breast, AJCC 8th Edition  - Pathologic stage from 9/4/2019: Stage Unknown (pTX, pN1a(sn), cM0, G1, ER+, WA+, HER2-) - Signed by Ez Burroughs MD on 10/18/2021  - Clinical: No stage assigned - Unsigned      ECOG Performance    0 - Independent     Pain  Pain Score: No Pain (0) (mild discomfort in right axilla, intermittent)    #.  DCIS of the both breasts (upper outer quadrant, lower outer quadrant and central portion of the right breast, multifocal, grade 2; focus of DCIS approximately 2 mm grade 1 in the left breast)  #. pTx pN1a M0 invasive ductal carcinoma of the right breast, grade 1.  ER positive, WA positive, HER-2 negative.   Clinically well.  No signs or symptoms suggestive of breast cancer recurrence.     She tolerates anastrozole well.  Recommended to continue anastrozole in will start intervention for her bone density as below.    Duration of endocrine therapy is for minimum of 5 years and she is considering to extend to 10 years as long as she does not have major side effects.   Follow-up clinical exam in about 6 months.    #.  Osteoporosis    Reviewed DEXA scan from 4/5/2022.  She has 12.7% decline in L-spine T score in L-spine T score is now-2.6 meeting osteoporosis.  There is also decline in her right hip T score of-1.8, left hip T score of-1.9.   I recommended bisphosphonate therapy.  I reviewed Fosamax, Reclast or Xgeva.  I reviewed how the RN administer, potential side effects and schedule.  We decided to proceed with Reclast.    I recommended to see her dentist for dental clearance.     Continue calcium and vitamin D.  I strongly encouraged her to continue with  weightbearing exercises.     Plan to obtain DEXA scan in 1 year.     Encounter Diagnoses:    Problem List Items Addressed This Visit     Age-related osteoporosis without current pathological fracture - Primary             CC: EMMY Maxwell CNP   ______________________________________________________________________________  Diagnosis  July 2019-    DCIS of both breasts (upper outer quadrant, lower outer quadrant and central portion of the right breast, multifocal, grade 2; focus of DCIS approximately 2 mm grade 1 in the left breast)   pTx pN1a M0 invasive ductal carcinoma of the right breast, grade 1.  Multifocal DCIS.  6 right axillary sentinel lymph nodes were examined 1 lymph node was positive for macro metastases and one lymph node was positive for isolated tumor cells.  ER positive (90%), VT positive (80%), HER-2 negative.    LMP-at age 48 ( in 3/2018) after hysterectomy. Serology confirms menopause in 4/2020.    Treatment to date  7/30/2019- right breast mastectomy and right sentinel lymph node biopsy, left mastectomy.  10/9/2019-12/6/2019-radiation to the right breast and chest wall and regional lymph nodes area of 5040 cGy/28 fractions followed by additional 1000 cGy in 5 fractions to the primary tumor bed.  12/2019-initiated adjuvant tamoxifen, but stopped about 2 months after due to pain in both shins.  Restarted tamoxifen in Apirl 2020, but stopped again after a month and then switched to anastrozole in May 2020.     History of Present Illness    Oliver presented herself today.    She had WALS lymphatic drainage for lymphedema of the lower extremity and she noted improvement.  No other new health concerns since last visit.  She takes anastrozole regularly.  She takes calcium, vitamin D.  She noted intermittent twinges in the right axilla.  No masses.  No headaches.  No back pain.    Review of systems  Apart from describing in HPI, the remainder of comprehensive ROS was negative.    Past  "History    Past Medical History:   Diagnosis Date     Dilated aortic root (H)      Foot fracture 2011     History of anesthesia complications     slow to wake up     PONV (postoperative nausea and vomiting)      Radial head fracture 2011     Seizures (H)     h/o seizure disorder in childhood, last seizure activity  \"4 yrs ago\"     Suspected sleep apnea     sleep study set up in Aug 2019       Past Surgical History:   Procedure Laterality Date     C/SECTION, LOW TRANSVERSE  07/10/1994    breech     HYSTERECTOMY, PAP NO LONGER INDICATED Bilateral      LAPAROSCOPIC HYSTERECTOMY TOTAL  2018    cervix benign     MAMMO STEREOTACTIC CORE BIOPSY RIGHT Right 2019     MAMMO STEREOTACTIC CORE BIOPSY RIGHT Right 2019     NM SENTINEL NODE INJECTION  2019     OTHER SURGICAL HISTORY      right arm surgery     RI MASTECTOMY, MODIFIED RADICAL Bilateral 2019    Procedure: BILATERAL MASTECTOMY;  Surgeon: Mckenzie Colby DO;  Location: [facility];  Service: General     RI REPLACEMENT TISSUE EXPANDER W/PERMANENT IMPLANT Bilateral 2019    Procedure: BILATERAL IMPLANT RECONSTRUCTION TO BREASTS;  Surgeon: Carlitos Crum MD;  Location: [facility];  Service: Plastics     RIGHT BREAST RECONSTRUCTION WITH LATISSIMUS DORSI FLAP    10/29/2021     [facility]  DELIVERY ONLY  1994         Physical Exam    BP (!) 155/82   Pulse 71   Temp 98.2  F (36.8  C) (Oral)   Resp 16   Wt 121.1 kg (267 lb)   LMP  (LMP Unknown)   SpO2 95%   BMI 38.31 kg/m      General: alert, awake, not in acute distress  HEENT: Head: Normal, normocephalic, atraumatic.  Eye: Normal external eye, conjunctiva, lids cornea, DAYSI.  Nose: Normal external nose, mucus membranes and septum.  Pharynx: Normal buccal mucosa. Normal pharynx.  Neck / Thyroid: Supple, no masses, nodes, nodules or enlargement.  Lymphatics: No abnormally enlarged lymph nodes.  Chest: Normal chest wall and respirations. Clear to " auscultation.  Breasts: Reconstructed breast bilaterally.  No palpable abnormalities.  Heart: S1 S2 RRR, no murmur.   Abdomen: abdomen is soft without significant tenderness, masses, organomegaly or guarding  Extremities: normal strength, tone, and muscle mass  Skin: normal. no rash or abnormalities  CNS: non focal.      Lab Results    No results found for this or any previous visit (from the past 168 hour(s)).    Imaging    DX Hip/Pelvis/Spine    Result Date: 4/5/2022  EXAM: DX HIP/PELVIS/SPINE LOCATION: [facility] DATE/TIME: 4/5/2022 11:10 AM INDICATION: Screening for osteoporosis, long-term current use of aromatase inhibitor. COMPARISON: 12/24/2019. TECHNIQUE: Dual-energy x-ray absorptiometry performed with routine technique. FINDINGS: Lumbar Spine: L2-L4: BMD: 0.885 g/cm2. T-score: -2.6. Z-score: -1.9 RIGHT Hip Total: BMD: 0.775 g/cm2. T-score: -1.8. Z-score: -1.2 RIGHT Hip Femoral neck: BMD: 77142 g/cm2. T-score: -1.4. Z-score: -0.5 LEFT Hip Total: BMD: 0.763 g/cm2. T-score: -1.9. Z-score: -1.3 LEFT Hip Femoral neck: BMD: 0.833 g/cm2. T-score: -1.5. Z-score: -0.5 WHO Criteria: Normal: T score at or above -1 SD Osteopenia: T score between -1 and -2.5 SD Osteoporosis: T score at or below -2.5 SD COMPARISON: 12/24/2019. Trend L2-L4: -12.7%. Trend total left hip: -7.4% FRAX Results: Not performed secondary to osteoporosis.     IMPRESSION: OSTEOPOROSIS. T score meets the World Health Organization (WHO) criteria for osteoporosis at one or more measured sites. The risk of osteoporotic fracture increased approximately two-fold for each SD decrease in T-score.     I spent 30 minutes on the date of service, of which greater than 50% of the time was spent on counseling of the patent about the above medical conditions, educating patient on the medical conditions, treatment plan and coordination of care.    Signed by: Ez Burroughs MD

## 2022-04-07 NOTE — PROGRESS NOTES
"   04/07/22 0700   Rehab Discipline   Discipline PT   Type of Visit   Type of visit Initial Edema Evaluation   General Information   Start of care 04/07/22   Referring physician Padmaja Beatty MD   Medical diagnosis  Leg swelling   Onset of illness / date of surgery 03/01/22   Affected body parts LLE;RLE   Pertinent history of current problem (PT: include personal factors and/or comorbidities that impact the POC; OT: include additional occupational profile info) Pt has history of sara lipedema in her LEs. She recently had liposuction on 3/1/22 and has been going in 1-3x/week to get the incisions drained. Now, she is only having to go in 1x/week.  Pt also has history of breast cancer. She can already tell the surgery is going to be lifechanging. This most recent surgery was knee to ankle removal. She is going in next Wednesday to discuss the next procedure. She may need 3-4 surgeries total. Initially there was a lot of pain. Stairs and raising her legs is still challenging but has improved since the surgery. She is wearing the pantyhose compression and is going to get new ones.    Surgical / medical history reviewed Yes   Prior treatment Compression garments;Exercise;MLD;Elevation   Fall Risk Screen   Fall screen completed by PT   Have you fallen 2 or more times in the past year? No   Have you fallen and had an injury in the past year? No   Is patient a fall risk? No   Abuse Screen (yes response referral indicated)   Feels Unsafe at Home or Work/School no   Feels Threatened by Someone no   Does Anyone Try to Keep You From Having Contact with Others or Doing Things Outside Your Home? no   Physical Signs of Abuse Present no   Patient needs abuse support services and resources No   System Outcome Measures   Outcome Measures Lymphedema   Lymphedema Life Impact Scale (score range 0-72). A higher score indicates greater impairment. 49   Patient / Family Goals   Patient / family goals statement \"lymph drainage\"   Pain " "  Patient currently in pain Yes   Pain location sara lower legs   Pain rating high: 7-9/10 low: 1-2/10   Pain description comment \"heaviness, numbness, mobility issues\"   Cognitive Status   Orientation Orientation to person, place and time   Edema Exam / Assessment   Skin condition Non-pitting   Skin condition comments increased fibrosis on posterior left lower leg   Scar Yes   Location R LE: <1cm incision on lateral lower leg and just distal to knee, same on L LE except on lateral side   Scar comments healing well   Capillary refill Symmetrical   Stemmer sign Negative   Stemmer sign comments sara   Ulceration No   Girth Measurements   Girth Measurements Refer to separate girth measurement flowsheet   Volume LE   Right LE (mL) 6862.34   Left LE (mL) 6700.23   LE volume comparison RLE volume greater than LLE volume   % difference 2.4%   Range of Motion   ROM comments limitations in knee AROM noted during session   Bed Mobility   Bed mobility Independent with all   Transfers   Transfers Independent with all   Coordination   Coordination Gross motor coordination appropriate   Muscle Tone   Muscle tone No deficits were identified   Planned Edema Interventions   Planned edema interventions Manual lymph drainage;Gradient compression bandaging;Fit for compression garment;Precautions to prevent infection / exacerbation;Exercises;Education;Manual therapy;Skin care / precautions;Scar mobilization;Soft tissue mobilization;Myofascial release;Home management program development   Clinical Impression   Criteria for skilled therapeutic intervention met Yes   Therapy diagnosis sara LE lipedema and leg swelling   Influenced by the following impairments / conditions Lipedema;Lipolymphedema   Functional limitations due to impairments / conditions standing, walking, sitting, transitions, bend, stairs, yard and household work, wearing shoes   Clinical Presentation Stable/Uncomplicated   Clinical Decision Making (Complexity) Low complexity "   Treatment Frequency Other (see comments)  (1-2x/week)   Treatment duration 8-12 weeks for up to 10 sessions   Patient / family and/or staff in agreement with plan of care Yes   Risks and benefits of therapy have been explained Yes   Clinical impression comments Pt presents with sara LE swelling and lipedema. She is s/p liposuction on 3/1/22 to her sara lower legs. Pt has increased swelling since the surgery and has been going in weekly for fluid drainage in the legs- it has been improving. Pt has pain, slight sara LE swelling, and fibrosis. Pt is appropriate for skilled PT intervention to address impairments and improve lymphatic function prior to her additional liposuction procedures.   Goals   Edema Eval Goals 1;2;3   Goal 1   Goal identifier Self-care   Goal description Pt will be independent in self-care/home management of her swelling including self-MLD, exercise, compression bandages if needed and compression wear/care   Target date 07/06/22   Goal 2   Goal identifier LLIS   Goal description Pt will demonstrate improvement in her function and management of her swelling by decreasing her LLIS score by >7 pts   Target date 07/06/22   Total Evaluation Time   PT Fabrizio Low Complexity Minutes (82275) 20       Tamiko Aguila, PT, DPT, CLT-YONY

## 2022-04-07 NOTE — PROGRESS NOTES
"Oncology Rooming Note    April 7, 2022 4:16 PM   Sarah Gilmore is a 51 year old female who presents for:    Chief Complaint   Patient presents with     Oncology Clinic Visit     Initial Vitals: LMP  (LMP Unknown)  Estimated body mass index is 38.02 kg/m  as calculated from the following:    Height as of 2/28/22: 1.778 m (5' 10\").    Weight as of 2/28/22: 120.2 kg (265 lb). There is no height or weight on file to calculate BSA.  Data Unavailable Comment: Data Unavailable   No LMP recorded (lmp unknown). Patient has had a hysterectomy.  Allergies reviewed: Yes  Medications reviewed: Yes    Medications: Medication refills not needed today.  Pharmacy name entered into [employer]:    [id number] - [place], [place] - [address]    Clinical concerns: Has slight discomfort in right axilla and rib area.    CHRISTOPHER SERNA RN            "

## 2022-04-11 ENCOUNTER — PATIENT OUTREACH (OUTPATIENT)
Dept: CARE COORDINATION | Facility: CLINIC | Age: 52
End: 2022-04-11

## 2022-04-11 NOTE — PROGRESS NOTES
Social Work Note: Telephone Call  Oncology Clinic    Data/Intervention: 22  Patient Name:  Sarah Gilmore  /Age:  1970 (51 year old)    Call From: Sarah (left message)  Reason for Call:  UGO received a message from Sarah requesting a call back to discuss affordable dental resources. UGO attempted to reach Sarah via phone to provide her resource and received no answer. UGO left a message and informed her that UGO would place resources I the mail and encouraged her to reach out if additional needs arise.     Plan:  UGO will remain available for ongoing resource/support needs.     YUE Lewis, LGSW  [facility]  Adult Oncology Clinic  Phone: [phone]

## 2022-04-14 ENCOUNTER — HOSPITAL ENCOUNTER (OUTPATIENT)
Dept: PHYSICAL THERAPY | Facility: REHABILITATION | Age: 52
Discharge: HOME | End: 2022-04-14
Payer: COMMERCIAL

## 2022-04-14 DIAGNOSIS — I89.0 ACQUIRED LYMPHEDEMA OF LEG: ICD-10-CM

## 2022-04-14 DIAGNOSIS — I87.303 VENOUS HYPERTENSION OF LOWER EXTREMITY, BILATERAL: ICD-10-CM

## 2022-04-14 DIAGNOSIS — M79.89 LEG SWELLING: Primary | ICD-10-CM

## 2022-04-14 DIAGNOSIS — R60.9 LIPEDEMA: ICD-10-CM

## 2022-04-14 PROCEDURE — 97140 MANUAL THERAPY 1/> REGIONS: CPT | Mod: GP | Performed by: PHYSICAL THERAPIST

## 2022-04-20 ENCOUNTER — PATIENT OUTREACH (OUTPATIENT)
Dept: ONCOLOGY | Facility: CLINIC | Age: 52
End: 2022-04-20
Payer: COMMERCIAL

## 2022-04-20 NOTE — PROGRESS NOTES
[facility]: Cancer Care Short Note                                    Discussion with Patient:                                                      Patient called Cancer Care with update regarding follow-up with dentist and reclast.       Assessment:                                                      Patient scheduled for Reclast on 5/3/22.  Patient saw dentist and needs some dental work done in May. Dentist is recommending she wait 4 months to heal before she gets reclast.    Intervention/Education provided during outreach:                                                       Told patient will update Dr. Burroughs when she returns to the office on 4/26 and see if we can coordinate Reclast infusion when she is here for her 6 month follow-up in Oct. Told patient will call her back with update next week. Patient verbalized understanding.    Message sent to Dr. Burroughs.    Patient to follow up as scheduled at next apt    Update 4/27/22:  Dr. Burroughs approved patient to receive Reclast when here for next appointment on 10/4/22.  Called and left message.  Will need to reschedule appointment earlier in the day to accommodate Reclast infusion. Message to Schedulers.    Update 4/27/22:  Patient returned my call and confirmed message received and ok with updated appointment time on 10/4/22.    Signature:  Alicia Maravilla RN

## 2022-04-21 ENCOUNTER — HOSPITAL ENCOUNTER (OUTPATIENT)
Dept: PHYSICAL THERAPY | Facility: REHABILITATION | Age: 52
Discharge: HOME | End: 2022-04-21
Payer: COMMERCIAL

## 2022-04-21 DIAGNOSIS — M79.89 LEG SWELLING: Primary | ICD-10-CM

## 2022-04-21 DIAGNOSIS — I87.303 VENOUS HYPERTENSION OF LOWER EXTREMITY, BILATERAL: ICD-10-CM

## 2022-04-21 DIAGNOSIS — I89.0 ACQUIRED LYMPHEDEMA OF LEG: ICD-10-CM

## 2022-04-21 DIAGNOSIS — R60.9 LIPEDEMA: ICD-10-CM

## 2022-04-21 PROCEDURE — 97140 MANUAL THERAPY 1/> REGIONS: CPT | Mod: GP | Performed by: PHYSICAL THERAPIST

## 2022-04-25 ENCOUNTER — TELEPHONE (OUTPATIENT)
Dept: PHYSICAL THERAPY | Facility: REHABILITATION | Age: 52
End: 2022-04-25
Payer: COMMERCIAL

## 2022-04-25 NOTE — TELEPHONE ENCOUNTER
I left a voice message for Sarah regarding today's NS. I reminded her of her next appointment on 5/17.  Mague Monroy PTA,CLT

## 2022-04-27 ENCOUNTER — TELEPHONE (OUTPATIENT)
Dept: VASCULAR SURGERY | Facility: CLINIC | Age: 52
End: 2022-04-27
Payer: COMMERCIAL

## 2022-04-27 ENCOUNTER — PATIENT OUTREACH (OUTPATIENT)
Dept: ONCOLOGY | Facility: CLINIC | Age: 52
End: 2022-04-27
Payer: COMMERCIAL

## 2022-04-27 DIAGNOSIS — M79.89 LEG SWELLING: Primary | ICD-10-CM

## 2022-04-27 DIAGNOSIS — D05.11 DUCTAL CARCINOMA IN SITU (DCIS) OF RIGHT BREAST: Primary | ICD-10-CM

## 2022-04-27 DIAGNOSIS — I89.0 ACQUIRED LYMPHEDEMA OF LEG: ICD-10-CM

## 2022-04-27 NOTE — TELEPHONE ENCOUNTER
Patient is requesting a new prescription for panty length compression, 20-30 qty 12 pair.  She states she is scheduled to see Dr. Beatty on 5/26/22 but is needing new compression before then.      She would like a call back if/when the order is placed:  [phone]

## 2022-04-27 NOTE — PROGRESS NOTES
[facility]: Cancer Care Short Note                                    Discussion with Patient:                                                      Patient called requesting mastectomy bra Rx.     Assessment:                                                      Patient reports current mastectomy bra Rx  and [facility] needs new order.  Patient requests order for camis.  Patient will call to set up appointment.    Intervention/Education provided during outreach:                                                       Mastectomy bra Rx faxed to [facility] at [phone].  Right Fax confirmed sent    Patient to follow up as scheduled at next apt    Signature:  Alicia Maravilla RN

## 2022-04-28 NOTE — TELEPHONE ENCOUNTER
LMTCB and confirm strength on compression pantyhose. Telephone encounter pt requesting 20-30 mmHg but last order by Dr. Hook is 15-20 mmHg. Awaiting call back for confirmation and okayed by Dr. Beatty to place order.

## 2022-05-10 ENCOUNTER — HOSPITAL ENCOUNTER (OUTPATIENT)
Dept: PHYSICAL THERAPY | Facility: REHABILITATION | Age: 52
Discharge: HOME | End: 2022-05-10
Payer: COMMERCIAL

## 2022-05-10 DIAGNOSIS — E66.812 CLASS 2 SEVERE OBESITY WITH SERIOUS COMORBIDITY AND BODY MASS INDEX (BMI) OF 38.0 TO 38.9 IN ADULT, UNSPECIFIED OBESITY TYPE (H): ICD-10-CM

## 2022-05-10 DIAGNOSIS — I87.303 VENOUS HYPERTENSION OF LOWER EXTREMITY, BILATERAL: ICD-10-CM

## 2022-05-10 DIAGNOSIS — M79.89 SWELLING IN RIGHT ARMPIT: ICD-10-CM

## 2022-05-10 DIAGNOSIS — E66.01 CLASS 2 SEVERE OBESITY WITH SERIOUS COMORBIDITY AND BODY MASS INDEX (BMI) OF 38.0 TO 38.9 IN ADULT, UNSPECIFIED OBESITY TYPE (H): ICD-10-CM

## 2022-05-10 DIAGNOSIS — D05.11 DUCTAL CARCINOMA IN SITU (DCIS) OF BOTH BREASTS: ICD-10-CM

## 2022-05-10 DIAGNOSIS — C50.911 INFILTRATING DUCTAL CARCINOMA OF RIGHT FEMALE BREAST (H): ICD-10-CM

## 2022-05-10 DIAGNOSIS — M25.561 ARTHRALGIA OF RIGHT LOWER LEG: ICD-10-CM

## 2022-05-10 DIAGNOSIS — I89.0 ACQUIRED LYMPHEDEMA OF LEG: ICD-10-CM

## 2022-05-10 DIAGNOSIS — Z98.890 H/O BREAST RECONSTRUCTION: ICD-10-CM

## 2022-05-10 DIAGNOSIS — M79.89 LEG SWELLING: Primary | ICD-10-CM

## 2022-05-10 DIAGNOSIS — R60.9 LIPEDEMA: ICD-10-CM

## 2022-05-10 DIAGNOSIS — D05.12 DUCTAL CARCINOMA IN SITU (DCIS) OF BOTH BREASTS: ICD-10-CM

## 2022-05-10 DIAGNOSIS — I89.0 LYMPHEDEMA OF RIGHT ARM: ICD-10-CM

## 2022-05-10 DIAGNOSIS — M62.81 GENERALIZED MUSCLE WEAKNESS: ICD-10-CM

## 2022-05-10 PROCEDURE — 97530 THERAPEUTIC ACTIVITIES: CPT | Mod: CQ | Performed by: PHYSICAL THERAPY ASSISTANT

## 2022-05-24 ENCOUNTER — HOSPITAL ENCOUNTER (OUTPATIENT)
Dept: PHYSICAL THERAPY | Facility: REHABILITATION | Age: 52
Discharge: HOME | End: 2022-05-24
Payer: COMMERCIAL

## 2022-05-24 DIAGNOSIS — M25.561 ARTHRALGIA OF RIGHT LOWER LEG: ICD-10-CM

## 2022-05-24 DIAGNOSIS — D05.11 DUCTAL CARCINOMA IN SITU (DCIS) OF BOTH BREASTS: ICD-10-CM

## 2022-05-24 DIAGNOSIS — E66.01 CLASS 2 SEVERE OBESITY WITH SERIOUS COMORBIDITY AND BODY MASS INDEX (BMI) OF 38.0 TO 38.9 IN ADULT, UNSPECIFIED OBESITY TYPE (H): ICD-10-CM

## 2022-05-24 DIAGNOSIS — M79.89 SWELLING IN RIGHT ARMPIT: ICD-10-CM

## 2022-05-24 DIAGNOSIS — M62.81 GENERALIZED MUSCLE WEAKNESS: ICD-10-CM

## 2022-05-24 DIAGNOSIS — C50.911 INFILTRATING DUCTAL CARCINOMA OF RIGHT FEMALE BREAST (H): ICD-10-CM

## 2022-05-24 DIAGNOSIS — I89.0 ACQUIRED LYMPHEDEMA OF LEG: ICD-10-CM

## 2022-05-24 DIAGNOSIS — D05.12 DUCTAL CARCINOMA IN SITU (DCIS) OF BOTH BREASTS: ICD-10-CM

## 2022-05-24 DIAGNOSIS — M79.89 LEG SWELLING: Primary | ICD-10-CM

## 2022-05-24 DIAGNOSIS — R60.9 LIPEDEMA: ICD-10-CM

## 2022-05-24 DIAGNOSIS — I87.303 VENOUS HYPERTENSION OF LOWER EXTREMITY, BILATERAL: ICD-10-CM

## 2022-05-24 DIAGNOSIS — Z98.890 H/O BREAST RECONSTRUCTION: ICD-10-CM

## 2022-05-24 DIAGNOSIS — E66.812 CLASS 2 SEVERE OBESITY WITH SERIOUS COMORBIDITY AND BODY MASS INDEX (BMI) OF 38.0 TO 38.9 IN ADULT, UNSPECIFIED OBESITY TYPE (H): ICD-10-CM

## 2022-05-24 DIAGNOSIS — I89.0 LYMPHEDEMA OF RIGHT ARM: ICD-10-CM

## 2022-05-24 PROCEDURE — 97140 MANUAL THERAPY 1/> REGIONS: CPT | Mod: CQ | Performed by: PHYSICAL THERAPY ASSISTANT

## 2022-05-26 ENCOUNTER — OFFICE VISIT (OUTPATIENT)
Dept: VASCULAR SURGERY | Facility: CLINIC | Age: 52
End: 2022-05-26
Attending: STUDENT IN AN ORGANIZED HEALTH CARE EDUCATION/TRAINING PROGRAM
Payer: COMMERCIAL

## 2022-05-26 VITALS
HEART RATE: 68 BPM | WEIGHT: 262 LBS | BODY MASS INDEX: 37.59 KG/M2 | DIASTOLIC BLOOD PRESSURE: 78 MMHG | SYSTOLIC BLOOD PRESSURE: 130 MMHG

## 2022-05-26 DIAGNOSIS — I97.2 POST-MASTECTOMY LYMPHEDEMA SYNDROME: ICD-10-CM

## 2022-05-26 DIAGNOSIS — I89.0 ACQUIRED LYMPHEDEMA OF LEG: ICD-10-CM

## 2022-05-26 DIAGNOSIS — E66.01 CLASS 2 SEVERE OBESITY WITH SERIOUS COMORBIDITY AND BODY MASS INDEX (BMI) OF 38.0 TO 38.9 IN ADULT, UNSPECIFIED OBESITY TYPE (H): Primary | ICD-10-CM

## 2022-05-26 DIAGNOSIS — I87.303 VENOUS HYPERTENSION OF LOWER EXTREMITY, BILATERAL: ICD-10-CM

## 2022-05-26 DIAGNOSIS — M79.89 SWELLING IN RIGHT ARMPIT: ICD-10-CM

## 2022-05-26 DIAGNOSIS — E66.812 CLASS 2 SEVERE OBESITY WITH SERIOUS COMORBIDITY AND BODY MASS INDEX (BMI) OF 38.0 TO 38.9 IN ADULT, UNSPECIFIED OBESITY TYPE (H): Primary | ICD-10-CM

## 2022-05-26 DIAGNOSIS — R60.9 LIPEDEMA: ICD-10-CM

## 2022-05-26 DIAGNOSIS — Z85.3 PERSONAL HISTORY OF MALIGNANT NEOPLASM OF BREAST: ICD-10-CM

## 2022-05-26 PROCEDURE — 99215 OFFICE O/P EST HI 40 MIN: CPT | Performed by: STUDENT IN AN ORGANIZED HEALTH CARE EDUCATION/TRAINING PROGRAM

## 2022-05-26 PROCEDURE — G0463 HOSPITAL OUTPT CLINIC VISIT: HCPCS

## 2022-05-26 ASSESSMENT — PAIN SCALES - GENERAL: PAINLEVEL: NO PAIN (0)

## 2022-05-26 NOTE — PROGRESS NOTES
Leg Swelling Follow Up     Date of Service: May 26, 2022     Date last seen by Dr. Hook:  21    PCP: Physician No Ref-Primary       Impression:       1. Right shoulder tightness-improved greatly  2. Right shoulder contracture with fibrosis-improved  3. Secondary post mastectomy lymphedema  4. Leg swelling with lipedema-unchanged  5. Venous hypertension of legs with pain  6. Obesity class II  7. Bilateral breast carcinoma   ( R invasive ductal CA  (pTx pN1a M0), L DCIS, ER/MO +, HER2 -, R mod rad mast, L simple, rad)       Plan:     1. Questions were answered.    2. Overall, patient is doing very well with bilateral lower extremity swelling. She recently had liposuction done at [facility] from knee down and plans to have another surgery in August from thigh down for her swelling. She should continue compression daily and replacing these regularly.  3. Continue right upper extremity compression sleeve as needed. RUE range of motion is good with no discomfort or pain. Continue daily stretches for RUE.   4. Continue lymphedema therapy until discharged. Continue using lymphedema pump at least three times per week.  5. Follow up with me in 6-8 months. If any questions or concerns they are to contact the clinic.           On day of encounter time spent in chart review and with patient in consultation, exam, education and coordination of care, excluding procedures:  50 minutes          ---------------------------------------------------------------------------------------------------------------------     History of Present Illness:   Sarah returns to the [facility] for follow up of right arm swelling due to post mastectomy lymphedema with legs swelling due to venous hypertension, acquired lymphedema and lipedema complicated by severe obesity class 2 and history of bilateral breast cancer ( R invasive ductal CA  (pTx pN1a M0), L DCIS, ER/MO +, HER2 -, R mod  "rad mast, L simple, rad).  She has previously seen by Dr. Hook and has also been following with lymphedema therapy. She has had compression sleeve, and has been working on weight loss. She had R breast flap necrosis excision per Dr. Preciado on 7/2/21. She continued to have problems with necrosis and deformity and required more surgery. She also has bilateral lower extremity compression.    Today in clinic, she reports that she had liposuction done in March with Dr. Preciado at [facility] in both lower extremities from knee downwards, and feels like liposuction helped significantly. She is planning to have it done again for thigh to knee in both legs in August of this year. She also has obtained flexitouch pump and has used it about 5 times, not every day and feels like it might help, but is not sure yet of how much it is helping.     She denies right shoulder pain or difficulty with ROM. She continues stretches for right arm, and continues lymphedema therapy in [place], and feels like it is helping. She wears a sleeve for her right arm. She wears compression stockings for legs and feels like they are fitting well. She replaces them every 3 months.     She will be starting reclast infusions for osteoporosis and is trying to get all her dental work done prior to starting these infusions. She states there has to be a 4 month gap from when she has her dental work done and when she gets the infusions.    She denies any other concerns today.        Past Medical History:    Past Medical History:   Diagnosis Date     Dilated aortic root (H)      Foot fracture 06/03/2011     History of anesthesia complications     slow to wake up     PONV (postoperative nausea and vomiting)      Radial head fracture 06/03/2011     Seizures (H)     h/o seizure disorder in childhood, last seizure activity  \"4 yrs ago\"     Suspected sleep apnea     sleep study set up in Aug 2019        Surgical History:   Past Surgical History: "   Procedure Laterality Date     C/SECTION, LOW TRANSVERSE  07/10/1994    breech     HYSTERECTOMY, PAP NO LONGER INDICATED Bilateral      LAPAROSCOPIC HYSTERECTOMY TOTAL  2018    cervix benign     MAMMO STEREOTACTIC CORE BIOPSY RIGHT Right 2019     MAMMO STEREOTACTIC CORE BIOPSY RIGHT Right 2019     NM SENTINEL NODE INJECTION  2019     OTHER SURGICAL HISTORY      right arm surgery     ME MASTECTOMY, MODIFIED RADICAL Bilateral 2019    Procedure: BILATERAL MASTECTOMY;  Surgeon: Mckenzie Colby DO;  Location: [facility];  Service: General     ME REPLACEMENT TISSUE EXPANDER W/PERMANENT IMPLANT Bilateral 2019    Procedure: BILATERAL IMPLANT RECONSTRUCTION TO BREASTS;  Surgeon: Carlitos Crum MD;  Location: [facility];  Service: Plastics     RIGHT BREAST RECONSTRUCTION WITH LATISSIMUS DORSI FLAP    10/29/2021     [facility]  DELIVERY ONLY  1994        Medications:    Current Outpatient Medications   Medication     EPI E-Z PEN JOELLEN 1:1000  IJ     FERROUS SULFATE  MG OR TBCR     fish oil-omega-3 fatty acids 1000 MG capsule     gabapentin (NEURONTIN) 100 MG capsule     Multiple Vitamin (MULTI-VITAMINS) TABS     OSCO MAGNESIUM CITRATE OR     Turmeric 400 MG CAPS     anastrozole (ARIMIDEX) 1 MG tablet     ergocalciferol (ERGOCALCIFEROL) 13203 UNIT capsule     No current facility-administered medications for this visit.        Allergies:    Allergies   Allergen Reactions     Bees Anaphylaxis     Noted in 09 ER  Trouble breathing, rash     Contrast Dye Anaphylaxis     airway problems     Seafood Anaphylaxis        Family history:   Family History   Problem Relation Age of Onset     Psychotic Disorder Mother      Sleep Apnea Mother      Mental Illness Mother      C.A.D. Father      Diabetes Father      Sleep Apnea Father      No Known Problems Sister      No Known Problems Brother      Psychotic Disorder Maternal Grandfather         commit suicide      Suicidality Maternal Grandfather      Breast Cancer Paternal Grandmother 50     Prostate Cancer Paternal Grandfather      Testicular cancer Paternal Grandfather      Asperger's syndrome Son      Mental Illness Son      Leukemia Paternal Uncle         Social History:   Social History     Tobacco Use     Smoking status: Never Smoker     Smokeless tobacco: Never Used   Substance Use Topics     Alcohol use: No     Drug use: No          Review of Systems:     ROS negative except as noted in HPI.     Labs:      I personally reviewed the following lab results today and those on care everywhere, if indicated     No results found for: CRP   Sed Rate   Date Value Ref Range Status   04/07/2009 26 (H) 0 - 20 mm/h Final      Last Renal Panel:  Sodium   Date Value Ref Range Status   02/28/2022 141 136 - 145 mmol/L Final   06/07/2011 143 133 - 144 mmol/L Final     Potassium   Date Value Ref Range Status   02/28/2022 4.2 3.5 - 5.0 mmol/L Final   06/07/2011 4.3 3.4 - 5.3 mmol/L Final     Chloride   Date Value Ref Range Status   02/28/2022 106 98 - 107 mmol/L Final   06/07/2011 109 94 - 109 mmol/L Final     Carbon Dioxide   Date Value Ref Range Status   06/07/2011 26 20 - 32 mmol/L Final     Carbon Dioxide (CO2)   Date Value Ref Range Status   02/28/2022 23 22 - 31 mmol/L Final     Anion Gap   Date Value Ref Range Status   02/28/2022 12 5 - 18 mmol/L Final   06/07/2011 8 6 - 17 mmol/L Final     Glucose   Date Value Ref Range Status   02/28/2022 83 70 - 125 mg/dL Final   06/07/2011 76 60 - 99 mg/dL Final     Comment:     Non Fasting     Urea Nitrogen   Date Value Ref Range Status   02/28/2022 13 8 - 22 mg/dL Final   06/07/2011 12 5 - 24 mg/dL Final     Creatinine   Date Value Ref Range Status   02/28/2022 0.84 0.60 - 1.10 mg/dL Final   06/07/2011 0.83 0.52 - 1.04 mg/dL Final     GFR Estimate   Date Value Ref Range Status   02/28/2022 83 >60 mL/min/1.73m2 Final     Comment:     Effective December 21, 2021 eGFRcr in adults is calculated  using the 2021 CKD-EPI creatinine equation which includes age and gender (Malena et al., [place], DOI: 10.1056/TIVGcf2348556)   06/01/2021 >60 >60 mL/min/1.73m2 Final   06/07/2011 75 >60 mL/min/1.7m2 Final     Calcium   Date Value Ref Range Status   02/28/2022 9.2 8.5 - 10.5 mg/dL Final   06/07/2011 8.4 (L) 8.5 - 10.4 mg/dL Final     Albumin   Date Value Ref Range Status   07/18/2019 4.0 3.5 - 5.0 g/dL Final   06/07/2011 3.4 (L) 3.9 - 5.1 g/dL Final      Lab Results   Component Value Date    WBC 5.6 02/28/2022    WBC 5.7 06/07/2011     Lab Results   Component Value Date    RBC 4.40 02/28/2022    RBC 4.05 06/07/2011     Lab Results   Component Value Date    HGB 11.9 02/28/2022    HGB 10.2 06/07/2011     Lab Results   Component Value Date    HCT 37.3 02/28/2022    HCT 32.1 06/07/2011     No components found for: MCT  Lab Results   Component Value Date    MCV 85 02/28/2022    MCV 79 06/07/2011     Lab Results   Component Value Date    MCH 27.0 02/28/2022    MCH 25.2 06/07/2011     Lab Results   Component Value Date    MCHC 31.9 02/28/2022    MCHC 31.8 06/07/2011     Lab Results   Component Value Date    RDW 13.4 02/28/2022    RDW 14.9 06/07/2011     Lab Results   Component Value Date     02/28/2022     06/07/2011      No results found for: A1C   TSH   Date Value Ref Range Status   08/29/2020 3.88 0.30 - 5.00 uIU/mL Final   03/11/2008 2.25 0.4 - 5.0 mU/L Final         Imaging:     I personally reviewed the following imaging results today and those on care everywhere, if indicated     No results found for this visit on 05/26/22.            Physical Exam:     Vital Signs: /78   Pulse 68   Wt 262 lb (118.8 kg)   LMP  (LMP Unknown)   BMI 37.59 kg/m   Body mass index is 37.59 kg/m .   Wt Readings from Last 2 Encounters:   05/26/22 262 lb (118.8 kg)   04/07/22 267 lb (121.1 kg)   .    Circumferential volume measures:    Circumferential Measures 5/3/2021 9/20/2021 5/26/2022   Right-just above MCP - 21.2 -    Right Wrist - 19 -   Right Up 10cm - 26.4 -   Right Up 10cm From Elbow - 37.7 -   Left-just above MCP - 21.5 -   Left Wrist - 19.4 -   Left Up 10cm - 28.1 -   Left Up 10cm From Elbow - 37.1 -   Right just above MTP 25.2 25 25   Right Ankle 32.7 32 31   Right Widest Calf 55.4 55.7 53.4   Right Thigh Up 10cm 68 - -   Right Knee to Ankle 44 - -   Left - just above MTP 26 25.4 25   Left Ankle 34 33.3 31   Left Widest Calf 56.8 55.8 51.8   Left Thigh Up 10cm 64.5 - -   Left Knee to Ankle 44 - -       General: In no apparent distress.       Psych: Alert and oriented X 3. Affect normal.      HEENT: Atraumatic, normocephalic      Range of Motion:  Range of motion of right shoulder is full. Bilateral lower extremity range of motion is  normal bilaterally without active joint synovitis, erythema, joint swelling, crepitus or joint laxity.     Sensation: Intact to  light touch in the  lower extremities bilaterally.       Vascular: No unusual venous distention in the legs. Normal dorsalis pedis, posterior tibial pulses bilaterally with palpation.  Stemmer's sign negative in both feet with spider veins, telangiectasias and slight medial ankle flaring.      Skin: No unusual rubor, calor, masses or rashes along the skin  in the legs.  No significant fibrosity or scarring.  No pain to palpation.      Padmaja Beatty MD  Wound Care and Lymphedema   [facility]   [phone]

## 2022-05-31 ENCOUNTER — HOSPITAL ENCOUNTER (OUTPATIENT)
Dept: PHYSICAL THERAPY | Facility: REHABILITATION | Age: 52
Discharge: HOME | End: 2022-05-31
Payer: COMMERCIAL

## 2022-05-31 DIAGNOSIS — E66.812 CLASS 2 SEVERE OBESITY WITH SERIOUS COMORBIDITY AND BODY MASS INDEX (BMI) OF 38.0 TO 38.9 IN ADULT, UNSPECIFIED OBESITY TYPE (H): ICD-10-CM

## 2022-05-31 DIAGNOSIS — M25.561 ARTHRALGIA OF RIGHT LOWER LEG: ICD-10-CM

## 2022-05-31 DIAGNOSIS — M79.89 SWELLING IN RIGHT ARMPIT: ICD-10-CM

## 2022-05-31 DIAGNOSIS — M79.89 LEG SWELLING: Primary | ICD-10-CM

## 2022-05-31 DIAGNOSIS — M62.81 GENERALIZED MUSCLE WEAKNESS: ICD-10-CM

## 2022-05-31 DIAGNOSIS — C50.911 INFILTRATING DUCTAL CARCINOMA OF RIGHT FEMALE BREAST (H): ICD-10-CM

## 2022-05-31 DIAGNOSIS — E66.01 CLASS 2 SEVERE OBESITY WITH SERIOUS COMORBIDITY AND BODY MASS INDEX (BMI) OF 38.0 TO 38.9 IN ADULT, UNSPECIFIED OBESITY TYPE (H): ICD-10-CM

## 2022-05-31 DIAGNOSIS — I89.0 ACQUIRED LYMPHEDEMA OF LEG: ICD-10-CM

## 2022-05-31 DIAGNOSIS — Z98.890 H/O BREAST RECONSTRUCTION: ICD-10-CM

## 2022-05-31 DIAGNOSIS — I89.0 LYMPHEDEMA OF RIGHT ARM: ICD-10-CM

## 2022-05-31 DIAGNOSIS — D05.11 DUCTAL CARCINOMA IN SITU (DCIS) OF BOTH BREASTS: ICD-10-CM

## 2022-05-31 DIAGNOSIS — D05.12 DUCTAL CARCINOMA IN SITU (DCIS) OF BOTH BREASTS: ICD-10-CM

## 2022-05-31 DIAGNOSIS — I87.303 VENOUS HYPERTENSION OF LOWER EXTREMITY, BILATERAL: ICD-10-CM

## 2022-05-31 DIAGNOSIS — R60.9 LIPEDEMA: ICD-10-CM

## 2022-05-31 PROCEDURE — 97140 MANUAL THERAPY 1/> REGIONS: CPT | Mod: GP | Performed by: PHYSICAL THERAPY ASSISTANT

## 2022-06-23 ENCOUNTER — ANCILLARY PROCEDURE (OUTPATIENT)
Dept: GENERAL RADIOLOGY | Facility: CLINIC | Age: 52
End: 2022-06-23
Attending: NURSE PRACTITIONER
Payer: COMMERCIAL

## 2022-06-23 ENCOUNTER — OFFICE VISIT (OUTPATIENT)
Dept: FAMILY MEDICINE | Facility: CLINIC | Age: 52
End: 2022-06-23
Payer: COMMERCIAL

## 2022-06-23 VITALS
SYSTOLIC BLOOD PRESSURE: 100 MMHG | OXYGEN SATURATION: 99 % | BODY MASS INDEX: 37.78 KG/M2 | HEART RATE: 75 BPM | TEMPERATURE: 98.3 F | DIASTOLIC BLOOD PRESSURE: 60 MMHG | WEIGHT: 263.3 LBS

## 2022-06-23 DIAGNOSIS — R07.81 RIB PAIN ON RIGHT SIDE: Primary | ICD-10-CM

## 2022-06-23 DIAGNOSIS — F41.9 ANXIETY: ICD-10-CM

## 2022-06-23 DIAGNOSIS — R07.81 RIB PAIN ON RIGHT SIDE: ICD-10-CM

## 2022-06-23 DIAGNOSIS — F32.0 MAJOR DEPRESSIVE DISORDER, SINGLE EPISODE, MILD (H): ICD-10-CM

## 2022-06-23 DIAGNOSIS — M81.0 AGE-RELATED OSTEOPOROSIS WITHOUT CURRENT PATHOLOGICAL FRACTURE: ICD-10-CM

## 2022-06-23 PROCEDURE — 71101 X-RAY EXAM UNILAT RIBS/CHEST: CPT | Mod: TC | Performed by: RADIOLOGY

## 2022-06-23 PROCEDURE — 99214 OFFICE O/P EST MOD 30 MIN: CPT | Performed by: NURSE PRACTITIONER

## 2022-06-23 ASSESSMENT — PAIN SCALES - GENERAL: PAINLEVEL: MILD PAIN (2)

## 2022-06-23 NOTE — PROGRESS NOTES
Assessment and Plan:       ICD-10-CM    1. Rib pain on right side  R07.81 XR Ribs & Chest Right G/E 3 Views   2. Age-related osteoporosis without current pathological fracture  M81.0 Adult Endocrinology  Referral   3. Anxiety  F41.9    4. Major depressive disorder, single episode, mild (H)  F32.0      Differentials include rib sprain, rib fracture, myofascial pain.  X-ray shows no acute fracture.  Will have radiology review.  Discussed symptomatic treatment including rest, ice, NSAIDs as needed.  Will refer to osteoporosis clinic for osteoporosis management.  We will complete paperwork regarding support animals for her anxiety and depression.  She feels as though her mood is stable without medication.  She is content with the plan.    Subjective:     Sarah is a 51 year old female presenting to the clinic for multiple concerns today.  Patient has a history of breast cancer diagnosed 2 years ago.  She had bilateral mastectomies performed with radiation.  She has 2 cats at home who assist with underlying anxiety and depression.  She feels as though her mood is stable without medications.  She denies thoughts of suicide.  Having the cats around her helps her relax.  Patient has been diagnosed with osteoporosis.  2 days ago, she was leaning over the couch when she felt a pop within her right rib cage.  She has pain with laying down.  She denies any difficulty breathing.  Pain is an intermittent ache.  She has been taking Tylenol and gabapentin.    Reviewof Systems: A complete 14 point review of systems was obtained and is negative or as stated in the history of present illness.    Social History     Socioeconomic History     Marital status: Single     Spouse name: Not on file     Number of children: 3     Years of education: 12+     Highest education level: Not on file   Occupational History     Occupation: pt care tech, "[employer]   Tobacco Use     Smoking status: Never Smoker     Smokeless tobacco: Never Used    Substance and Sexual Activity     Alcohol use: No     Drug use: No     Sexual activity: Yes     Partners: Male     Birth control/protection: Condom   Other Topics Concern      Service Not Asked     Blood Transfusions No     Caffeine Concern Yes     Occupational Exposure Not Asked     Hobby Hazards No     Sleep Concern No     Stress Concern No     Weight Concern No     Special Diet No     Back Care No     Exercise Yes     Bike Helmet Not Asked     Seat Belt Yes     Self-Exams Yes   Social History Narrative     Not on file     Social Determinants of Health     Financial Resource Strain: Not on file   Food Insecurity: Not on file   Transportation Needs: Not on file   Physical Activity: Not on file   Stress: Not on file   Social Connections: Not on file   Intimate Partner Violence: Not on file   Housing Stability: Not on file       Active Ambulatory Problems     Diagnosis Date Noted     Iron deficiency anemia 08/21/2007     Hymenoptera allergy 07/07/2009     Osteopenia 06/08/2011     Vitamin D deficiency 06/10/2011     Acquired lymphedema of leg 05/03/2021     H/O breast reconstruction 08/30/2019     Personal history of malignant neoplasm of breast 12/22/2020     Swelling in right armpit 09/20/2021     Post-mastectomy lymphedema syndrome 09/20/2021     Venous hypertension of lower extremity, bilateral 09/20/2021     Lipedema 09/20/2021     Class 2 severe obesity with serious comorbidity and body mass index (BMI) of 38.0 to 38.9 in adult, unspecified obesity type (H) 09/20/2021     DCIS (ductal carcinoma in situ) 2019     Infiltrating ductal carcinoma of right breast (H) 10/18/2021     History of hysterectomy 03/30/2022     Age-related osteoporosis without current pathological fracture 04/07/2022     Resolved Ambulatory Problems     Diagnosis Date Noted     Anemia 03/20/2007     Excessive or frequent menstruation 03/11/2008     Past Medical History:   Diagnosis Date     Dilated aortic root (H)      Foot  fracture 06/03/2011     History of anesthesia complications      PONV (postoperative nausea and vomiting)      Radial head fracture 06/03/2011     Seizures (H)      Suspected sleep apnea        Family History   Problem Relation Age of Onset     Psychotic Disorder Mother      Sleep Apnea Mother      Mental Illness Mother      C.A.D. Father      Diabetes Father      Sleep Apnea Father      No Known Problems Sister      No Known Problems Brother      Psychotic Disorder Maternal Grandfather         commit suicide     Suicidality Maternal Grandfather      Breast Cancer Paternal Grandmother 50     Prostate Cancer Paternal Grandfather      Testicular cancer Paternal Grandfather      Asperger's syndrome Son      Mental Illness Son      Leukemia Paternal Uncle        Objective:     /60 (BP Location: Left arm, Patient Position: Sitting, Cuff Size: Adult Regular)   Pulse 75   Temp 98.3  F (36.8  C)   Wt 119.4 kg (263 lb 4.8 oz)   LMP  (LMP Unknown)   SpO2 99%   BMI 37.78 kg/m      Patient is alert, in no obvious distress.   Skin: Warm, dry.    Lungs:  Clear to auscultation. Respirations even and unlabored.  No wheezing or rales noted.   Heart:  Regular rate and rhythm.  No murmurs, S3, S4, gallops, or rubs.    Abdomen: Soft, nontender.  No organomegaly. Bowel sounds normoactive. No guarding or masses noted.   Musculoskeletal: Patient is tender to palpation within her right upper rib cage anteriorly beneath her right breast.    LABORATORY: I ordered and personally reviewed right rib x-rays showing no obvious fracture.  Will have radiology review.

## 2022-06-26 ENCOUNTER — APPOINTMENT (OUTPATIENT)
Dept: CT IMAGING | Facility: CLINIC | Age: 52
End: 2022-06-26
Attending: STUDENT IN AN ORGANIZED HEALTH CARE EDUCATION/TRAINING PROGRAM
Payer: COMMERCIAL

## 2022-06-26 ENCOUNTER — HOSPITAL ENCOUNTER (EMERGENCY)
Facility: CLINIC | Age: 52
Discharge: HOME | End: 2022-06-26
Attending: STUDENT IN AN ORGANIZED HEALTH CARE EDUCATION/TRAINING PROGRAM | Admitting: STUDENT IN AN ORGANIZED HEALTH CARE EDUCATION/TRAINING PROGRAM
Payer: COMMERCIAL

## 2022-06-26 VITALS
HEART RATE: 60 BPM | BODY MASS INDEX: 38.51 KG/M2 | HEIGHT: 69 IN | TEMPERATURE: 98.2 F | RESPIRATION RATE: 16 BRPM | DIASTOLIC BLOOD PRESSURE: 80 MMHG | OXYGEN SATURATION: 98 % | SYSTOLIC BLOOD PRESSURE: 130 MMHG | WEIGHT: 260 LBS
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
## 2022-06-26 DIAGNOSIS — S02.2XXA CLOSED FRACTURE OF NASAL BONE, INITIAL ENCOUNTER: ICD-10-CM

## 2022-06-26 PROCEDURE — 99284 EMERGENCY DEPT VISIT MOD MDM: CPT | Mod: 25

## 2022-06-26 PROCEDURE — 70486 CT MAXILLOFACIAL W/O DYE: CPT

## 2022-06-26 PROCEDURE — 70450 CT HEAD/BRAIN W/O DYE: CPT

## 2022-06-26 NOTE — ED PROVIDER NOTES
EMERGENCY DEPARTMENT ENCOUNTER       ED Course & Medical Decision Making     3:35 PM I introduced myself to the patient, obtained patient history, performed a physical exam, and discussed plan for ED workup including potential diagnostic laboratory/imaging studies and interventions.  4:58 PM Rechecked and updated the patient. We discussed the plan for discharge and the patient is agreeable. Reviewed supportive cares, symptomatic treatment, outpatient follow up, and reasons to return to the Emergency Department. Patient to be discharged by ED RN.     Final Impression  51 year old female presents for evaluation after a mechanical fall up a set of stairs.  Was walking up a set of wooden stairs carrying some milk when she caught her foot on a broom nearby causing her to fall and strike her face on the stairs.  Barely got her arm out in front of her, states that most of the impact was absorbed by the bridge of her nose.  Does have some moderate swelling and tenderness to palpation over the bridge of nose.  Denies LOC or headache, no nausea or vomiting.  Patient denies being on any blood thinners.  CT head negative.  CT facial bones shows some cortical irregularity along the nasal bridge suspicious for nondisplaced fracture, this was discussed with patient, would be consistent with her exam.  Recommended Tylenol/ibuprofen, could ice it as needed, then may need to follow-up with plastics if she is unhappy with the cosmetic result.  Patient agreeable to plan.  Will discharge home.    Prior to making a final disposition on this patient the results of patient's tests and other diagnostic studies were discussed with the patient. All questions were answered. Patient expressed understanding of the plan and was amenable to it.    Medications - No data to display    Final Impression     1. Fall, initial encounter      Chief Complaint     Chief Complaint   Patient presents with     Fall     Patient reports about an hour pta,  patient tripped on a broom going up stairs. She hit hear face on another step. Pain to the nasal bridge and right eye. Minor pain in the upper lip area. NO LOC, no thinners, but does endorse headache        HPI     Sarah Gilmore is a 51 year old female who presents for evaluation of fall.    The patient reports she was walking up the stairs today when she tripped over a broom and fell hitting her face on a wooden step. She endorses pain to her face, right shoulder, and low back. She did not have time to break her fall with her hands and she denies elbow and wrist pain.  After the fall she had mild epistaxis which has now resolved. She is not anticoagulated.    I, Gopi Spicer am serving as a scribe to document services personally performed by Dr. Jey Wells MD, based on my observation and the provider's statements to me. I, Dr. Jey Wells MD attest that Gopi Spicer is acting in a scribe capacity, has observed my performance of the services and has documented them in accordance with my direction.    Past Medical History     Past Medical History:   Diagnosis Date     Dilated aortic root (H)      Foot fracture 06/03/2011     History of anesthesia complications      PONV (postoperative nausea and vomiting)      Radial head fracture 06/03/2011     Seizures (H)      Suspected sleep apnea      Past Surgical History:   Procedure Laterality Date     C/SECTION, LOW TRANSVERSE  07/10/1994    breech     HYSTERECTOMY, PAP NO LONGER INDICATED Bilateral      LAPAROSCOPIC HYSTERECTOMY TOTAL  03/13/2018    cervix benign     MAMMO STEREOTACTIC CORE BIOPSY RIGHT Right 05/22/2019     MAMMO STEREOTACTIC CORE BIOPSY RIGHT Right 05/30/2019     NM SENTINEL NODE INJECTION  07/30/2019     OTHER SURGICAL HISTORY      right arm surgery     DC MASTECTOMY, MODIFIED RADICAL Bilateral 07/30/2019    Procedure: BILATERAL MASTECTOMY;  Surgeon: Mckenzie Colby DO;  Location: [facility] OR;  Service: General     DC REPLACEMENT  TISSUE EXPANDER W/PERMANENT IMPLANT Bilateral 2019    Procedure: BILATERAL IMPLANT RECONSTRUCTION TO BREASTS;  Surgeon: Carlitos Crum MD;  Location: [facility];  Service: Plastics     RIGHT BREAST RECONSTRUCTION WITH LATISSIMUS DORSI FLAP    10/29/2021     [facility]  DELIVERY ONLY  1994     Family History   Problem Relation Age of Onset     Psychotic Disorder Mother      Sleep Apnea Mother      Mental Illness Mother      C.A.D. Father      Diabetes Father      Sleep Apnea Father      No Known Problems Sister      No Known Problems Brother      Psychotic Disorder Maternal Grandfather         commit suicide     Suicidality Maternal Grandfather      Breast Cancer Paternal Grandmother 50     Prostate Cancer Paternal Grandfather      Testicular cancer Paternal Grandfather      Asperger's syndrome Son      Mental Illness Son      Leukemia Paternal Uncle       Social History     Tobacco Use     Smoking status: Never Smoker     Smokeless tobacco: Never Used   Substance Use Topics     Alcohol use: No     Drug use: No     Allergies   Allergen Reactions     Bees Anaphylaxis     Noted in 09 ER  Trouble breathing, rash     Contrast Dye Anaphylaxis     airway problems     Seafood Anaphylaxis       Relevant past medical, surgical, family and social history as documented above, has been reviewed and discussed with patient. No changes or additions, unless otherwise noted in the HPI.    Current Medications     anastrozole (ARIMIDEX) 1 MG tablet  EPI E-Z PEN JOELLEN 1:1000  IJ  ergocalciferol (ERGOCALCIFEROL) 71778 UNIT capsule  FERROUS SULFATE  MG OR TBCR  fish oil-omega-3 fatty acids 1000 MG capsule  gabapentin (NEURONTIN) 100 MG capsule  Multiple Vitamin (MULTI-VITAMINS) TABS  OSCO MAGNESIUM CITRATE OR  Turmeric 400 MG CAPS        Review of Systems     Constitutional: Denies fever, chills  Eyes: Denies visual changes or discharge  HENT: Denies sore throat, ear pain or neck pain. Endorses facial pain,  "epistaxis (now resolved).  Respiratory: Denies cough or shortness of breath    Cardiovascular: Denies chest pain, palpitations or leg swelling  GI: Denies abdominal pain, nausea, vomiting, or dark, bloody stools  : Denies hematuria, dysuria, or flank pain  Musculoskeletal: Denies wrist and elbow pain. Endorses low back pain, right elbow pain.  Skin: Denies rashes or wound    Remainder of systems reviewed, unless noted in HPI all others negative.    Physical Exam     /79   Pulse 59   Temp 98.2  F (36.8  C) (Oral)   Resp 16   Ht 1.753 m (5' 9\")   Wt 117.9 kg (260 lb)   LMP  (LMP Unknown)   SpO2 99%   BMI 38.40 kg/m    Constitutional: Awake, alert, in no acute distress  Head: Normocephalic, atraumatic.  No hematomas, lacerations or notable abrasions.  ENT: Mucous membranes moist.  Bilateral nostrils clear, no active bleeding, no septal hematoma.  Slight swelling and tenderness to palpation over the nasal bridge.  Eyes: PERRL, EOMI, Conjunctiva normal  Respiratory: Respirations even, unlabored. Lungs clear to ascultation bilaterally, in no acute respiratory distress.  Cardiovascular: Regular rate and rhythm.   GI: Abdomen soft, non-tender  Back: No midline tenderness or step-offs in thoracic or lumbar spine.  Musculoskeletal: Moves all 4 extremities equally, strength symmetrical on bilateral uppers and lowers.  Integument: Warm, dry.  Neurologic: Alert & oriented x 3. Normal speech. Grossly normal motor and sensory function. No focal deficits noted.  Psychiatric: Normal mood and affect.     Labs & Imaging     Results for orders placed or performed during the hospital encounter of 06/26/22   Head CT w/o contrast    Impression    IMPRESSION:  HEAD CT:  1.  No acute intracranial process.    FACIAL BONE CT:  1.  Mild osseous irregularity of the nasal bone, suggestive of nondisplaced fracture. There is overlying soft tissue swelling.     CT Facial Bones without Contrast    Impression    IMPRESSION:  HEAD " CT:  1.  No acute intracranial process.    FACIAL BONE CT:  1.  Mild osseous irregularity of the nasal bone, suggestive of nondisplaced fracture. There is overlying soft tissue swelling.          Jey Wells MD  06/26/22 2997

## 2022-06-26 NOTE — ED TRIAGE NOTES
Patient reports about an hour pta, patient tripped on a broom going up stairs. She hit hear face on another step. Pain to the nasal bridge and right eye. Minor pain in the upper lip area. NO LOC, no thinners, but does endorse headache       Triage Assessment     Row Name 06/26/22 1504       Triage Assessment (Adult)    Airway WDL WDL       Respiratory WDL    Respiratory WDL WDL       Skin Circulation/Temperature WDL    Skin Circulation/Temperature WDL WDL       Cardiac WDL    Cardiac WDL WDL       Peripheral/Neurovascular WDL    Peripheral Neurovascular WDL WDL       Cognitive/Neuro/Behavioral WDL    Cognitive/Neuro/Behavioral WDL WDL

## 2022-07-26 ENCOUNTER — OFFICE VISIT (OUTPATIENT)
Dept: FAMILY MEDICINE | Facility: CLINIC | Age: 52
End: 2022-07-26
Payer: COMMERCIAL

## 2022-07-26 VITALS
HEART RATE: 75 BPM | DIASTOLIC BLOOD PRESSURE: 72 MMHG | RESPIRATION RATE: 18 BRPM | TEMPERATURE: 98 F | WEIGHT: 260 LBS | OXYGEN SATURATION: 98 % | BODY MASS INDEX: 38.51 KG/M2 | SYSTOLIC BLOOD PRESSURE: 124 MMHG | HEIGHT: 69 IN

## 2022-07-26 DIAGNOSIS — R60.9 LIPEDEMA: ICD-10-CM

## 2022-07-26 DIAGNOSIS — Z01.818 PREOP GENERAL PHYSICAL EXAM: Primary | ICD-10-CM

## 2022-07-26 DIAGNOSIS — Z85.3 PERSONAL HISTORY OF MALIGNANT NEOPLASM OF BREAST: ICD-10-CM

## 2022-07-26 DIAGNOSIS — I77.810 DILATED AORTIC ROOT (H): ICD-10-CM

## 2022-07-26 DIAGNOSIS — N65.1 BREAST ASYMMETRY FOLLOWING RECONSTRUCTIVE SURGERY: ICD-10-CM

## 2022-07-26 DIAGNOSIS — M81.0 AGE-RELATED OSTEOPOROSIS WITHOUT CURRENT PATHOLOGICAL FRACTURE: ICD-10-CM

## 2022-07-26 DIAGNOSIS — G47.33 OSA (OBSTRUCTIVE SLEEP APNEA): ICD-10-CM

## 2022-07-26 LAB
ERYTHROCYTE [DISTWIDTH] IN BLOOD BY AUTOMATED COUNT: 15.5 % (ref 10–15)
HCT VFR BLD AUTO: 35.4 % (ref 35–47)
HGB BLD-MCNC: 11.7 G/DL (ref 11.7–15.7)
HOLD SPECIMEN: NORMAL
MCH RBC QN AUTO: 28.1 PG (ref 26.5–33)
MCHC RBC AUTO-ENTMCNC: 33.1 G/DL (ref 31.5–36.5)
MCV RBC AUTO: 85 FL (ref 78–100)
PLATELET # BLD AUTO: 311 10E3/UL (ref 150–450)
RBC # BLD AUTO: 4.16 10E6/UL (ref 3.8–5.2)
WBC # BLD AUTO: 4.9 10E3/UL (ref 4–11)

## 2022-07-26 PROCEDURE — 99214 OFFICE O/P EST MOD 30 MIN: CPT | Performed by: FAMILY MEDICINE

## 2022-07-26 PROCEDURE — 85027 COMPLETE CBC AUTOMATED: CPT | Performed by: FAMILY MEDICINE

## 2022-07-26 PROCEDURE — 36415 COLL VENOUS BLD VENIPUNCTURE: CPT | Performed by: FAMILY MEDICINE

## 2022-07-26 ASSESSMENT — PAIN SCALES - GENERAL: PAINLEVEL: MILD PAIN (2)

## 2022-07-26 NOTE — PROGRESS NOTES
[facility]  [address]  [place] [address]  Phone: [phone]  Fax: [phone]  Primary Provider: No Ref-Primary, Physician  Pre-op Performing Provider: EUFEMIA SADLER      PREOPERATIVE EVALUATION:  Today's date: 7/26/2022    Sarah Gilmore is a 52 year old female who presents for a preoperative evaluation.    Surgical Information:    Surgery/Procedure: LIPOSUCTION OF BILATERAL LEGS, LIPOTRANSFER TO BILATERAL BREASTS  Surgery Location: [facility] OR  Surgeon: Dr Mir Preciado  Surgery Date: 8/2/22  Time of Surgery: 7:15am  Where patient plans to recover: At home with family   Fax number for surgical facility: [phone]    Type of Anesthesia Anticipated: to be determined    Assessment & Plan     The proposed surgical procedure is considered INTERMEDIATE risk.    Sarah was seen today for pre-op exam.    Diagnoses and all orders for this visit:    Preop general physical exam  -     CBC with platelets  -     Extra Tube    Lipedema    Breast asymmetry following reconstructive surgery    Personal history of malignant neoplasm of breast    Age-related osteoporosis without current pathological fracture    ONIEL (obstructive sleep apnea)    Dilated aortic root (H)  -     Adult Cardiology Eval [facility] Referral; Future         Risks and Recommendations:  The patient has the following additional risks and recommendations for perioperative complications:   - No identified additional risk factors other than previously addressed    Medication Instructions:  Patient is to take all scheduled medications on the day of surgery EXCEPT for modifications listed below: Supplements and gabapentin    RECOMMENDATION:  APPROVAL GIVEN to proceed with proposed procedure, without further diagnostic evaluation.    Subjective     HPI related to upcoming procedure:    She has a history of lipedema and has had previous liposuction procedures to aid in treatment and is now scheduled for an  additional procedure.  There is also a plan due to a history of breast asymmetry following reconstructive surgery due to her history of breast cancer to also treat this concern.    She has a history of osteoporosis we will plan to start treatment with Reclast soon.  No fracture concerns at this time.    She has obstructive sleep apnea uses a CPAP nightly with good symptom relief.    She has a history of dilated aortic root with last measurement from November 2020 available in her chart from an MR angiogram of 43 mm.  She would like to establish with a cardiologist within the [place] system and a referral is placed today but no concern regarding her upcoming procedure with this diagnosis.    She otherwise does not have any signs or symptoms of an acute infection.    She reports that she has been slow to wake up from anesthesia and sometimes gets nauseated.  She has not had other more significant reactions.    She does not report a history of a bleeding disorder however reports subjectively that it has sometimes taken more pressure and more prolonged time for her to stop bleeding.  No family members have a noted bleeding disorder.    Her father has a history of blood clots.  She does not have any history of blood clot.    Preop Questions 7/26/2022   1. Have you ever had a heart attack or stroke? No   2. Have you ever had surgery on your heart or blood vessels, such as a stent placement, a coronary artery bypass, or surgery on an artery in your head, neck, heart, or legs? No   3. Do you have chest pain with activity? No   4. Do you have a history of  heart failure? No   5. Do you currently have a cold, bronchitis or symptoms of other infection? No   6. Do you have a cough, shortness of breath, or wheezing? No   7. Do you or anyone in your family have previous history of blood clots? YES - Father, no personal history of clot   8. Do you or does anyone in your family have a serious bleeding problem such as prolonged  bleeding following surgeries or cuts? YES - has had situation where it takes more time to stop bleeding   9. Have you ever had problems with anemia or been told to take iron pills? YES - anemia due to menstrual blood loss in the past   10. Have you had any abnormal blood loss such as black, tarry or bloody stools, or abnormal vaginal bleeding? No   11. Have you ever had a blood transfusion? No   12. Are you willing to have a blood transfusion if it is medically needed before, during, or after your surgery? Yes   13. Have you or any of your relatives ever had problems with anesthesia? YES -  Nausea, slow to wake up   14. Do you have sleep apnea, excessive snoring or daytime drowsiness? YES -    14a. Do you have a CPAP machine? Yes   15. Do you have any artifical heart valves or other implanted medical devices like a pacemaker, defibrillator, or continuous glucose monitor? No   16. Do you have artificial joints? No   17. Are you allergic to latex? No   18. Is there any chance that you may be pregnant? No       Health Care Directive:  Patient does not have a Health Care Directive or Living Will:   56}    Status of Chronic Conditions:  See problem list for active medical problems.  Problems all longstanding and stable, except as noted/documented.  See ROS for pertinent symptoms related to these conditions.      Review of Systems  Complete review of systems is obtained.  Other than the specific considerations noted above complete review of systems is negative.      Patient Active Problem List    Diagnosis Date Noted     Age-related osteoporosis without current pathological fracture 04/07/2022     Priority: Medium     History of hysterectomy 03/30/2022     Priority: Medium     Cervix benign, pap not indicated       Infiltrating ductal carcinoma of right breast (H) 10/18/2021     Priority: Medium     Swelling in right armpit 09/20/2021     Priority: Medium     Post-mastectomy lymphedema syndrome 09/20/2021     Priority:  "Medium     Venous hypertension of lower extremity, bilateral 09/20/2021     Priority: Medium     Lipedema 09/20/2021     Priority: Medium     Class 2 severe obesity with serious comorbidity and body mass index (BMI) of 38.0 to 38.9 in adult, unspecified obesity type (H) 09/20/2021     Priority: Medium     Acquired lymphedema of leg 05/03/2021     Priority: Medium     Personal history of malignant neoplasm of breast 12/22/2020     Priority: Medium     Formatting of this note might be different from the original.  Added automatically from request for surgery [id number]       H/O breast reconstruction 08/30/2019     Priority: Medium     Formatting of this note might be different from the original.  Added automatically from request for surgery [id number]       DCIS (ductal carcinoma in situ) 2019     Priority: Medium     bilateral       Vitamin D deficiency 06/10/2011     Priority: Medium     (Problem list name updated by automated process. Provider to review and confirm.)       Osteopenia 06/08/2011     Priority: Medium     Hymenoptera allergy 07/07/2009     Priority: Medium     Iron deficiency anemia 08/21/2007     Priority: Medium     Problem list name updated by automated process. Provider to review        Past Medical History:   Diagnosis Date     Dilated aortic root (H)      Foot fracture 06/03/2011     History of anesthesia complications     slow to wake up     PONV (postoperative nausea and vomiting)      Radial head fracture 06/03/2011     Seizures (H)     h/o seizure disorder in childhood, last seizure activity  \"4 yrs ago\"     Suspected sleep apnea     sleep study set up in Aug 2019     Past Surgical History:   Procedure Laterality Date     C/SECTION, LOW TRANSVERSE  07/10/1994    breech     HYSTERECTOMY, PAP NO LONGER INDICATED Bilateral      LAPAROSCOPIC HYSTERECTOMY TOTAL  03/13/2018    cervix benign     MAMMO STEREOTACTIC CORE BIOPSY RIGHT Right 05/22/2019     MAMMO STEREOTACTIC CORE BIOPSY RIGHT Right 05/30/2019 "     NM SENTINEL NODE INJECTION  2019     OTHER SURGICAL HISTORY      right arm surgery     ID MASTECTOMY, MODIFIED RADICAL Bilateral 2019    Procedure: BILATERAL MASTECTOMY;  Surgeon: Mckenzie Colby DO;  Location: [facility];  Service: General     ID REPLACEMENT TISSUE EXPANDER W/PERMANENT IMPLANT Bilateral 2019    Procedure: BILATERAL IMPLANT RECONSTRUCTION TO BREASTS;  Surgeon: Carlitos Crum MD;  Location: [facility];  Service: Plastics     RIGHT BREAST RECONSTRUCTION WITH LATISSIMUS DORSI FLAP    10/29/2021     [facility]  DELIVERY ONLY  1994     Current Outpatient Medications   Medication Sig Dispense Refill     anastrozole (ARIMIDEX) 1 MG tablet TAKE 1 TABLET BY MOUTH EVERY DAY 90 tablet 2     EPI E-Z PEN JOELLEN 1:1000  IJ 1 TIME ONLY  PRN bee sting 2 Device 20     ergocalciferol (ERGOCALCIFEROL) 01549 UNIT capsule Take 1 capsule by mouth every 14 days. 12 capsule 1     FERROUS SULFATE  MG OR TBCR 1 TABLET BID  after food withm orange juice 100 3     gabapentin (NEURONTIN) 100 MG capsule Take 1 capsule (100 mg) by mouth 3 times daily 271 capsule 1     fish oil-omega-3 fatty acids 1000 MG capsule  (Patient not taking: Reported on 2022)       Multiple Vitamin (MULTI-VITAMINS) TABS Take 1 tablet by mouth  (Patient not taking: Reported on 2022)       OSCO MAGNESIUM CITRATE OR  (Patient not taking: Reported on 2022)       Turmeric 400 MG CAPS  (Patient not taking: Reported on 2022)         Allergies   Allergen Reactions     Bees Anaphylaxis     Noted in 09 ER  Trouble breathing, rash     Contrast Dye Anaphylaxis     airway problems     Seafood Anaphylaxis        Social History     Tobacco Use     Smoking status: Never Smoker     Smokeless tobacco: Never Used   Substance Use Topics     Alcohol use: No     Family History   Problem Relation Age of Onset     Psychotic Disorder Mother      Sleep Apnea Mother      Mental Illness Mother      C.A.D.  "Father      Diabetes Father      Sleep Apnea Father      No Known Problems Sister      No Known Problems Brother      Psychotic Disorder Maternal Grandfather         commit suicide     Suicidality Maternal Grandfather      Breast Cancer Paternal Grandmother 50     Prostate Cancer Paternal Grandfather      Testicular cancer Paternal Grandfather      Asperger's syndrome Son      Mental Illness Son      Leukemia Paternal Uncle      History   Drug Use No         Objective     /72   Pulse 75   Temp 98  F (36.7  C)   Resp 18   Ht 1.753 m (5' 9\")   Wt 117.9 kg (260 lb)   LMP  (LMP Unknown)   SpO2 98%   BMI 38.40 kg/m      Physical Exam  General Appearance:    Alert, cooperative, no distress   Eyes:   No scleral icterus or conjunctival irritation       Ears:    Normal TM's and external ear canals, both ears   Throat:   Lips, mucosa, and tongue normal; teeth and gums normal   Neck:   Supple, symmetrical, trachea midline, no adenopathy;        thyroid:  No enlargement/tenderness/nodules   Lungs:     Clear to auscultation bilaterally, respirations unlabored, no wheezes or crackles   Heart:    Regular rate and rhythm,  No murmur   Abdomen:    Soft, no distention, no tenderness on palpation, no masses, no organomegaly     Extremities:  No edema, no joint swelling or redness, no evidence of any injuries   Skin:  No concerning skin findings, no suspicious moles, no rashes   Neurologic:  On gross examination there is no motor or sensory deficit.  Patient walks with a normal gait           Recent Labs   Lab Test 02/28/22  1440 10/14/21  1534 06/01/21  1146 02/09/21  1508 08/29/20  1246   HGB 11.9 12.3 14.2   < > 13.9     --   --   --  286   INR  --  0.93 0.97   < >  --     140 139   < > 140   POTASSIUM 4.2 4.1 4.2   < > 4.7   CR 0.84 0.78 0.71   < > 0.86    < > = values in this interval not displayed.      Recent Results (from the past 24 hour(s))   CBC with platelets    Collection Time: 07/26/22  3:33 PM "   Result Value Ref Range    WBC Count 4.9 4.0 - 11.0 10e3/uL    RBC Count 4.16 3.80 - 5.20 10e6/uL    Hemoglobin 11.7 11.7 - 15.7 g/dL    Hematocrit 35.4 35.0 - 47.0 %    MCV 85 78 - 100 fL    MCH 28.1 26.5 - 33.0 pg    MCHC 33.1 31.5 - 36.5 g/dL    RDW 15.5 (H) 10.0 - 15.0 %    Platelet Count 311 150 - 450 10e3/uL         Diagnostics:  Labs pending at this time.  Results will be reviewed when available.   No EKG required, no history of coronary heart disease, significant arrhythmia, peripheral arterial disease or other structural heart disease.    Revised Cardiac Risk Index (RCRI):  The patient has the following serious cardiovascular risks for perioperative complications:   - No serious cardiac risks = 0 points     RCRI Interpretation: 0 points: Class I (very low risk - 0.4% complication rate)         Signed Electronically by: Jey Quick MD, MD  Copy of this evaluation report is provided to requesting physician.

## 2022-08-22 NOTE — PROGRESS NOTES
"[facility]    Outpatient Physical Therapy Discharge Note  Patient: Sarah Gilmore  : 1970    Beginning/End Dates of Reporting Period:  22 to 22    Referring Provider: Padmaja Beatty MD    Therapy Diagnosis: Lipolymphedema     Client Self Report: Pt continues to use her pump 1x/day for an hour.  Pt reports she does have a bit more swellng in her ankles. Pt reports she did speak with a nurse at the vascular center. \" This can be normal.\"    Objective Measurements:  Objective Measure: LE volumes  Details: R 4227.34 (initially @ 6862.34) L 6133.02 (initially @ 6700.23)            Outcome Measures (most recent score):  Lymphedema Life Impact Scale (score range 0-72). A higher score indicates greater impairment.: 49    Goals:  Goal Identifier Self-care   Goal Description Pt will be independent in self-care/home management of her swelling including self-MLD, exercise, compression bandages if needed and compression wear/care   Target Date 22   Date Met      Progress (detail required for progress note):  improved     Goal Identifier LLIS   Goal Description Pt will demonstrate improvement in her function and management of her swelling by decreasing her LLIS score by >7 pts   Target Date 22   Date Met      Progress (detail required for progress note):             Plan:  Discharge from therapy.    Discharge:    Reason for Discharge: Pt cancelled last 2 PT appointments- goals not formally assessed but pt was making progress towards all goals.    Equipment Issued: none    Discharge Plan: Patient to continue home program.    Tamiko Aguila, PT, DPT, CLT-YONY  "

## 2022-08-22 NOTE — ADDENDUM NOTE
Encounter addended by: Tamiko Aguila, PT on: 8/22/2022 8:51 AM   Actions taken: Episode resolved, Clinical Note Signed

## 2022-08-31 NOTE — TELEPHONE ENCOUNTER
RECORDS RECEIVED FROM: Age-Related Osteoporosis; referred by Laura Mcintosh   DATE RECEIVED: 12/1/2022   NOTES (FOR ALL VISITS) STATUS DETAILS   OFFICE NOTES from referring provider [facility]:  6/23/22 - P[facility] OV with Laura Mcintosh NP   OFFICE NOTES from other specialist [facility]:  7/26/22 - [facility] OV with Dr. Quick   ED NOTES N/A    OPERATIVE REPORT  (thyroid, pituitary, adrenal, parathyroid) N/A    MEDICATION LIST Internal    IMAGING      DEXASCAN Internal [facility]:  4/5/22 - DEXA   CT (HEAD/NECK/CHEST/ABDOMEN) [facility]:  6/26/22 - CT Facial Bones  6/26/22 - CT Head   LABS     DIABETES: HBGA1C, CREATININE, FASTING LIPIDS, MICROALBUMIN URINE, POTASSIUM, TSH, T4    THYROID: TSH, T4, CBC, THYRODLONULIN, TOTAL T3, FREE T4, CALCITONIN, CEA Care Everywhere / Internal   [facility]:  7/26/22 - CBC  2/28/22 - BMP  10/14/21 - HBGA1C  8/12/21 - Glucose  8/12/21 - Lipid  8/29/20 - T4    [facility]:  10/29/21 - Creatinine    [facility]:  3/12/21 - CMP

## 2022-09-06 ENCOUNTER — VIRTUAL VISIT (OUTPATIENT)
Dept: FAMILY MEDICINE | Facility: CLINIC | Age: 52
End: 2022-09-06
Payer: COMMERCIAL

## 2022-09-06 DIAGNOSIS — F41.9 ANXIETY: ICD-10-CM

## 2022-09-06 DIAGNOSIS — F32.0 MAJOR DEPRESSIVE DISORDER, SINGLE EPISODE, MILD (H): Primary | ICD-10-CM

## 2022-09-06 PROCEDURE — 99213 OFFICE O/P EST LOW 20 MIN: CPT | Mod: 95 | Performed by: FAMILY MEDICINE

## 2022-09-06 RX ORDER — OXYCODONE HYDROCHLORIDE 5 MG/1
TABLET ORAL
COMMUNITY
Start: 2022-08-02 | End: 2023-01-26

## 2022-09-06 NOTE — PROGRESS NOTES
Sarah is a 52 year old who is being evaluated via a billable telephone visit.      What phone number would you like to be contacted at? [phone]  How would you like to obtain your AVS? Dina Smith was seen today for form request.    Diagnoses and all orders for this visit:    Major depressive disorder, single episode, mild (H)    Anxiety           Subjective   Sarah is a 52 year old has an established diagnosis of major depression under control current symptoms mild anxiety.  She does not require medication therapy and reports overall she is stable.  She has benefited from having 2  animals, 2 cats that have been long-term pets.  Having these animals allows for her to help maintain her degree of functioning in relation to her stated diagnoses.  Original documentation for having many animals was completed by Laura Mcintosh on June 22, 2022.  He documentation accounting for 2 pending animals at that time.  Her place of residence feels that the original documentation was only for 1 animal and they are inquiring about why a second animal is needed.  Documentation is completed.  Patient does not have a second disability that requires an additional  animal but her currently describe disability has benefited strongly from both  animals.   her from 1 of the animals would cause a significant detriment where it would not be recommended as it would result in decompensation of her current symptoms..        Review of Systems Complete review of systems is obtained.  Other than the specific considerations noted above complete review of systems is negative.        Objective           Vitals:  No vitals were obtained today due to virtual visit.    Physical Exam   healthy, alert and no distress  PSYCH: Alert and oriented times 3; coherent speech, normal   rate and volume, able to articulate logical thoughts, able   to abstract reason, no tangential thoughts, no hallucinations   or delusions   Her affect is normal  RESP: No cough, no audible wheezing, able to talk in full sentences  Remainder of exam unable to be completed due to telephone visits                Phone call duration: 5 minutes    Jey Quick MD

## 2022-10-04 ENCOUNTER — ONCOLOGY VISIT (OUTPATIENT)
Dept: ONCOLOGY | Facility: CLINIC | Age: 52
End: 2022-10-04
Attending: INTERNAL MEDICINE
Payer: COMMERCIAL

## 2022-10-04 ENCOUNTER — LAB (OUTPATIENT)
Dept: INFUSION THERAPY | Facility: CLINIC | Age: 52
End: 2022-10-04
Attending: INTERNAL MEDICINE
Payer: COMMERCIAL

## 2022-10-04 VITALS
HEART RATE: 61 BPM | OXYGEN SATURATION: 98 % | SYSTOLIC BLOOD PRESSURE: 125 MMHG | DIASTOLIC BLOOD PRESSURE: 80 MMHG | WEIGHT: 255 LBS | BODY MASS INDEX: 37.66 KG/M2 | TEMPERATURE: 98.1 F

## 2022-10-04 DIAGNOSIS — M81.0 AGE-RELATED OSTEOPOROSIS WITHOUT CURRENT PATHOLOGICAL FRACTURE: ICD-10-CM

## 2022-10-04 DIAGNOSIS — D05.11 DUCTAL CARCINOMA IN SITU (DCIS) OF RIGHT BREAST: ICD-10-CM

## 2022-10-04 DIAGNOSIS — M81.0 AGE-RELATED OSTEOPOROSIS WITHOUT CURRENT PATHOLOGICAL FRACTURE: Primary | ICD-10-CM

## 2022-10-04 DIAGNOSIS — C50.911 INFILTRATING DUCTAL CARCINOMA OF RIGHT BREAST (H): Primary | ICD-10-CM

## 2022-10-04 LAB
CALCIUM SERPL-MCNC: 9.4 MG/DL (ref 8.5–10.5)
CREAT SERPL-MCNC: 0.78 MG/DL (ref 0.6–1.1)
GFR SERPL CREATININE-BSD FRML MDRD: 90 ML/MIN/1.73M2

## 2022-10-04 PROCEDURE — 82565 ASSAY OF CREATININE: CPT | Performed by: INTERNAL MEDICINE

## 2022-10-04 PROCEDURE — 96365 THER/PROPH/DIAG IV INF INIT: CPT

## 2022-10-04 PROCEDURE — 82310 ASSAY OF CALCIUM: CPT | Performed by: INTERNAL MEDICINE

## 2022-10-04 PROCEDURE — G0463 HOSPITAL OUTPT CLINIC VISIT: HCPCS

## 2022-10-04 PROCEDURE — 250N000011 HC RX IP 250 OP 636: Performed by: INTERNAL MEDICINE

## 2022-10-04 PROCEDURE — 99214 OFFICE O/P EST MOD 30 MIN: CPT | Performed by: INTERNAL MEDICINE

## 2022-10-04 PROCEDURE — 36415 COLL VENOUS BLD VENIPUNCTURE: CPT | Performed by: INTERNAL MEDICINE

## 2022-10-04 RX ORDER — DIPHENHYDRAMINE HYDROCHLORIDE 50 MG/ML
50 INJECTION INTRAMUSCULAR; INTRAVENOUS
Status: CANCELLED
Start: 2022-10-04

## 2022-10-04 RX ORDER — EPINEPHRINE 1 MG/ML
0.3 INJECTION, SOLUTION INTRAMUSCULAR; SUBCUTANEOUS EVERY 5 MIN PRN
Status: CANCELLED | OUTPATIENT
Start: 2022-10-04

## 2022-10-04 RX ORDER — ZOLEDRONIC ACID 5 MG/100ML
5 INJECTION, SOLUTION INTRAVENOUS ONCE
Status: COMPLETED | OUTPATIENT
Start: 2022-10-04 | End: 2022-10-04

## 2022-10-04 RX ORDER — HEPARIN SODIUM,PORCINE 10 UNIT/ML
5 VIAL (ML) INTRAVENOUS
Status: CANCELLED | OUTPATIENT
Start: 2022-10-04

## 2022-10-04 RX ORDER — HEPARIN SODIUM (PORCINE) LOCK FLUSH IV SOLN 100 UNIT/ML 100 UNIT/ML
5 SOLUTION INTRAVENOUS
Status: CANCELLED | OUTPATIENT
Start: 2022-10-04

## 2022-10-04 RX ORDER — DIPHENHYDRAMINE HYDROCHLORIDE 50 MG/ML
50 INJECTION INTRAMUSCULAR; INTRAVENOUS
Status: DISCONTINUED | OUTPATIENT
Start: 2022-10-04 | End: 2022-10-04 | Stop reason: HOSPADM

## 2022-10-04 RX ORDER — ALBUTEROL SULFATE 0.83 MG/ML
2.5 SOLUTION RESPIRATORY (INHALATION)
Status: DISCONTINUED | OUTPATIENT
Start: 2022-10-04 | End: 2022-10-04 | Stop reason: HOSPADM

## 2022-10-04 RX ORDER — NALOXONE HYDROCHLORIDE 0.4 MG/ML
0.2 INJECTION, SOLUTION INTRAMUSCULAR; INTRAVENOUS; SUBCUTANEOUS
Status: CANCELLED | OUTPATIENT
Start: 2022-10-04

## 2022-10-04 RX ORDER — MEPERIDINE HYDROCHLORIDE 50 MG/ML
25 INJECTION INTRAMUSCULAR; INTRAVENOUS; SUBCUTANEOUS EVERY 30 MIN PRN
Status: DISCONTINUED | OUTPATIENT
Start: 2022-10-04 | End: 2022-10-04 | Stop reason: HOSPADM

## 2022-10-04 RX ORDER — METHYLPREDNISOLONE SODIUM SUCCINATE 125 MG/2ML
125 INJECTION, POWDER, LYOPHILIZED, FOR SOLUTION INTRAMUSCULAR; INTRAVENOUS
Status: CANCELLED
Start: 2022-10-04

## 2022-10-04 RX ORDER — NALOXONE HYDROCHLORIDE 0.4 MG/ML
0.2 INJECTION, SOLUTION INTRAMUSCULAR; INTRAVENOUS; SUBCUTANEOUS
Status: DISCONTINUED | OUTPATIENT
Start: 2022-10-04 | End: 2022-10-04 | Stop reason: HOSPADM

## 2022-10-04 RX ORDER — MEPERIDINE HYDROCHLORIDE 50 MG/ML
25 INJECTION INTRAMUSCULAR; INTRAVENOUS; SUBCUTANEOUS EVERY 30 MIN PRN
Status: CANCELLED | OUTPATIENT
Start: 2022-10-04

## 2022-10-04 RX ORDER — ZOLEDRONIC ACID 5 MG/100ML
5 INJECTION, SOLUTION INTRAVENOUS ONCE
Status: CANCELLED
Start: 2022-10-04 | End: 2022-10-04

## 2022-10-04 RX ORDER — ALBUTEROL SULFATE 0.83 MG/ML
2.5 SOLUTION RESPIRATORY (INHALATION)
Status: CANCELLED | OUTPATIENT
Start: 2022-10-04

## 2022-10-04 RX ORDER — EPINEPHRINE 1 MG/ML
0.3 INJECTION, SOLUTION INTRAMUSCULAR; SUBCUTANEOUS EVERY 5 MIN PRN
Status: DISCONTINUED | OUTPATIENT
Start: 2022-10-04 | End: 2022-10-04 | Stop reason: HOSPADM

## 2022-10-04 RX ORDER — ALBUTEROL SULFATE 90 UG/1
1-2 AEROSOL, METERED RESPIRATORY (INHALATION)
Status: DISCONTINUED | OUTPATIENT
Start: 2022-10-04 | End: 2022-10-04 | Stop reason: HOSPADM

## 2022-10-04 RX ORDER — METHYLPREDNISOLONE SODIUM SUCCINATE 125 MG/2ML
125 INJECTION, POWDER, LYOPHILIZED, FOR SOLUTION INTRAMUSCULAR; INTRAVENOUS
Status: DISCONTINUED | OUTPATIENT
Start: 2022-10-04 | End: 2022-10-04 | Stop reason: HOSPADM

## 2022-10-04 RX ORDER — ALBUTEROL SULFATE 90 UG/1
1-2 AEROSOL, METERED RESPIRATORY (INHALATION)
Status: CANCELLED
Start: 2022-10-04

## 2022-10-04 RX ADMIN — ZOLEDRONIC ACID 5 MG: 0.05 INJECTION, SOLUTION INTRAVENOUS at 13:46

## 2022-10-04 ASSESSMENT — PAIN SCALES - GENERAL: PAINLEVEL: NO PAIN (0)

## 2022-10-04 NOTE — LETTER
10/4/2022         RE: Sarah Gilmore  [address] [place] [address] [address]  [facility] [address]        Dear Colleague,    Thank you for referring your patient, Sarah Gilmore, to the [facility] [place]. Please see a copy of my visit note below.    [facility] Hematology and Oncology Progress Note    Patient: Sarah Gilmore  MRN: [id number]  Date of Service: Oct 4, 2022         Reason for Visit    Chief Complaint   Patient presents with     Oncology Clinic Visit     DCIS ductal carcinoma IN SITU   6 month visit, labs, reclast.        Assessment and Plan    Cancer Staging  Infiltrating ductal carcinoma of right breast (H)  Staging form: Breast, AJCC 8th Edition  - Pathologic stage from 9/4/2019: Stage Unknown (pTX, pN1a(sn), cM0, G1, ER+, LA+, HER2-) - Signed by Ez Burroughs MD on 10/18/2021  - Clinical: No stage assigned - Unsigned      ECOG Performance    0 - Independent     Pain  Pain Score: No Pain (0)    #.  DCIS of the both breasts (upper outer quadrant, lower outer quadrant and central portion of the right breast, multifocal, grade 2; focus of DCIS approximately 2 mm grade 1 in the left breast)  #. pTx pN1a M0 invasive ductal carcinoma of the right breast, grade 1.  ER positive, LA positive, HER-2 negative.   Clinically well.  She tolerates anastrozole very well at that point and comfortable continuing it.     Duration of endocrine therapy is for minimum of 5 years and she is considering to extend to 10 years as long as she does not have major side effects.   Follow-up clinical exam in about 6 months.    #.  Right-sided low back pain/tightness, intermittent.   No reproducible pain or symptom at this point.  Continue to monitor clinically.  She is advised to call me with any concern or persistent pain.    #.  Osteoporosis    Reviewed labs.  She has normal calcium and normal renal function.     Recommended to continue calcium, vitamin D.  Reclast first dose today.   I  strongly encouraged her to continue with weightbearing exercises.     Plan to obtain DEXA scan in 2023.     Encounter Diagnoses:    Problem List Items Addressed This Visit        Oncology Diagnoses    DCIS (ductal carcinoma in situ)    Infiltrating ductal carcinoma of right breast (H) - Primary       Other    Age-related osteoporosis without current pathological fracture             CC: EMMY Maxwell CNP   ______________________________________________________________________________  Diagnosis  July 2019-    DCIS of both breasts (upper outer quadrant, lower outer quadrant and central portion of the right breast, multifocal, grade 2; focus of DCIS approximately 2 mm grade 1 in the left breast)   pTx pN1a M0 invasive ductal carcinoma of the right breast, grade 1.  Multifocal DCIS.  6 right axillary sentinel lymph nodes were examined 1 lymph node was positive for macro metastases and one lymph node was positive for isolated tumor cells.  ER positive (90%), NJ positive (80%), HER-2 negative.    LMP-at age 48 ( in 3/2018) after hysterectomy. Serology confirms menopause in 4/2020.    Treatment to date  7/30/2019- right breast mastectomy and right sentinel lymph node biopsy, left mastectomy.  10/9/2019-12/6/2019-radiation to the right breast and chest wall and regional lymph nodes area of 5040 cGy/28 fractions followed by additional 1000 cGy in 5 fractions to the primary tumor bed.  12/2019-initiated adjuvant tamoxifen, but stopped about 2 months after due to pain in both shins.  Restarted tamoxifen in Apirl 2020, but stopped again after a month and then switched to anastrozole in May 2020.     History of Present Illness    Oliver presented herself today.  She is overall doing okay.  She reported right-sided low back pain for about a month, describing it as tightness with stretching and lifting.  No awakening from pain.  No weakness.  No spasm.  She does not have any pain today.  She takes calcium and vitamin D  "regularly.  She also take anastrozole every day.    Review of systems  Apart from describing in HPI, the remainder of comprehensive ROS was negative.    Past History    Past Medical History:   Diagnosis Date     Dilated aortic root (H)      Foot fracture 2011     History of anesthesia complications     slow to wake up     PONV (postoperative nausea and vomiting)      Radial head fracture 2011     Seizures (H)     h/o seizure disorder in childhood, last seizure activity  \"4 yrs ago\"     Suspected sleep apnea     sleep study set up in Aug 2019       Past Surgical History:   Procedure Laterality Date     C/SECTION, LOW TRANSVERSE  07/10/1994    breech     HYSTERECTOMY, PAP NO LONGER INDICATED Bilateral      LAPAROSCOPIC HYSTERECTOMY TOTAL  2018    cervix benign     MAMMO STEREOTACTIC CORE BIOPSY RIGHT Right 2019     MAMMO STEREOTACTIC CORE BIOPSY RIGHT Right 2019     NM SENTINEL NODE INJECTION  2019     OTHER SURGICAL HISTORY      right arm surgery     AZ MASTECTOMY, MODIFIED RADICAL Bilateral 2019    Procedure: BILATERAL MASTECTOMY;  Surgeon: Mckenzie Colby DO;  [place] [facility]: General     AZ REPLACEMENT TISSUE EXPANDER W/PERMANENT IMPLANT Bilateral 2019    Procedure: BILATERAL IMPLANT RECONSTRUCTION TO BREASTS;  Surgeon: Carlitos Crum MD;  [place] [facility] [place]  Service: Pl[place]     RIGHT BREAST RECONSTRUCTION WITH LATISSIMUS DORSI FLAP    10/29/2021     [facility]  DELIVERY ONLY  1994         Physical Exam    /80 (BP Location: Left arm, Patient Position: Sitting)   Pulse 61   Temp 98.1  F (36.7  C) (Oral)   Wt 115.7 kg (255 lb)   LMP  (LMP Unknown)   SpO2 98%   BMI 37.66 kg/m      General: alert, awake, not in acute distress  HEENT: Head: Normal, normocephalic, atraumatic.  Eye: Normal external eye, conjunctiva, lids cornea, DAYSI.  Pharynx: Normal buccal mucosa. Normal pharynx.  Neck / Thyroid: Supple, no masses, " "nodes, nodules or enlargement.  Lymphatics: No abnormally enlarged lymph nodes.  Abdomen: abdomen is soft without significant tenderness, masses, organomegaly or guarding  Extremities: normal strength, tone, and muscle mass  Skin: normal. no rash or abnormalities  CNS: non focal.    Lab Results    Recent Results (from the past 168 hour(s))   Creatinine   Result Value Ref Range    Creatinine 0.78 0.60 - 1.10 mg/dL    GFR Estimate 90 >60 mL/min/1.73m2   Calcium   Result Value Ref Range    Calcium 9.4 8.5 - 10.5 mg/dL       Imaging    No results found.    30 minutes spent on the date of the encounter doing chart review, history and exam, documentation and further activities as noted above.      Signed by: Ez Burroughs MD    Oncology Rooming Note    October 4, 2022 1:26 PM   Sarah Gilmore is a 52 year old female who presents for:    Chief Complaint   Patient presents with     Oncology Clinic Visit     DCIS ductal carcinoma IN SITU   6 month visit, labs, reclast.      Initial Vitals: Wt 115.7 kg (255 lb)   LMP  (LMP Unknown)   BMI 37.66 kg/m   Estimated body mass index is 37.66 kg/m  as calculated from the following:    Height as of 7/26/22: 1.753 m (5' 9\").    Weight as of this encounter: 115.7 kg (255 lb). Body surface area is 2.37 meters squared.  No Pain (0) Comment: Data Unavailable   No LMP recorded (lmp unknown). Patient has had a hysterectomy.  Allergies reviewed: Yes  Medications reviewed: Yes    Medications: Medication refills not needed today.  Pharmacy name entered into [employer]: [facility] #[id number] [place] [id number] AT [address]    Clinical concerns: Occasional Rt lower back pain, \"more tiredness\", x one month    Kaila Akins RN                  Again, thank you for allowing me to participate in the care of your patient.        Sincerely,        Ez Burroughs MD    "

## 2022-10-04 NOTE — PROGRESS NOTES
[facility] Hematology and Oncology Progress Note    Patient: Sarah Gilmore  MRN: [id number]  Date of Service: Oct 4, 2022         Reason for Visit    Chief Complaint   Patient presents with     Oncology Clinic Visit     DCIS ductal carcinoma IN SITU   6 month visit, labs, reclast.        Assessment and Plan    Cancer Staging  Infiltrating ductal carcinoma of right breast (H)  Staging form: Breast, AJCC 8th Edition  - Pathologic stage from 9/4/2019: Stage Unknown (pTX, pN1a(sn), cM0, G1, ER+, DE+, HER2-) - Signed by Ez Burroughs MD on 10/18/2021  - Clinical: No stage assigned - Unsigned      ECOG Performance    0 - Independent     Pain  Pain Score: No Pain (0)    #.  DCIS of the both breasts (upper outer quadrant, lower outer quadrant and central portion of the right breast, multifocal, grade 2; focus of DCIS approximately 2 mm grade 1 in the left breast)  #. pTx pN1a M0 invasive ductal carcinoma of the right breast, grade 1.  ER positive, DE positive, HER-2 negative.   Clinically well.  She tolerates anastrozole very well at that point and comfortable continuing it.     Duration of endocrine therapy is for minimum of 5 years and she is considering to extend to 10 years as long as she does not have major side effects.   Follow-up clinical exam in about 6 months.    #.  Right-sided low back pain/tightness, intermittent.   No reproducible pain or symptom at this point.  Continue to monitor clinically.  She is advised to call me with any concern or persistent pain.    #.  Osteoporosis    Reviewed labs.  She has normal calcium and normal renal function.     Recommended to continue calcium, vitamin D.  Reclast first dose today.   I strongly encouraged her to continue with weightbearing exercises.     Plan to obtain DEXA scan in 2023.     Encounter Diagnoses:    Problem List Items Addressed This Visit        Oncology Diagnoses    DCIS (ductal carcinoma in situ)    Infiltrating ductal carcinoma of right breast  (H) - Primary       Other    Age-related osteoporosis without current pathological fracture             CC: EMMY Maxwell CNP   ______________________________________________________________________________  Diagnosis  July 2019-    DCIS of both breasts (upper outer quadrant, lower outer quadrant and central portion of the right breast, multifocal, grade 2; focus of DCIS approximately 2 mm grade 1 in the left breast)   pTx pN1a M0 invasive ductal carcinoma of the right breast, grade 1.  Multifocal DCIS.  6 right axillary sentinel lymph nodes were examined 1 lymph node was positive for macro metastases and one lymph node was positive for isolated tumor cells.  ER positive (90%), GA positive (80%), HER-2 negative.    LMP-at age 48 ( in 3/2018) after hysterectomy. Serology confirms menopause in 4/2020.    Treatment to date  7/30/2019- right breast mastectomy and right sentinel lymph node biopsy, left mastectomy.  10/9/2019-12/6/2019-radiation to the right breast and chest wall and regional lymph nodes area of 5040 cGy/28 fractions followed by additional 1000 cGy in 5 fractions to the primary tumor bed.  12/2019-initiated adjuvant tamoxifen, but stopped about 2 months after due to pain in both shins.  Restarted tamoxifen in Apirl 2020, but stopped again after a month and then switched to anastrozole in May 2020.     History of Present Illness    Oliver presented herself today.  She is overall doing okay.  She reported right-sided low back pain for about a month, describing it as tightness with stretching and lifting.  No awakening from pain.  No weakness.  No spasm.  She does not have any pain today.  She takes calcium and vitamin D regularly.  She also take anastrozole every day.    Review of systems  Apart from describing in HPI, the remainder of comprehensive ROS was negative.    Past History    Past Medical History:   Diagnosis Date     Dilated aortic root (H)      Foot fracture 06/03/2011     History of  "anesthesia complications     slow to wake up     PONV (postoperative nausea and vomiting)      Radial head fracture 2011     Seizures (H)     h/o seizure disorder in childhood, last seizure activity  \"4 yrs ago\"     Suspected sleep apnea     sleep study set up in Aug 2019       Past Surgical History:   Procedure Laterality Date     C/SECTION, LOW TRANSVERSE  07/10/1994    breech     HYSTERECTOMY, PAP NO LONGER INDICATED Bilateral      LAPAROSCOPIC HYSTERECTOMY TOTAL  2018    cervix benign     MAMMO STEREOTACTIC CORE BIOPSY RIGHT Right 2019     MAMMO STEREOTACTIC CORE BIOPSY RIGHT Right 2019     NM SENTINEL NODE INJECTION  2019     OTHER SURGICAL HISTORY      right arm surgery     GA MASTECTOMY, MODIFIED RADICAL Bilateral 2019    Procedure: BILATERAL MASTECTOMY;  Surgeon: Mckenzie Colby DO;  [place] [facility] [place];  Service: General     GA REPLACEMENT TISSUE EXPANDER W/PERMANENT IMPLANT Bilateral 2019    Procedure: BILATERAL IMPLANT RECONSTRUCTION TO BREASTS;  Surgeon: Carlitos Crum MD;  Location: [facility] [place];  Service: Plastics     RIGHT BREAST RECONSTRUCTION WITH LATISSIMUS DORSI FLAP    10/29/2021     [facility]  DELIVERY ONLY  1994         Physical Exam    /80 (BP Location: Left arm, Patient Position: Sitting)   Pulse 61   Temp 98.1  F (36.7  C) (Oral)   Wt 115.7 kg (255 lb)   LMP  (LMP Unknown)   SpO2 98%   BMI 37.66 kg/m      General: alert, awake, not in acute distress  HEENT: Head: Normal, normocephalic, atraumatic.  Eye: Normal external eye, conjunctiva, lids cornea, DAYSI.  Pharynx: Normal buccal mucosa. Normal pharynx.  Neck / Thyroid: Supple, no masses, nodes, nodules or enlargement.  Lymphatics: No abnormally enlarged lymph nodes.  Abdomen: abdomen is soft without significant tenderness, masses, organomegaly or guarding  Extremities: normal strength, tone, and muscle mass  Skin: normal. no rash or abnormalities  CNS: non " focal.    Lab Results    Recent Results (from the past 168 hour(s))   Creatinine   Result Value Ref Range    Creatinine 0.78 0.60 - 1.10 mg/dL    GFR Estimate 90 >60 mL/min/1.73m2   Calcium   Result Value Ref Range    Calcium 9.4 8.5 - 10.5 mg/dL       Imaging    No results found.    30 minutes spent on the date of the encounter doing chart review, history and exam, documentation and further activities as noted above.      Signed by: Ez Burroughs MD

## 2022-10-04 NOTE — PROGRESS NOTES
Infusion Nursing Note:  Sarah Gilmore presents today for reclast.    Patient seen by provider today: Yes: Dr Lopez   present during visit today: Not Applicable.    Note: N/A.    Intravenous Access:  Peripheral IV placed.    Treatment Conditions:  Results reviewed, labs MET treatment parameters, ok to proceed with treatment.    Post Infusion Assessment:  Patient tolerated infusion without incident.     Discharge Plan:   Patient and/or family verbalized understanding of discharge instructions and all questions answered.  Printout information on reclast given to pt.    Patsy Vidales RN

## 2022-10-04 NOTE — PROGRESS NOTES
"Oncology Rooming Note    October 4, 2022 1:26 PM   Sarah Gilmore is a 52 year old female who presents for:    Chief Complaint   Patient presents with     Oncology Clinic Visit     DCIS ductal carcinoma IN SITU   6 month visit, labs, reclast.      Initial Vitals: Wt 115.7 kg (255 lb)   LMP  (LMP Unknown)   BMI 37.66 kg/m   Estimated body mass index is 37.66 kg/m  as calculated from the following:    Height as of 7/26/22: 1.753 m (5' 9\").    Weight as of this encounter: 115.7 kg (255 lb). Body surface area is 2.37 meters squared.  No Pain (0) Comment: Data Unavailable   No LMP recorded (lmp unknown). Patient has had a hysterectomy.  Allergies reviewed: Yes  Medications reviewed: Yes    Medications: Medication refills not needed today.  Pharmacy name entered into [employer]: [id number] [place] [id number] AT [address]    Clinical concerns: Occasional Rt lower back pain, \"more tiredness\", x one month    Kaila Akins RN              "

## 2022-11-19 ENCOUNTER — HEALTH MAINTENANCE LETTER (OUTPATIENT)
Age: 52
End: 2022-11-19

## 2022-11-24 ENCOUNTER — HOSPITAL ENCOUNTER (EMERGENCY)
Facility: CLINIC | Age: 52
Discharge: HOME | End: 2022-11-24
Attending: EMERGENCY MEDICINE | Admitting: EMERGENCY MEDICINE
Payer: COMMERCIAL

## 2022-11-24 VITALS
RESPIRATION RATE: 20 BRPM | HEART RATE: 64 BPM | DIASTOLIC BLOOD PRESSURE: 73 MMHG | TEMPERATURE: 98.6 F | SYSTOLIC BLOOD PRESSURE: 126 MMHG | OXYGEN SATURATION: 99 %

## 2022-11-24 DIAGNOSIS — J10.1 INFLUENZA A: ICD-10-CM

## 2022-11-24 LAB
FLUAV RNA SPEC QL NAA+PROBE: POSITIVE
FLUBV RNA RESP QL NAA+PROBE: NEGATIVE
RSV RNA SPEC NAA+PROBE: NEGATIVE
SARS-COV-2 RNA RESP QL NAA+PROBE: NEGATIVE

## 2022-11-24 PROCEDURE — 250N000009 HC RX 250: Performed by: EMERGENCY MEDICINE

## 2022-11-24 PROCEDURE — 87637 SARSCOV2&INF A&B&RSV AMP PRB: CPT | Performed by: PHYSICIAN ASSISTANT

## 2022-11-24 PROCEDURE — 99283 EMERGENCY DEPT VISIT LOW MDM: CPT | Mod: CS

## 2022-11-24 PROCEDURE — C9803 HOPD COVID-19 SPEC COLLECT: HCPCS

## 2022-11-24 PROCEDURE — 250N000013 HC RX MED GY IP 250 OP 250 PS 637: Performed by: EMERGENCY MEDICINE

## 2022-11-24 RX ORDER — OXYMETAZOLINE HYDROCHLORIDE 0.05 G/100ML
2 SPRAY NASAL ONCE
Status: COMPLETED | OUTPATIENT
Start: 2022-11-24 | End: 2022-11-24

## 2022-11-24 RX ORDER — BENZONATATE 100 MG/1
100 CAPSULE ORAL ONCE
Status: COMPLETED | OUTPATIENT
Start: 2022-11-24 | End: 2022-11-24

## 2022-11-24 RX ORDER — IBUPROFEN 600 MG/1
600 TABLET, FILM COATED ORAL ONCE
Status: COMPLETED | OUTPATIENT
Start: 2022-11-24 | End: 2022-11-24

## 2022-11-24 RX ADMIN — IBUPROFEN 600 MG: 600 TABLET ORAL at 17:49

## 2022-11-24 RX ADMIN — OXYMETAZOLINE HYDROCHLORIDE 2 SPRAY: 0.05 SPRAY NASAL at 17:48

## 2022-11-24 RX ADMIN — BENZONATATE 100 MG: 100 CAPSULE ORAL at 17:49

## 2022-11-24 ASSESSMENT — ENCOUNTER SYMPTOMS
HEADACHES: 1
DIARRHEA: 0
COUGH: 1
RHINORRHEA: 1
VOMITING: 0

## 2022-11-24 NOTE — ED TRIAGE NOTES
Pt presents with cough, fever and body aches for one week. ABCs intact.      Triage Assessment     Row Name 11/24/22 0964       Triage Assessment (Adult)    Airway WDL WDL       Respiratory WDL    Respiratory WDL X;cough;rhythm/pattern    Rhythm/Pattern, Respiratory shortness of breath    Cough Frequency frequent       Skin Circulation/Temperature WDL    Skin Circulation/Temperature WDL WDL       Cardiac WDL    Cardiac WDL WDL       Peripheral/Neurovascular WDL    Peripheral Neurovascular WDL WDL       Cognitive/Neuro/Behavioral WDL    Cognitive/Neuro/Behavioral WDL WDL               1-2 cups/cans per day

## 2022-11-24 NOTE — ED PROVIDER NOTES
EMERGENCY DEPARTMENT ENCOUNTER      NAME: Sarah Gilmore  AGE: 52 year old female  YOB: 1970  MRN: [id number]  EVALUATION DATE & TIME: 2022  5:10 PM    PCP: No Ref-Primary, Physician    ED PROVIDER: Malia Zapien DO      Chief Complaint   Patient presents with     Cough     Fever         FINAL IMPRESSION:  1. Influenza A          ED COURSE & MEDICAL DECISION MAKIN-year-old female presented to the ED for evaluation of a cough, chest discomfort, congestion, laryngitis, and diffuse body aches that have been ongoing for approximately 1 week.  Upon arrival to the ED the patient was hemodynamically stable.  She did not appear to have any respiratory difficulty and she was not acutely ill-appearing at the time for initial evaluation.  The patient's physical exam was unremarkable and her lungs were clear to auscultation bilaterally.  Follow initial evaluation the patient was given a dose of ibuprofen, Tessalon Perles, and oxymetazoline nasal spray.    Testing for influenza A was positive.     The patient was reevaluated and informed of the positive influenza A results.  The patient was informed that this is the likely explanation for all of her symptoms.  The patient reported minimal change in her symptoms with the nasal spray, Tessalon Perles, or ibuprofen.   Despite this the patient felt comfortable returning home.  She was instructed to rest and drink plenty of fluids over the next few days.  She was sent home with Tylenol with codeine to take as needed for any worsening pain and for cough suppression.  The patient was instructed to follow-up with her primary care provider for reevaluation or to return back to ED sooner for any worsening respiratory difficulty or any other new or concerning symptoms    Pertinent Labs & Imaging studies reviewed. (See chart for details)  5:38 PM I met with the patient to gather history and to perform my initial exam. We discussed plans for the ED course, including  diagnostic testing and treatment.   6:20 PM I rechecked and updated the patient.  6:37 PM I discharged the patient.    At the conclusion of the encounter I discussed the results of all of the tests and the disposition. The questions were answered. The patient or family acknowledged understanding and was agreeable with the care plan.       PPE worn: n95 mask, goggles    MDM      MEDICATIONS GIVEN IN THE EMERGENCY:  Medications   ibuprofen (ADVIL/MOTRIN) tablet 600 mg (600 mg Oral Given 11/24/22 1749)   benzonatate (TESSALON) capsule 100 mg (100 mg Oral Given 11/24/22 1749)   oxymetazoline (AFRIN) 0.05 % spray 2 spray (2 sprays Nasal Given 11/24/22 1748)       NEW PRESCRIPTIONS STARTED AT TODAY'S ER VISIT  Discharge Medication List as of 11/24/2022  6:42 PM      START taking these medications    Details   acetaminophen-codeine (TYLENOL #3) 300-30 MG tablet Take 1 tablet by mouth every 6 hours as needed for pain (or cough), Disp-10 tablet, R-0, Local Print                =================================================================    HPI    Patient information was obtained from: Patient    Use of : N/A       Sarah Gilmore is a 52 year old female with a pertinent history of class 2 severe obesity and dilated aortic root who presents to this ED for evaluation of cough and fever.     The patient has been having a runny nose and congestion with pharyngitis. She has a worsening cough with discomfort in the chest when coughing. She also reports body aches and sinus headache.     She denies vomiting and diarrhea. She has been using NyQuil and DayQuil, but they have not helped.     REVIEW OF SYSTEMS   Review of Systems   Constitutional:        Positive for body aches   HENT: Positive for congestion and rhinorrhea.    Respiratory: Positive for cough.         Positive for chest discomfort when coughing   Gastrointestinal: Negative for diarrhea and vomiting.   Neurological: Positive for headaches (from sinuses).  "  All other systems reviewed and are negative.       PAST MEDICAL HISTORY:  Past Medical History:   Diagnosis Date     Dilated aortic root (H)      Foot fracture 2011     History of anesthesia complications     slow to wake up     PONV (postoperative nausea and vomiting)      Radial head fracture 2011     Seizures (H)     h/o seizure disorder in childhood, last seizure activity  \"4 yrs ago\"     Suspected sleep apnea     sleep study set up in Aug 2019       PAST SURGICAL HISTORY:  Past Surgical History:   Procedure Laterality Date     C/SECTION, LOW TRANSVERSE  07/10/1994    breech     HYSTERECTOMY, PAP NO LONGER INDICATED Bilateral      LAPAROSCOPIC HYSTERECTOMY TOTAL  2018    cervix benign     MAMMO STEREOTACTIC CORE BIOPSY RIGHT Right 2019     MAMMO STEREOTACTIC CORE BIOPSY RIGHT Right 2019     NM SENTINEL NODE INJECTION  2019     OTHER SURGICAL HISTORY      right arm surgery     WV MASTECTOMY, MODIFIED RADICAL Bilateral 2019    Procedure: BILATERAL MASTECTOMY;  Surgeon: Mckenzie Colby DO;  [place] [facility] [place]  Service: General     WV REPLACEMENT TISSUE EXPANDER W/PERMANENT IMPLANT Bilateral 2019    Procedure: BILATERAL IMPLANT RECONSTRUCTION TO BREASTS;  Surgeon: Carlitos Crum MD;  [place] [facility] [place];  Service: Plastics     RIGHT BREAST RECONSTRUCTION WITH LATISSIMUS DORSI FLAP    10/29/2021     [facility]  DELIVERY ONLY  1994           CURRENT MEDICATIONS:    acetaminophen-codeine (TYLENOL #3) 300-30 MG tablet  anastrozole (ARIMIDEX) 1 MG tablet  EPI E-Z PEN JOELLEN 1:1000  IJ  ergocalciferol (ERGOCALCIFEROL) 71855 UNIT capsule  FERROUS SULFATE  MG OR TBCR  fish oil-omega-3 fatty acids 1000 MG capsule  gabapentin (NEURONTIN) 100 MG capsule  Multiple Vitamin (MULTI-VITAMINS) TABS  OSCO MAGNESIUM CITRATE OR  oxyCODONE (ROXICODONE) 5 MG tablet  Turmeric 400 MG CAPS        ALLERGIES:  Allergies   Allergen Reactions     Bees Anaphylaxis "     Noted in 1/17/09 ER  Trouble breathing, rash     Contrast Dye Anaphylaxis     airway problems     Seafood Anaphylaxis       FAMILY HISTORY:  Family History   Problem Relation Age of Onset     Psychotic Disorder Mother      Sleep Apnea Mother      Mental Illness Mother      C.A.D. Father      Diabetes Father      Sleep Apnea Father      No Known Problems Sister      No Known Problems Brother      Psychotic Disorder Maternal Grandfather         commit suicide     Suicidality Maternal Grandfather      Breast Cancer Paternal Grandmother 50     Prostate Cancer Paternal Grandfather      Testicular cancer Paternal Grandfather      Asperger's syndrome Son      Mental Illness Son      Leukemia Paternal Uncle        SOCIAL HISTORY:   Social History     Socioeconomic History     Marital status: Single     Spouse name: None     Number of children: 3     Years of education: 12+     Highest education level: None   Occupational History     Occupation: pt care tech, [email or web address]   Tobacco Use     Smoking status: Never     Smokeless tobacco: Never   Substance and Sexual Activity     Alcohol use: No     Drug use: No     Sexual activity: Yes     Partners: Male     Birth control/protection: Condom   Other Topics Concern     Blood Transfusions No     Caffeine Concern Yes     Hobby Hazards No     Sleep Concern No     Stress Concern No     Weight Concern No     Special Diet No     Back Care No     Exercise Yes     Seat Belt Yes     Self-Exams Yes       VITALS:  /73   Pulse 64   Temp 98.6  F (37  C) (Temporal)   Resp 20   LMP  (LMP Unknown)   SpO2 99%     PHYSICAL EXAM    General presentation: Alert, Vital signs reviewed. NAD  HENT: E&T inspection is normal. Oropharynx is moist and clear. Audible nasal congestion.   Eye: Pupils are equal and reactive to light. EOMI  Neck: The neck is supple, with full ROM, with no evidence of meningismus.  Pulmonary: Currently in no acute respiratory distress. Normal, non labored respirations,  the lung sounds are normal with good equal air movement. Clear to auscultation bilaterally.   Circulatory: Regular rate and rhythm. Peripheral pulses are strong and equal. No murmurs, rubs, or gallops.   Abdominal: The abdomen is soft. Nontender. No rigidity, guarding, or rebound. Bowel sounds normal.   Neurologic: Alert, oriented to person, place, and time. No motor deficit. No sensory deficit. Cranial nerves II through XII are intact.  Musculoskeletal: No extremity tenderness. Full range of motion in all extremities. No extremity edema.   Skin: Skin color is normal. No rash. Warm. Dry to touch.      LAB:  All pertinent labs reviewed and interpreted.  Results for orders placed or performed during the hospital encounter of 11/24/22   Symptomatic; Unknown Influenza A/B & SARS-CoV2 (COVID-19) Virus PCR Multiplex Nasopharyngeal    Specimen: Nasopharyngeal; Swab   Result Value Ref Range    Influenza A PCR Positive (A) Negative    Influenza B PCR Negative Negative    RSV PCR Negative Negative    SARS CoV2 PCR Negative Negative         I, Parminder Mcgovern, am serving as a scribe to document services personally performed by Malia Zapien DO based on my observation and the provider's statements to me. I, Malia Zapien, attest that Parminder Mcgovern is acting in a scribe capacity, has observed my performance of the services and has documented them in accordance with my direction.    Malia Zapien DO  Emergency Medicine  [facility] EMERGENCY ROOM  [address] [address] [phone]     Malia Zapien DO  11/24/22 1911

## 2022-11-25 NOTE — DISCHARGE INSTRUCTIONS
Your symptoms are due to an influenza A infection.    Continue to rest and to drink plenty of fluids over the next few days.  You can use the prescribed Tylenol with codeine as needed for any further pain or for cough suppression.  Use caution when taking the Tylenol with codeine and either DayQuil or NyQuil because the also contain Tylenol.  You could also use over-the-counter ibuprofen as needed for any further pain or body aches.      Follow-up with your primary care physician for reevaluation or return back to ED sooner for any worsening respiratory difficulty or any other new or concerning symptoms.

## 2022-11-30 ENCOUNTER — TELEPHONE (OUTPATIENT)
Dept: ENDOCRINOLOGY | Facility: CLINIC | Age: 52
End: 2022-11-30

## 2022-12-01 ENCOUNTER — PRE VISIT (OUTPATIENT)
Dept: ENDOCRINOLOGY | Facility: CLINIC | Age: 52
End: 2022-12-01

## 2022-12-09 DIAGNOSIS — G47.33 OBSTRUCTIVE SLEEP APNEA (ADULT) (PEDIATRIC): Primary | ICD-10-CM

## 2022-12-15 ENCOUNTER — TELEPHONE (OUTPATIENT)
Dept: ONCOLOGY | Facility: CLINIC | Age: 52
End: 2022-12-15

## 2022-12-15 NOTE — TELEPHONE ENCOUNTER
Patient calls with concerns regarding body odor. She reports that she has noticed a constant bad odor for about a month. She denies sweating. Patient reports that she had this on and off during her treatment, but now currently it is not going away. She has tried different remedies and deodorants, nothing she is doing is helping. Patient has infiltrating ductal carcinoma of the right breast. She is currently on Anastrozole that was started May 2020. She denies recently starting any new medications. Patient was seen in clinic with Dr. Burroughs 10/4/22, has follow up scheduled 4/4/23. Patient is wondering if this is related to her cancer or medication.    Message will be sent to Dr. Burroughs to review and advise.    Alessandra Mauricio RN

## 2022-12-16 NOTE — TELEPHONE ENCOUNTER
Patient returned call and given recommendation from Dr. Burroughs. She verbalizes understanding and will follow up with PCP.    Alessandra Mauricio RN

## 2022-12-16 NOTE — TELEPHONE ENCOUNTER
Message left for patient to return call to triage nurse. Per Dr. Burroughs she does no feel that symptom is related to her diagnosis or Anastrozole. Recommends that patient see primary care for further evaluation.    Alessandra Mauricio RN

## 2023-01-20 ENCOUNTER — TELEPHONE (OUTPATIENT)
Dept: OTHER | Facility: CLINIC | Age: 53
End: 2023-01-20
Payer: COMMERCIAL

## 2023-01-20 NOTE — TELEPHONE ENCOUNTER
[facility]    Who is the name of the provider?:       What is the location you see this provider at/preferred location?:    Person calling: Sarah Gilmore  Phone number:  [phone]  Nurse call back needed:  Not Applicable     Reason for call:   Patient is a self referral for lipedema.  She would like to see Dr. Winkler.    She has a diagnosis of lipedemaa.  She was previous patient of Dr. Hook.    Please review and advise what to schedule.    Pharmacy location:     Outside Imaging: NO   Can we leave a detailed message on this number?  YES

## 2023-01-20 NOTE — TELEPHONE ENCOUNTER
Future Appointments   Date Time Provider Department Center   1/26/2023  4:00 PM Ade Winkler MD [facility]

## 2023-01-20 NOTE — TELEPHONE ENCOUNTER
Pt needs to be scheduled for consult with Dr. Winkler.  Will route to scheduling to coordinate an appointment at next available.    Appt note: Self referred for lymphedema; previously seen at [facility].    TANA SuárezN, RN-[facility] [place]

## 2023-01-26 ENCOUNTER — OFFICE VISIT (OUTPATIENT)
Dept: OTHER | Facility: CLINIC | Age: 53
End: 2023-01-26
Attending: INTERNAL MEDICINE
Payer: COMMERCIAL

## 2023-01-26 VITALS
BODY MASS INDEX: 37.8 KG/M2 | HEART RATE: 72 BPM | WEIGHT: 256 LBS | SYSTOLIC BLOOD PRESSURE: 129 MMHG | DIASTOLIC BLOOD PRESSURE: 84 MMHG

## 2023-01-26 DIAGNOSIS — G47.33 OSA (OBSTRUCTIVE SLEEP APNEA): ICD-10-CM

## 2023-01-26 DIAGNOSIS — I97.2 POST-MASTECTOMY LYMPHEDEMA SYNDROME: ICD-10-CM

## 2023-01-26 DIAGNOSIS — R60.9 LIPEDEMA: Primary | ICD-10-CM

## 2023-01-26 DIAGNOSIS — I87.303 VENOUS HYPERTENSION OF LOWER EXTREMITY, BILATERAL: ICD-10-CM

## 2023-01-26 DIAGNOSIS — E66.01 CLASS 2 SEVERE OBESITY WITH SERIOUS COMORBIDITY AND BODY MASS INDEX (BMI) OF 37.0 TO 37.9 IN ADULT, UNSPECIFIED OBESITY TYPE (H): ICD-10-CM

## 2023-01-26 DIAGNOSIS — Z85.3 HX: BREAST CANCER: ICD-10-CM

## 2023-01-26 DIAGNOSIS — E66.812 CLASS 2 SEVERE OBESITY WITH SERIOUS COMORBIDITY AND BODY MASS INDEX (BMI) OF 37.0 TO 37.9 IN ADULT, UNSPECIFIED OBESITY TYPE (H): ICD-10-CM

## 2023-01-26 PROCEDURE — G0463 HOSPITAL OUTPT CLINIC VISIT: HCPCS

## 2023-01-26 PROCEDURE — 99205 OFFICE O/P NEW HI 60 MIN: CPT | Performed by: INTERNAL MEDICINE

## 2023-01-26 NOTE — PROGRESS NOTES
[facility] INITIAL VASCULAR MEDICINE CONSULT    ( New patient visit)     PRIMARY HEALTH CARE PROVIDER:  No primary MD currently       REFERRING HEALTH CARE PROVIDER; Self Referred     REASON FOR CONSULT: Evaluation and management of known history of lipedema with secondary lymphedema and also varicose veins with venous insufficiency and venous hypertension in a very pleasant 52-year-old female underwent liposuction of lower extremities in July and August 2022 at [facility] by Dr. Preciado.      HPI: Sarah Gilmore is a 52 year old very pleasant female obese BMI greater than 37, obstructive sleep apnea uses CPAP machine, dilated aortic root currently following up with the cardiologist and undergoing surveillance imaging studies, history of breast cancer underwent bilateral mastectomy followed by breast reconstruction developed secondary lymphedema of upper extremities has followed many years by Dr. Carolina Hook who retired and she is here for further evaluation and management.  Patient underwent bilateral leg liposuction in July and August by Dr. Preciado who also did her breast reconstruction as well after mastectomy.  4 L were taken out each time from the legs.  She is vigorously doing pool exercises which is helping.  She is using compression stockings requesting new prescription.  She also using pump therapy flexi  touch.  Last year she was seen and evaluated by edema therapist.  She has no history for DVT and no previous venous intervention  She works as a dialysis tech.    She is new to me reviewed available extensive records in the epic and updated chart      PAST MEDICAL HISTORY  Past Medical History:   Diagnosis Date     Dilated aortic root (H)      Foot fracture 06/03/2011     History of anesthesia complications     slow to wake up     PONV (postoperative nausea and vomiting)      Radial head fracture 06/03/2011     Seizures (H)     h/o seizure disorder in  "childhood, last seizure activity  \"4 yrs ago\"     Suspected sleep apnea     sleep study set up in Aug 2019       CURRENT MEDICATIONS  anastrozole (ARIMIDEX) 1 MG tablet, TAKE 1 TABLET BY MOUTH EVERY DAY  EPI E-Z PEN JOELLEN 1:1000  IJ, 1 TIME ONLY  PRN bee sting  ergocalciferol (ERGOCALCIFEROL) 36439 UNIT capsule, Take 1 capsule by mouth every 14 days.  FERROUS SULFATE  MG OR TBCR, 1 TABLET BID  after food withm orange juice  fish oil-omega-3 fatty acids 1000 MG capsule,   Multiple Vitamin (MULTI-VITAMINS) TABS, Take 1 tablet by mouth  OSCO MAGNESIUM CITRATE OR,   Turmeric 400 MG CAPS,     No current facility-administered medications on file prior to visit.      PAST SURGICAL HISTORY:  Past Surgical History:   Procedure Laterality Date     C/SECTION, LOW TRANSVERSE  07/10/1994    breech     HYSTERECTOMY, PAP NO LONGER INDICATED Bilateral      LAPAROSCOPIC HYSTERECTOMY TOTAL  2018    cervix benign     MAMMO STEREOTACTIC CORE BIOPSY RIGHT Right 2019     MAMMO STEREOTACTIC CORE BIOPSY RIGHT Right 2019     NM SENTINEL NODE INJECTION  2019     OTHER SURGICAL HISTORY      right arm surgery     NC MASTECTOMY, MODIFIED RADICAL Bilateral 2019    Procedure: BILATERAL MASTECTOMY;  Surgeon: Mckenzie Colby DO;  Location: [facility];  Service: General     NC REPLACEMENT TISSUE EXPANDER W/PERMANENT IMPLANT Bilateral 2019    Procedure: BILATERAL IMPLANT RECONSTRUCTION TO BREASTS;  Surgeon: Carlitos Crum MD;  Location: [facility];  Service: Plastics     RIGHT BREAST RECONSTRUCTION WITH LATISSIMUS DORSI FLAP    10/29/2021     [facility]  DELIVERY ONLY  1994       ALLERGIES     Allergies   Allergen Reactions     Bees Anaphylaxis     Noted in 09 ER  Trouble breathing, rash     Contrast Dye Anaphylaxis     airway problems     Seafood Anaphylaxis       FAMILY HISTORY  Family History   Problem Relation Age of Onset     Psychotic Disorder Mother      Sleep Apnea Mother  "     Mental Illness Mother      C.A.D. Father      Diabetes Father      Sleep Apnea Father      No Known Problems Sister      No Known Problems Brother      Psychotic Disorder Maternal Grandfather         commit suicide     Suicidality Maternal Grandfather      Breast Cancer Paternal Grandmother 50     Prostate Cancer Paternal Grandfather      Testicular cancer Paternal Grandfather      Asperger's syndrome Son      Mental Illness Son      Leukemia Paternal Uncle        VASCULAR FAMILY HISTORY  1st order relative with atherosclerotic PAD: No  1st order relative with AAA: No  Family history of Familial Hyperlipidemia No  Family History of Hypercoagulable state:No    VASCULAR RISK FACTORS  1. Diabetes:No   2. Smoking: has never smoked.  3. HTN: normotensive  4.Hyperlipidemia: No      SOCIAL HISTORY  Social History     Socioeconomic History     Marital status: Single     Spouse name: Not on file     Number of children: 3     Years of education: 12+     Highest education level: Not on file   Occupational History     Occupation: pt care tech, [employer]   Tobacco Use     Smoking status: Never     Smokeless tobacco: Never   Substance and Sexual Activity     Alcohol use: No     Drug use: No     Sexual activity: Yes     Partners: Male     Birth control/protection: Condom   Other Topics Concern      Service Not Asked     Blood Transfusions No     Caffeine Concern Yes     Occupational Exposure Not Asked     Hobby Hazards No     Sleep Concern No     Stress Concern No     Weight Concern No     Special Diet No     Back Care No     Exercise Yes     Bike Helmet Not Asked     Seat Belt Yes     Self-Exams Yes   Social History Narrative     Not on file     Social Determinants of Health     Financial Resource Strain: Not on file   Food Insecurity: Not on file   Transportation Needs: Not on file   Physical Activity: Not on file   Stress: Not on file   Social Connections: Not on file   Intimate Partner Violence: Not on file    Housing Stability: Not on file       ROS:   General: No change in weight, sleep or appetite.  Normal energy.  No fever or chills  Eyes: Negative for vision changes or eye problems  ENT: No problems with ears, nose or throat.  No difficulty swallowing.  Resp: No coughing, wheezing or shortness of breath  CV: No chest pains or palpitations  GI: No nausea, vomiting,  heartburn, abdominal pain, diarrhea, constipation or change in bowel habits  : No urinary frequency or dysuria, bladder or kidney problems  Musculoskeletal: No significant muscle or joint pains  Neurologic: No headaches, numbness, tingling, weakness, problems with balance or coordination  Psychiatric: No problems with anxiety, depression or mental health  Heme/immune/allergy: No history of bleeding or clotting problems or anemia.  No allergies or immune system problems  Endocrine: No history of thyroid disease, diabetes or other endocrine disorders  Skin: No rashes,worrisome lesions or skin problems  Vascular: Known history of bilateral lower extremity lipedema and secondary lymphedema and venous insufficiency with venous hypertension  Underwent liposuction of both legs in July and August at [facility] by Dr. Preciado  History of breast cancer bilateral mastectomy and developed lymphedema of upper extremities    EXAM:  /84 (BP Location: Left arm, Patient Position: Chair, Cuff Size: Adult Large)   Pulse 72   Wt 256 lb (116.1 kg)   LMP  (LMP Unknown)   BMI 37.80 kg/m    In general, the patient is a pleasant female in no apparent distress.    HEENT: NC/AT.  PERRLA.  EOMI.  Sclerae white, not injected.  Nares clear.  Pharynx without erythema or exudate.  Dentition intact.    Neck: No adenopathy.  No thyromegaly. Carotids +2/2 bilaterally without bruits.  No jugular venous distension.   Heart: RRR. Normal S1, S2 splits physiologically. No murmur, rub, click, or gallop. The PMI is in the 5th ICS in the midclavicular line. There is no  heave.    Lungs: CTA.  No ronchi, wheezes, rales.  No dullness to percussion.   Abdomen: Soft, nontender, nondistended. No organomegaly. No AAA.  No bruits.   Extremities: Vascular:  She has a classical features of lipedema stage II-III in both lower extremities and secondary lymphedema  Bilateral lower extremity varicose veins  No foot ulcers or leg ulcers  Good palpable peripheral pulses          Labs:  LIPID RESULTS:  Lab Results   Component Value Date    CHOL 169 08/12/2021    HDL 46 (L) 08/12/2021     08/12/2021    TRIG 91 08/12/2021       LIVER ENZYME RESULTS:  Lab Results   Component Value Date    AST 12 07/18/2019    AST 16 06/07/2011    ALT 12 07/18/2019    ALT 20 06/07/2011       CBC RESULTS:  Lab Results   Component Value Date    WBC 4.9 07/26/2022    WBC 5.7 06/07/2011    RBC 4.16 07/26/2022    RBC 4.05 06/07/2011    HGB 11.7 07/26/2022    HGB 10.2 (L) 06/07/2011    HCT 35.4 07/26/2022    HCT 32.1 (L) 06/07/2011    MCV 85 07/26/2022    MCV 79 06/07/2011    MCH 28.1 07/26/2022    MCH 25.2 (L) 06/07/2011    MCHC 33.1 07/26/2022    MCHC 31.8 06/07/2011    RDW 15.5 (H) 07/26/2022    RDW 14.9 06/07/2011     07/26/2022     06/07/2011       BMP RESULTS:  Lab Results   Component Value Date     02/28/2022     06/07/2011    POTASSIUM 4.2 02/28/2022    POTASSIUM 4.3 06/07/2011    CHLORIDE 106 02/28/2022    CHLORIDE 109 06/07/2011    CO2 23 02/28/2022    CO2 26 06/07/2011    ANIONGAP 12 02/28/2022    ANIONGAP 8 06/07/2011    GLC 83 02/28/2022    GLC 76 06/07/2011    BUN 13 02/28/2022    BUN 12 06/07/2011    CR 0.78 10/04/2022    CR 0.83 06/07/2011    GFRESTIMATED 90 10/04/2022    GFRESTIMATED >60 06/01/2021    GFRESTIMATED 75 06/07/2011    GFRESTBLACK >60 06/01/2021    GFRESTBLACK >90 06/07/2011    SURAJ 9.4 10/04/2022    SURAJ 8.4 (L) 06/07/2011        THYROID RESULTS:  Lab Results   Component Value Date    TSH 3.88 08/29/2020    TSH 2.25 03/11/2008       Procedures:       Courtesy from  fatdisorders.org    Assessment and Plan:     1. Lipedema stage 2-3 and secondary lymphedema  (Status post liposuction of both lower extremities in July and August 2022 at [facility] by Dr. Preciado)    2. Venous hypertension of lower extremity, bilateral    3. Post-mastectomy lymphedema syndrome    4. HX: breast cancer Bilateral mastectomy LN dissection and followed by breast reconstruction     5. Class 2 severe obesity with serious comorbidity and body mass index (BMI) of 37.0 to 37.9 in adult, unspecified obesity type (H)    6. ONIEL (obstructive sleep apnea)    This is a very pleasant 52-year-old female with classical features of lipedema and she gives a history of lower part of the body substantially larger than upper part of the body since puberty then followed by pregnancy childbirth and further worsened after the menopause.  Unfortunately she also developed breast cancer underwent bilateral mastectomy and breast reconstruction with secondary lymphedema.  She has a bilateral lower extremity varicose veins with venous insufficiency and venous hypertension.  She has been using compression stockings flex  touch pump .  She was seen and evaluated by edema therapist.  Doing aquatic exercises regularly which is helping    She also underwent liposuction of both lower extremities in July and August 2022 at [facility] with some improvement.    We discussed lipedema and its implications, progression etc. with the patient.  Multimodality approach discussed and shared above  fat [email or web address] diagram    Suggested anti-inflammatory diet, paleo diet and intermittent fasting  Lymphatic flow improvement with compression, vibration, lymphatic yoga, Pump therapy etc.  Muscle toning exercises, aquatic exercises, Pilates cycling etc.  She already joined support groups      She will benefit with lymphatic formula prescription and information given  Prescription of custom compression stockings given    Virtual  visit in 6 months    Thank you for the consultation    This note was dictated by utilizing Dragon software       60 minutes spent on the date of the encounter doing chart review, history and exam, documentation, and further activities as noted above.    Ade Winkler MD, FAHA, FSVM, FNLA, FACP   vascular Medicine  Clinical Hypertension specialist  Clinical Lipidologist

## 2023-01-26 NOTE — PATIENT INSTRUCTIONS
Courtesy from [email or web address].org    Take  lymphatic formula     Folow up with me in  6 months video or office

## 2023-02-17 NOTE — TELEPHONE ENCOUNTER
RECORDS RECEIVED FROM: internal    DATE RECEIVED: 5.11.23    NOTES (FOR ALL VISITS) STATUS DETAILS   OFFICE NOTES from referring provider internal  Laura Mcintosh APRN CNP   OFFICE NOTES from other specialist internal  7.26.22 [facility]        MEDICATION LIST internal     IMAGING      DEXASCAN internal  4.5.22     CT (HEAD/NECK/CHEST/ABDOMEN) internal    6.26.22     LABS     DIABETES: HBGA1C, CREATININE, FASTING LIPIDS, MICROALBUMIN URINE, POTASSIUM, TSH, T4    THYROID: TSH, T4, CBC, THYRODLONULIN, TOTAL T3, FREE T4, CALCITONIN, CEA internal  Calcium- 10.4.22  Creatinine- 10.4.22  Cbc- 7.26.22  BMP- 2.28.22  Lipid- 8/12/21

## 2023-04-04 ENCOUNTER — ONCOLOGY VISIT (OUTPATIENT)
Dept: ONCOLOGY | Facility: CLINIC | Age: 53
End: 2023-04-04
Attending: INTERNAL MEDICINE
Payer: COMMERCIAL

## 2023-04-04 VITALS
WEIGHT: 262 LBS | SYSTOLIC BLOOD PRESSURE: 100 MMHG | DIASTOLIC BLOOD PRESSURE: 82 MMHG | RESPIRATION RATE: 16 BRPM | HEART RATE: 73 BPM | BODY MASS INDEX: 38.8 KG/M2 | HEIGHT: 69 IN | OXYGEN SATURATION: 97 %

## 2023-04-04 DIAGNOSIS — D05.11 DUCTAL CARCINOMA IN SITU (DCIS) OF RIGHT BREAST: Primary | ICD-10-CM

## 2023-04-04 DIAGNOSIS — M81.0 AGE-RELATED OSTEOPOROSIS WITHOUT CURRENT PATHOLOGICAL FRACTURE: ICD-10-CM

## 2023-04-04 PROCEDURE — 99214 OFFICE O/P EST MOD 30 MIN: CPT | Performed by: INTERNAL MEDICINE

## 2023-04-04 PROCEDURE — 99212 OFFICE O/P EST SF 10 MIN: CPT | Performed by: INTERNAL MEDICINE

## 2023-04-04 RX ORDER — ANASTROZOLE 1 MG/1
1 TABLET ORAL DAILY
Qty: 90 TABLET | Refills: 3 | Status: SHIPPED | OUTPATIENT
Start: 2023-04-04 | End: 2024-04-04

## 2023-04-04 ASSESSMENT — PAIN SCALES - GENERAL: PAINLEVEL: NO PAIN (0)

## 2023-04-04 NOTE — PROGRESS NOTES
[facility] Hematology and Oncology Progress Note    Patient: Sarah Gilmore  MRN: [id number]  Date of Service: Apr 4, 2023         Reason for Visit    Chief Complaint   Patient presents with     Oncology Clinic Visit     Infiltrating ductal carcinoma of right breast       Assessment and Plan     Cancer Staging   Infiltrating ductal carcinoma of right breast (H)  Staging form: Breast, AJCC 8th Edition  - Pathologic stage from 9/4/2019: Stage Unknown (pTX, pN1a(sn), cM0, G1, ER+, ND+, HER2-) - Signed by Ez Burroughs MD on 10/18/2021  - Clinical: No stage assigned - Unsigned      ECOG Performance    0 - Independent     Pain  Pain Score: No Pain (0)    #.  History of DCIS of the both breasts (upper outer quadrant, lower outer quadrant and central portion of the right breast, multifocal, grade 2; focus of DCIS approximately 2 mm grade 1 in the left breast)  #. pTx pN1a M0 invasive ductal carcinoma of the right breast, grade 1.  ER positive, ND positive, HER-2 negative.   Clinically well.  She tolerates anastrozole very well at that point and comfortable continuing it.     Duration of endocrine therapy is for minimum of 5 years and she is considering to extend to 10 years as long as she does not have major side effects.  Refilled anastrozole today.   Follow-up clinical exam in about 6 months.    #.  Right-sided low back pain/tightness, intermittent.   Stable and no new neurologic symptoms. Continue to monitor clinically.  She is advised to call me with any concern or persistent pain.    #.  Osteoporosis    Started zoledronic acid on 10/4/2022.     Continue calcium and vitamin D.     I strongly recommended to do weightbearing exercises.    Plan to obtain DEXA scan in later this year or next year.    Encounter Diagnoses:    Problem List Items Addressed This Visit        Oncology Diagnoses    DCIS (ductal carcinoma in situ) - Primary    Relevant Medications    anastrozole (ARIMIDEX) 1 MG tablet       Other     Age-related osteoporosis without current pathological fracture          CC: EMMY Maxwell CNP   ______________________________________________________________________________  Diagnosis  July 2019-    DCIS of both breasts (upper outer quadrant, lower outer quadrant and central portion of the right breast, multifocal, grade 2; focus of DCIS approximately 2 mm grade 1 in the left breast)   pTx pN1a M0 invasive ductal carcinoma of the right breast, grade 1.  Multifocal DCIS.  6 right axillary sentinel lymph nodes were examined 1 lymph node was positive for macro metastases and one lymph node was positive for isolated tumor cells.  ER positive (90%), HI positive (80%), HER-2 negative.    LMP-at age 48 ( in 3/2018) after hysterectomy. Serology confirms menopause in 4/2020.    Treatment to date  7/30/2019- right breast mastectomy and right sentinel lymph node biopsy, left mastectomy.  10/9/2019-12/6/2019-radiation to the right breast and chest wall and regional lymph nodes area of 5040 cGy/28 fractions followed by additional 1000 cGy in 5 fractions to the primary tumor bed.  12/2019-initiated adjuvant tamoxifen, but stopped about 2 months after due to pain in both shins.  Restarted tamoxifen in Apirl 2020, but stopped again after a month and then switched to anastrozole in May 2020.     History of Present Illness    Oliver presented herself today.  She reported no new health concern.  She does not have unusual or persistent pain.  She takes anastrozole regularly. She takes calcium and vitamin D regularly.      Review of systems  Apart from describing in HPI, the remainder of comprehensive ROS was negative.    Past History    Past Medical History:   Diagnosis Date     Dilated aortic root (H)      Foot fracture 06/03/2011     History of anesthesia complications     slow to wake up     PONV (postoperative nausea and vomiting)      Radial head fracture 06/03/2011     Seizures (H)     h/o seizure disorder in childhood,  "last seizure activity  \"4 yrs ago\"     Suspected sleep apnea     sleep study set up in Aug 2019       Past Surgical History:   Procedure Laterality Date     C/SECTION, LOW TRANSVERSE  07/10/1994    breech     HYSTERECTOMY, PAP NO LONGER INDICATED Bilateral      LAPAROSCOPIC HYSTERECTOMY TOTAL  2018    cervix benign     MAMMO STEREOTACTIC CORE BIOPSY RIGHT Right 2019     MAMMO STEREOTACTIC CORE BIOPSY RIGHT Right 2019     NM SENTINEL NODE INJECTION  2019     OTHER SURGICAL HISTORY      right arm surgery     ME MASTECTOMY, MODIFIED RADICAL Bilateral 2019    Procedure: BILATERAL MASTECTOMY;  Surgeon: Mckenzie Colby DO;  Location: [facility];  Service: General     ME REPLACEMENT TISSUE EXPANDER W/PERMANENT IMPLANT Bilateral 2019    Procedure: BILATERAL IMPLANT RECONSTRUCTION TO BREASTS;  Surgeon: Carlitos Crum MD;  Location: [facility] [place];  Service: Plastics     RIGHT BREAST RECONSTRUCTION WITH LATISSIMUS DORSI FLAP    10/29/2021     [facility]  DELIVERY ONLY  1994         Physical Exam    /82   Pulse 73   Resp 16   Ht 1.753 m (5' 9\")   Wt 118.8 kg (262 lb)   LMP  (LMP Unknown)   SpO2 97%   BMI 38.69 kg/m      General: alert, awake, not in acute distress  HEENT: Head: Normal, normocephalic, atraumatic.  Eye: Normal external eye, conjunctiva, lids cornea, DAYSI.  Pharynx: Normal buccal mucosa. Normal pharynx.  Neck / Thyroid: Supple, no masses, nodes, nodules or enlargement.  Lymphatics: No abnormally enlarged lymph nodes.  Chest: Normal chest wall and respirations. Clear to auscultation.  Breasts: No palpable discrete abnormalities.   Heart: S1 S2 RRR.   Abdomen: abdomen is soft without significant tenderness, masses, organomegaly or guarding  Extremities: normal strength, tone, and muscle mass  Skin: normal. no rash or abnormalities  CNS: non focal.    Lab Results    No results found for this or any previous visit (from the past 168 hour(s)).   "   Imaging    No results found.    30 minutes spent on the date of the encounter doing chart review, history and exam, documentation and further activities as noted above.    Signed by: Ez Burroughs MD

## 2023-04-04 NOTE — PROGRESS NOTES
"Oncology Rooming Note    April 4, 2023 2:49 PM   Sarah Gilmore is a 52 year old female who presents for:    Chief Complaint   Patient presents with     Oncology Clinic Visit     Infiltrating ductal carcinoma of right breast     Initial Vitals: /82   Pulse 73   Resp 16   Ht 1.753 m (5' 9\")   Wt 118.8 kg (262 lb)   LMP  (LMP Unknown)   SpO2 97%   BMI 38.69 kg/m   Estimated body mass index is 38.69 kg/m  as calculated from the following:    Height as of this encounter: 1.753 m (5' 9\").    Weight as of this encounter: 118.8 kg (262 lb). Body surface area is 2.4 meters squared.  No Pain (0) Comment: Data Unavailable   No LMP recorded (lmp unknown). Patient has had a hysterectomy.  Allergies reviewed: Yes  Medications reviewed: Yes    Medications: Medication refills not needed today.  Pharm[facility] entered into [id number] [address] AT [address]    Clinical concerns: 6 month      Dagmar Suarez            "

## 2023-04-04 NOTE — LETTER
"    4/4/2023         RE: Sarah Gilmore  [address]  [place] [facility] [place] [address]        Dear Colleague,    Thank you for referring your patient, Sarah Gilmore, to the [facility]. Please see a copy of my visit note below.    Oncology Rooming Note    April 4, 2023 2:49 PM   Sarah Gilmore is a 52 year old female who presents for:    Chief Complaint   Patient presents with     Oncology Clinic Visit     Infiltrating ductal carcinoma of right breast     Initial Vitals: /82   Pulse 73   Resp 16   Ht 1.753 m (5' 9\")   Wt 118.8 kg (262 lb)   LMP  (LMP Unknown)   SpO2 97%   BMI 38.69 kg/m   Estimated body mass index is 38.69 kg/m  as calculated from the following:    Height as of this encounter: 1.753 m (5' 9\").    Weight as of this encounter: 118.8 kg (262 lb). Body surface area is 2.4 meters squared.  No Pain (0) Comment: Data Unavailable   No LMP recorded (lmp unknown). Patient has had a hysterectomy.  Allergies reviewed: Yes  Medications reviewed: Yes    Medications: Medication refills not needed today.  Pharmacy name entered into [employer]: [id number] - [place], [place] - [address] AT [address]    Clinical concerns: 6 month      Dagmar Suarez              [facility] Hematology and Oncology Progress Note    Patient: Sarah Gilmore  MRN: [id number]  Date of Service: Apr 4, 2023         Reason for Visit    Chief Complaint   Patient presents with     Oncology Clinic Visit     Infiltrating ductal carcinoma of right breast       Assessment and Plan     Cancer Staging   Infiltrating ductal carcinoma of right breast (H)  Staging form: Breast, AJCC 8th Edition  - Pathologic stage from 9/4/2019: Stage Unknown (pTX, pN1a(sn), cM0, G1, ER+, MO+, HER2-) - Signed by Ez Burroughs MD on 10/18/2021  - Clinical: No stage assigned - Unsigned      ECOG Performance    0 - Independent     Pain  Pain Score: No Pain (0)    #.  " History of DCIS of the both breasts (upper outer quadrant, lower outer quadrant and central portion of the right breast, multifocal, grade 2; focus of DCIS approximately 2 mm grade 1 in the left breast)  #. pTx pN1a M0 invasive ductal carcinoma of the right breast, grade 1.  ER positive, LA positive, HER-2 negative.   Clinically well.  She tolerates anastrozole very well at that point and comfortable continuing it.     Duration of endocrine therapy is for minimum of 5 years and she is considering to extend to 10 years as long as she does not have major side effects.  Refilled anastrozole today.   Follow-up clinical exam in about 6 months.    #.  Right-sided low back pain/tightness, intermittent.   Stable and no new neurologic symptoms. Continue to monitor clinically.  She is advised to call me with any concern or persistent pain.    #.  Osteoporosis    Started zoledronic acid on 10/4/2022.     Continue calcium and vitamin D.     I strongly recommended to do weightbearing exercises.    Plan to obtain DEXA scan in later this year or next year.    Encounter Diagnoses:    Problem List Items Addressed This Visit        Oncology Diagnoses    DCIS (ductal carcinoma in situ) - Primary    Relevant Medications    anastrozole (ARIMIDEX) 1 MG tablet       Other    Age-related osteoporosis without current pathological fracture          CC: EMMY Maxwell CNP   ______________________________________________________________________________  Diagnosis  July 2019-    DCIS of both breasts (upper outer quadrant, lower outer quadrant and central portion of the right breast, multifocal, grade 2; focus of DCIS approximately 2 mm grade 1 in the left breast)   pTx pN1a M0 invasive ductal carcinoma of the right breast, grade 1.  Multifocal DCIS.  6 right axillary sentinel lymph nodes were examined 1 lymph node was positive for macro metastases and one lymph node was positive for isolated tumor cells.  ER positive (90%), LA positive  "(80%), HER-2 negative.    LMP-at age 48 ( in 3/2018) after hysterectomy. Serology confirms menopause in 4/2020.    Treatment to date  7/30/2019- right breast mastectomy and right sentinel lymph node biopsy, left mastectomy.  10/9/2019-12/6/2019-radiation to the right breast and chest wall and regional lymph nodes area of 5040 cGy/28 fractions followed by additional 1000 cGy in 5 fractions to the primary tumor bed.  12/2019-initiated adjuvant tamoxifen, but stopped about 2 months after due to pain in both shins.  Restarted tamoxifen in Apirl 2020, but stopped again after a month and then switched to anastrozole in May 2020.     History of Present Illness    Oliver presented herself today.  She reported no new health concern.  She does not have unusual or persistent pain.  She takes anastrozole regularly. She takes calcium and vitamin D regularly.      Review of systems  Apart from describing in HPI, the remainder of comprehensive ROS was negative.    Past History    Past Medical History:   Diagnosis Date     Dilated aortic root (H)      Foot fracture 06/03/2011     History of anesthesia complications     slow to wake up     PONV (postoperative nausea and vomiting)      Radial head fracture 06/03/2011     Seizures (H)     h/o seizure disorder in childhood, last seizure activity  \"4 yrs ago\"     Suspected sleep apnea     sleep study set up in Aug 2019       Past Surgical History:   Procedure Laterality Date     C/SECTION, LOW TRANSVERSE  07/10/1994    breech     HYSTERECTOMY, PAP NO LONGER INDICATED Bilateral      LAPAROSCOPIC HYSTERECTOMY TOTAL  03/13/2018    cervix benign     MAMMO STEREOTACTIC CORE BIOPSY RIGHT Right 05/22/2019     MAMMO STEREOTACTIC CORE BIOPSY RIGHT Right 05/30/2019     NM SENTINEL NODE INJECTION  07/30/2019     OTHER SURGICAL HISTORY      right arm surgery     DC MASTECTOMY, MODIFIED RADICAL Bilateral 07/30/2019    Procedure: BILATERAL MASTECTOMY;  Surgeon: Mckenzie Colby DO;  [facility]: [facility]" "[facility] [place] [facility];  Service: General     KY REPLACEMENT TISSUE EXPANDER W/PERMANENT IMPLANT Bilateral 2019    Procedure: BILATERAL IMPLANT RECONSTRUCTION TO BREASTS;  Surgeon: Carlitos Crum MD;  [place] [facility] [place]  Service: Plastics     RIGHT BREAST RECONSTRUCTION WITH LATISSIMUS DORSI FLAP    10/29/2021     [facility]  DELIVERY ONLY  1994         Physical Exam    /82   Pulse 73   Resp 16   Ht 1.753 m (5' 9\")   Wt 118.8 kg (262 lb)   LMP  (LMP Unknown)   SpO2 97%   BMI 38.69 kg/m      General: alert, awake, not in acute distress  HEENT: Head: Normal, normocephalic, atraumatic.  Eye: Normal external eye, conjunctiva, lids cornea, DAYSI.  Pharynx: Normal buccal mucosa. Normal pharynx.  Neck / Thyroid: Supple, no masses, nodes, nodules or enlargement.  Lymphatics: No abnormally enlarged lymph nodes.  Chest: Normal chest wall and respirations. Clear to auscultation.  Breasts: No palpable discrete abnormalities.   Heart: S1 S2 RRR.   Abdomen: abdomen is soft without significant tenderness, masses, organomegaly or guarding  Extremities: normal strength, tone, and muscle mass  Skin: normal. no rash or abnormalities  CNS: non focal.    Lab Results    No results found for this or any previous visit (from the past 168 hour(s)).     Imaging    No results found.    30 minutes spent on the date of the encounter doing chart review, history and exam, documentation and further activities as noted above.    Signed by: Ez Burroughs MD      Again, thank you for allowing me to participate in the care of your patient.        Sincerely,        Ez Burroughs MD    "

## 2023-04-09 RX ORDER — METHYLPREDNISOLONE SODIUM SUCCINATE 125 MG/2ML
125 INJECTION, POWDER, LYOPHILIZED, FOR SOLUTION INTRAMUSCULAR; INTRAVENOUS
Status: CANCELLED
Start: 2023-10-09

## 2023-04-09 RX ORDER — ZOLEDRONIC ACID 5 MG/100ML
5 INJECTION, SOLUTION INTRAVENOUS ONCE
Status: CANCELLED
Start: 2023-10-09

## 2023-04-09 RX ORDER — EPINEPHRINE 1 MG/ML
0.3 INJECTION, SOLUTION INTRAMUSCULAR; SUBCUTANEOUS EVERY 5 MIN PRN
Status: CANCELLED | OUTPATIENT
Start: 2023-10-09

## 2023-04-09 RX ORDER — HEPARIN SODIUM (PORCINE) LOCK FLUSH IV SOLN 100 UNIT/ML 100 UNIT/ML
5 SOLUTION INTRAVENOUS
Status: CANCELLED | OUTPATIENT
Start: 2023-10-09

## 2023-04-09 RX ORDER — DIPHENHYDRAMINE HYDROCHLORIDE 50 MG/ML
50 INJECTION INTRAMUSCULAR; INTRAVENOUS
Status: CANCELLED
Start: 2023-10-09

## 2023-04-09 RX ORDER — ALBUTEROL SULFATE 90 UG/1
1-2 AEROSOL, METERED RESPIRATORY (INHALATION)
Status: CANCELLED
Start: 2023-10-09

## 2023-04-09 RX ORDER — ACETAMINOPHEN 325 MG/1
325 TABLET ORAL ONCE
Status: CANCELLED | OUTPATIENT
Start: 2023-10-09

## 2023-04-09 RX ORDER — ALBUTEROL SULFATE 0.83 MG/ML
2.5 SOLUTION RESPIRATORY (INHALATION)
Status: CANCELLED | OUTPATIENT
Start: 2023-10-09

## 2023-04-09 RX ORDER — HEPARIN SODIUM,PORCINE 10 UNIT/ML
5 VIAL (ML) INTRAVENOUS
Status: CANCELLED | OUTPATIENT
Start: 2023-10-09

## 2023-04-09 RX ORDER — MEPERIDINE HYDROCHLORIDE 50 MG/ML
25 INJECTION INTRAMUSCULAR; INTRAVENOUS; SUBCUTANEOUS EVERY 30 MIN PRN
Status: CANCELLED | OUTPATIENT
Start: 2023-10-09

## 2023-04-18 ENCOUNTER — NURSE TRIAGE (OUTPATIENT)
Dept: ONCOLOGY | Facility: CLINIC | Age: 53
End: 2023-04-18
Payer: COMMERCIAL

## 2023-04-18 NOTE — TELEPHONE ENCOUNTER
Patient left message on triage line this morning with concerns regarding blurry vision and pain on her right side below her breast. Return call placed to patient to further triage. There was no answer, message left to return call to triage line.    Patient returned call. She reports that she has been wearing readers for months now and in the last couple of weeks she has noticed that things look blurry. She denies any eye pain or new double vision (has had some double vision for years from a previous accident). Denies any severe headache, does get mild headaches on occasion. Patient also reports that she has had floaters that come and go and this is new in the past couple of weeks. Symptoms seem to be in both eyes, not just one.   Patient is also having some tenderness by her right breast in the area in between her armpit that has been present for 3+ days.  The area is tender to touch, no redness or warmth. Patient denies any obvious swelling but does says that the area is not as smooth as the other side. She denies any fever.     Patient is advised to be seen by primary care or eye doctor regarding vision concerns. Regarding breast area patient should get set up with one of the oncology providers to evaluate. Patient is off on Friday and would be able to see Simon Mora NP. She will call scheduling when she is able, she is currently at work.    Alessandra Mauricio RN    Reason for Disposition    Many floaters in the eye    Additional Information    Negative: Weakness of the face, arm or leg on one side of the body    Negative: Followed getting substance in the eye    Negative: Foreign body or object is or was lodged in the eye    Negative: Followed an eye injury    Negative: Followed sun lamp or sun exposure (UV keratitis)    Negative: Yellow or green discharge (pus) in the eye    Negative: Pregnant    Negative: Complete loss of vision in 1 or both eyes    Negative: SEVERE eye pain    Negative: Severe headache     Negative: Double vision    Negative: Blurred vision or visual changes and present now and sudden onset or new (e.g., minutes, hours, days)  (Exception: Seeing floaters / black specks OR previously diagnosed migraine headaches with same symptoms.)    Negative: Patient sounds very sick or weak to the triager    Negative: Flashes of light  (Exception: brief from pressing on the eyeball)    Negative: Eye pain and brief (now gone) blurred vision or visual changes    Negative: Taking digoxin (e.g., Lanoxin, Digitek, Cardoxin, Lanoxicaps; Toloxin in [place]) and blurred vision, yellow vision, or yellow-green halos    Protocols used: VISION LOSS OR CHANGE-A-OH

## 2023-04-21 ENCOUNTER — ONCOLOGY VISIT (OUTPATIENT)
Dept: ONCOLOGY | Facility: CLINIC | Age: 53
End: 2023-04-21
Attending: INTERNAL MEDICINE
Payer: COMMERCIAL

## 2023-04-21 VITALS
OXYGEN SATURATION: 94 % | HEART RATE: 78 BPM | BODY MASS INDEX: 38.42 KG/M2 | SYSTOLIC BLOOD PRESSURE: 94 MMHG | TEMPERATURE: 98.5 F | WEIGHT: 260.2 LBS | DIASTOLIC BLOOD PRESSURE: 78 MMHG

## 2023-04-21 DIAGNOSIS — D05.11 DUCTAL CARCINOMA IN SITU (DCIS) OF RIGHT BREAST: ICD-10-CM

## 2023-04-21 DIAGNOSIS — Z79.811 LONG TERM CURRENT USE OF AROMATASE INHIBITOR: ICD-10-CM

## 2023-04-21 DIAGNOSIS — C50.911 INFILTRATING DUCTAL CARCINOMA OF RIGHT BREAST (H): Primary | ICD-10-CM

## 2023-04-21 PROCEDURE — 99212 OFFICE O/P EST SF 10 MIN: CPT | Performed by: NURSE PRACTITIONER

## 2023-04-21 PROCEDURE — 99213 OFFICE O/P EST LOW 20 MIN: CPT | Performed by: NURSE PRACTITIONER

## 2023-04-21 ASSESSMENT — PAIN SCALES - GENERAL: PAINLEVEL: NO PAIN (0)

## 2023-04-21 NOTE — LETTER
"    4/21/2023         RE: Sarah Gilmore  [address] [place] [address] [address]  [facility] [place] [address]        Dear Colleague,    Thank you for referring your patient, Sarah Gilmore, to the [facility] [place]. Please see a copy of my visit note below.    [facility] Hematology and Oncology Progress Note    Patient: Sarah Gilmore  MRN: [id number]  Date of Service: 04/15/2021        Reason for Visit:    Patient asked to be seen regarding (R) breast concerns.    Assessment and Plan:    1. History of DCIS of both breasts and a G1, pTx pN1a M0, ER & CA positive, HER-2 negative invasive ductal carcinoma of the right breast ... nearly 4 years s/p (B) mastectomies with reconstruction, followed by (R) breast EBRT, on adjuvant AI therapy.    52 year old LMP at age 48 (2018, March) after hysterectomy.  Serology confirms menopause in 2020, April.     Sarah was just seen in routine f/up by Dr Burroughs a couple weeks ago and doing well.  She presents today stating she has noted some (R) breast discomfort with it feeling \"a bit different\" over the past week.  She can specifically pin point an area in the lateral aspect of the breast near the chest wall.  She denies any known trauma.  She is tolerating adjuvant AI therapy with anastrozole well.  She denies any constitutional symptoms, cough, fever, chills, chest pain, shortness of breath, unusual headaches, visual or mentation disturbance, bowel or bladder issues, rash.    No concerning palpable abnormalities noted in either reconstructed breasts.  Trace palpable discomfort in the pinpoint area of the (R) breast/chest wall that she elicited.      I reviewed with Sarah that the (R) breast has been through a lot ... initial mastectomy with implant, followed by implant removeal in 2019 d/t infection, followed by EBRT, followed by yet another surgery for implant replacement.  Certainly there have been resulting tissue and nerve changes that could account for occasional " breast paresthesias and discomforts.  Additionally, these changes could be further aggravated causing additional symptoms with wearing certain clothing, her bra or with an unrealized mild injury.     At this point, encouraged to monitor and if does not resolve over the next couple weeks to alert us and imaging could be obtained - patient agreeable to plan.       Retain prior scheduled follow-up orders.     2. Bone health:    12/24/2019 DEXA - Osteopenia.    04/05/2022 DEXA - Osteoporosis     10/04/2022 - started zoledronic acid.      Continue 7108-3764 mg/day calcium + 4358-0881 international unit(s)/day vitamin D supplements.     Continue balance and weight bearing exercises.    Prior history of osteoporotic fracture.     Oncologic History:    1. Infiltrating ductal carcinoma of right female breast (H)    Pathologic Stage Unknown (pTX, pN1a(sn), cM0)    G1    ER+ 90%    FL+ 80%    HER2-      07/30/2019 - (R) breast mastectomy and sentinel lymph node biopsy and (L) mastectomy:    Pathology:    (R) breast - Invasive ductal carcinoma [pTx pN1a M0], G1.  6 axillary sentinel lymph nodes examined, 1 positive for macro metastases and one positive for isolated tumor cells.  Multifocal DCIS, G2.      (L) breast - DCIS approximately 2 mm, G1.    09/05/19 - removal of (R) implant r/t infection.    12/06/2019 - completed 5040 cGy/28 fx (right breast, chest wall and regional lymph nodes) followed by 1000 cGy/5 fx boost to the primary tumor bed.     2019, December - initiated adjuvant endocrine therapy with tamoxifen.  Stopped after 2 months due to pain in both shins.      2020, April - restarted tamoxifen.  Stopped again after a month.    2020, May - switched to adjuvant endocrine therapy with an aromatase inhibitor (AI), specifically anastrozole.     12/07/21 US (L) breast - no imaging correlate to reported change in sensation involving the left lower inner quadrant, for which management should be based clinically.  "Postsurgical changes of left mastectomy with implant reconstruction.  Probable folliculitis    02/25/21 - (R) breast fat grafting.     ECOG Performance:     ECOG Performance Status: 0     Distress Assessment:    Distress Assessment Score: No distress    Pain:    Currently in Pain: No/denies        27 minutes of time were spent on the date of this encounter with chart review, face to face time with the patient, examination, recommendations, documentation, and communication of the plan of care to the care team.     Simon Mora, [facility]   ______________________________________________________________________________    Review of Systems:    No fever or night sweats.  No loss of weight.  No lumps or bumps anywhere.  No unusual headaches or eyesight issues.  No dizziness.  No bleeding from the nose.  No sores in the mouth. No problems with swallowing.  No chest pain. No shortness of breath. No cough.  No abdominal pain. No nausea or vomiting.  No diarrhea or constipation.  No blood in stool or black colored stools.  No problems passing urine.  No numbness or tingling in hands or feet.  No skin rashes.    A 14 point review of systems is otherwise negative.    Past History:    Past Medical History:   Diagnosis Date     Anemia      DCIS (ductal carcinoma in situ) 2019    bilateral     Dilated aortic root (H)      History of anesthesia complications     slow to wake up     Osteopenia      Osteoporosis      PONV (postoperative nausea and vomiting)      Seizures (H)     h/o seizure disorder in childhood, last seizure activity  \"4 yrs ago\"     Suspected sleep apnea     sleep study set up in Aug 2019       Physical Exam:    BP 94/78 (BP Location: Left arm, Patient Position: Sitting, Cuff Size: Adult Regular)   Pulse 78   Temp 98.5  F (36.9  C) (Oral)   Wt 118 kg (260 lb 3.2 oz)   LMP  (LMP Unknown)   SpO2 94%   BMI 38.42 kg/m      General:  Alert.  Pleasant.  No acute distress  HEENT:  Anicteric sclera.  EOMI.  DYASI.  No " mucosal lesions.  Lymphatics:  No abnormally enlarged lymph nodes palpated.  Chest:   Normal chest wall and respirations. Lungs cear to auscultation.  Breasts: (R) c/w mastectomy with implant.  Trace lateral lymphedema.  No concerning skin changes.  Negative axilla.    (L) c/w mastectomy with implant.  No lymphedema appreciated.  No concerning skin changes.  Negative axilla.    Sarah declined offer for female  during exam.  Heart:   S1 S2 heard.  RRR heard.  No murmur heard.     Abdomen:  Soft.  Not tender.  Not distended.  No masses, organomegaly or guarding noted.  Extremities:  (B) LE lipedema.   Skin:   No rash noted.  CNS:   Non focal.    Lab Results:    No results found for this or any previous visit (from the past 168 hour(s)).    Imaging:    No results found.          Again, thank you for allowing me to participate in the care of your patient.        Sincerely,        Simon Mora, CNP

## 2023-04-21 NOTE — PROGRESS NOTES
"[facility] Hematology and Oncology Progress Note    Patient: Sarah Gilmore  MRN: [id number]  Date of Service: 04/15/2021        Reason for Visit:    Patient asked to be seen regarding (R) breast concerns.    Assessment and Plan:    1. History of DCIS of both breasts and a G1, pTx pN1a M0, ER & AK positive, HER-2 negative invasive ductal carcinoma of the right breast ... nearly 4 years s/p (B) mastectomies with reconstruction, followed by (R) breast EBRT, on adjuvant AI therapy.    52 year old LMP at age 48 (2018, March) after hysterectomy.  Serology confirms menopause in 2020, April.     Sarah was just seen in routine f/up by Dr Burroughs a couple weeks ago and doing well.  She presents today stating she has noted some (R) breast discomfort with it feeling \"a bit different\" over the past week.  She can specifically pin point an area in the lateral aspect of the breast near the chest wall.  She denies any known trauma.  She is tolerating adjuvant AI therapy with anastrozole well.  She denies any constitutional symptoms, cough, fever, chills, chest pain, shortness of breath, unusual headaches, visual or mentation disturbance, bowel or bladder issues, rash.    No concerning palpable abnormalities noted in either reconstructed breasts.  Trace palpable discomfort in the pinpoint area of the (R) breast/chest wall that she elicited.      I reviewed with Sarah that the (R) breast has been through a lot ... initial mastectomy with implant, followed by implant removeal in 2019 d/t infection, followed by EBRT, followed by yet another surgery for implant replacement.  Certainly there have been resulting tissue and nerve changes that could account for occasional breast paresthesias and discomforts.  Additionally, these changes could be further aggravated causing additional symptoms with wearing certain clothing, her bra or with an unrealized mild injury.     At this point, encouraged to monitor and if does not resolve over the next " couple weeks to alert us and imaging could be obtained - patient agreeable to plan.       Retain prior scheduled follow-up orders.     2. Bone health:    12/24/2019 DEXA - Osteopenia.    04/05/2022 DEXA - Osteoporosis     10/04/2022 - started zoledronic acid.      Continue 5037-0800 mg/day calcium + 7334-7739 international unit(s)/day vitamin D supplements.     Continue balance and weight bearing exercises.    Prior history of osteoporotic fracture.     Oncologic History:    1. Infiltrating ductal carcinoma of right female breast (H)    Pathologic Stage Unknown (pTX, pN1a(sn), cM0)    G1    ER+ 90%    MD+ 80%    HER2-      07/30/2019 - (R) breast mastectomy and sentinel lymph node biopsy and (L) mastectomy:    Pathology:    (R) breast - Invasive ductal carcinoma [pTx pN1a M0], G1.  6 axillary sentinel lymph nodes examined, 1 positive for macro metastases and one positive for isolated tumor cells.  Multifocal DCIS, G2.      (L) breast - DCIS approximately 2 mm, G1.    09/05/19 - removal of (R) implant r/t infection.    12/06/2019 - completed 5040 cGy/28 fx (right breast, chest wall and regional lymph nodes) followed by 1000 cGy/5 fx boost to the primary tumor bed.     2019, December - initiated adjuvant endocrine therapy with tamoxifen.  Stopped after 2 months due to pain in both shins.      2020, April - restarted tamoxifen.  Stopped again after a month.    2020, May - switched to adjuvant endocrine therapy with an aromatase inhibitor (AI), specifically anastrozole.     12/07/21 US (L) breast - no imaging correlate to reported change in sensation involving the left lower inner quadrant, for which management should be based clinically. Postsurgical changes of left mastectomy with implant reconstruction.  Probable folliculitis    02/25/21 - (R) breast fat grafting.     ECOG Performance:     ECOG Performance Status: 0     Distress Assessment:    Distress Assessment Score: No distress    Pain:    Currently in Pain:  "No/denies        27 minutes of time were spent on the date of this encounter with chart review, face to face time with the patient, examination, recommendations, documentation, and communication of the plan of care to the care team.     Simon Mora, [facility]   ______________________________________________________________________________    Review of Systems:    No fever or night sweats.  No loss of weight.  No lumps or bumps anywhere.  No unusual headaches or eyesight issues.  No dizziness.  No bleeding from the nose.  No sores in the mouth. No problems with swallowing.  No chest pain. No shortness of breath. No cough.  No abdominal pain. No nausea or vomiting.  No diarrhea or constipation.  No blood in stool or black colored stools.  No problems passing urine.  No numbness or tingling in hands or feet.  No skin rashes.    A 14 point review of systems is otherwise negative.    Past History:    Past Medical History:   Diagnosis Date     Anemia      DCIS (ductal carcinoma in situ) 2019    bilateral     Dilated aortic root (H)      History of anesthesia complications     slow to wake up     Osteopenia      Osteoporosis      PONV (postoperative nausea and vomiting)      Seizures (H)     h/o seizure disorder in childhood, last seizure activity  \"4 yrs ago\"     Suspected sleep apnea     sleep study set up in Aug 2019       Physical Exam:    BP 94/78 (BP Location: Left arm, Patient Position: Sitting, Cuff Size: Adult Regular)   Pulse 78   Temp 98.5  F (36.9  C) (Oral)   Wt 118 kg (260 lb 3.2 oz)   LMP  (LMP Unknown)   SpO2 94%   BMI 38.42 kg/m      General:  Alert.  Pleasant.  No acute distress  HEENT:  Anicteric sclera.  EOMI.  DAYSI.  No mucosal lesions.  Lymphatics:  No abnormally enlarged lymph nodes palpated.  Chest:   Normal chest wall and respirations. Lungs cear to auscultation.  Breasts: (R) c/w mastectomy with implant.  Trace lateral lymphedema.  No concerning skin changes.  Negative axilla.    (L) c/w " mastectomy with implant.  No lymphedema appreciated.  No concerning skin changes.  Negative axilla.    Sarah declined offer for female  during exam.  Heart:   S1 S2 heard.  RRR heard.  No murmur heard.     Abdomen:  Soft.  Not tender.  Not distended.  No masses, organomegaly or guarding noted.  Extremities:  (B) LE lipedema.   Skin:   No rash noted.  CNS:   Non focal.    Lab Results:    No results found for this or any previous visit (from the past 168 hour(s)).    Imaging:    No results found.

## 2023-05-11 ENCOUNTER — OFFICE VISIT (OUTPATIENT)
Dept: ENDOCRINOLOGY | Facility: CLINIC | Age: 53
End: 2023-05-11
Attending: NURSE PRACTITIONER
Payer: COMMERCIAL

## 2023-05-11 ENCOUNTER — LAB (OUTPATIENT)
Dept: LAB | Facility: CLINIC | Age: 53
End: 2023-05-11
Payer: COMMERCIAL

## 2023-05-11 ENCOUNTER — PRE VISIT (OUTPATIENT)
Dept: ENDOCRINOLOGY | Facility: CLINIC | Age: 53
End: 2023-05-11

## 2023-05-11 VITALS
DIASTOLIC BLOOD PRESSURE: 75 MMHG | WEIGHT: 265 LBS | SYSTOLIC BLOOD PRESSURE: 115 MMHG | HEART RATE: 77 BPM | BODY MASS INDEX: 39.13 KG/M2

## 2023-05-11 DIAGNOSIS — M81.0 AGE-RELATED OSTEOPOROSIS WITHOUT CURRENT PATHOLOGICAL FRACTURE: ICD-10-CM

## 2023-05-11 LAB
ALBUMIN SERPL BCG-MCNC: 4.4 G/DL (ref 3.5–5.2)
CALCIUM SERPL-MCNC: 9.6 MG/DL (ref 8.6–10)
CREAT SERPL-MCNC: 0.8 MG/DL (ref 0.51–0.95)
GFR SERPL CREATININE-BSD FRML MDRD: 87 ML/MIN/1.73M2
PTH-INTACT SERPL-MCNC: 42 PG/ML (ref 15–65)
T4 FREE SERPL-MCNC: 0.94 NG/DL (ref 0.9–1.7)
TOTAL PROTEIN SERUM FOR ELP: 7.3 G/DL (ref 6.4–8.3)
TSH SERPL DL<=0.005 MIU/L-ACNC: 5.82 UIU/ML (ref 0.3–4.2)

## 2023-05-11 PROCEDURE — 36415 COLL VENOUS BLD VENIPUNCTURE: CPT | Performed by: PATHOLOGY

## 2023-05-11 PROCEDURE — 99204 OFFICE O/P NEW MOD 45 MIN: CPT | Mod: GC | Performed by: STUDENT IN AN ORGANIZED HEALTH CARE EDUCATION/TRAINING PROGRAM

## 2023-05-11 PROCEDURE — 86364 TISS TRNSGLTMNASE EA IG CLAS: CPT | Performed by: PATHOLOGY

## 2023-05-11 PROCEDURE — 84165 PROTEIN E-PHORESIS SERUM: CPT

## 2023-05-11 PROCEDURE — 99000 SPECIMEN HANDLING OFFICE-LAB: CPT | Performed by: PATHOLOGY

## 2023-05-11 PROCEDURE — 84155 ASSAY OF PROTEIN SERUM: CPT | Performed by: PATHOLOGY

## 2023-05-11 PROCEDURE — 84439 ASSAY OF FREE THYROXINE: CPT | Performed by: PATHOLOGY

## 2023-05-11 PROCEDURE — 82310 ASSAY OF CALCIUM: CPT | Performed by: PATHOLOGY

## 2023-05-11 PROCEDURE — 82040 ASSAY OF SERUM ALBUMIN: CPT | Performed by: PATHOLOGY

## 2023-05-11 PROCEDURE — 84443 ASSAY THYROID STIM HORMONE: CPT | Performed by: PATHOLOGY

## 2023-05-11 PROCEDURE — 83970 ASSAY OF PARATHORMONE: CPT | Performed by: PATHOLOGY

## 2023-05-11 PROCEDURE — 82565 ASSAY OF CREATININE: CPT | Performed by: PATHOLOGY

## 2023-05-11 PROCEDURE — 84166 PROTEIN E-PHORESIS/URINE/CSF: CPT

## 2023-05-11 PROCEDURE — 82306 VITAMIN D 25 HYDROXY: CPT | Performed by: PATHOLOGY

## 2023-05-11 RX ORDER — CALCIUM CARBONATE/VITAMIN D3 600 MG-10
2 TABLET ORAL 2 TIMES DAILY
Qty: 360 TABLET | Refills: 3 | Status: SHIPPED | OUTPATIENT
Start: 2023-05-11

## 2023-05-11 ASSESSMENT — PAIN SCALES - GENERAL: PAINLEVEL: NO PAIN (0)

## 2023-05-11 NOTE — PATIENT INSTRUCTIONS
Get blood test done today    You are due for iv reclast in 10/2023  DEXA scan in 9/2023  Take calcium pills 2 tabs twice daily  Try to get 2-3 servings of calcium    CALCIUM elemental Recommendation 1200- 1500 mg/day in divided dose of no more than 500 mg/dose. This includes what is in your food and also what is in any supplements you are taking.      Dietary sources of calcium: These also contain vitamin D  Milk / buttermilk              8 oz            300 mg  Dry milk powder       5 Tbsp             300 mg  Yogurt                          1 cup           400 mg  Ice cream   1/2 cup          100 mg  Hard cheese                     1.5 oz          300 mg  Cottage cheese                1/2 cup        65 mg  Spinach, cooked  1 cup  240 mg  Tofu, soft regular           4 oz          120 to 390 mg  Sardines                       3 oz               370 mg  Mackerel canned         3 oz                250 mg  Canned salmon with bones 3 oz        170-210 mg  Calcium fortified cereals are available  SILK soy and almond milk products are calcium fortified  Orange juice  Fortified with Calcium       8 oz            300 mg  Low fat dairy sources are recommended      Recommended exercise goals:    30 minutes of moderate exercise on most days of the week .  Weight bearing exercise (ie, walking, jogging, hiking, dancing)    Strength training 2 or more times/week in addition to other weight bearing exercise.  Strength training -- uses weight or resistance beyond that seen in everyday activities -(pilates, weight training with free weights, weight machines or resistance bands)     Please call and schedule at one of these locations that provide the service you need.     DEXA, CT, MRI, US, XRAY    [facility]   ([facility]) [phone]   [facility] ([place], [place]) [phone]   [facility] ([facility]) [phone]   [facility] ([facility]) [phone]     Please contact our  [facility] at your convenience to schedule your Reclast Infusion.     Their number is [phone] option #3, then option #2.     A prior authorization will be initiated by the infusion center once you schedule an appointment. They will let you know if there are any issues with coverage.    Let us know if you have any questions.    Thank you,  The Endocrine Team  [phone]

## 2023-05-11 NOTE — PROGRESS NOTES
Endocrinology Clinic New Consult      Sarah Gilmore MRN:[id number] YOB: 1970  Primary care provider: No Ref-Primary, Physician     Reason for Endocrine consult: Osteoporosis    HPI:  Sarah Gilmore is a 52 year old female with PMHx of  history of aortic root/ascending aorta enlargement, LE edema, DCIS bilateral breasts, ER/MI positive with invasive ductal carcinoma of the right breast, status postmastectomy, EBRT right breast on aromatase inhibitor therapy for last 4 years.  She is postmenopausal at the age of 48, also had hysterectomy at that time.    She had a DEXA spine in December 2019 which showed the most negative T score of bilateral total hip at -1.5 and -1.4 at lumbar spine.  DEXA scan in April 2022 most negative T score of -2.6 at the lumbar spine L2-L4.  She was started on iv zoledronic acid in 10/4/2022 by her oncologist. She mentions she had mild headache which went away in a day after treatment.     - Hx of falls: She had a fall in 2011, she tripped and fell while putting on pants, had a intra-articular left radial head fracture, left ankle fracture.  She had a fall in June 2022 while cleaning, she tripped over a broom handle. She describes that as a bad fall, had facial pain and nasal pain CT facial showed nondisplaced bony irregularity likely nondisplaced fracture  - FH of osteoporosis or hip fractures: Grandmother had osteoporosis and hip fractures  - Menopause: Had a hysterectomy at age of 48.  Currently on anastrozole  - Diet: Drinks milk once in aw hile with a meal, 8 Oz, infrequent use of yoghurt or cheese. Has orange juice couple of times a week, has leafy vegetables once in a while.   - Ca and Vit D supplementation: Not on calcium or vitamin D supplements, takes a multivitamin   - Dental health: She had dental clearance prior to her ZA. She had teeth cleaning and few teeth extracted. No tooth pain or plan for procedures  - Thyroid disorder: none  - IBD/malabsorption/ RA:  "none  - Steroid exposure: none  - Smoking hx: none  - Alcohol hx: none  - Back pain or height loss: none    Social history : Works as [employer] technician, single, 3 kids. No tobacco use or alcohol use. No illicit drug usage  Family history : Grand mother with osteoporosis, paternal cousin with thyroid disorder - cancer    ROS:  All 12 systems were reviewed and negative except as mentioned in HPI    Past Medical/Surgical History:  Past Medical History:   Diagnosis Date    Dilated aortic root (H)     Foot fracture 2011    History of anesthesia complications     slow to wake up    PONV (postoperative nausea and vomiting)     Radial head fracture 2011    Seizures (H)     h/o seizure disorder in childhood, last seizure activity  \"4 yrs ago\"    Suspected sleep apnea     sleep study set up in Aug 2019     Past Surgical History:   Procedure Laterality Date    C/SECTION, LOW TRANSVERSE  07/10/1994    breech    HYSTERECTOMY, PAP NO LONGER INDICATED Bilateral     LAPAROSCOPIC HYSTERECTOMY TOTAL  2018    cervix benign    MAMMO STEREOTACTIC CORE BIOPSY RIGHT Right 2019    MAMMO STEREOTACTIC CORE BIOPSY RIGHT Right 2019    NM SENTINEL NODE INJECTION  2019    OTHER SURGICAL HISTORY      right arm surgery    KY MASTECTOMY, MODIFIED RADICAL Bilateral 2019    Procedure: BILATERAL MASTECTOMY;  Surgeon: Mckenzie Colby DO;  [facility]  Service: General    KY REPLACEMENT TISSUE EXPANDER W/PERMANENT IMPLANT Bilateral 2019    Procedure: BILATERAL IMPLANT RECONSTRUCTION TO BREASTS;  Surgeon: Carlitos Crum MD;  [facility];  Service: Plastics    RIGHT BREAST RECONSTRUCTION WITH LATISSIMUS DORSI FLAP    10/29/2021    [facility]  DELIVERY ONLY  1994       Allergies:  Allergies   Allergen Reactions    Bees Anaphylaxis     Noted in 09 ER  Trouble breathing, rash    Contrast Dye Anaphylaxis     airway problems    Seafood Anaphylaxis       PTA Meds:  Prior " to Admission medications    Medication Sig Last Dose Taking? Auth Provider Long Term End Date   anastrozole (ARIMIDEX) 1 MG tablet Take 1 tablet (1 mg) by mouth daily   Ez Burroughs MD Yes    EPI E-Z PEN JOELLEN 1:1000  IJ 1 TIME ONLY  PRN bee sting   Marvin Fung MD     ergocalciferol (ERGOCALCIFEROL) 43380 UNIT capsule Take 1 capsule by mouth every 14 days.   Marvin Fung MD     FERROUS SULFATE  MG OR TBCR 1 TABLET BID  after food withm orange juice   Marvin Fung MD     fish oil-omega-3 fatty acids 1000 MG capsule    Reported, Patient     Multiple Vitamin (MULTI-VITAMINS) TABS Take 1 tablet by mouth   Reported, Patient     OSCO MAGNESIUM CITRATE OR    Reported, Patient     Specialty Vitamins Products (LYMPH PO) Take by mouth daily Lymphatic supplement   Reported, Patient     Turmeric 400 MG CAPS    Reported, Patient          Current Meds:   Current Outpatient Medications   Medication    anastrozole (ARIMIDEX) 1 MG tablet    EPI E-Z PEN JOELLEN 1:1000  IJ    ergocalciferol (ERGOCALCIFEROL) 08323 UNIT capsule    FERROUS SULFATE  MG OR TBCR    fish oil-omega-3 fatty acids 1000 MG capsule    Multiple Vitamin (MULTI-VITAMINS) TABS    OSCO MAGNESIUM CITRATE OR    Specialty Vitamins Products (LYMPH PO)    Turmeric 400 MG CAPS     No current facility-administered medications for this visit.       Family History:  Family History   Problem Relation Age of Onset    Psychotic Disorder Mother     Sleep Apnea Mother     Mental Illness Mother     C.A.D. Father     Diabetes Father     Sleep Apnea Father     No Known Problems Sister     No Known Problems Brother     Psychotic Disorder Maternal Grandfather         commit suicide    Suicidality Maternal Grandfather     Breast Cancer Paternal Grandmother 50    Prostate Cancer Paternal Grandfather     Testicular cancer Paternal Grandfather     Asperger's syndrome Son     Mental Illness Son     Leukemia Paternal Uncle        Social History:  Social History      Tobacco Use    Smoking status: Never    Smokeless tobacco: Never   Vaping Use    Vaping status: Not on file   Substance Use Topics    Alcohol use: No         Physical examination:  /75 (BP Location: Left arm, Patient Position: Sitting, Cuff Size: Adult Large)   Pulse 77   Wt 120.2 kg (265 lb)   LMP  (LMP Unknown)   BMI 39.13 kg/m    General appearance: seated comfortably in the chair during assessment. Not in any acute distress  HEENT: PEERLA. Oral cavity is moist and clear. No thyromegaly or discreet nodules  Lungs: bilateral air entry equal. Clear to auscultation. No rhonchi or crepitations heard  Heart: S1 S2 normal. Pulse: regular rate and rhythm, good volume  Abdomen: soft, nontender, non distended. No  Pink/purple striae  Neurological: conscious and oriented. Speech: normal. Moving all four extremities equally  Extremities: no edema noted. Dorsalis pedis 2+ bilaterally. No ulcers noted. No tremor is noted over her outstretched hands  Psychiatric: normal mood and affect. Normal judgment  Spine: no tenderness or step ups    Endocrine Labs:  10/4/2022 calcium 9.4, creatinine 0.78  8/9/2020: TSH 3.88, free T4: 0.9  No recent vitamin D or PTH levels         Assessment and Plan:   Sarah Gilmore is a 52 year old female with PMHx of  history of aortic root/ascending aorta enlargement, LE edema, DCIS bilateral breasts, ER/NV positive with invasive ductal carcinoma of the right breast, status postmastectomy, EBRT right breast on aromatase inhibitor therapy for last 4 years. Referred for management of osteoporosis    #Osteoporosis with lowest T score -2.6 at L spine  #History of vitamin D deficiency  Risk factors include post menopausal, age, , aromatase inhibitor use, poor calcium intake and family history of osteoporosis. Given her EBRT history PTH analogues would not be recommended.     Recommend 1200 to 1500 mg of elemental calcium as a combination through diet and supplementation. Advised to  take 2-3 servings of calcium rich food in a day.  Recommended calcium carbonate 600 mg with Vit D 10 mcg - 2 tabs bid  Check ca, alb, creat,vit D, PTH, SPEP, UPEP, TSH, TTg Ab levels  Continue IV zoledronic acid 5 mg yearly basis, due in 10/2023 - already ordered as therapy plan by oncology, we plan to do this for 3-5 doses and give a drug holiday based on the repsonse. Discussed rationale, side effects and benefits. Patient agreeable to the plan  DEXA scan in 9/2023 or in 2024 (2 years after prior) based on insurance coverage  Discussed weightbearing exercises, given her dilated aortic root, I mentioned she can refrain from heavy lifting, can use 2 pounds weights or mild resistance band exercises    RTC in 10/2023      The patient was seen, examined and discussed with MD Beltran Enamorado MD.  Endocrinology fellow        Physician Attestation   I saw this patient with Dr. Lucio and agree with the findings and plan of care as documented in the note.     Arleth Valenzuela MD  Endocrinology and Diabetes   [phone]

## 2023-05-11 NOTE — NURSING NOTE
"Chief Complaint   Patient presents with     Osteoporosis     Vital signs:      BP: 115/75 Pulse: 77             Weight: 120.2 kg (265 lb)  Estimated body mass index is 39.13 kg/m  as calculated from the following:    Height as of 4/4/23: 1.753 m (5' 9\").    Weight as of this encounter: 120.2 kg (265 lb).          "

## 2023-05-11 NOTE — LETTER
5/11/2023       RE: Sarah Gilmore  [address]  [facility] [place] [address]     Dear Colleague,    Thank you for referring your patient, Sarah Gilmore, to the [facility] [place] at [facility]. Please see a copy of my visit note below.      Endocrinology Clinic New Consult      Sarah Gilmore MRN:[id number] YOB: 1970  Primary care provider: No Ref-Primary, Physician     Reason for Endocrine consult: Osteoporosis    HPI:  Sarah Gilmore is a 52 year old female with PMHx of  history of aortic root/ascending aorta enlargement, LE edema, DCIS bilateral breasts, ER/NV positive with invasive ductal carcinoma of the right breast, status postmastectomy, EBRT right breast on aromatase inhibitor therapy for last 4 years.  She is postmenopausal at the age of 48, also had hysterectomy at that time.    She had a DEXA spine in December 2019 which showed the most negative T score of bilateral total hip at -1.5 and -1.4 at lumbar spine.  DEXA scan in April 2022 most negative T score of -2.6 at the lumbar spine L2-L4.  She was started on iv zoledronic acid in 10/4/2022 by her oncologist. She mentions she had mild headache which went away in a day after treatment.     - Hx of falls: She had a fall in 2011, she tripped and fell while putting on pants, had a intra-articular left radial head fracture, left ankle fracture.  She had a fall in June 2022 while cleaning, she tripped over a broom handle. She describes that as a bad fall, had facial pain and nasal pain CT facial showed nondisplaced bony irregularity likely nondisplaced fracture  - FH of osteoporosis or hip fractures: Grandmother had osteoporosis and hip fractures  - Menopause: Had a hysterectomy at age of 48.  Currently on anastrozole  - Diet: Drinks milk once in aw hile with a meal, 8 Oz, infrequent use of yoghurt or cheese. Has orange juice couple of times a week,  "has leafy vegetables once in a while.   - Ca and Vit D supplementation: Not on calcium or vitamin D supplements, takes a multivitamin   - Dental health: She had dental clearance prior to her ZA. She had teeth cleaning and few teeth extracted. 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 No illicit drug usage  Family history : Grand mother with osteoporosis, paternal cousin with thyroid disorder - cancer    ROS:  All 12 systems were reviewed and negative except as mentioned in HPI    Past Medical/Surgical History:  Past Medical History:   Diagnosis Date    Dilated aortic root (H)     Foot fracture 06/03/2011    History of anesthesia complications     slow to wake up    PONV (postoperative nausea and vomiting)     Radial head fracture 06/03/2011    Seizures (H)     h/o seizure disorder in childhood, last seizure activity  \"4 yrs ago\"    Suspected sleep apnea     sleep study set up in Aug 2019     Past Surgical History:   Procedure Laterality Date    C/SECTION, LOW TRANSVERSE  07/10/1994    breech    HYSTERECTOMY, PAP NO LONGER INDICATED Bilateral     LAPAROSCOPIC HYSTERECTOMY TOTAL  03/13/2018    cervix benign    MAMMO STEREOTACTIC CORE BIOPSY RIGHT Right 05/22/2019    MAMMO STEREOTACTIC CORE BIOPSY RIGHT Right 05/30/2019    NM SENTINEL NODE INJECTION  07/30/2019    OTHER SURGICAL HISTORY      right arm surgery    CA MASTECTOMY, MODIFIED RADICAL Bilateral 07/30/2019    Procedure: BILATERAL MASTECTOMY;  Surgeon: Mckenzie Colby DO;  Location: [facility] OR;  Service: General    CA REPLACEMENT TISSUE EXPANDER W/PERMANENT IMPLANT Bilateral 07/30/2019    Procedure: BILATERAL IMPLANT RECONSTRUCTION TO BREASTS;  Surgeon: Carlitos Crum MD;  Location: [facility] OR;  Service: " Plastics    RIGHT BREAST RECONSTRUCTION WITH LATISSIMUS DORSI FLAP    10/29/2021    [facility]  DELIVERY ONLY  1994       Allergies:  Allergies   Allergen Reactions    Bees Anaphylaxis     Noted in 09 ER  Trouble breathing, rash    Contrast Dye Anaphylaxis     airway problems    Seafood Anaphylaxis       PTA Meds:  Prior to Admission medications    Medication Sig Last Dose Taking? Auth Provider Long Term End Date   anastrozole (ARIMIDEX) 1 MG tablet Take 1 tablet (1 mg) by mouth daily   Ez Burroughs MD Yes    EPI E-Z PEN JOELLEN 1:1000  IJ 1 TIME ONLY  PRN bee sting   Marvin Fung MD     ergocalciferol (ERGOCALCIFEROL) 02169 UNIT capsule Take 1 capsule by mouth every 14 days.   Marvin Fung MD     FERROUS SULFATE  MG OR TBCR 1 TABLET BID  after food withm orange juice   Marvin Fung MD     fish oil-omega-3 fatty acids 1000 MG capsule    Reported, Patient     Multiple Vitamin (MULTI-VITAMINS) TABS Take 1 tablet by mouth   Reported, Patient     OSCO MAGNESIUM CITRATE OR    Reported, Patient     Specialty Vitamins Products (LYMPH PO) Take by mouth daily Lymphatic supplement   Reported, Patient     Turmeric 400 MG CAPS    Reported, Patient          Current Meds:   Current Outpatient Medications   Medication    anastrozole (ARIMIDEX) 1 MG tablet    EPI E-Z PEN JOELLEN 1:1000  IJ    ergocalciferol (ERGOCALCIFEROL) 10689 UNIT capsule    FERROUS SULFATE  MG OR TBCR    fish oil-omega-3 fatty acids 1000 MG capsule    Multiple Vitamin (MULTI-VITAMINS) TABS    OSCO MAGNESIUM CITRATE OR    Specialty Vitamins Products (LYMPH PO)    Turmeric 400 MG CAPS     No current facility-administered medications for this visit.       Family History:  Family History   Problem Relation Age of Onset    Psychotic Disorder Mother     Sleep Apnea Mother     Mental Illness Mother     C.A.D. Father     Diabetes Father     Sleep Apnea Father     No Known Problems Sister     No Known Problems Brother     Psychotic  Disorder Maternal Grandfather         commit suicide    Suicidality Maternal Grandfather     Breast Cancer Paternal Grandmother 50    Prostate Cancer Paternal Grandfather     Testicular cancer Paternal Grandfather     Asperger's syndrome Son     Mental Illness Son     Leukemia Paternal Uncle        Social History:  Social History     Tobacco Use    Smoking status: Never    Smokeless tobacco: Never   Vaping Use    Vaping status: Not on file   Substance Use Topics    Alcohol use: No         Physical examination:  /75 (BP Location: Left arm, Patient Position: Sitting, Cuff Size: Adult Large)   Pulse 77   Wt 120.2 kg (265 lb)   LMP  (LMP Unknown)   BMI 39.13 kg/m    General appearance: seated comfortably in the chair during assessment. Not in any acute distress  HEENT: PEERLA. Oral cavity is moist and clear. No thyromegaly or discreet nodules  Lungs: bilateral air entry equal. Clear to auscultation. No rhonchi or crepitations heard  Heart: S1 S2 normal. Pulse: regular rate and rhythm, good volume  Abdomen: soft, nontender, non distended. No  Pink/purple striae  Neurological: conscious and oriented. Speech: normal. Moving all four extremities equally  Extremities: no edema noted. Dorsalis pedis 2+ bilaterally. No ulcers noted. No tremor is noted over her outstretched hands  Psychiatric: normal mood and affect. Normal judgment  Spine: no tenderness or step ups    Endocrine Labs:  10/4/2022 calcium 9.4, creatinine 0.78  8/9/2020: TSH 3.88, free T4: 0.9  No recent vitamin D or PTH levels         Assessment and Plan:   Sarah Gilmore is a 52 year old female with PMHx of  history of aortic root/ascending aorta enlargement, LE edema, DCIS bilateral breasts, ER/TN positive with invasive ductal carcinoma of the right breast, status postmastectomy, EBRT right breast on aromatase inhibitor therapy for last 4 years. Referred for management of osteoporosis    #Osteoporosis with lowest T score -2.6 at L spine  #History of  vitamin D deficiency  Risk factors include post menopausal, age, , aromatase inhibitor use, poor calcium intake and family history of osteoporosis. Given her EBRT history PTH analogues would not be recommended.     Recommend 1200 to 1500 mg of elemental calcium as a combination through diet and supplementation. Advised to take 2-3 servings of calcium rich food in a day.  Recommended calcium carbonate 600 mg with Vit D 10 mcg - 2 tabs bid  Check ca, alb, creat,vit D, PTH, SPEP, UPEP, TSH, TTg Ab levels  Continue IV zoledronic acid 5 mg yearly basis, due in 10/2023 - already ordered as therapy plan by oncology, we plan to do this for 3-5 doses and give a drug holiday based on the repsonse. Discussed rationale, side effects and benefits. Patient agreeable to the plan  DEXA scan in 9/2023 or in 2024 (2 years after prior) based on insurance coverage  Discussed weightbearing exercises, given her dilated aortic root, I mentioned she can refrain from heavy lifting, can use 2 pounds weights or mild resistance band exercises    RTC in 10/2023      The patient was seen, examined and discussed with MD Beltran Enamorado MD.  Endocrinology fellow        Physician Attestation   I saw this patient with Dr. Lucio and agree with the findings and plan of care as documented in the note.     Arleth Valenzuela MD  Endocrinology and Diabetes   [phone]

## 2023-05-12 ENCOUNTER — TELEPHONE (OUTPATIENT)
Dept: ENDOCRINOLOGY | Facility: CLINIC | Age: 53
End: 2023-05-12
Payer: COMMERCIAL

## 2023-05-12 DIAGNOSIS — R53.83 FATIGUE, UNSPECIFIED TYPE: Primary | ICD-10-CM

## 2023-05-12 LAB
ALBUMIN SERPL ELPH-MCNC: 4.3 G/DL (ref 3.7–5.1)
ALPHA1 GLOB SERPL ELPH-MCNC: 0.3 G/DL (ref 0.2–0.4)
ALPHA2 GLOB SERPL ELPH-MCNC: 0.7 G/DL (ref 0.5–0.9)
B-GLOBULIN SERPL ELPH-MCNC: 0.9 G/DL (ref 0.6–1)
GAMMA GLOB SERPL ELPH-MCNC: 1.2 G/DL (ref 0.7–1.6)
M PROTEIN SERPL ELPH-MCNC: 0 G/DL
PROT PATTERN SERPL ELPH-IMP: NORMAL
PROT PATTERN UR ELPH-IMP: NORMAL
TTG IGA SER-ACNC: 0.4 U/ML
TTG IGG SER-ACNC: 0.6 U/ML

## 2023-05-17 LAB
DEPRECATED CALCIDIOL+CALCIFEROL SERPL-MC: <38 UG/L (ref 20–75)
VITAMIN D2 SERPL-MCNC: <5 UG/L
VITAMIN D3 SERPL-MCNC: 33 UG/L

## 2023-05-22 ENCOUNTER — TELEPHONE (OUTPATIENT)
Dept: ONCOLOGY | Facility: CLINIC | Age: 53
End: 2023-05-22
Payer: COMMERCIAL

## 2023-05-22 NOTE — TELEPHONE ENCOUNTER
Patient calls wondering if Dr. Burroughs could review recent lab results she had with done endocrinology. Patient was seen on 5/11 and TSH was elevated at 5.82. She reports that she is having continued fatigue. Patient has been waiting for a call back from endocrinology but has not heard anything. Patient was last seen in oncology 4/21/23 for breast cancer, diagnosis 4 years ago, on endocrine therapy with Anastrozole.    Alessandra Mauricio RN

## 2023-05-24 NOTE — TELEPHONE ENCOUNTER
"I spoke with patient who is concerned about thyroid cancer. Family has\" had abnormal TFT  that lead to thyroid cancer \" . Her main complaint is fatigue . She is on a hormone blocking medication . She would like to know  The plan based off the lab results Alma Vides RN on 5/23/2023 at 4:10 PM  .   "
Labs 5/11/23 please view Alma Vides RN on 5/19/2023 at 7:59 AM    
[facility] Call Center    Phone Message    May a detailed message be left on voicemail: yes     Reason for Call: Other: Patient is calling in regards to her lab results from 05/11/2023. Her TSH was high, she was wondering about a medication for this or if she will need more tests done.     She is wondering about medications due to her being tired all the time and her weight is not coming off.     Action Taken: Other: Endo     Travel Screening: Not Applicable                                                                        
[facility] Call Center    Phone Message    May a detailed message be left on voicemail: yes     Reason for Call: Other: Per pt is really worried this time. Please call her back. Thank you!     Action Taken: Message routed to:  [facility] ENDO    Travel Screening: Not Applicable                                                                        
[facility] Call Center    Phone Message    May a detailed message be left on voicemail: yes     Reason for Call: Other: Per pt would like if someone can call her back to go over her results. Per pt symptoms seems like cancer. Prior cancer pt, so she is concern. Please call pt back asap! Thank you!      Action Taken: Message routed to:  [facility] ENDO    Travel Screening: Not Applicable                                                                      
Patient's exhaustion is getting worse, please advise on lab results and medication  
Spoke with patient, will repeat Tsh in 3-4 weeks and added Am cortisol due to her excess fatigue, clinically low suspicion for adrenal insufficiency. Erst of the labs for secondary causes of osteoporosis were normal
Statement Selected

## 2023-06-01 ENCOUNTER — TELEPHONE (OUTPATIENT)
Dept: ONCOLOGY | Facility: CLINIC | Age: 53
End: 2023-06-01
Payer: COMMERCIAL

## 2023-06-01 NOTE — TELEPHONE ENCOUNTER
Spoke with patient. She reports that she has asked endocrinology about getting imaging of thyroid but hasn't gotten a direct answer. Patient is having a lot of fatigue that started about a month ago. The only other time she felt this way was when she was diagnosed with cancer. She is wondering if there is imaging that can be done on her thyroid to rule out cancer, no swelling or nodules were noted on physical exam.    Message will be sent to Dr. Burroughs to advise.    Alessandra Mauricio RN

## 2023-06-01 NOTE — TELEPHONE ENCOUNTER
Message received for patient on triage voicemail. She saw endocrinology recently and had an elevated TSH. Endocrinologist recommended repeat labs in 3 weeks. Patient is concerned for cancer and is wanting imaging done. She is wondering if Dr. Burroughs would order.    Call placed to patient to get further information. Did she discuss imaging with endocrinologist? There was no answer, message left for patient to return call to triage line.    Alessandra Mauricio RN

## 2023-06-05 NOTE — TELEPHONE ENCOUNTER
Call placed to patient. Message left with recommendation from Dr. Burroughs that patient discuss further with endocrinology regarding request for imaging. Patient is advised to call back with any further questions or concerns.    Alessandra Mauricio RN

## 2023-06-13 ENCOUNTER — LAB (OUTPATIENT)
Dept: LAB | Facility: CLINIC | Age: 53
End: 2023-06-13
Payer: COMMERCIAL

## 2023-06-13 DIAGNOSIS — R53.83 FATIGUE, UNSPECIFIED TYPE: ICD-10-CM

## 2023-06-13 LAB
CORTIS SERPL-MCNC: 15 UG/DL
T4 FREE SERPL-MCNC: 1.16 NG/DL (ref 0.9–1.7)
TSH SERPL DL<=0.005 MIU/L-ACNC: 6.07 UIU/ML (ref 0.3–4.2)

## 2023-06-13 PROCEDURE — 36415 COLL VENOUS BLD VENIPUNCTURE: CPT

## 2023-06-13 PROCEDURE — 84439 ASSAY OF FREE THYROXINE: CPT

## 2023-06-13 PROCEDURE — 82533 TOTAL CORTISOL: CPT

## 2023-06-13 PROCEDURE — 84443 ASSAY THYROID STIM HORMONE: CPT

## 2023-07-19 ENCOUNTER — VIRTUAL VISIT (OUTPATIENT)
Dept: OTHER | Facility: CLINIC | Age: 53
End: 2023-07-19
Attending: INTERNAL MEDICINE
Payer: COMMERCIAL

## 2023-07-19 DIAGNOSIS — E66.812 CLASS 2 SEVERE OBESITY WITH SERIOUS COMORBIDITY AND BODY MASS INDEX (BMI) OF 37.0 TO 37.9 IN ADULT, UNSPECIFIED OBESITY TYPE (H): ICD-10-CM

## 2023-07-19 DIAGNOSIS — E66.01 CLASS 2 SEVERE OBESITY WITH SERIOUS COMORBIDITY AND BODY MASS INDEX (BMI) OF 37.0 TO 37.9 IN ADULT, UNSPECIFIED OBESITY TYPE (H): ICD-10-CM

## 2023-07-19 DIAGNOSIS — I87.303 VENOUS HYPERTENSION OF LOWER EXTREMITY, BILATERAL: ICD-10-CM

## 2023-07-19 DIAGNOSIS — R60.9 LIPEDEMA: ICD-10-CM

## 2023-07-19 DIAGNOSIS — G47.33 OSA (OBSTRUCTIVE SLEEP APNEA): ICD-10-CM

## 2023-07-19 DIAGNOSIS — Z85.3 HX: BREAST CANCER: ICD-10-CM

## 2023-07-19 DIAGNOSIS — I97.2 POST-MASTECTOMY LYMPHEDEMA SYNDROME: ICD-10-CM

## 2023-07-19 PROCEDURE — G0463 HOSPITAL OUTPT CLINIC VISIT: HCPCS | Mod: TEL

## 2023-07-19 PROCEDURE — 99214 OFFICE O/P EST MOD 30 MIN: CPT | Mod: 95 | Performed by: INTERNAL MEDICINE

## 2023-07-19 PROCEDURE — 99213 OFFICE O/P EST LOW 20 MIN: CPT

## 2023-07-19 NOTE — PROGRESS NOTES
Sarah is a 53 year old who is being evaluated via a billable telephone visit.      What phone number would you like to be contacted at? [phone]  How would you like to obtain your AVS? Dina    Distant Location (provider location):  On-site        Objective       Vitals:  No vitals were obtained today due to virtual visit.    CEASAR MONTEZ    Provider visit note:    Chief complaint:  Follow-up visit  Known history of lipedema with secondary lymphedema and also varicose veins with venous insufficiency      History of present illness:   For full details please see my initial consult note on January 26, 2023  This is a very pleasant 53-year-old female initially seen for evaluation and management of known history of lipedema with secondary lymphedema and also varicose veins with venous insufficiency and venous hypertension she underwent liposuction of lower extremities in July and August 2022 at [facility] [place] by Dr. Preciado  She started using compression stockings and also using pump therapy  She is taking lymphatic formula 1000 daily  Requesting compression stocking refills to be mailed to her home address    Review of systems: Reviewed all 12 point review of systems as per HPI otherwise unremarkable    Physical exam:( no physical exam done this is virtual visit)    Reviewed recent laboratory tests, imaging studies in the epic and updated chart    Assessment and plan:    1. 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 Preciado)     2. Venous hypertension of lower extremity, bilateral     3. Post-mastectomy lymphedema syndrome     4. HX: breast cancer Bilateral mastectomy LN dissection and followed by breast reconstruction      5. Class 2 severe obesity with serious comorbidity and body mass index (BMI) of 37.0 to 37.9 in adult, unspecified obesity type (H)     6. ONIEL (obstructive sleep apnea)     This  is a very pleasant 52-year-old female with classical features of lipedema and she gives a history of lower part of the body substantially larger than upper part of the body since puberty then followed by pregnancy childbirth and further worsened after the menopause.  Unfortunately she also developed breast cancer underwent bilateral mastectomy and breast reconstruction with secondary lymphedema.  She has a bilateral lower extremity varicose veins with venous insufficiency and venous hypertension.  She has been using compression stockings flex  touch pump .  She was seen and evaluated by edema therapist.  Doing aquatic exercises regularly which is helping     She also underwent liposuction of both lower extremities in July and August 2022 at [facility] with some improvement.     We discussed lipedema and its implications, progression etc. with the patient.  Multimodality approach discussed and shared  [email or web address] diagram during recent office visit     Suggested anti-inflammatory diet, paleo diet and intermittent fasting  Lymphatic flow improvement with compression, vibration, lymphatic yoga, Pump therapy etc.  Muscle toning exercises, aquatic exercises, Pilates cycling etc.  She already joined support groups        Continue  lymphatic formula prescription and information given  Prescription of custom compression stockings  will be mailed to patient home address       Phone visit start time; 2 PM  Location of the patient at her home in [place]  Location of the provider: [facility]         30 minutes spent on the date of the encounter doing chart review, history and exam, documentation, and further activities as noted above.    This visit is being conducted as a virtual visit due to the emphasis on mitigation of the COVID-19 virus pandemic. The clinician has decided that the risk of an in-office visit outweighs the benefit for this patient.      Ade Winkler MD,  ZAY, ONOFRE, FNLA, FACP   vascular Medicine  Clinical Hypertension specialist  Clinical Lipidologist

## 2023-07-19 NOTE — PATIENT INSTRUCTIONS
Continue to utilize compression stockings pantyhose 20 to 30 mmHg new prescription will be mailed to your home address    Use Flexitouch pump    Continue lymphatic formula 1000 one  pill daily    Muscle toning exercises, intermittent fasting, anti-inflammatory diet/paleo diet    Pool exercises etc.    Follow-up with me in 6 months

## 2023-07-19 NOTE — Clinical Note
Ginny,  Please mail AVS with compression stockings prescription sent to patient home address  Thank you  LG

## 2023-07-20 ENCOUNTER — TELEPHONE (OUTPATIENT)
Dept: CARDIOLOGY | Facility: CLINIC | Age: 53
End: 2023-07-20
Payer: COMMERCIAL

## 2023-09-29 ENCOUNTER — ANCILLARY PROCEDURE (OUTPATIENT)
Dept: BONE DENSITY | Facility: CLINIC | Age: 53
End: 2023-09-29
Payer: COMMERCIAL

## 2023-09-29 DIAGNOSIS — M81.0 AGE-RELATED OSTEOPOROSIS WITHOUT CURRENT PATHOLOGICAL FRACTURE: ICD-10-CM

## 2023-09-29 PROCEDURE — 77080 DXA BONE DENSITY AXIAL: CPT | Mod: TC | Performed by: PHYSICIAN ASSISTANT

## 2023-10-02 ENCOUNTER — TELEPHONE (OUTPATIENT)
Dept: ENDOCRINOLOGY | Facility: CLINIC | Age: 53
End: 2023-10-02
Payer: COMMERCIAL

## 2023-10-03 ENCOUNTER — LAB (OUTPATIENT)
Dept: INFUSION THERAPY | Facility: CLINIC | Age: 53
End: 2023-10-03
Attending: INTERNAL MEDICINE
Payer: COMMERCIAL

## 2023-10-03 ENCOUNTER — ONCOLOGY VISIT (OUTPATIENT)
Dept: ONCOLOGY | Facility: CLINIC | Age: 53
End: 2023-10-03
Attending: INTERNAL MEDICINE
Payer: COMMERCIAL

## 2023-10-03 VITALS — WEIGHT: 264.99 LBS | BODY MASS INDEX: 39.13 KG/M2

## 2023-10-03 DIAGNOSIS — M81.0 AGE-RELATED OSTEOPOROSIS WITHOUT CURRENT PATHOLOGICAL FRACTURE: Primary | ICD-10-CM

## 2023-10-03 DIAGNOSIS — C50.911 INFILTRATING DUCTAL CARCINOMA OF RIGHT BREAST (H): Primary | ICD-10-CM

## 2023-10-03 DIAGNOSIS — D05.10 DUCTAL CARCINOMA IN SITU (DCIS) OF BREAST, UNSPECIFIED LATERALITY: ICD-10-CM

## 2023-10-03 DIAGNOSIS — M81.0 AGE-RELATED OSTEOPOROSIS WITHOUT CURRENT PATHOLOGICAL FRACTURE: ICD-10-CM

## 2023-10-03 LAB
CALCIUM SERPL-MCNC: 9.4 MG/DL (ref 8.6–10)
CREAT SERPL-MCNC: 0.76 MG/DL (ref 0.51–0.95)
EGFRCR SERPLBLD CKD-EPI 2021: >90 ML/MIN/1.73M2
HOLD SPECIMEN: NORMAL
HOLD SPECIMEN: NORMAL

## 2023-10-03 PROCEDURE — 82310 ASSAY OF CALCIUM: CPT | Performed by: INTERNAL MEDICINE

## 2023-10-03 PROCEDURE — 82565 ASSAY OF CREATININE: CPT | Performed by: INTERNAL MEDICINE

## 2023-10-03 PROCEDURE — 99212 OFFICE O/P EST SF 10 MIN: CPT | Performed by: INTERNAL MEDICINE

## 2023-10-03 PROCEDURE — 258N000003 HC RX IP 258 OP 636: Performed by: INTERNAL MEDICINE

## 2023-10-03 PROCEDURE — 96365 THER/PROPH/DIAG IV INF INIT: CPT

## 2023-10-03 PROCEDURE — 250N000013 HC RX MED GY IP 250 OP 250 PS 637: Performed by: INTERNAL MEDICINE

## 2023-10-03 PROCEDURE — 250N000011 HC RX IP 250 OP 636: Mod: JZ | Performed by: INTERNAL MEDICINE

## 2023-10-03 PROCEDURE — 99214 OFFICE O/P EST MOD 30 MIN: CPT | Performed by: INTERNAL MEDICINE

## 2023-10-03 PROCEDURE — 36415 COLL VENOUS BLD VENIPUNCTURE: CPT | Performed by: INTERNAL MEDICINE

## 2023-10-03 RX ORDER — DIPHENHYDRAMINE HYDROCHLORIDE 50 MG/ML
50 INJECTION INTRAMUSCULAR; INTRAVENOUS
Status: CANCELLED
Start: 2023-10-03

## 2023-10-03 RX ORDER — LOSARTAN POTASSIUM 25 MG/1
1 TABLET ORAL DAILY
COMMUNITY
Start: 2023-09-06 | End: 2024-05-06

## 2023-10-03 RX ORDER — HEPARIN SODIUM,PORCINE 10 UNIT/ML
5 VIAL (ML) INTRAVENOUS
Status: CANCELLED | OUTPATIENT
Start: 2023-10-03

## 2023-10-03 RX ORDER — ALBUTEROL SULFATE 90 UG/1
1-2 AEROSOL, METERED RESPIRATORY (INHALATION)
Status: CANCELLED
Start: 2023-10-03

## 2023-10-03 RX ORDER — EPINEPHRINE 1 MG/ML
0.3 INJECTION, SOLUTION INTRAMUSCULAR; SUBCUTANEOUS EVERY 5 MIN PRN
Status: CANCELLED | OUTPATIENT
Start: 2023-10-03

## 2023-10-03 RX ORDER — ZOLEDRONIC ACID 5 MG/100ML
5 INJECTION, SOLUTION INTRAVENOUS ONCE
Status: CANCELLED
Start: 2023-10-03

## 2023-10-03 RX ORDER — ZOLEDRONIC ACID 5 MG/100ML
5 INJECTION, SOLUTION INTRAVENOUS ONCE
Status: COMPLETED | OUTPATIENT
Start: 2023-10-03 | End: 2023-10-03

## 2023-10-03 RX ORDER — DIPHENHYDRAMINE HYDROCHLORIDE 50 MG/ML
50 INJECTION INTRAMUSCULAR; INTRAVENOUS
Status: DISCONTINUED | OUTPATIENT
Start: 2023-10-03 | End: 2023-10-03 | Stop reason: HOSPADM

## 2023-10-03 RX ORDER — ALBUTEROL SULFATE 0.83 MG/ML
2.5 SOLUTION RESPIRATORY (INHALATION)
Status: CANCELLED | OUTPATIENT
Start: 2023-10-03

## 2023-10-03 RX ORDER — MEPERIDINE HYDROCHLORIDE 50 MG/ML
25 INJECTION INTRAMUSCULAR; INTRAVENOUS; SUBCUTANEOUS EVERY 30 MIN PRN
Status: CANCELLED | OUTPATIENT
Start: 2023-10-03

## 2023-10-03 RX ORDER — MEPERIDINE HYDROCHLORIDE 50 MG/ML
25 INJECTION INTRAMUSCULAR; INTRAVENOUS; SUBCUTANEOUS EVERY 30 MIN PRN
Status: DISCONTINUED | OUTPATIENT
Start: 2023-10-03 | End: 2023-10-03 | Stop reason: HOSPADM

## 2023-10-03 RX ORDER — METHYLPREDNISOLONE SODIUM SUCCINATE 125 MG/2ML
125 INJECTION, POWDER, LYOPHILIZED, FOR SOLUTION INTRAMUSCULAR; INTRAVENOUS
Status: CANCELLED
Start: 2023-10-03

## 2023-10-03 RX ORDER — ACETAMINOPHEN 325 MG/1
325 TABLET ORAL ONCE
Status: CANCELLED | OUTPATIENT
Start: 2023-10-03

## 2023-10-03 RX ORDER — ALBUTEROL SULFATE 90 UG/1
1-2 AEROSOL, METERED RESPIRATORY (INHALATION)
Status: DISCONTINUED | OUTPATIENT
Start: 2023-10-03 | End: 2023-10-03 | Stop reason: HOSPADM

## 2023-10-03 RX ORDER — HEPARIN SODIUM (PORCINE) LOCK FLUSH IV SOLN 100 UNIT/ML 100 UNIT/ML
5 SOLUTION INTRAVENOUS
Status: CANCELLED | OUTPATIENT
Start: 2023-10-03

## 2023-10-03 RX ORDER — ACETAMINOPHEN 325 MG/1
325 TABLET ORAL ONCE
Status: COMPLETED | OUTPATIENT
Start: 2023-10-03 | End: 2023-10-03

## 2023-10-03 RX ORDER — EPINEPHRINE 1 MG/ML
0.3 INJECTION, SOLUTION INTRAMUSCULAR; SUBCUTANEOUS EVERY 5 MIN PRN
Status: DISCONTINUED | OUTPATIENT
Start: 2023-10-03 | End: 2023-10-03 | Stop reason: HOSPADM

## 2023-10-03 RX ORDER — ALBUTEROL SULFATE 0.83 MG/ML
2.5 SOLUTION RESPIRATORY (INHALATION)
Status: DISCONTINUED | OUTPATIENT
Start: 2023-10-03 | End: 2023-10-03 | Stop reason: HOSPADM

## 2023-10-03 RX ORDER — METHYLPREDNISOLONE SODIUM SUCCINATE 125 MG/2ML
125 INJECTION, POWDER, LYOPHILIZED, FOR SOLUTION INTRAMUSCULAR; INTRAVENOUS
Status: DISCONTINUED | OUTPATIENT
Start: 2023-10-03 | End: 2023-10-03 | Stop reason: HOSPADM

## 2023-10-03 RX ADMIN — ACETAMINOPHEN 325 MG: 325 TABLET ORAL at 11:05

## 2023-10-03 RX ADMIN — SODIUM CHLORIDE 250 ML: 9 INJECTION, SOLUTION INTRAVENOUS at 11:08

## 2023-10-03 RX ADMIN — ZOLEDRONIC ACID 5 MG: 5 INJECTION, SOLUTION INTRAVENOUS at 11:09

## 2023-10-03 NOTE — PROGRESS NOTES
Infusion Nursing Note:  Sarah Gilmore presents today for IV reclast.    Patient seen by provider today: Yes: Dr. Burroughs   present during visit today: Not Applicable.    Note: Tolerated infusion well, offers no complaints.      Intravenous Access:  Peripheral IV placed.    Treatment Conditions:  Results reviewed, labs MET treatment parameters, ok to proceed with treatment.      Post Infusion Assessment:  Patient tolerated infusion without incident.  Blood return noted pre and post infusion.  Site patent and intact, free from redness, edema or discomfort.  No evidence of extravasations.  Access discontinued per protocol.       Discharge Plan:   Patient and/or family verbalized understanding of discharge instructions and all questions answered.      Caty Bartlett RN

## 2023-10-03 NOTE — LETTER
"    10/3/2023         RE: Sarah Gilmore  [address] [place] [address] [address]  [facility] [place] [place] [address]        Dear Colleague,    Thank you for referring your patient, Sarah Gilmore, to the [facility]. Please see a copy of my visit note below.    Oncology Rooming Note    October 3, 2023 10:33 AM   Sarah Gilmore is a 53 year old female who presents for:    Chief Complaint   Patient presents with     Oncology Clinic Visit     DCIS Ductal carcinoma in situ.     Initial Vitals: LMP  (LMP Unknown)  Estimated body mass index is 39.13 kg/m  as calculated from the following:    Height as of 4/4/23: 1.753 m (5' 9\").    Weight as of 5/11/23: 120.2 kg (265 lb). There is no height or weight on file to calculate BSA.  Data Unavailable Comment: Data Unavailable   No LMP recorded (lmp unknown). Patient has had a hysterectomy.  Allergies reviewed: Yes  Medications reviewed: Yes    Medications: Medication refills not needed today.  Pharmacy name entered into [facility]: [id number] - [place], [place] - [address] AT [address]    Clinical concerns:  Right side breast feels different.       Adrian Fenton LPN              [facility] Hematology and Oncology Progress Note    Patient: Sarah Gilmore  MRN: [id number]  Date of Service: Oct 3, 2023         Reason for Visit    Chief Complaint   Patient presents with     Oncology Clinic Visit     DCIS Ductal carcinoma in situ.       Assessment and Plan     Cancer Staging   Infiltrating ductal carcinoma of right breast (H)  Staging form: Breast, AJCC 8th Edition  - Pathologic stage from 9/4/2019: Stage Unknown (pTX, pN1a(sn), cM0, G1, ER+, WY+, HER2-) - Signed by Ez Burroughs MD on 10/18/2021  - Clinical: No stage assigned - Unsigned      ECOG Performance    0 - Independent     Pain       #.  History of DCIS of the both breasts (upper outer quadrant, lower outer quadrant and central portion of the right breast, " multifocal, grade 2; focus of DCIS approximately 2 mm grade 1 in the left breast)  #. pTx pN1a M0 invasive ductal carcinoma of the right breast, grade 1.  ER positive, HI positive, HER-2 negative.     She does not have any clinical concern today.  Exam is unremarkable.  She tolerates anastrozole well.  Advised her to continue anastrozole at this point.  Duration of endocrine therapy is for minimum of 5 years and she is considering to extend to 10 years as long as she does not have major side effects.  Refilled anastrozole today.   Continue clinical surveillance with exam in about 6 months.    #.  Right-sided low back pain/tightness, intermittent.   She has some persistent discomfort around the right side of the body he had right breast.  No palpable or visible abnormalities.  Recommended massage therapy.    #.  Osteoporosis    Reviewed the DEXA scan from 9/29/2023.  It showed improvement in bone mineral density about 2.2-4.2%.  Recommended to continue zoledronic acid.   Started zoledronic acid on 10/4/2022.     Continue calcium and vitamin D and encouraged weightbearing exercises.   Labs from today showed normal calcium and creatinine.  She will receive zoledronic acid today.    Encounter Diagnoses:    Problem List Items Addressed This Visit          Oncology Diagnoses    DCIS (ductal carcinoma in situ)    Infiltrating ductal carcinoma of right breast (H) - Primary       Other    Age-related osteoporosis without current pathological fracture    Relevant Medications    Acetaminophen 325 MG CAPS          CC: EMMY Maxwell CNP   ______________________________________________________________________________  Diagnosis  July 2019-    DCIS of both breasts (upper outer quadrant, lower outer quadrant and central portion of the right breast, multifocal, grade 2; focus of DCIS approximately 2 mm grade 1 in the left breast)   pTx pN1a M0 invasive ductal carcinoma of the right breast, grade 1.  Multifocal DCIS.  6 right  "axillary sentinel lymph nodes were examined 1 lymph node was positive for macro metastases and one lymph node was positive for isolated tumor cells.  ER positive (90%), NC positive (80%), HER-2 negative.    LMP-at age 48 ( in 3/2018) after hysterectomy. Serology confirms menopause in 4/2020.    Treatment to date  7/30/2019- right breast mastectomy and right sentinel lymph node biopsy, left mastectomy.  10/9/2019-12/6/2019-radiation to the right breast and chest wall and regional lymph nodes area of 5040 cGy/28 fractions followed by additional 1000 cGy in 5 fractions to the primary tumor bed.  12/2019-initiated adjuvant tamoxifen, but stopped about 2 months after due to pain in both shins.  Restarted tamoxifen in Apirl 2020, but stopped again after a month and then switched to anastrozole in May 2020.     History of Present Illness    Oliver presented herself today.    She takes anastrozole regularly.  No intolerable side effects.  She does have some different feeling/sensation in the right breast.  No skin changes.  No unusual headaches.  No bone pain.    Review of systems  Apart from describing in HPI, the remainder of comprehensive ROS was negative.    Past History    Past Medical History:   Diagnosis Date     Dilated aortic root (H)      Foot fracture 06/03/2011     History of anesthesia complications     slow to wake up     PONV (postoperative nausea and vomiting)      Radial head fracture 06/03/2011     Seizures (H)     h/o seizure disorder in childhood, last seizure activity  \"4 yrs ago\"     Suspected sleep apnea     sleep study set up in Aug 2019       Past Surgical History:   Procedure Laterality Date     C/SECTION, LOW TRANSVERSE  07/10/1994    breech     HYSTERECTOMY, PAP NO LONGER INDICATED Bilateral      LAPAROSCOPIC HYSTERECTOMY TOTAL  03/13/2018    cervix benign     MAMMO STEREOTACTIC CORE BIOPSY RIGHT Right 05/22/2019     MAMMO STEREOTACTIC CORE BIOPSY RIGHT Right 05/30/2019     NM SENTINEL NODE " INJECTION  2019     OTHER SURGICAL HISTORY      right arm surgery     MO MASTECTOMY, MODIFIED RADICAL Bilateral 2019    Procedure: BILATERAL MASTECTOMY;  Surgeon: Mckenzie Colby DO;  Location: [facility];  Service: General     MO REPLACEMENT TISSUE EXPANDER W/PERMANENT IMPLANT Bilateral 2019    Procedure: BILATERAL IMPLANT RECONSTRUCTION TO BREASTS;  Surgeon: Carlitos Crum MD;  Location: [facility];  Service: Plastics     RIGHT BREAST RECONSTRUCTION WITH LATISSIMUS DORSI FLAP    10/29/2021     [facility]  DELIVERY ONLY  1994         Physical Exam    LMP  (LMP Unknown)     General: alert, awake, not in acute distress  HEENT: Head: Normal, normocephalic, atraumatic.  Eye: Normal external eye, conjunctiva, lids cornea, DAYSI.  Pharynx: Normal buccal mucosa. Normal pharynx.  Neck / Thyroid: Supple, no masses, nodes, nodules or enlargement.  Lymphatics: No abnormally enlarged lymph nodes.  Chest: Normal chest wall and respirations. Clear to auscultation.  Breasts: Postsurgical scar in both breasts.  No palpable abnormalities.  Heart: S1 S2 RRR.   Abdomen: abdomen is soft without significant tenderness, masses, organomegaly or guarding  Extremities: normal strength, tone, and muscle mass  Skin: normal. no rash or abnormalities  CNS: non focal.    Lab Results    Recent Results (from the past 168 hour(s))   Creatinine   Result Value Ref Range    Creatinine 0.76 0.51 - 0.95 mg/dL    GFR Estimate >90 >60 mL/min/1.73m2   Calcium   Result Value Ref Range    Calcium 9.4 8.6 - 10.0 mg/dL   Extra Red Top Tube   Result Value Ref Range    Hold Specimen JIC    Extra Purple Top Tube   Result Value Ref Range    Hold Specimen JIC         Imaging    Dexa hip/pelvis/spine    Result Date: 2023  EXAM: DX HIP/PELVIS/SPINE LOCATION: [facility] DATE: 2023 INDICATION: Age-related osteoporosis without current pathological fracture. Patient is taking Reclast and Vitamin  D. History of breast cancer. DEMOGRAPHICS: Age - 53 years. Gender - female. Menopausal status - postmenopausal. COMPARISON: None. TECHNIQUE: Dual-energy x-ray absorptiometry (DXA) performed with routine technique. Trabecular bone score (TBS) analysis performed. FINDINGS: DXA RESULTS -Lumbar Spine: L2-L4: BMD: 0.922 g/cm2. T-score: -2.3. Z-score: -2.7. -RIGHT Hip Total: BMD: 0.800 g/cm2. T-score: -1.6. Z-score: -1.8. -RIGHT Hip Femoral neck: BMD: 0.887 g/cm2. T-score: -1.1. Z-score: -0.8. -LEFT Hip Total: BMD: 0.780 g/cm2. T-score: -1.8. Z-score: -2.0. -LEFT Hip Femoral neck: BMD: 0.842 g/cm2. T-score: -1.4. Z-score: -1.1. WHO T-SCORE CRITERIA -Normal: T-score at or above -1 SD -Osteopenia: T-score between -1 and -2.5 SD -Osteoporosis: T-score at or below -2.5 SD The World Health Organization (WHO) criteria is applicable to perimenopausal females, postmenopausal females, and men aged 50 years or older. TBS RESULTS -Lumbar Spine L2-L4: TBS: 1.360. TBS T-score: -1.3.TBS Z-score: 0.1. The TBS is a DXA derived measurement for fracture risk assessment, and reflects the structural condition of the bone microarchitecture. It can be used to adjust WHO Fracture Risk Assessment Tool (FRAX) probability of fracture in postmenopausal women and older men. The calculated probabilities of fracture have been shown to be more accurate when computed with the TBS. INTERVAL CHANGE -There has been a 4.2% increase in lumbar spine BMD. -There has been a 2.2% increase in bilateral hip BMD. FRACTURE RISK -FRAX Results: The 10 year probability of major osteoporotic fracture is 10.4%, and of hip fracture is 0.8%, based on left femoral neck BMD. -TBS-adjusted FRAX Results: The 10 year probability of major osteoporotic fracture is 9.6%, and of hip fracture is 0.6%. RECOMMENDATIONS: Consider treatment if major osteoporotic fracture score is greater than or equal to 20%, and if the hip fracture score is greater than or equal to 3%.     IMPRESSION:  Low bone density (OSTEOPENIA). T-score meets the WHO criteria for low bone density (osteopenia) at one or more measured sites. The risk of osteoporotic fracture increases approximately twofold for each standard deviation decrease in T-score.      30 minutes spent on the date of the encounter doing chart review, history and exam, documentation, communication of the treatment plan with the care team and further activities as noted above.    Signed by: Ez Burroughs MD      Again, thank you for allowing me to participate in the care of your patient.        Sincerely,        Ez Burroughs MD

## 2023-10-03 NOTE — PROGRESS NOTES
[facility] Hematology and Oncology Progress Note    Patient: Sarah Gilmore  MRN: [id number]  Date of Service: Oct 3, 2023         Reason for Visit    Chief Complaint   Patient presents with    Oncology Clinic Visit     DCIS Ductal carcinoma in situ.       Assessment and Plan     Cancer Staging   Infiltrating ductal carcinoma of right breast (H)  Staging form: Breast, AJCC 8th Edition  - Pathologic stage from 9/4/2019: Stage Unknown (pTX, pN1a(sn), cM0, G1, ER+, NM+, HER2-) - Signed by Ez Burroughs MD on 10/18/2021  - Clinical: No stage assigned - Unsigned      ECOG Performance    0 - Independent     Pain       #.  History of DCIS of the both breasts (upper outer quadrant, lower outer quadrant and central portion of the right breast, multifocal, grade 2; focus of DCIS approximately 2 mm grade 1 in the left breast)  #. pTx pN1a M0 invasive ductal carcinoma of the right breast, grade 1.  ER positive, NM positive, HER-2 negative.     She does not have any clinical concern today.  Exam is unremarkable.  She tolerates anastrozole well.  Advised her to continue anastrozole at this point.  Duration of endocrine therapy is for minimum of 5 years and she is considering to extend to 10 years as long as she does not have major side effects.  Refilled anastrozole today.   Continue clinical surveillance with exam in about 6 months.    #.  Right-sided low back pain/tightness, intermittent.   She has some persistent discomfort around the right side of the body he had right breast.  No palpable or visible abnormalities.  Recommended massage therapy.    #.  Osteoporosis    Reviewed the DEXA scan from 9/29/2023.  It showed improvement in bone mineral density about 2.2-4.2%.  Recommended to continue zoledronic acid.   Started zoledronic acid on 10/4/2022.     Continue calcium and vitamin D and encouraged weightbearing exercises.   Labs from today showed normal calcium and creatinine.  She will receive zoledronic acid  today.    Encounter Diagnoses:    Problem List Items Addressed This Visit          Oncology Diagnoses    DCIS (ductal carcinoma in situ)    Infiltrating ductal carcinoma of right breast (H) - Primary       Other    Age-related osteoporosis without current pathological fracture    Relevant Medications    Acetaminophen 325 MG CAPS          CC: EMMY Maxwell CNP   ______________________________________________________________________________  Diagnosis  July 2019-    DCIS of both breasts (upper outer quadrant, lower outer quadrant and central portion of the right breast, multifocal, grade 2; focus of DCIS approximately 2 mm grade 1 in the left breast)   pTx pN1a M0 invasive ductal carcinoma of the right breast, grade 1.  Multifocal DCIS.  6 right axillary sentinel lymph nodes were examined 1 lymph node was positive for macro metastases and one lymph node was positive for isolated tumor cells.  ER positive (90%), ID positive (80%), HER-2 negative.    LMP-at age 48 ( in 3/2018) after hysterectomy. Serology confirms menopause in 4/2020.    Treatment to date  7/30/2019- right breast mastectomy and right sentinel lymph node biopsy, left mastectomy.  10/9/2019-12/6/2019-radiation to the right breast and chest wall and regional lymph nodes area of 5040 cGy/28 fractions followed by additional 1000 cGy in 5 fractions to the primary tumor bed.  12/2019-initiated adjuvant tamoxifen, but stopped about 2 months after due to pain in both shins.  Restarted tamoxifen in Apirl 2020, but stopped again after a month and then switched to anastrozole in May 2020.     History of Present Illness    Oliver presented herself today.    She takes anastrozole regularly.  No intolerable side effects.  She does have some different feeling/sensation in the right breast.  No skin changes.  No unusual headaches.  No bone pain.    Review of systems  Apart from describing in HPI, the remainder of comprehensive ROS was negative.    Past  "History    Past Medical History:   Diagnosis Date    Dilated aortic root (H)     Foot fracture 2011    History of anesthesia complications     slow to wake up    PONV (postoperative nausea and vomiting)     Radial head fracture 2011    Seizures (H)     h/o seizure disorder in childhood, last seizure activity  \"4 yrs ago\"    Suspected sleep apnea     sleep study set up in Aug 2019       Past Surgical History:   Procedure Laterality Date    C/SECTION, LOW TRANSVERSE  07/10/1994    breech    HYSTERECTOMY, PAP NO LONGER INDICATED Bilateral     LAPAROSCOPIC HYSTERECTOMY TOTAL  2018    cervix benign    MAMMO STEREOTACTIC CORE BIOPSY RIGHT Right 2019    MAMMO STEREOTACTIC CORE BIOPSY RIGHT Right 2019    NM SENTINEL NODE INJECTION  2019    OTHER SURGICAL HISTORY      right arm surgery    NV MASTECTOMY, MODIFIED RADICAL Bilateral 2019    Procedure: BILATERAL MASTECTOMY;  Surgeon: Mckenzie Colby DO;  Location: [facility] [place];  Service: General    NV REPLACEMENT TISSUE EXPANDER W/PERMANENT IMPLANT Bilateral 2019    Procedure: BILATERAL IMPLANT RECONSTRUCTION TO BREASTS;  Surgeon: Carlitos Crum MD;  Location: [facility] [place];  Service: Plastics    RIGHT BREAST RECONSTRUCTION WITH LATISSIMUS DORSI FLAP    10/29/2021    [facility]  DELIVERY ONLY  1994         Physical Exam    LMP  (LMP Unknown)     General: alert, awake, not in acute distress  HEENT: Head: Normal, normocephalic, atraumatic.  Eye: Normal external eye, conjunctiva, lids cornea, DAYSI.  Pharynx: Normal buccal mucosa. Normal pharynx.  Neck / Thyroid: Supple, no masses, nodes, nodules or enlargement.  Lymphatics: No abnormally enlarged lymph nodes.  Chest: Normal chest wall and respirations. Clear to auscultation.  Breasts: Postsurgical scar in both breasts.  No palpable abnormalities.  Heart: S1 S2 RRR.   Abdomen: abdomen is soft without significant tenderness, masses, organomegaly or " guarding  Extremities: normal strength, tone, and muscle mass  Skin: normal. no rash or abnormalities  CNS: non focal.    Lab Results    Recent Results (from the past 168 hour(s))   Creatinine   Result Value Ref Range    Creatinine 0.76 0.51 - 0.95 mg/dL    GFR Estimate >90 >60 mL/min/1.73m2   Calcium   Result Value Ref Range    Calcium 9.4 8.6 - 10.0 mg/dL   Extra Red Top Tube   Result Value Ref Range    Hold Specimen JIC    Extra Purple Top Tube   Result Value Ref Range    Hold Specimen JIC         Imaging    Dexa hip/pelvis/spine    Result Date: 9/29/2023  EXAM: DX HIP/PELVIS/SPINE LOCATION: [facility] DATE: 9/29/2023 INDICATION: Age-related osteoporosis without current pathological fracture. Patient is taking Reclast and Vitamin D. History of breast cancer. DEMOGRAPHICS: Age - 53 years. Gender - female. Menopausal status - postmenopausal. COMPARISON: None. TECHNIQUE: Dual-energy x-ray absorptiometry (DXA) performed with routine technique. Trabecular bone score (TBS) analysis performed. FINDINGS: DXA RESULTS -Lumbar Spine: L2-L4: BMD: 0.922 g/cm2. T-score: -2.3. Z-score: -2.7. -RIGHT Hip Total: BMD: 0.800 g/cm2. T-score: -1.6. Z-score: -1.8. -RIGHT Hip Femoral neck: BMD: 0.887 g/cm2. T-score: -1.1. Z-score: -0.8. -LEFT Hip Total: BMD: 0.780 g/cm2. T-score: -1.8. Z-score: -2.0. -LEFT Hip Femoral neck: BMD: 0.842 g/cm2. T-score: -1.4. Z-score: -1.1. WHO T-SCORE CRITERIA -Normal: T-score at or above -1 SD -Osteopenia: T-score between -1 and -2.5 SD -Osteoporosis: T-score at or below -2.5 SD The World Health Organization (WHO) criteria is applicable to perimenopausal females, postmenopausal females, and men aged 50 years or older. TBS RESULTS -Lumbar Spine L2-L4: TBS: 1.360. TBS T-score: -1.3.TBS Z-score: 0.1. The TBS is a DXA derived measurement for fracture risk assessment, and reflects the structural condition of the bone microarchitecture. It can be used to adjust WHO Fracture Risk  Assessment Tool (FRAX) probability of fracture in postmenopausal women and older men. The calculated probabilities of fracture have been shown to be more accurate when computed with the TBS. INTERVAL CHANGE -There has been a 4.2% increase in lumbar spine BMD. -There has been a 2.2% increase in bilateral hip BMD. FRACTURE RISK -FRAX Results: The 10 year probability of major osteoporotic fracture is 10.4%, and of hip fracture is 0.8%, based on left femoral neck BMD. -TBS-adjusted FRAX Results: The 10 year probability of major osteoporotic fracture is 9.6%, and of hip fracture is 0.6%. RECOMMENDATIONS: Consider treatment if major osteoporotic fracture score is greater than or equal to 20%, and if the hip fracture score is greater than or equal to 3%.     IMPRESSION: Low bone density (OSTEOPENIA). T-score meets the WHO criteria for low bone density (osteopenia) at one or more measured sites. The risk of osteoporotic fracture increases approximately twofold for each standard deviation decrease in T-score.      30 minutes spent on the date of the encounter doing chart review, history and exam, documentation, communication of the treatment plan with the care team and further activities as noted above.    Signed by: Ez Burroughs MD

## 2023-10-03 NOTE — PROGRESS NOTES
"Oncology Rooming Note    October 3, 2023 10:33 AM   Sarah Gilmore is a 53 year old female who presents for:    Chief Complaint   Patient presents with    Oncology Clinic Visit     DCIS Ductal carcinoma in situ.     Initial Vitals: LMP  (LMP Unknown)  Estimated body mass index is 39.13 kg/m  as calculated from the following:    Height as of 4/4/23: 1.753 m (5' 9\").    Weight as of 5/11/23: 120.2 kg (265 lb). There is no height or weight on file to calculate BSA.  Data Unavailable Comment: Data Unavailable   No LMP recorded (lmp unknown). Patient has had a hysterectomy.  Allergies reviewed: Yes  Medications reviewed: Yes    Medications: Medication refills not needed today.  Pharm[facility] entered into [id number] [place] [phone] AT [facility] [address]    Clinical concerns:  Right side breast feels different.       Adrian Fenton LPN            "

## 2023-11-09 DIAGNOSIS — Z12.11 COLON CANCER SCREENING: ICD-10-CM

## 2023-11-22 ENCOUNTER — OFFICE VISIT (OUTPATIENT)
Dept: ENDOCRINOLOGY | Facility: CLINIC | Age: 53
End: 2023-11-22
Payer: COMMERCIAL

## 2023-11-22 VITALS
HEIGHT: 70 IN | DIASTOLIC BLOOD PRESSURE: 82 MMHG | WEIGHT: 278 LBS | SYSTOLIC BLOOD PRESSURE: 138 MMHG | BODY MASS INDEX: 39.8 KG/M2 | OXYGEN SATURATION: 96 % | HEART RATE: 66 BPM

## 2023-11-22 DIAGNOSIS — E55.9 VITAMIN D DEFICIENCY: Primary | ICD-10-CM

## 2023-11-22 DIAGNOSIS — M81.0 AGE-RELATED OSTEOPOROSIS WITHOUT CURRENT PATHOLOGICAL FRACTURE: ICD-10-CM

## 2023-11-22 DIAGNOSIS — E03.8 SUBCLINICAL HYPOTHYROIDISM: ICD-10-CM

## 2023-11-22 PROCEDURE — 99215 OFFICE O/P EST HI 40 MIN: CPT | Performed by: STUDENT IN AN ORGANIZED HEALTH CARE EDUCATION/TRAINING PROGRAM

## 2023-11-22 ASSESSMENT — PAIN SCALES - GENERAL: PAINLEVEL: NO PAIN (0)

## 2023-11-22 NOTE — PROGRESS NOTES
Endocrinology Clinic Visit 11/22/2023    NAME:  Sarah Gilmore  PCP:  No Ref-Primary, Physician  MRN:  [id number]  Reason for Consult:  osteoporosis  Requesting Provider:  Referred Self    Chief Complaint     No chief complaint on file.      History of Present Illness     Sarah Gilmore is a 53 year old female who is seen in clinic for osteoporosis. She established with endocrine 5/2023, she is new to me.    She has a PMHx of  history of aortic root/ascending aorta enlargement, LE edema, DCIS bilateral breasts, ER/IA positive with invasive ductal carcinoma of the right breast, status postmastectomy, EBRT right breast on aromatase inhibitor therapy for last 4 years.  She is postmenopausal at the age of 48, also had hysterectomy at that time.     She had a DEXA spine in December 2019 which showed the most negative T score of bilateral total hip at -1.5 and -1.4 at lumbar spine.  DEXA scan in April 2022 most negative T score of -2.6 at the lumbar spine L2-L4.  She was started on iv zoledronic acid in 10/4/2022 by her oncologist.     - Hx of falls: She had a fall in 2011, she tripped and fell while putting on pants, had a intra-articular left radial head fracture, left ankle fracture.  She had a fall in June 2022 while cleaning, she tripped over a broom handle. She describes that as a bad fall, had facial pain and nasal pain CT facial showed nondisplaced bony irregularity likely nondisplaced fracture  - FH of osteoporosis or hip fractures: Grandmother had osteoporosis and hip fractures  - Menopause: Had a hysterectomy at age of 48.  Currently on anastrozole  - Diet: Drinks milk once in aw hile with a meal, 8 Oz, infrequent use of yoghurt or cheese. Has orange juice couple of times a week, has leafy vegetables once in a while.   - Ca and Vit D supplementation: Not on calcium or vitamin D supplements, takes a multivitamin   - Dental health: She had dental clearance prior to her ZA. She had teeth cleaning and few teeth  extracted. No tooth pain or plan for procedures  - Thyroid disorder: none  - IBD/malabsorption/ RA: none  - Steroid exposure: none  - Smoking hx: none  - Alcohol hx: none  - Back pain or height loss: none       Interval hx: she remains on anastrozole.  No falls or fractures.  Taking ca carbonate-vitd 600-10 bid  She is following with oncology, last seen 10/2023. Completed reclast 10/2022, 10/2023.     Dexa scan 9/2023  DXA RESULTS  -Lumbar Spine: L2-L4: BMD: 0.922 g/cm2. T-score: -2.3. Z-score: -2.7.  -RIGHT Hip Total: BMD: 0.800 g/cm2. T-score: -1.6. Z-score: -1.8.  -RIGHT Hip Femoral neck: BMD: 0.887 g/cm2. T-score: -1.1. Z-score: -0.8.  -LEFT Hip Total: BMD: 0.780 g/cm2. T-score: -1.8. Z-score: -2.0.  -LEFT Hip Femoral neck: BMD: 0.842 g/cm2. T-score: -1.4. Z-score: -1.1.     WHO T-SCORE CRITERIA  -Normal: T-score at or above -1 SD  -Osteopenia: T-score between -1 and -2.5 SD  -Osteoporosis: T-score at or below -2.5 SD     The World Health Organization (WHO) criteria is applicable to perimenopausal females, postmenopausal females, and men aged 50 years or older.     TBS RESULTS  -Lumbar Spine L2-L4: TBS: 1.360. TBS T-score: -1.3.TBS Z-score: 0.1.     The TBS is a DXA derived measurement for fracture risk assessment, and reflects the structural condition of the bone microarchitecture. It can be used to adjust WHO Fracture Risk Assessment Tool (FRAX) probability of fracture in postmenopausal women and   older men. The calculated probabilities of fracture have been shown to be more accurate when computed with the TBS.     INTERVAL CHANGE  -There has been a 4.2% increase in lumbar spine BMD.  -There has been a 2.2% increase in bilateral hip BMD.    Social history : Works as [employer] technician, single, 3 kids. No tobacco use or alcohol use. No illicit drug usage  Family history : Grand mother with osteoporosis, paternal cousin with thyroid disorder - cancer    Problem List     Patient Active Problem List   Diagnosis     "Iron deficiency anemia    Hymenoptera allergy    Vitamin D deficiency    Acquired lymphedema of leg    H/O breast reconstruction    Personal history of malignant neoplasm of breast    Swelling in right armpit    Post-mastectomy lymphedema syndrome    Venous hypertension of lower extremity, bilateral    Lipedema    Class 2 severe obesity with serious comorbidity and body mass index (BMI) of 38.0 to 38.9 in adult, unspecified obesity type (H)    DCIS (ductal carcinoma in situ)    Infiltrating ductal carcinoma of right breast (H)    History of hysterectomy    Age-related osteoporosis without current pathological fracture        Medications     Current Outpatient Medications   Medication    Acetaminophen 325 MG CAPS    anastrozole (ARIMIDEX) 1 MG tablet    calcium carbonate-vitamin D (CALTRATE) 600-10 MG-MCG per tablet    EPI E-Z PEN JOELLEN 1:1000  IJ    FERROUS SULFATE  MG OR TBCR    fish oil-omega-3 fatty acids 1000 MG capsule    losartan (COZAAR) 25 MG tablet    MAGNESIUM PO    Multiple Vitamin (MULTI-VITAMINS) TABS    Specialty Vitamins Products (LYMPH PO)    Turmeric 400 MG CAPS     No current facility-administered medications for this visit.        Allergies     Allergies   Allergen Reactions    Bees Anaphylaxis     Noted in 1/17/09 ER  Trouble breathing, rash    Contrast Dye Anaphylaxis     airway problems    Seafood Anaphylaxis       Medical / Surgical History     Past Medical History:   Diagnosis Date    Dilated aortic root (H)     Foot fracture 06/03/2011    History of anesthesia complications     slow to wake up    PONV (postoperative nausea and vomiting)     Radial head fracture 06/03/2011    Seizures (H)     h/o seizure disorder in childhood, last seizure activity  \"4 yrs ago\"    Suspected sleep apnea     sleep study set up in Aug 2019     Past Surgical History:   Procedure Laterality Date    C/SECTION, LOW TRANSVERSE  07/10/1994    breech    HYSTERECTOMY, PAP NO LONGER INDICATED Bilateral     " LAPAROSCOPIC HYSTERECTOMY TOTAL  2018    cervix benign    MAMMO STEREOTACTIC CORE BIOPSY RIGHT Right 2019    MAMMO STEREOTACTIC CORE BIOPSY RIGHT Right 2019    NM SENTINEL NODE INJECTION  2019    OTHER SURGICAL HISTORY      right arm surgery    RI MASTECTOMY, MODIFIED RADICAL Bilateral 2019    Procedure: BILATERAL MASTECTOMY;  Surgeon: Mckenzie Colby DO;  Location: [facility] [place];  Service: General    RI REPLACEMENT TISSUE EXPANDER W/PERMANENT IMPLANT Bilateral 2019    Procedure: BILATERAL IMPLANT RECONSTRUCTION TO BREASTS;  Surgeon: Carlitos Crum MD;  Location: [facility] [place];  Service: Plastics    RIGHT BREAST RECONSTRUCTION WITH LATISSIMUS DORSI FLAP    10/29/2021    [facility]  DELIVERY ONLY  1994       Social History     Social History     Socioeconomic History    Marital status: Single     Spouse name: Not on file    Number of children: 3    Years of education: 12+    Highest education level: Not on file   Occupational History    Occupation: pt care tech, [employer]   Tobacco Use    Smoking status: Never    Smokeless tobacco: Never   Substance and Sexual Activity    Alcohol use: No    Drug use: No    Sexual activity: Yes     Partners: Male     Birth control/protection: Condom   Other Topics Concern     Service Not Asked    Blood Transfusions No    Caffeine Concern Yes    Occupational Exposure Not Asked    Hobby Hazards No    Sleep Concern No    Stress Concern No    Weight Concern No    Special Diet No    Back Care No    Exercise Yes    Bike Helmet Not Asked    Seat Belt Yes    Self-Exams Yes   Social History Narrative    Not on file     Social Determinants of Health     Financial Resource Strain: Not on file   Food Insecurity: Not on file   Transportation Needs: Not on file   Physical Activity: Not on file   Stress: Not on file   Social Connections: Not on file   Interpersonal Safety: Not on file   Housing Stability: Not on file       Family History  "    Family History   Problem Relation Age of Onset    Psychotic Disorder Mother     Sleep Apnea Mother     Mental Illness Mother     C.A.D. Father     Diabetes Father     Sleep Apnea Father     No Known Problems Sister     No Known Problems Brother     Psychotic Disorder Maternal Grandfather         commit suicide    Suicidality Maternal Grandfather     Breast Cancer Paternal Grandmother 50    Prostate Cancer Paternal Grandfather     Testicular cancer Paternal Grandfather     Asperger's syndrome Son     Mental Illness Son     Leukemia Paternal Uncle        ROS     12 ROS completed, pertinent positive and negative in HPI    Physical Exam   LMP  (LMP Unknown)      General: Comfortable, no obvious distress, normal body habitus  Eyes: Sclera anicteric, moist conjunctiva  HENT: Atraumatic, oropharynx clear, moist mucous membranes with no mucosal ulcerations  Neck: Trachea midline, supple. Thyroid: Thyroid is normal in size and texture  CV: normal rate.   Resp:  good effort, no evidence of loud wheezing  Abdomen:  obese, non distended.   Skin: No rashes, lesions, or subcutaneous nodules on exposed skin.   Psych: Alert and oriented x 3. Appropriate affect, good insight  Extremities: No peripheral edema  Musculoskeletal: Appropriate muscle bulk and strength  Neuro: Moves all four extremities. No focal deficits on limited exam. Gait normal.     Labs/Imaging     Pertinent Labs were reviewed and updated in EPIC and discussed briefly.  Radiology Results were  reviewed and updated in EPIC and discussed briefly.    Summary of recent findings:   No results found for: \"A1C\"    TSH   Date Value Ref Range Status   06/13/2023 6.07 (H) 0.30 - 4.20 uIU/mL Final   05/11/2023 5.82 (H) 0.30 - 4.20 uIU/mL Final   08/29/2020 3.88 0.30 - 5.00 uIU/mL Final   03/11/2008 2.25 0.4 - 5.0 mU/L Final   02/03/2004 1.94 0.34 - 4.82 IU/mL      T4 Free   Date Value Ref Range Status   03/11/2008 1.02 0.70 - 1.85 ng/dL Final     Free T4   Date Value Ref " "Range Status   06/13/2023 1.16 0.90 - 1.70 ng/dL Final   05/11/2023 0.94 0.90 - 1.70 ng/dL Final       Creatinine   Date Value Ref Range Status   10/03/2023 0.76 0.51 - 0.95 mg/dL Final   06/07/2011 0.83 0.52 - 1.04 mg/dL Final       Recent Labs   Lab Test 08/12/21  0936 06/30/16  1007   CHOL 169 138   HDL 46* 42*    85   TRIG 91 54       No results found for: \"[id number]\", \"[id number]\", \"[id number]\"    I personally reviewed the patient's outside records from [employer]. Summary of pertinent findings in HPI.    Impression / Plan     1. Osteoporosis  Risk factors are post menopause and use of anastrozole. She is not sure for how long she will remain on Anastrozole, probably 10 years. She is s/p 2 dose reclast ordered by oncology, 10/2022 and 10/2023. Most recent dexa 9/2023 with some improvement in BMD. We discussed plan of treating with IV relact for 3 years then reassess. Next reclast 10/2024. Next dexa scan 9/2025.  Discussed 1/3 risk of flu symptoms (acute phase reaction) after treatment with IV zoledronic acid. Discussed risks of osteonecrosis of the jaw (1/200 after tooth extraction, 1/2500 spontaneous) and atypical femur fractures (1/1000) with chronic bisphosphonates. For every atypical femur fracture, 100 typical femur fractures are prevented.  We discussed adequate ca and vitd intake.   She will continue follow ing with oncology for osteoporosis and return as needed.  Filled her disability parking card due to concern of slipping on ice when walking during winter. Advised to establish with new PCP , now that her PCP left practice.     2. Subclinical hypothyroidism  Noted on labs 5/2023 and 6/2023. We reviewed the diagnosis above and approach to management. There is no indication to start treatment for mild subclinical hypothyroidism. We reviewed the indications to start lt4 replacement. Recommend monitoring tft every 6-12 month or earlier if symptoms. Monitoring by PCP or oncologist. Return PRN. "       Test and/or medications prescribed today:  No orders of the defined types were placed in this encounter.        Follow up: heydi Smith MD  Endocrinology, Diabetes and Metabolism  [facility]    40 minutes spent on the date of the encounter doing chart review, history and exam, documentation and further activities per the note

## 2023-11-22 NOTE — LETTER
11/22/2023       RE: Sarah Gilmore  [address] [facility] [address]  [place] [address]     Dear Colleague,    Thank you for referring your patient, Sarah Gilmore, to the [facility] [place] at [facility]. Please see a copy of my visit note below.    Endocrinology Clinic Visit 11/22/2023    NAME:  Sarah Gilmore  PCP:  No Ref-Primary, Physician  MRN:  [id number]  Reason for Consult:  osteoporosis  Requesting Provider:  Referred Self    Chief Complaint     No chief complaint on file.      History of Present Illness     Sarah Gilmore is a 53 year old female who is seen in clinic for osteoporosis. She established with endocrine 5/2023, she is new to me.    She has a PMHx of  history of aortic root/ascending aorta enlargement, LE edema, DCIS bilateral breasts, ER/N[place] positive with invasive ductal carcinoma of the right breast, status postmastectomy, EBRT right breast on aromatase inhibitor therapy for last 4 years.  She is postmenopausal at the age of 48, also had hysterectomy at that time.     She had a DEXA spine in December 2019 which showed the most negative T score of bilateral total hip at -1.5 and -1.4 at lumbar spine.  DEXA scan in April 2022 most negative T score of -2.6 at the lumbar spine L2-L4.  She was started on iv zoledronic acid in 10/4/2022 by her oncologist.     - Hx of falls: She had a fall in 2011, she tripped and fell while putting on pants, had a intra-articular left radial head fracture, left ankle fracture.  She had a fall in June 2022 while cleaning, she tripped over a broom handle. She describes that as a bad fall, had facial pain and nasal pain CT facial showed nondisplaced bony irregularity likely nondisplaced fracture  - FH of osteoporosis or hip fractures: Grandmother had osteoporosis and hip fractures  - Menopause: Had a hysterectomy at age of 48.  Currently on anastrozole  - Diet: Drinks milk once in aw  lydia with a meal, 8 Oz, infrequent use of yoghurt or cheese. Has orange juice couple of times a week, has leafy vegetables once in a while.   - Ca and Vit D supplementation: Not on calcium or vitamin D supplements, takes a multivitamin   - Dental health: She had dental clearance prior to her ZA. She had teeth cleaning and few teeth extracted. No tooth pain or plan for procedures  - Thyroid disorder: none  - IBD/malabsorption/ RA: none  - Steroid exposure: none  - Smoking hx: none  - Alcohol hx: none  - Back pain or height loss: none       Interval hx: she remains on anastrozole.  No falls or fractures.  Taking ca carbonate-vitd 600-10 bid  She is following with oncology, last seen 10/2023. Completed reclast 10/2022, 10/2023.     Dexa scan 9/2023  DXA RESULTS  -Lumbar Spine: L2-L4: BMD: 0.922 g/cm2. T-score: -2.3. Z-score: -2.7.  -RIGHT Hip Total: BMD: 0.800 g/cm2. T-score: -1.6. Z-score: -1.8.  -RIGHT Hip Femoral neck: BMD: 0.887 g/cm2. T-score: -1.1. Z-score: -0.8.  -LEFT Hip Total: BMD: 0.780 g/cm2. T-score: -1.8. Z-score: -2.0.  -LEFT Hip Femoral neck: BMD: 0.842 g/cm2. T-score: -1.4. Z-score: -1.1.     WHO T-SCORE CRITERIA  -Normal: T-score at or above -1 SD  -Osteopenia: T-score between -1 and -2.5 SD  -Osteoporosis: T-score at or below -2.5 SD     The World Health Organization (WHO) criteria is applicable to perimenopausal females, postmenopausal females, and men aged 50 years or older.     TBS RESULTS  -Lumbar Spine L2-L4: TBS: 1.360. TBS T-score: -1.3.TBS Z-score: 0.1.     The TBS is a DXA derived measurement for fracture risk assessment, and reflects the structural condition of the bone microarchitecture. It can be used to adjust WHO Fracture Risk Assessment Tool (FRAX) probability of fracture in postmenopausal women and   older men. The calculated probabilities of fracture have been shown to be more accurate when computed with the TBS.     INTERVAL CHANGE  -There has been a 4.2% increase in lumbar spine  BMD.  -There has been a 2.2% increase in bilateral hip BMD.    Social history : Works as [employer] technician, single, 3 kids. No tobacco use or alcohol use. No illicit drug usage  Family history : Grand mother with osteoporosis, paternal cousin with thyroid disorder - cancer    Problem List     Patient Active Problem List   Diagnosis    Iron deficiency anemia    Hymenoptera allergy    Vitamin D deficiency    Acquired lymphedema of leg    H/O breast reconstruction    Personal history of malignant neoplasm of breast    Swelling in right armpit    Post-mastectomy lymphedema syndrome    Venous hypertension of lower extremity, bilateral    Lipedema    Class 2 severe obesity with serious comorbidity and body mass index (BMI) of 38.0 to 38.9 in adult, unspecified obesity type (H)    DCIS (ductal carcinoma in situ)    Infiltrating ductal carcinoma of right breast (H)    History of hysterectomy    Age-related osteoporosis without current pathological fracture        Medications     Current Outpatient Medications   Medication    Acetaminophen 325 MG CAPS    anastrozole (ARIMIDEX) 1 MG tablet    calcium carbonate-vitamin D (CALTRATE) 600-10 MG-MCG per tablet    EPI E-Z PEN JOELLEN 1:1000  IJ    FERROUS SULFATE  MG OR TBCR    fish oil-omega-3 fatty acids 1000 MG capsule    losartan (COZAAR) 25 MG tablet    MAGNESIUM PO    Multiple Vitamin (MULTI-VITAMINS) TABS    Specialty Vitamins Products (LYMPH PO)    Turmeric 400 MG CAPS     No current facility-administered medications for this visit.        Allergies     Allergies   Allergen Reactions    Bees Anaphylaxis     Noted in 1/17/09 ER  Trouble breathing, rash    Contrast Dye Anaphylaxis     airway problems    Seafood Anaphylaxis       Medical / Surgical History     Past Medical History:   Diagnosis Date    Dilated aortic root (H)     Foot fracture 06/03/2011    History of anesthesia complications     slow to wake up    PONV (postoperative nausea and vomiting)     Radial head  "fracture 2011    Seizures (H)     h/o seizure disorder in childhood, last seizure activity  \"4 yrs ago\"    Suspected sleep apnea     sleep study set up in Aug 2019     Past Surgical History:   Procedure Laterality Date    C/SECTION, LOW TRANSVERSE  07/10/1994    breech    HYSTERECTOMY, PAP NO LONGER INDICATED Bilateral     LAPAROSCOPIC HYSTERECTOMY TOTAL  2018    cervix benign    MAMMO STEREOTACTIC CORE BIOPSY RIGHT Right 2019    MAMMO STEREOTACTIC CORE BIOPSY RIGHT Right 2019    NM SENTINEL NODE INJECTION  2019    OTHER SURGICAL HISTORY      right arm surgery    MD MASTECTOMY, MODIFIED RADICAL Bilateral 2019    Procedure: BILATERAL MASTECTOMY;  Surgeon: Mckenzie Colby DO;  Location: [facility];  Service: General    MD REPLACEMENT TISSUE EXPANDER W/PERMANENT IMPLANT Bilateral 2019    Procedure: BILATERAL IMPLANT RECONSTRUCTION TO BREASTS;  Surgeon: Carlitos Crum MD;  Location: [facility];  Service: Plastics    RIGHT BREAST RECONSTRUCTION WITH LATISSIMUS DORSI FLAP    10/29/2021    ZZC  DELIVERY ONLY  1994       Social History     Social History     Socioeconomic History    Marital status: Single     Spouse name: Not on file    Number of children: 3    Years of education: 12+    Highest education level: Not on file   Occupational History    Occupation: pt care tech, [employer]   Tobacco Use    Smoking status: Never    Smokeless tobacco: Never   Substance and Sexual Activity    Alcohol use: No    Drug use: No    Sexual activity: Yes     Partners: Male     Birth control/protection: Condom   Other Topics Concern     Service Not Asked    Blood Transfusions No    Caffeine Concern Yes    Occupational Exposure Not Asked    Hobby Hazards No    Sleep Concern No    Stress Concern No    Weight Concern No    Special Diet No    Back Care No    Exercise Yes    Bike Helmet Not Asked    Seat Belt Yes    Self-Exams Yes   Social History Narrative    Not " "on file     Social Determinants of Health     Financial Resource Strain: Not on file   Food Insecurity: Not on file   Transportation Needs: Not on file   Physical Activity: Not on file   Stress: Not on file   Social Connections: Not on file   Interpersonal Safety: Not on file   Housing Stability: Not on file       Family History     Family History   Problem Relation Age of Onset    Psychotic Disorder Mother     Sleep Apnea Mother     Mental Illness Mother     C.A.D. Father     Diabetes Father     Sleep Apnea Father     No Known Problems Sister     No Known Problems Brother     Psychotic Disorder Maternal Grandfather         commit suicide    Suicidality Maternal Grandfather     Breast Cancer Paternal Grandmother 50    Prostate Cancer Paternal Grandfather     Testicular cancer Paternal Grandfather     Asperger's syndrome Son     Mental Illness Son     Leukemia Paternal Uncle        ROS     12 ROS completed, pertinent positive and negative in HPI    Physical Exam   LMP  (LMP Unknown)      General: Comfortable, no obvious distress, normal body habitus  Eyes: Sclera anicteric, moist conjunctiva  HENT: Atraumatic, oropharynx clear, moist mucous membranes with no mucosal ulcerations  Neck: Trachea midline, supple. Thyroid: Thyroid is normal in size and texture  CV: normal rate.   Resp:  good effort, no evidence of loud wheezing  Abdomen:  obese, non distended.   Skin: No rashes, lesions, or subcutaneous nodules on exposed skin.   Psych: Alert and oriented x 3. Appropriate affect, good insight  Extremities: No peripheral edema  Musculoskeletal: Appropriate muscle bulk and strength  Neuro: Moves all four extremities. No focal deficits on limited exam. Gait normal.     Labs/Imaging     Pertinent Labs were reviewed and updated in EPIC and discussed briefly.  Radiology Results were  reviewed and updated in EPIC and discussed briefly.    Summary of recent findings:   No results found for: \"A1C\"    TSH   Date Value Ref Range " "Status   06/13/2023 6.07 (H) 0.30 - 4.20 uIU/mL Final   05/11/2023 5.82 (H) 0.30 - 4.20 uIU/mL Final   08/29/2020 3.88 0.30 - 5.00 uIU/mL Final   03/11/2008 2.25 0.4 - 5.0 mU/L Final   02/03/2004 1.94 0.34 - 4.82 IU/mL      T4 Free   Date Value Ref Range Status   03/11/2008 1.02 0.70 - 1.85 ng/dL Final     Free T4   Date Value Ref Range Status   06/13/2023 1.16 0.90 - 1.70 ng/dL Final   05/11/2023 0.94 0.90 - 1.70 ng/dL Final       Creatinine   Date Value Ref Range Status   10/03/2023 0.76 0.51 - 0.95 mg/dL Final   06/07/2011 0.83 0.52 - 1.04 mg/dL Final       Recent Labs   Lab Test 08/12/21  0936 06/30/16  1007   CHOL 169 138   HDL 46* 42*    85   TRIG 91 54       No results found for: \"[id number]\", \"[id number]\", \"[id number]\"    I personally reviewed the patient's outside records from [facility]. Summary of pertinent findings in HPI.    Impression / Plan     1. Osteoporosis  Risk factors are post menopause and use of anastrozole. She is not sure for how long she will remain on Anastrozole, probably 10 years. She is s/p 2 dose reclast ordered by oncology, 10/2022 and 10/2023. Most recent dexa 9/2023 with some improvement in BMD. We discussed plan of treating with IV relact for 3 years then reassess. Next reclast 10/2024. Next dexa scan 9/2025.  Discussed 1/3 risk of flu symptoms (acute phase reaction) after treatment with IV zoledronic acid. Discussed risks of osteonecrosis of the jaw (1/200 after tooth extraction, 1/2500 spontaneous) and atypical femur fractures (1/1000) with chronic bisphosphonates. For every atypical femur fracture, 100 typical femur fractures are prevented.  We discussed adequate ca and vitd intake.   She will continue follow ing with oncology for osteoporosis and return as needed.  Filled her disability parking card due to concern of slipping on ice when walking during winter. Advised to establish with new PCP , now that her PCP left practice.     2. Subclinical hypothyroidism  Noted on " labs 5/2023 and 6/2023. We reviewed the diagnosis above and approach to management. There is no indication to start treatment for mild subclinical hypothyroidism. We reviewed the indications to start lt4 replacement. Recommend monitoring tft every 6-12 month or earlier if symptoms. Monitoring by PCP or oncologist. Return PRN.       Test and/or medications prescribed today:  No orders of the defined types were placed in this encounter.        Follow up: prn       Leonel Smith MD  Endocrinology, Diabetes and Metabolism  [facility]    40 minutes spent on the date of the encounter doing chart review, history and exam, documentation and further activities per the note

## 2023-11-22 NOTE — NURSING NOTE
"Chief Complaint   Patient presents with    Endocrine Problem     Oncologist sent Sarah for osteoporosis. Thyroid levels are going up. Fatigue. Sleeping 15-16 hours. Weight gain is going out of control. Patient would like the paperwork for handicap parking.     Blood pressure 138/82, pulse 66, height 1.769 m (5' 9.65\"), weight 126.1 kg (278 lb), SpO2 96%, not currently breastfeeding.    Kacey Mcintosh    "

## 2023-11-23 ENCOUNTER — LAB (OUTPATIENT)
Dept: FAMILY MEDICINE | Facility: CLINIC | Age: 53
End: 2023-11-23
Payer: COMMERCIAL

## 2023-11-23 DIAGNOSIS — Z12.11 COLON CANCER SCREENING: ICD-10-CM

## 2024-01-28 ENCOUNTER — HEALTH MAINTENANCE LETTER (OUTPATIENT)
Age: 54
End: 2024-01-28

## 2024-02-02 ENCOUNTER — TELEPHONE (OUTPATIENT)
Dept: ONCOLOGY | Facility: HOSPITAL | Age: 54
End: 2024-02-02
Payer: COMMERCIAL

## 2024-02-02 NOTE — TELEPHONE ENCOUNTER
Patient called and left message about a skin spot on neck that has had some recent changes. Left message for patient to return call.   Kaila Akins RN

## 2024-02-02 NOTE — TELEPHONE ENCOUNTER
Patient called back.   She has had what she thinks is a flat mole on Lt lateral neck x years. It recently became raised, it caught on comb when doing hair. It is white in color. Approximate size of pencil eraser.  She does not think it is asymmetrical. No bleeding, no itch, no discharge. No personal history of skin cancer.  She has hx of Infiltrating ductal carcinoma of right breast.   Patient will be seeing PCP on 2/06.   Advised she have Dr. Quick look at area. He may biopsy or recommend referral to Dermatology.   If referred, asked that patient sign COLTEN for information sharing.   The patient aware of plan and verbalizes agreement.  Kaila Akins RN

## 2024-02-06 ENCOUNTER — ORDERS ONLY (AUTO-RELEASED) (OUTPATIENT)
Dept: INTERNAL MEDICINE | Facility: CLINIC | Age: 54
End: 2024-02-06

## 2024-02-06 ENCOUNTER — OFFICE VISIT (OUTPATIENT)
Dept: INTERNAL MEDICINE | Facility: CLINIC | Age: 54
End: 2024-02-06
Payer: COMMERCIAL

## 2024-02-06 VITALS
WEIGHT: 282 LBS | BODY MASS INDEX: 40.37 KG/M2 | SYSTOLIC BLOOD PRESSURE: 120 MMHG | TEMPERATURE: 98.6 F | OXYGEN SATURATION: 95 % | RESPIRATION RATE: 18 BRPM | DIASTOLIC BLOOD PRESSURE: 72 MMHG | HEIGHT: 70 IN | HEART RATE: 78 BPM

## 2024-02-06 DIAGNOSIS — M25.532 LEFT WRIST PAIN: ICD-10-CM

## 2024-02-06 DIAGNOSIS — Z12.11 SCREEN FOR COLON CANCER: ICD-10-CM

## 2024-02-06 DIAGNOSIS — L98.9 SKIN LESION: ICD-10-CM

## 2024-02-06 DIAGNOSIS — E03.8 SUBCLINICAL HYPOTHYROIDISM: Primary | ICD-10-CM

## 2024-02-06 PROBLEM — I71.8: Status: ACTIVE | Noted: 2023-08-25

## 2024-02-06 LAB — TSH SERPL DL<=0.005 MIU/L-ACNC: 4.06 UIU/ML (ref 0.3–4.2)

## 2024-02-06 PROCEDURE — 36415 COLL VENOUS BLD VENIPUNCTURE: CPT | Performed by: NURSE PRACTITIONER

## 2024-02-06 PROCEDURE — 84443 ASSAY THYROID STIM HORMONE: CPT | Performed by: NURSE PRACTITIONER

## 2024-02-06 PROCEDURE — 99214 OFFICE O/P EST MOD 30 MIN: CPT | Performed by: NURSE PRACTITIONER

## 2024-02-06 ASSESSMENT — PATIENT HEALTH QUESTIONNAIRE - PHQ9
10. IF YOU CHECKED OFF ANY PROBLEMS, HOW DIFFICULT HAVE THESE PROBLEMS MADE IT FOR YOU TO DO YOUR WORK, TAKE CARE OF THINGS AT HOME, OR GET ALONG WITH OTHER PEOPLE: SOMEWHAT DIFFICULT
SUM OF ALL RESPONSES TO PHQ QUESTIONS 1-9: 7
SUM OF ALL RESPONSES TO PHQ QUESTIONS 1-9: 7

## 2024-02-06 NOTE — PATIENT INSTRUCTIONS
Wear a wrist splint with thumb spica every day.  Wear this for the next few weeks.    Apply topical Voltaren gel on your wrist 2-3 times daily.    You could also try heat for 15 minutes 2-3 times daily.    Try to avoid aggravating activities.    If pain persists, you may need a steroid injection at the base of the thumb to treat the tendinitis.    Call dermatology consultants in [place] to assess the skin lesion on your neck as well as to do a whole body skin check.    Recheck thyroid labs today.

## 2024-02-06 NOTE — PROGRESS NOTES
Assessment & Plan     Subclinical hypothyroidism  She saw endocrinology and had mildly elevated TSH with normal T4.  She wants this rechecked and if similar, willing to try levothyroxine 50 mcg daily due to persistent fatigue and weight gain  - TSH with free T4 reflex; Future    Skin lesion  Sclerotic appearing nevi left neck area.  Suggested dermatology evaluation  - Adult Dermatology  Referral; Future    Screen for colon cancer  - MISSY(EXACT SCIENCES); Future    Left wrist pain  Likely dealing with de Quervain's tenosynovitis.  We talked about splinting with a thumb spica and topical NSAIDs.  If unresponsive to this treatment regimen, would suggest she try corticosteroid injection at Ortho  - WRIST SPLINT    Patient Instructions   Wear a wrist splint with thumb spica every day.  Wear this for the next few weeks.    Apply topical Voltaren gel on your wrist 2-3 times daily.    You could also try heat for 15 minutes 2-3 times daily.    Try to avoid aggravating activities.    If pain persists, you may need a steroid injection at the base of the thumb to treat the tendinitis.    Call dermatology consultants in [place] to assess the skin lesion on your neck as well as to do a whole body skin check.    Recheck thyroid labs today.      Anastasiya Smith is a 53 year old, presenting for the following health issues:    Wrist Pain (Left wrist pain, no injury, started ~2  months ago)      2/6/2024     7:31 AM   Additional Questions   Roomed by Luz Marina WOOD     History of Present Illness       Reason for visit:  Wrist pain skin tag on left side of neck  Symptom onset:  More than a month  Symptoms include:  Pain  Symptom intensity:  Moderate  Symptom progression:  Worsening  Had these symptoms before:  No  What makes it worse:  Overusing movement after resting sleeping  What makes it better:  Living on ibuprofen & tylenol    She eats 2-3 servings of fruits and vegetables daily.She consumes 3 sweetened beverage(s)  "daily.She exercises with enough effort to increase her heart rate 30 to 60 minutes per day.  She exercises with enough effort to increase her heart rate 5 days per week. She is missing 2 dose(s) of medications per week.  She is not taking prescribed medications regularly due to side effects and remembering to take.       Here for some left-sided wrist pain.  It has been present for about a month.  She does have a wrist brace but it does not have a thumb spica.  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  Physical Exam   Finkelstein's maneuver positive on the left        Signed Electronically by: Niko Kennedy, CNP    "

## 2024-03-15 LAB — NONINV COLON CA DNA+OCC BLD SCRN STL QL: NEGATIVE

## 2024-04-03 ENCOUNTER — TELEPHONE (OUTPATIENT)
Dept: ONCOLOGY | Facility: HOSPITAL | Age: 54
End: 2024-04-03
Payer: COMMERCIAL

## 2024-04-03 NOTE — TELEPHONE ENCOUNTER
"Patient calls wanting to give Dr. Burroughs an update prior to appointment tomorrow. She reports that she had MR angio chest done with cardiology and there were some concerns with her liver that she was told to discuss with provider. Care everywhere updated, imaging was done on 3/6/24 at [place]v Report mentions \"likely liver cysts.\"  Also, there is a mole on her neck that she is concerned about. She was seen by [facility], they referred her to dermatology. Patient will be seeing dermatology this month on 4/21.    Alessandra Mauricio RN     "

## 2024-04-04 ENCOUNTER — ONCOLOGY VISIT (OUTPATIENT)
Dept: ONCOLOGY | Facility: HOSPITAL | Age: 54
End: 2024-04-04
Attending: INTERNAL MEDICINE
Payer: COMMERCIAL

## 2024-04-04 VITALS
HEART RATE: 66 BPM | TEMPERATURE: 98.4 F | WEIGHT: 290 LBS | OXYGEN SATURATION: 98 % | HEIGHT: 70 IN | SYSTOLIC BLOOD PRESSURE: 135 MMHG | BODY MASS INDEX: 41.52 KG/M2 | RESPIRATION RATE: 20 BRPM | DIASTOLIC BLOOD PRESSURE: 91 MMHG

## 2024-04-04 DIAGNOSIS — M81.0 AGE-RELATED OSTEOPOROSIS WITHOUT CURRENT PATHOLOGICAL FRACTURE: Primary | ICD-10-CM

## 2024-04-04 DIAGNOSIS — K76.9 LIVER LESION: ICD-10-CM

## 2024-04-04 DIAGNOSIS — Z79.811 LONG TERM CURRENT USE OF AROMATASE INHIBITOR: ICD-10-CM

## 2024-04-04 DIAGNOSIS — D05.11 DUCTAL CARCINOMA IN SITU (DCIS) OF RIGHT BREAST: ICD-10-CM

## 2024-04-04 PROCEDURE — 99214 OFFICE O/P EST MOD 30 MIN: CPT | Performed by: INTERNAL MEDICINE

## 2024-04-04 PROCEDURE — G2211 COMPLEX E/M VISIT ADD ON: HCPCS | Performed by: INTERNAL MEDICINE

## 2024-04-04 RX ORDER — ZOLEDRONIC ACID 5 MG/100ML
5 INJECTION, SOLUTION INTRAVENOUS ONCE
Status: CANCELLED
Start: 2024-10-04

## 2024-04-04 RX ORDER — HEPARIN SODIUM,PORCINE 10 UNIT/ML
5-20 VIAL (ML) INTRAVENOUS DAILY PRN
Status: CANCELLED | OUTPATIENT
Start: 2024-10-04

## 2024-04-04 RX ORDER — MEPERIDINE HYDROCHLORIDE 50 MG/ML
25 INJECTION INTRAMUSCULAR; INTRAVENOUS; SUBCUTANEOUS EVERY 30 MIN PRN
Status: CANCELLED | OUTPATIENT
Start: 2024-10-04

## 2024-04-04 RX ORDER — ALBUTEROL SULFATE 0.83 MG/ML
2.5 SOLUTION RESPIRATORY (INHALATION)
Status: CANCELLED | OUTPATIENT
Start: 2024-10-04

## 2024-04-04 RX ORDER — ANASTROZOLE 1 MG/1
1 TABLET ORAL DAILY
Qty: 90 TABLET | Refills: 2 | Status: SHIPPED | OUTPATIENT
Start: 2024-04-04 | End: 2024-10-04

## 2024-04-04 RX ORDER — METHYLPREDNISOLONE SODIUM SUCCINATE 125 MG/2ML
125 INJECTION, POWDER, LYOPHILIZED, FOR SOLUTION INTRAMUSCULAR; INTRAVENOUS
Status: CANCELLED
Start: 2024-10-04

## 2024-04-04 RX ORDER — DIPHENHYDRAMINE HYDROCHLORIDE 50 MG/ML
50 INJECTION INTRAMUSCULAR; INTRAVENOUS
Status: CANCELLED
Start: 2024-10-04

## 2024-04-04 RX ORDER — ALBUTEROL SULFATE 90 UG/1
1-2 AEROSOL, METERED RESPIRATORY (INHALATION)
Status: CANCELLED
Start: 2024-10-04

## 2024-04-04 RX ORDER — EPINEPHRINE 1 MG/ML
0.3 INJECTION, SOLUTION INTRAMUSCULAR; SUBCUTANEOUS EVERY 5 MIN PRN
Status: CANCELLED | OUTPATIENT
Start: 2024-10-04

## 2024-04-04 RX ORDER — HEPARIN SODIUM (PORCINE) LOCK FLUSH IV SOLN 100 UNIT/ML 100 UNIT/ML
5 SOLUTION INTRAVENOUS
Status: CANCELLED | OUTPATIENT
Start: 2024-10-04

## 2024-04-04 ASSESSMENT — PAIN SCALES - GENERAL: PAINLEVEL: NO PAIN (0)

## 2024-04-04 NOTE — LETTER
"    4/4/2024         RE: Sarah Gilmore  [address] [place] [address] [address]  [facility] [address]        Dear Colleague,    Thank you for referring your patient, Sarah Gilmore, to the [facility]. Please see a copy of my visit note below.    Oncology Rooming Note    April 4, 2024 3:05 PM   Sarah Gilmore is a 53 year old female who presents for:    Chief Complaint   Patient presents with     Oncology Clinic Visit     Returning patient consult: Ductal carcinoma in situ (DCIS) of right breast. 6 moths follow up.     Initial Vitals: BP (!) 135/91   Pulse 66   Temp 98.4  F (36.9  C) (Tympanic)   Resp 20   Ht 1.772 m (5' 9.76\")   Wt 131.5 kg (290 lb)   LMP  (LMP Unknown)   SpO2 98%   BMI 41.89 kg/m   Estimated body mass index is 41.89 kg/m  as calculated from the following:    Height as of this encounter: 1.772 m (5' 9.76\").    Weight as of this encounter: 131.5 kg (290 lb). Body surface area is 2.54 meters squared.  No Pain (0) Comment: Data Unavailable   No LMP recorded (lmp unknown). Patient has had a hysterectomy.  Allergies reviewed: Yes  Medications reviewed: Yes    Medications: MEDICATION REFILLS NEEDED TODAY. Provider was notified.  Pharmacy name entered into [employer]: [id number] - [place], [place] - [address] AT [facility] [address]    Frailty Screening:   Is the patient here for a new oncology consult visit in cancer care? 2. No      Clinical concerns:  RETURN CCSL - 6 months follow up.      India Serrano MA              [facility] Hematology and Oncology Progress Note    Patient: Sarah Gilmore  MRN: [id number]  Date of Service: Apr 4, 2024         Reason for Visit    Chief Complaint   Patient presents with     Oncology Clinic Visit     Returning patient consult: Ductal carcinoma in situ (DCIS) of right breast. 6 moths follow up.       Assessment and Plan     Cancer Staging   Infiltrating ductal carcinoma of right breast " (H)  Staging form: Breast, AJCC 8th Edition  - Pathologic stage from 9/4/2019: Stage Unknown (pTX, pN1a(sn), cM0, G1, ER+, SD+, HER2-) - Signed by Ez Burroughs MD on 10/18/2021  - Clinical: No stage assigned - Unsigned      ECOG Performance    0 - Independent     Pain  Pain Score: No Pain (0)    #.  History of DCIS of the both breasts (upper outer quadrant, lower outer quadrant and central portion of the right breast, multifocal, grade 2; focus of DCIS approximately 2 mm grade 1 in the left breast)  #. pTx pN1a M0 invasive ductal carcinoma of the right breast, grade 1.  ER positive, SD positive, HER-2 negative.     She is clinically well without signs and symptoms suggestive for breast cancer recurrence. She tolerates current aromatase inhibitor therapy well without major intolerable side effects.  Plan is to complete 5 years of adjuvant endocrine therapy (till Dec 2024)   She does not have any indication for systemic imaging.  I discussed that we will obtain imaging with clinical signs and symptoms concerning for breast cancer recurrence, but not routinely.   I recommended continue clinical surveillance with follow up clinical exam in 6 months.  She is advised to call me sooner with any concerns.    #. Liver lesions/cysts  It was incidentally found MR angiogram of the chest during MRA of the chest.  Requested ultrasound liver for further evaluation.  Will communicate once the result is available.    #. Osteoporosis    Reviewed the DEXA scan from 9/29/2023.  It showed improvement in bone mineral density about 2.2-4.2%.  Recommended to continue zoledronic acid.   Started zoledronic acid on 10/4/2022.  She received second dose in 10/2023.  She will receive next dose in 10/2024.   Continue calcium and vitamin D and encouraged weightbearing exercises.   Will plan to obtain DEXA scan in 2025.    The longitudinal plan of care for the diagnosis(es)/condition(s) as documented were addressed during this visit. Due to the  added complexity in care, I will continue to support Sarah in the subsequent management and with ongoing continuity of care.    Encounter Diagnoses:    Problem List Items Addressed This Visit          Oncology Diagnoses    DCIS (ductal carcinoma in situ)    Relevant Medications    anastrozole (ARIMIDEX) 1 MG tablet       Other    Age-related osteoporosis without current pathological fracture - Primary    Long term current use of aromatase inhibitor     Other Visit Diagnoses       Liver lesion        Relevant Orders    US Abdomen Limited                 CC: Laura Lobo, EMMY CNP   ______________________________________________________________________________  Diagnosis  July 2019-    DCIS of both breasts (upper outer quadrant, lower outer quadrant and central portion of the right breast, multifocal, grade 2; focus of DCIS approximately 2 mm grade 1 in the left breast)   pTx pN1a M0 invasive ductal carcinoma of the right breast, grade 1.  Multifocal DCIS.  6 right axillary sentinel lymph nodes were examined 1 lymph node was positive for macro metastases and one lymph node was positive for isolated tumor cells.  ER positive (90%), NV positive (80%), HER-2 negative.    LMP-at age 48 ( in 3/2018) after hysterectomy. Serology confirms menopause in 4/2020.    Treatment to date  7/30/2019- right breast mastectomy and right sentinel lymph node biopsy, left mastectomy.  10/9/2019-12/6/2019-radiation to the right breast and chest wall and regional lymph nodes area of 5040 cGy/28 fractions followed by additional 1000 cGy in 5 fractions to the primary tumor bed.  12/2019-initiated adjuvant tamoxifen, but stopped about 2 months after due to pain in both shins.  Restarted tamoxifen in Apirl 2020, but stopped again after a month and then switched to anastrozole in May 2020.     History of Present Illness    Oliver presented herself today.  She reported no concerns today.  She takes anastrozole regularly.  She does not have major  "intolerable side effects.  She informed me that liver cysts were incidentally found during angiogram of the chest.  She was advised by her cardiologist to follow-up with me.    Review of systems  Apart from describing in HPI, the remainder of comprehensive ROS was negative.    Past History    Past Medical History:   Diagnosis Date     Dilated aortic root (H24)      Foot fracture 2011     History of anesthesia complications     slow to wake up     PONV (postoperative nausea and vomiting)      Radial head fracture 2011     Seizures (H)     h/o seizure disorder in childhood, last seizure activity  \"4 yrs ago\"     Suspected sleep apnea     sleep study set up in Aug 2019       Past Surgical History:   Procedure Laterality Date     C/SECTION, LOW TRANSVERSE  07/10/1994    breech     HYSTERECTOMY, PAP NO LONGER INDICATED Bilateral      LAPAROSCOPIC HYSTERECTOMY TOTAL  2018    cervix benign     MAMMO STEREOTACTIC CORE BIOPSY RIGHT Right 2019     MAMMO STEREOTACTIC CORE BIOPSY RIGHT Right 2019     NM SENTINEL NODE INJECTION  2019     OTHER SURGICAL HISTORY      right arm surgery     CT MASTECTOMY, MODIFIED RADICAL Bilateral 2019    Procedure: BILATERAL MASTECTOMY;  Surgeon: Mckenzie Colby DO;  Location: [facility];  Service: General     CT REPLACEMENT TISSUE EXPANDER W/PERMANENT IMPLANT Bilateral 2019    Procedure: BILATERAL IMPLANT RECONSTRUCTION TO BREASTS;  Surgeon: Carlitos Crum MD;  Location: [facility];  Service: Plastics     RIGHT BREAST RECONSTRUCTION WITH LATISSIMUS DORSI FLAP    10/29/2021     [facility]  DELIVERY ONLY  1994         Physical Exam    BP (!) 135/91   Pulse 66   Temp 98.4  F (36.9  C) (Tympanic)   Resp 20   Ht 1.772 m (5' 9.76\")   Wt 131.5 kg (290 lb)   LMP  (LMP Unknown)   SpO2 98%   BMI 41.89 kg/m      General: alert, awake, not in acute distress  HEENT: Head: Normal, normocephalic, atraumatic.  Eye: Normal external " eye, conjunctiva, lids cornea, DAYSI.  Pharynx: Normal buccal mucosa. Normal pharynx.  Neck / Thyroid: Supple, no masses, nodes, nodules or enlargement.  Lymphatics: No abnormally enlarged lymph nodes.  Chest: Normal chest wall and respirations. Clear to auscultation.  Breasts: Reconstructed breast bilaterally. Upper inner quadrant of the right breast showed skin  Heart: S1 S2 RRR.   Abdomen: abdomen is soft without significant tenderness, masses, organomegaly or guarding  Extremities: normal strength, tone, and muscle mass  Skin: normal. no rash or abnormalities  CNS: non focal.    Lab Results    No results found for this or any previous visit (from the past 168 hour(s)).       Imaging    No results found.    32 minutes spent on the date of the encounter doing chart review, history and exam, documentation, communication of the treatment plan with the care team and further activities as noted above.    Signed by: Ez Burroughs MD      Again, thank you for allowing me to participate in the care of your patient.        Sincerely,        Ez Burroughs MD

## 2024-04-04 NOTE — PROGRESS NOTES
[facility] Hematology and Oncology Progress Note    Patient: Sarah Gilmore  MRN: [id number]  Date of Service: Apr 4, 2024         Reason for Visit    Chief Complaint   Patient presents with    Oncology Clinic Visit     Returning patient consult: Ductal carcinoma in situ (DCIS) of right breast. 6 moths follow up.       Assessment and Plan     Cancer Staging   Infiltrating ductal carcinoma of right breast (H)  Staging form: Breast, AJCC 8th Edition  - Pathologic stage from 9/4/2019: Stage Unknown (pTX, pN1a(sn), cM0, G1, ER+, MA+, HER2-) - Signed by Ez Burroughs MD on 10/18/2021  - Clinical: No stage assigned - Unsigned      ECOG Performance    0 - Independent     Pain  Pain Score: No Pain (0)    #.  History of DCIS of the both breasts (upper outer quadrant, lower outer quadrant and central portion of the right breast, multifocal, grade 2; focus of DCIS approximately 2 mm grade 1 in the left breast)  #. pTx pN1a M0 invasive ductal carcinoma of the right breast, grade 1.  ER positive, MA positive, HER-2 negative.     She is clinically well without signs and symptoms suggestive for breast cancer recurrence. She tolerates current aromatase inhibitor therapy well without major intolerable side effects.  Plan is to complete 5 years of adjuvant endocrine therapy (till Dec 2024)   She does not have any indication for systemic imaging.  I discussed that we will obtain imaging with clinical signs and symptoms concerning for breast cancer recurrence, but not routinely.   I recommended continue clinical surveillance with follow up clinical exam in 6 months.  She is advised to call me sooner with any concerns.    #. Liver lesions/cysts  It was incidentally found MR angiogram of the chest during MRA of the chest.  Requested ultrasound liver for further evaluation.  Will communicate once the result is available.    #. Osteoporosis    Reviewed the DEXA scan from 9/29/2023.  It showed improvement in bone mineral density  about 2.2-4.2%.  Recommended to continue zoledronic acid.   Started zoledronic acid on 10/4/2022.  She received second dose in 10/2023.  She will receive next dose in 10/2024.   Continue calcium and vitamin D and encouraged weightbearing exercises.   Will plan to obtain DEXA scan in 2025.    The longitudinal plan of care for the diagnosis(es)/condition(s) as documented were addressed during this visit. Due to the added complexity in care, I will continue to support Sarah in the subsequent management and with ongoing continuity of care.    Encounter Diagnoses:    Problem List Items Addressed This Visit          Oncology Diagnoses    DCIS (ductal carcinoma in situ)    Relevant Medications    anastrozole (ARIMIDEX) 1 MG tablet       Other    Age-related osteoporosis without current pathological fracture - Primary    Long term current use of aromatase inhibitor     Other Visit Diagnoses       Liver lesion        Relevant Orders    US Abdomen Limited                 CC: EMMY Maxwell CNP   ______________________________________________________________________________  Diagnosis  July 2019-    DCIS of both breasts (upper outer quadrant, lower outer quadrant and central portion of the right breast, multifocal, grade 2; focus of DCIS approximately 2 mm grade 1 in the left breast)   pTx pN1a M0 invasive ductal carcinoma of the right breast, grade 1.  Multifocal DCIS.  6 right axillary sentinel lymph nodes were examined 1 lymph node was positive for macro metastases and one lymph node was positive for isolated tumor cells.  ER positive (90%), UT positive (80%), HER-2 negative.    LMP-at age 48 ( in 3/2018) after hysterectomy. Serology confirms menopause in 4/2020.    Treatment to date  7/30/2019- right breast mastectomy and right sentinel lymph node biopsy, left mastectomy.  10/9/2019-12/6/2019-radiation to the right breast and chest wall and regional lymph nodes area of 5040 cGy/28 fractions followed by additional  "1000 cGy in 5 fractions to the primary tumor bed.  12/2019-initiated adjuvant tamoxifen, but stopped about 2 months after due to pain in both shins.  Restarted tamoxifen in Apirl 2020, but stopped again after a month and then switched to anastrozole in May 2020.     History of Present Illness    Oliver presented herself today.  She reported no concerns today.  She takes anastrozole regularly.  She does not have major intolerable side effects.  She informed me that liver cysts were incidentally found during angiogram of the chest.  She was advised by her cardiologist to follow-up with me.    Review of systems  Apart from describing in HPI, the remainder of comprehensive ROS was negative.    Past History    Past Medical History:   Diagnosis Date    Dilated aortic root (H24)     Foot fracture 06/03/2011    History of anesthesia complications     slow to wake up    PONV (postoperative nausea and vomiting)     Radial head fracture 06/03/2011    Seizures (H)     h/o seizure disorder in childhood, last seizure activity  \"4 yrs ago\"    Suspected sleep apnea     sleep study set up in Aug 2019       Past Surgical History:   Procedure Laterality Date    C/SECTION, LOW TRANSVERSE  07/10/1994    breech    HYSTERECTOMY, PAP NO LONGER INDICATED Bilateral     LAPAROSCOPIC HYSTERECTOMY TOTAL  03/13/2018    cervix benign    MAMMO STEREOTACTIC CORE BIOPSY RIGHT Right 05/22/2019    MAMMO STEREOTACTIC CORE BIOPSY RIGHT Right 05/30/2019    NM SENTINEL NODE INJECTION  07/30/2019    OTHER SURGICAL HISTORY      right arm surgery    WA MASTECTOMY, MODIFIED RADICAL Bilateral 07/30/2019    Procedure: BILATERAL MASTECTOMY;  Surgeon: Mckenzie Colby DO;  Location: [facility];  Service: General    WA REPLACEMENT TISSUE EXPANDER W/PERMANENT IMPLANT Bilateral 07/30/2019    Procedure: BILATERAL IMPLANT RECONSTRUCTION TO BREASTS;  Surgeon: Carlitos Crum MD;  Location: [facility] OR;  Service: Plastics    RIGHT BREAST RECONSTRUCTION WITH " "LATISSIMUS DORSI FLAP    10/29/2021    [facility]  DELIVERY ONLY  1994         Physical Exam    BP (!) 135/91   Pulse 66   Temp 98.4  F (36.9  C) (Tympanic)   Resp 20   Ht 1.772 m (5' 9.76\")   Wt 131.5 kg (290 lb)   LMP  (LMP Unknown)   SpO2 98%   BMI 41.89 kg/m      General: alert, awake, not in acute distress  HEENT: Head: Normal, normocephalic, atraumatic.  Eye: Normal external eye, conjunctiva, lids cornea, DAYSI.  Pharynx: Normal buccal mucosa. Normal pharynx.  Neck / Thyroid: Supple, no masses, nodes, nodules or enlargement.  Lymphatics: No abnormally enlarged lymph nodes.  Chest: Normal chest wall and respirations. Clear to auscultation.  Breasts: Reconstructed breast bilaterally. Upper inner quadrant of the right breast showed skin  Heart: S1 S2 RRR.   Abdomen: abdomen is soft without significant tenderness, masses, organomegaly or guarding  Extremities: normal strength, tone, and muscle mass  Skin: normal. no rash or abnormalities  CNS: non focal.    Lab Results    No results found for this or any previous visit (from the past 168 hour(s)).       Imaging    No results found.    32 minutes spent on the date of the encounter doing chart review, history and exam, documentation, communication of the treatment plan with the care team and further activities as noted above.    Signed by: Ez Burroughs MD  "

## 2024-04-04 NOTE — PROGRESS NOTES
"Oncology Rooming Note    April 4, 2024 3:05 PM   Sarah Gilmore is a 53 year old female who presents for:    Chief Complaint   Patient presents with    Oncology Clinic Visit     Returning patient consult: Ductal carcinoma in situ (DCIS) of right breast. 6 moths follow up.     Initial Vitals: BP (!) 135/91   Pulse 66   Temp 98.4  F (36.9  C) (Tympanic)   Resp 20   Ht 1.772 m (5' 9.76\")   Wt 131.5 kg (290 lb)   LMP  (LMP Unknown)   SpO2 98%   BMI 41.89 kg/m   Estimated body mass index is 41.89 kg/m  as calculated from the following:    Height as of this encounter: 1.772 m (5' 9.76\").    Weight as of this encounter: 131.5 kg (290 lb). Body surface area is 2.54 meters squared.  No Pain (0) Comment: Data Unavailable   No LMP recorded (lmp unknown). Patient has had a hysterectomy.  Allergies reviewed: Yes  Medications reviewed: Yes    Medications: MEDICATION REFILLS NEEDED TODAY. Provider was notified.  Pharmacy name entered into [employer]: [id number] [address] [place] [facility] [address] [facility] [address]    Frailty Screening:   Is the patient here for a new oncology consult visit in cancer care? 2. No      Clinical concerns:  RETURN CCSL - 6 months follow up.      India Serrano MA            "

## 2024-04-09 ENCOUNTER — HOSPITAL ENCOUNTER (OUTPATIENT)
Dept: ULTRASOUND IMAGING | Facility: CLINIC | Age: 54
Discharge: HOME | End: 2024-04-09
Attending: INTERNAL MEDICINE | Admitting: INTERNAL MEDICINE
Payer: COMMERCIAL

## 2024-04-09 ENCOUNTER — TRANSFERRED RECORDS (OUTPATIENT)
Dept: HEALTH INFORMATION MANAGEMENT | Facility: CLINIC | Age: 54
End: 2024-04-09
Payer: COMMERCIAL

## 2024-04-09 DIAGNOSIS — K76.9 LIVER LESION: ICD-10-CM

## 2024-04-09 PROCEDURE — 76705 ECHO EXAM OF ABDOMEN: CPT

## 2024-04-17 ENCOUNTER — TRANSFERRED RECORDS (OUTPATIENT)
Dept: HEALTH INFORMATION MANAGEMENT | Facility: CLINIC | Age: 54
End: 2024-04-17
Payer: COMMERCIAL

## 2024-05-06 ENCOUNTER — OFFICE VISIT (OUTPATIENT)
Dept: INTERNAL MEDICINE | Facility: CLINIC | Age: 54
End: 2024-05-06
Payer: COMMERCIAL

## 2024-05-06 VITALS
OXYGEN SATURATION: 96 % | TEMPERATURE: 97.6 F | BODY MASS INDEX: 41.47 KG/M2 | HEART RATE: 78 BPM | RESPIRATION RATE: 18 BRPM | DIASTOLIC BLOOD PRESSURE: 72 MMHG | WEIGHT: 280 LBS | SYSTOLIC BLOOD PRESSURE: 92 MMHG | HEIGHT: 69 IN

## 2024-05-06 DIAGNOSIS — R60.9 LIPEDEMA: ICD-10-CM

## 2024-05-06 DIAGNOSIS — I77.810 THORACIC AORTIC ECTASIA (H): ICD-10-CM

## 2024-05-06 DIAGNOSIS — D05.11 DUCTAL CARCINOMA IN SITU (DCIS) OF RIGHT BREAST: ICD-10-CM

## 2024-05-06 DIAGNOSIS — E03.8 SUBCLINICAL HYPOTHYROIDISM: ICD-10-CM

## 2024-05-06 DIAGNOSIS — I77.810 ASCENDING AORTA DILATATION (H): ICD-10-CM

## 2024-05-06 DIAGNOSIS — M81.0 AGE-RELATED OSTEOPOROSIS WITHOUT CURRENT PATHOLOGICAL FRACTURE: ICD-10-CM

## 2024-05-06 DIAGNOSIS — E55.9 VITAMIN D DEFICIENCY: ICD-10-CM

## 2024-05-06 DIAGNOSIS — Z13.220 SCREENING FOR LIPOID DISORDERS: ICD-10-CM

## 2024-05-06 DIAGNOSIS — Z11.4 SCREENING FOR HIV (HUMAN IMMUNODEFICIENCY VIRUS): Primary | ICD-10-CM

## 2024-05-06 DIAGNOSIS — Z00.00 ANNUAL PHYSICAL EXAM: ICD-10-CM

## 2024-05-06 DIAGNOSIS — Z11.59 NEED FOR HEPATITIS C SCREENING TEST: ICD-10-CM

## 2024-05-06 DIAGNOSIS — E66.01 MORBID OBESITY (H): ICD-10-CM

## 2024-05-06 DIAGNOSIS — G47.30 SLEEP APNEA, UNSPECIFIED TYPE: ICD-10-CM

## 2024-05-06 PROBLEM — E66.812 CLASS 2 SEVERE OBESITY WITH SERIOUS COMORBIDITY AND BODY MASS INDEX (BMI) OF 38.0 TO 38.9 IN ADULT, UNSPECIFIED OBESITY TYPE (H): Status: RESOLVED | Noted: 2021-09-20 | Resolved: 2024-05-06

## 2024-05-06 PROBLEM — I71.8: Status: RESOLVED | Noted: 2023-08-25 | Resolved: 2024-05-06

## 2024-05-06 PROCEDURE — 99396 PREV VISIT EST AGE 40-64: CPT | Mod: 25 | Performed by: NURSE PRACTITIONER

## 2024-05-06 PROCEDURE — 90471 IMMUNIZATION ADMIN: CPT | Performed by: NURSE PRACTITIONER

## 2024-05-06 PROCEDURE — 90677 PCV20 VACCINE IM: CPT | Performed by: NURSE PRACTITIONER

## 2024-05-06 PROCEDURE — 99214 OFFICE O/P EST MOD 30 MIN: CPT | Mod: 25 | Performed by: NURSE PRACTITIONER

## 2024-05-06 RX ORDER — ZOLEDRONIC ACID 5 MG/100ML
5 INJECTION, SOLUTION INTRAVENOUS ONCE
COMMUNITY

## 2024-05-06 RX ORDER — LOSARTAN POTASSIUM 25 MG/1
25 TABLET ORAL DAILY
Qty: 90 TABLET | Refills: 3 | Status: SHIPPED | OUTPATIENT
Start: 2024-05-06

## 2024-05-06 SDOH — HEALTH STABILITY: PHYSICAL HEALTH: ON AVERAGE, HOW MANY DAYS PER WEEK DO YOU ENGAGE IN MODERATE TO STRENUOUS EXERCISE (LIKE A BRISK WALK)?: 4 DAYS

## 2024-05-06 SDOH — HEALTH STABILITY: PHYSICAL HEALTH: ON AVERAGE, HOW MANY MINUTES DO YOU ENGAGE IN EXERCISE AT THIS LEVEL?: 30 MIN

## 2024-05-06 ASSESSMENT — SOCIAL DETERMINANTS OF HEALTH (SDOH): HOW OFTEN DO YOU GET TOGETHER WITH FRIENDS OR RELATIVES?: ONCE A WEEK

## 2024-05-06 ASSESSMENT — PATIENT HEALTH QUESTIONNAIRE - PHQ9: SUM OF ALL RESPONSES TO PHQ QUESTIONS 1-9: 4

## 2024-05-06 NOTE — PROGRESS NOTES
Preventive Care Visit  [facility]  Niko Kennedy, CNP, Nurse Practitioner - Family  May 6, 2024      Assessment & Plan     1. Annual physical exam  In general, Sarah seems to be doing fairly well.  She works as a dialysis nurse    2. Screening for HIV (human immunodeficiency virus)  - HIV Antigen Antibody Combo; Future    3. Need for hepatitis C screening test  - Hepatitis C Screen Reflex to HCV RNA Quant and Genotype; Future    4. Sleep apnea, unspecified type  She uses a CPAP machine but is due for getting a new machine due to recall    5. Morbid obesity (H)  She is technically morbidly obese with a BMI of 41 although I am not sure if her history of lipedema skews her BMI.  I am going to check with pharmacy to see if it is reasonable starting her on a low-dose Wegovy for weight loss.  - Semaglutide-Weight Management (WEGOVY) 0.25 MG/0.5ML pen; Inject 0.25 mg Subcutaneous once a week  Dispense: 2 mL; Refill: 0    6. Age-related osteoporosis without current pathological fracture  On zoledronic acid, managed by her oncologist    7. Ductal carcinoma in situ (DCIS) of right breast  Following with oncology regularly, Dr. Burroughs    8. Vitamin D deficiency  On replacement    9. Subclinical hypothyroidism  - TSH with free T4 reflex; Future    10. Thoracic aortic ectasia (H24)  Blood pressure is a bit low.  I asked if she could talk with her cardiologist about potentially reducing her risk of losartan, which she uses for aortic ectasia management  - losartan (COZAAR) 25 MG tablet; Take 1 tablet (25 mg) by mouth daily  Dispense: 90 tablet; Refill: 3    11. Ascending aorta dilatation (H24)  Managed by cardiology at the [facility].    12. Screening for lipoid disorders  - Lipid Profile (Chol, Trig, HDL, LDL calc); Future    13. Lipedema  She has excessive fat deposits on her arms and legs.  It is unclear if this skews her BMI and accurately.    Patient Instructions   We will check a  "variety of labs.  We will help you schedule a lab only appointment before you leave.    Consider asking your cardiologist about reducing the losartan down to 12.5 mg daily.  I think this is reasonable, especially given your lightheaded dizziness symptoms.    Prevnar 20 pneumonia shot today.    I sent a prescription for the Wegovy to your pharmacy.  This is to see if we can help you lose some weight.    Follow-up for routine checkup in 6 months                BMI  Estimated body mass index is 41.35 kg/m  as calculated from the following:    Height as of this encounter: 1.753 m (5' 9\").    Weight as of this encounter: 127 kg (280 lb).       Counseling  Appropriate preventive services were discussed with this patient, including applicable screening as appropriate for fall prevention, nutrition, physical activity, Tobacco-use cessation, weight loss and cognition.  Checklist reviewing preventive services available has been given to the patient.  Reviewed patient's diet, addressing concerns and/or questions.   The patient was instructed to see the dentist every 6 months.           Anastasiya Smith is a 53 year old, presenting for the following:  Physical (Pt is not fasting)        5/6/2024    10:18 AM   Additional Questions   Roomed by Sonia CLEMENS       Health Care Directive  Patient does not have a Health Care Directive or Living Will: Patient states has Advance Directive and will bring in a copy to clinic.    HPI  Here for annual physical            5/6/2024   General Health   How would you rate your overall physical health? (!) FAIR   Feel stress (tense, anxious, or unable to sleep) To some extent   (!) STRESS CONCERN      5/6/2024   Nutrition   Three or more servings of calcium each day? Yes   Diet: Gluten-free/reduced   How many servings of fruit and vegetables per day? (!) 2-3   How many sweetened beverages each day? (!) 2         5/6/2024   Exercise   Days per week of moderate/strenous exercise 4 days   Average " minutes spent exercising at this level 30 min         5/6/2024   Social Factors   Frequency of gathering with friends or relatives Once a week   Worry food won't last until get money to buy more No   Food not last or not have enough money for food? No   Do you have housing?  Yes   Are you worried about losing your housing? No   Lack of transportation? No   Unable to get utilities (heat,electricity)? No         5/6/2024   Fall Risk   Fallen 2 or more times in the past year? No   Trouble with walking or balance? Yes           5/6/2024   Dental   Dentist two times every year? (!) NO         5/6/2024   TB Screening   Were you born outside of the [place]? No       Today's PHQ-9 Score:       5/6/2024    10:23 AM   PHQ-9 SCORE   PHQ-9 Total Score 4         5/6/2024   Substance Use   Alcohol more than 3/day or more than 7/wk Not Applicable   Do you use any other substances recreationally? No     Social History     Tobacco Use    Smoking status: Never     Passive exposure: Never    Smokeless tobacco: Never   Vaping Use    Vaping status: Never Used   Substance Use Topics    Alcohol use: No    Drug use: No                    5/6/2024   One time HIV Screening   Previous HIV test? No         5/6/2024   STI Screening   New sexual partner(s) since last STI/HIV test? (!) YES      History of abnormal Pap smear: Status post benign hysterectomy. Health Maintenance and Surgical History updated.        6/23/2016     3:52 PM 5/18/2005    12:00 AM 2/6/2004    12:00 AM   PAP / HPV   PAP Negative for squamous intraepithelial lesion or malignancy  Electronically signed by Vilma Arnold CT (ASCP) on 6/30/2016 at  1:57 PM        PAP (Historical)  NIL  WNL      ASCVD Risk   The 10-year ASCVD risk score (Michelle YAN, et al., 2019) is: 1%    Values used to calculate the score:      Age: 53 years      Sex: Female      Is Non- : No      Diabetic: No      Tobacco smoker: No      Systolic Blood Pressure: 92 mmHg      Is BP  "treated: No      HDL Cholesterol: 46 mg/dL      Total Cholesterol: 169 mg/dL           Reviewed and updated as needed this visit by Provider                             Objective    Exam  BP 92/72 (BP Location: Left arm, Patient Position: Sitting)   Pulse 78   Temp 97.6  F (36.4  C)   Resp 18   Ht 1.753 m (5' 9\")   Wt 127 kg (280 lb)   LMP  (LMP Unknown)   SpO2 96%   BMI 41.35 kg/m     Estimated body mass index is 41.35 kg/m  as calculated from the following:    Height as of this encounter: 1.753 m (5' 9\").    Weight as of this encounter: 127 kg (280 lb).    Physical Exam  GENERAL: alert and no distress  NECK: no adenopathy, no asymmetry, masses, or scars  RESP: lungs clear to auscultation - no rales, rhonchi or wheezes  CV: regular rate and rhythm, normal S1 S2, no S3 or S4, no murmur, click or rub, no peripheral edema  ABDOMEN: soft, nontender, no hepatosplenomegaly, no masses and bowel sounds normal  MS: no gross musculoskeletal defects noted, no edema        Signed Electronically by: iNko Kennedy, CNP    "

## 2024-05-06 NOTE — PATIENT INSTRUCTIONS
We will check a variety of labs.  We will help you schedule a lab only appointment before you leave.    Consider asking your cardiologist about reducing the losartan down to 12.5 mg daily.  I think this is reasonable, especially given your lightheaded dizziness symptoms.    Prevnar 20 pneumonia shot today.    I sent a prescription for the Wegovy to your pharmacy.  This is to see if we can help you lose some weight.    Follow-up for routine checkup in 6 months

## 2024-05-09 ENCOUNTER — LAB (OUTPATIENT)
Dept: LAB | Facility: CLINIC | Age: 54
End: 2024-05-09
Payer: COMMERCIAL

## 2024-05-09 DIAGNOSIS — Z11.4 SCREENING FOR HIV (HUMAN IMMUNODEFICIENCY VIRUS): ICD-10-CM

## 2024-05-09 DIAGNOSIS — Z11.59 NEED FOR HEPATITIS C SCREENING TEST: ICD-10-CM

## 2024-05-09 DIAGNOSIS — E03.8 SUBCLINICAL HYPOTHYROIDISM: ICD-10-CM

## 2024-05-09 DIAGNOSIS — Z13.220 SCREENING FOR LIPOID DISORDERS: ICD-10-CM

## 2024-05-09 LAB
CHOLEST SERPL-MCNC: 161 MG/DL
FASTING STATUS PATIENT QL REPORTED: YES
HCV AB SERPL QL IA: NONREACTIVE
HDLC SERPL-MCNC: 43 MG/DL
HIV 1+2 AB+HIV1 P24 AG SERPL QL IA: NONREACTIVE
LDLC SERPL CALC-MCNC: 102 MG/DL
NONHDLC SERPL-MCNC: 118 MG/DL
T4 FREE SERPL-MCNC: 1.2 NG/DL (ref 0.9–1.7)
TRIGL SERPL-MCNC: 78 MG/DL
TSH SERPL DL<=0.005 MIU/L-ACNC: 4.45 UIU/ML (ref 0.3–4.2)

## 2024-05-09 PROCEDURE — 36415 COLL VENOUS BLD VENIPUNCTURE: CPT

## 2024-05-09 PROCEDURE — 86803 HEPATITIS C AB TEST: CPT

## 2024-05-09 PROCEDURE — 84443 ASSAY THYROID STIM HORMONE: CPT

## 2024-05-09 PROCEDURE — 87389 HIV-1 AG W/HIV-1&-2 AB AG IA: CPT

## 2024-05-09 PROCEDURE — 84439 ASSAY OF FREE THYROXINE: CPT

## 2024-05-09 PROCEDURE — 80061 LIPID PANEL: CPT

## 2024-05-24 ENCOUNTER — NURSE TRIAGE (OUTPATIENT)
Dept: INTERNAL MEDICINE | Facility: CLINIC | Age: 54
End: 2024-05-24
Payer: COMMERCIAL

## 2024-05-24 DIAGNOSIS — R11.0 NAUSEA: Primary | ICD-10-CM

## 2024-05-24 RX ORDER — PROCHLORPERAZINE MALEATE 10 MG
10 TABLET ORAL EVERY 6 HOURS PRN
Qty: 30 TABLET | Refills: 0 | Status: SHIPPED | OUTPATIENT
Start: 2024-05-24

## 2024-05-24 NOTE — TELEPHONE ENCOUNTER
Outgoing call to patient. Relayed provider's detailed message. No further questions/concerns at this time.

## 2024-05-24 NOTE — TELEPHONE ENCOUNTER
Nurse Triage SBAR    Is this a 2nd Level Triage? YES, LICENSED PRACTITIONER REVIEW IS REQUIRED    Situation:  Ongoing and worsening nausea while on Wegovy.     Background: Incoming call from patient reporting side of effect of medication.     LOV 5/6/24    5. Morbid obesity (H)  She is technically morbidly obese with a BMI of 41 although I am not sure if her history of lipedema skews her BMI.  I am going to check with pharmacy to see if it is reasonable starting her on a low-dose Wegovy for weight loss.  - Semaglutide-Weight Management (WEGOVY) 0.25 MG/0.5ML pen; Inject 0.25 mg Subcutaneous once a week  Dispense: 2 mL; Refill: 0    Assessment:      First dose of Wegovy 5/7/24. 3rd dose on 5/21/24.     Nausea started after first dose and stays the same until 3rd dose when it has been worsening.      Nausea has been consistent and stays all day.     Also report general weakness, body achy, flu like symptoms, mild dizziness, but she sometimes run low on her BP in the past.     Pt requesting a medication to help with nausea and she is aware that it is a side effect of the Wegovy.     Protocol Recommended Disposition:   Callback by PCP Today    Recommendation:  Awaiting for covering recommendation as  PCP is out of the office today.   Care advise reviewed with pt and red flags reviewed with pt.     Routed to provider    Does the patient meet one of the following criteria for ADS visit consideration? 16+ years old, with an [facility] PCP     TIP  Providers, please consider if this condition is appropriate for management at one of our Acute and Diagnostic Services sites.     If patient is a good candidate, please use dotphrase <dot>triageresponse and select Refer to ADS to document.     Reason for Disposition   Taking prescription medication that could cause nausea (e.g., narcotics/opiates, antibiotics, OCPs, many others)    Additional Information   Negative: Shock suspected (e.g., cold/pale/clammy skin, too weak to stand, low  BP, rapid pulse)   Negative: Sounds like a life-threatening emergency to the triager   Negative: Nausea or vomiting and pregnancy < 20 weeks   Negative: Menstrual Period - Missed or Late (i.e., pregnancy suspected)   Negative: Heat exhaustion suspected (i.e., dehydration from heat exposure)   Negative: Anxiety or stress suspected (i.e., nausea with anxiety attacks or stressful situations)   Negative: Traumatic Brain Injury (TBI) suspected   Negative: Vomiting occurs   Negative: Other symptom is present, see that guideline.  (e.g., chest pain, headache, dizziness, abdominal pain, colds, sore throat, etc.).   Negative: Unable to walk, or can only walk with assistance (e.g., requires support)   Negative: Difficulty breathing   Negative: Insulin-dependent diabetes (Type I) and glucose > 400 mg/dL (22 mmol/L)   Negative: Drinking very little and dehydration suspected (e.g., no urine > 12 hours, very dry mouth, very lightheaded)   Negative: Patient sounds very sick or weak to the triager   Negative: Fever > 104 F  (40 C)   Negative: Fever > 101 F  (38.3 C) and over 60 years of age   Negative: Fever > 100.0 F  (37.8 C) and bedridden (e.g., CVA, chronic illness, recovering from surgery)   Negative: Fever > 100.0 F  (37.8 C) and diabetes mellitus or weak immune system (e.g., HIV positive, cancer chemo, splenectomy, chronic steroids)   Negative: Taking any of the following medications: digoxin (Lanoxin), lithium, theophylline, phenytoin (Dilantin)   Negative: Yellowish color of the skin or white of the eye (i.e., jaundice)   Negative: Fever present > 3 days (72 hours)   Negative: Patient wants to be seen   Negative: Receiving cancer chemotherapy medication    Protocols used: Irish-AGILLES SALEH RN

## 2024-05-29 DIAGNOSIS — E66.01 MORBID OBESITY (H): ICD-10-CM

## 2024-06-03 DIAGNOSIS — E66.01 MORBID OBESITY (H): ICD-10-CM

## 2024-06-05 ENCOUNTER — PATIENT OUTREACH (OUTPATIENT)
Dept: CARE COORDINATION | Facility: CLINIC | Age: 54
End: 2024-06-05
Payer: COMMERCIAL

## 2024-06-05 NOTE — PROGRESS NOTES
Social Work - Telephone/Arrogenehart message  [facility]  Data:   Patient Name: Sarah Gilmore  Goes By: Sarah    /Age: 1970 (53 year old)      Intervention: SW received voicemail from patient inquiring about possible legal resources for cancer patients. SW called her back and left contact information for Cancer Legal Care on her voicemail. Encouraged her to call SW if she has questions or needs additional support.   Plan:  will await patient's return phone call/message and provide assistance at that time.   YUE Shanks, [facility]  Adult Oncology Clinics  [place] (M,W), [place] (T) & [place] (Th)  *I am off Friday  Office: [phone]

## 2024-06-12 ENCOUNTER — VIRTUAL VISIT (OUTPATIENT)
Dept: INTERNAL MEDICINE | Facility: CLINIC | Age: 54
End: 2024-06-12
Payer: COMMERCIAL

## 2024-06-12 DIAGNOSIS — E66.01 MORBID OBESITY (H): ICD-10-CM

## 2024-06-12 PROCEDURE — 99443 PR PHYSICIAN TELEPHONE EVALUATION 21-30 MIN: CPT | Mod: 93 | Performed by: NURSE PRACTITIONER

## 2024-06-12 NOTE — PROGRESS NOTES
"Sarah is a 53 year old who is being evaluated via a billable telephone visit.    What phone number would you like to be contacted at? [phone]  How would you like to obtain your AVS? Dina  Originating Location (pt. Location): Home    Distant Location (provider location):  On-site    Assessment & Plan     Morbid obesity (H)  Since starting the low-dose Wegovy, she has lost about 5 pounds.  Mild nausea, but has not had to rely on the Compazine much over the last couple of weeks.  She is interested in increasing her Wegovy at this time.  We will escalate her dose to 0.5 mg weekly.  We will plan on following up in a month or so to discuss how things are going.    I told her that I would send a message to her vascular provider to inquire about whether it would be advisable to continue escalating the Wegovy, given her history of lipedema.    Nonetheless, she appears to be tolerating the medication well and agrees with our plan.    - Semaglutide-Weight Management (WEGOVY) 0.5 MG/0.5ML pen; Inject 0.5 mg Subcutaneous once a week    Patient Instructions   Increase Wegovy to 0.5 mg weekly.    No other changes in prescriptions right now.    Please follow-up for a virtual visit sometime within the next 4 to 6 weeks to discuss medication response and weight.    I will send a message over to your vascular doctor.  In the meantime, make sure you have a follow-up appointment with your vascular doctor as a pertains to the lipedema    The longitudinal plan of care for the diagnosis(es)/condition(s) as documented were addressed during this visit. Due to the added complexity in care, I will continue to support Sarah in the subsequent management and with ongoing continuity of care.            BMI  Estimated body mass index is 41.35 kg/m  as calculated from the following:    Height as of 5/6/24: 1.753 m (5' 9\").    Weight as of 5/6/24: 127 kg (280 lb).             Subjective   Sarah is a 53 year old, presenting for the following " health issues:  Discuss Medication  (Discuss Medication)    HPI     Virtual visit to discuss the Wegovy.  She is been taking this medication as prescribed, having some mild nausea at times but this seems to be improving.    Had been using the Compazine for nausea which seemed to help.  Relying on the Compazine less now.    Has lost about 5 pounds.    Otherwise feeling well and has no acute complaints              Objective           Vitals:  No vitals were obtained today due to virtual visit.    Physical Exam   General: Alert and no distress //Respiratory: No audible wheeze, cough, or shortness of breath // Psychiatric:  Appropriate affect, tone, and pace of words            Phone call duration: 21 minutes  Signed Electronically by: Niko Kennedy CNP

## 2024-06-13 NOTE — PATIENT INSTRUCTIONS
Increase Wegovy to 0.5 mg weekly.    No other changes in prescriptions right now.    Please follow-up for a virtual visit sometime within the next 4 to 6 weeks to discuss medication response and weight.    I will send a message over to your vascular doctor.  In the meantime, make sure you have a follow-up appointment with your vascular doctor as a pertains to the lipedema

## 2024-07-19 DIAGNOSIS — E66.01 MORBID OBESITY (H): ICD-10-CM

## 2024-08-19 ENCOUNTER — TELEPHONE (OUTPATIENT)
Dept: ONCOLOGY | Facility: HOSPITAL | Age: 54
End: 2024-08-19
Payer: COMMERCIAL

## 2024-08-19 NOTE — TELEPHONE ENCOUNTER
Voicemail received from patient wondering when her next DEXA scan is needed.    Per chart review, last office visit with Dr. Burroughs on 4/4/24 says will plan DEXA scan in 2025. Last scan done 9/29/2023. Return call placed to patient, message left for her to return call to triage line.    Alessandra Mauricio RN

## 2024-08-20 NOTE — TELEPHONE ENCOUNTER
Patient returned call. She is advised per Dr. Burroughs's note that DEXA scan is not needed until 2025. She verbalizes understanding and denies further questions at this time.    Alessandra Mauricio RN

## 2024-09-04 DIAGNOSIS — E66.01 MORBID OBESITY (H): ICD-10-CM

## 2024-10-04 ENCOUNTER — INFUSION THERAPY VISIT (OUTPATIENT)
Dept: INFUSION THERAPY | Facility: HOSPITAL | Age: 54
End: 2024-10-04
Attending: INTERNAL MEDICINE
Payer: COMMERCIAL

## 2024-10-04 ENCOUNTER — ONCOLOGY VISIT (OUTPATIENT)
Dept: ONCOLOGY | Facility: HOSPITAL | Age: 54
End: 2024-10-04
Attending: INTERNAL MEDICINE
Payer: COMMERCIAL

## 2024-10-04 VITALS
BODY MASS INDEX: 38.08 KG/M2 | RESPIRATION RATE: 16 BRPM | TEMPERATURE: 98.3 F | HEIGHT: 70 IN | OXYGEN SATURATION: 98 % | DIASTOLIC BLOOD PRESSURE: 90 MMHG | SYSTOLIC BLOOD PRESSURE: 120 MMHG | WEIGHT: 266 LBS | HEART RATE: 74 BPM

## 2024-10-04 DIAGNOSIS — Z79.811 LONG TERM CURRENT USE OF AROMATASE INHIBITOR: ICD-10-CM

## 2024-10-04 DIAGNOSIS — M81.0 AGE-RELATED OSTEOPOROSIS WITHOUT CURRENT PATHOLOGICAL FRACTURE: Primary | ICD-10-CM

## 2024-10-04 DIAGNOSIS — D05.11 DUCTAL CARCINOMA IN SITU (DCIS) OF RIGHT BREAST: ICD-10-CM

## 2024-10-04 DIAGNOSIS — M81.0 AGE-RELATED OSTEOPOROSIS WITHOUT CURRENT PATHOLOGICAL FRACTURE: ICD-10-CM

## 2024-10-04 DIAGNOSIS — C50.911 INFILTRATING DUCTAL CARCINOMA OF RIGHT BREAST (H): Primary | ICD-10-CM

## 2024-10-04 LAB
CALCIUM SERPL-MCNC: 9.4 MG/DL (ref 8.8–10.4)
CREAT SERPL-MCNC: 0.89 MG/DL (ref 0.51–0.95)
EGFRCR SERPLBLD CKD-EPI 2021: 76 ML/MIN/1.73M2

## 2024-10-04 PROCEDURE — 99214 OFFICE O/P EST MOD 30 MIN: CPT | Performed by: INTERNAL MEDICINE

## 2024-10-04 PROCEDURE — 250N000011 HC RX IP 250 OP 636: Performed by: INTERNAL MEDICINE

## 2024-10-04 PROCEDURE — 36415 COLL VENOUS BLD VENIPUNCTURE: CPT | Performed by: INTERNAL MEDICINE

## 2024-10-04 PROCEDURE — 82565 ASSAY OF CREATININE: CPT | Performed by: INTERNAL MEDICINE

## 2024-10-04 PROCEDURE — 96365 THER/PROPH/DIAG IV INF INIT: CPT

## 2024-10-04 PROCEDURE — 82310 ASSAY OF CALCIUM: CPT | Performed by: INTERNAL MEDICINE

## 2024-10-04 RX ORDER — HEPARIN SODIUM,PORCINE 10 UNIT/ML
5-20 VIAL (ML) INTRAVENOUS DAILY PRN
Status: DISCONTINUED | OUTPATIENT
Start: 2024-10-04 | End: 2024-10-04 | Stop reason: HOSPADM

## 2024-10-04 RX ORDER — ZOLEDRONIC ACID 0.05 MG/ML
5 INJECTION, SOLUTION INTRAVENOUS ONCE
Status: COMPLETED | OUTPATIENT
Start: 2024-10-04 | End: 2024-10-04

## 2024-10-04 RX ORDER — HEPARIN SODIUM (PORCINE) LOCK FLUSH IV SOLN 100 UNIT/ML 100 UNIT/ML
5 SOLUTION INTRAVENOUS
Status: DISCONTINUED | OUTPATIENT
Start: 2024-10-04 | End: 2024-10-04 | Stop reason: HOSPADM

## 2024-10-04 RX ORDER — ALBUTEROL SULFATE 90 UG/1
1-2 INHALANT RESPIRATORY (INHALATION)
Start: 2025-10-04

## 2024-10-04 RX ORDER — ALBUTEROL SULFATE 0.83 MG/ML
2.5 SOLUTION RESPIRATORY (INHALATION)
Status: CANCELLED | OUTPATIENT
Start: 2025-10-04

## 2024-10-04 RX ORDER — ALBUTEROL SULFATE 90 UG/1
1-2 INHALANT RESPIRATORY (INHALATION)
Status: CANCELLED
Start: 2025-10-04

## 2024-10-04 RX ORDER — ZOLEDRONIC ACID 0.05 MG/ML
5 INJECTION, SOLUTION INTRAVENOUS ONCE
Start: 2025-10-04

## 2024-10-04 RX ORDER — HEPARIN SODIUM,PORCINE 10 UNIT/ML
5-20 VIAL (ML) INTRAVENOUS DAILY PRN
Status: CANCELLED | OUTPATIENT
Start: 2025-10-04

## 2024-10-04 RX ORDER — EPINEPHRINE 1 MG/ML
0.3 INJECTION, SOLUTION INTRAMUSCULAR; SUBCUTANEOUS EVERY 5 MIN PRN
OUTPATIENT
Start: 2025-10-04

## 2024-10-04 RX ORDER — ALBUTEROL SULFATE 0.83 MG/ML
2.5 SOLUTION RESPIRATORY (INHALATION)
OUTPATIENT
Start: 2025-10-04

## 2024-10-04 RX ORDER — DIPHENHYDRAMINE HYDROCHLORIDE 50 MG/ML
50 INJECTION INTRAMUSCULAR; INTRAVENOUS
Status: CANCELLED
Start: 2025-10-04

## 2024-10-04 RX ORDER — HEPARIN SODIUM (PORCINE) LOCK FLUSH IV SOLN 100 UNIT/ML 100 UNIT/ML
5 SOLUTION INTRAVENOUS
OUTPATIENT
Start: 2025-10-04

## 2024-10-04 RX ORDER — METHYLPREDNISOLONE SODIUM SUCCINATE 125 MG/2ML
125 INJECTION INTRAMUSCULAR; INTRAVENOUS
Status: CANCELLED
Start: 2025-10-04

## 2024-10-04 RX ORDER — MEPERIDINE HYDROCHLORIDE 50 MG/ML
25 INJECTION INTRAMUSCULAR; INTRAVENOUS; SUBCUTANEOUS EVERY 30 MIN PRN
OUTPATIENT
Start: 2025-10-04

## 2024-10-04 RX ORDER — EPINEPHRINE 1 MG/ML
0.3 INJECTION, SOLUTION INTRAMUSCULAR; SUBCUTANEOUS EVERY 5 MIN PRN
Status: CANCELLED | OUTPATIENT
Start: 2025-10-04

## 2024-10-04 RX ORDER — HEPARIN SODIUM,PORCINE 10 UNIT/ML
5-20 VIAL (ML) INTRAVENOUS DAILY PRN
OUTPATIENT
Start: 2025-10-04

## 2024-10-04 RX ORDER — HEPARIN SODIUM (PORCINE) LOCK FLUSH IV SOLN 100 UNIT/ML 100 UNIT/ML
5 SOLUTION INTRAVENOUS
Status: CANCELLED | OUTPATIENT
Start: 2025-10-04

## 2024-10-04 RX ORDER — ANASTROZOLE 1 MG/1
1 TABLET ORAL DAILY
Qty: 30 TABLET | Refills: 0 | Status: SHIPPED | OUTPATIENT
Start: 2024-10-04

## 2024-10-04 RX ORDER — DIPHENHYDRAMINE HYDROCHLORIDE 50 MG/ML
50 INJECTION INTRAMUSCULAR; INTRAVENOUS
Start: 2025-10-04

## 2024-10-04 RX ORDER — MEPERIDINE HYDROCHLORIDE 50 MG/ML
25 INJECTION INTRAMUSCULAR; INTRAVENOUS; SUBCUTANEOUS EVERY 30 MIN PRN
Status: CANCELLED | OUTPATIENT
Start: 2025-10-04

## 2024-10-04 RX ORDER — ZOLEDRONIC ACID 0.05 MG/ML
5 INJECTION, SOLUTION INTRAVENOUS ONCE
Status: CANCELLED
Start: 2025-10-04

## 2024-10-04 RX ORDER — METHYLPREDNISOLONE SODIUM SUCCINATE 125 MG/2ML
125 INJECTION INTRAMUSCULAR; INTRAVENOUS
Start: 2025-10-04

## 2024-10-04 RX ADMIN — ZOLEDRONIC ACID 5 MG: 5 INJECTION, SOLUTION INTRAVENOUS at 14:30

## 2024-10-04 ASSESSMENT — PAIN SCALES - GENERAL: PAINLEVEL: NO PAIN (0)

## 2024-10-04 NOTE — PROGRESS NOTES
[facility] Hematology and Oncology Progress Note    Patient: Sarah Gilmore  MRN: [id number]  Date of Service: Oct 4, 2024         Reason for Visit    Chief Complaint   Patient presents with    Oncology Clinic Visit     Infiltrating ductal carcinoma of right breast       Assessment and Plan     Cancer Staging   Infiltrating ductal carcinoma of right breast (H)  Staging form: Breast, AJCC 8th Edition  - Pathologic stage from 9/4/2019: Stage Unknown (pTX, pN1a(sn), cM0, G1, ER+, CA+, HER2-) - Signed by Ez Burroughs MD on 10/18/2021  - Clinical: No stage assigned - Unsigned      ECOG Performance    0 - Independent     Pain  Pain Score: No Pain (0)    #.  History of DCIS of the both breasts (upper outer quadrant, lower outer quadrant and central portion of the right breast, multifocal, grade 2; focus of DCIS approximately 2 mm grade 1 in the left breast)  #. pTx pN1a M0 invasive ductal carcinoma of the right breast, grade 1.  ER positive, CA positive, HER-2 negative.     She is clinically well without signs and symptoms suggestive for breast cancer recurrence. She tolerates current aromatase inhibitor therapy well without major intolerable side effects.  Plan is to complete 5 years of adjuvant endocrine therapy (till Dec 2024).  She does not have indication for extended endocrine therapy.   She does not have any indication for systemic imaging.  I discussed that we will obtain imaging with clinical signs and symptoms concerning for breast cancer recurrence, but not routinely.   Once she complete anastrozole in 12/2024, she we will stop it without taper.  At this point, she will be discharged from our clinic and I will be available as needed.  She will continue annual surveillance exam along with annual physical through her primary care provider.      #. Liver lesions/cysts  It was incidentally found MR angiogram of the chest during MRA of the chest.  Ultrasound of the liver confirmed tiny subcentimeter simple  hepatic cysts and for 2024, therefore no follow-up is recommended.    #. Osteoporosis    I reviewed her labs.  She has normal renal function and normal calcium.    Started zoledronic acid on 10/4/2022.  She received second dose in 10/2023 and third dose today in 10/2024.   Continue calcium and vitamin D and encouraged weightbearing exercises.   Future bone density monitoring and management per her primary care provider.      Encounter Diagnoses:    Problem List Items Addressed This Visit          Oncology Diagnoses    DCIS (ductal carcinoma in situ)    Relevant Medications    anastrozole (ARIMIDEX) 1 MG tablet    Infiltrating ductal carcinoma of right breast (H) - Primary       Other    Age-related osteoporosis without current pathological fracture    Long term current use of aromatase inhibitor         CC: EMMY Maxwell CNP   ______________________________________________________________________________  Diagnosis  July 2019-    DCIS of both breasts (upper outer quadrant, lower outer quadrant and central portion of the right breast, multifocal, grade 2; focus of DCIS approximately 2 mm grade 1 in the left breast)   pTx pN1a M0 invasive ductal carcinoma of the right breast, grade 1.  Multifocal DCIS.  6 right axillary sentinel lymph nodes were examined 1 lymph node was positive for macro metastases and one lymph node was positive for isolated tumor cells.  ER positive (90%), KS positive (80%), HER-2 negative.    LMP-at age 48 ( in 3/2018) after hysterectomy. Serology confirms menopause in 4/2020.    Treatment to date  7/30/2019- right breast mastectomy and right sentinel lymph node biopsy, left mastectomy.  10/9/2019-12/6/2019-radiation to the right breast and chest wall and regional lymph nodes area of 5040 cGy/28 fractions followed by additional 1000 cGy in 5 fractions to the primary tumor bed.  12/2019-initiated adjuvant tamoxifen, but stopped about 2 months after due to pain in both shins.  Restarted  "tamoxifen in Apirl , but stopped again after a month and then switched to anastrozole in May 2020.     History of Present Illness    Oliver presented today for follow-up.  She does not have any concern today.  She takes anastrozole every day.    Review of systems  Apart from describing in HPI, the remainder of comprehensive ROS was negative.    Past History    Past Medical History:   Diagnosis Date    Dilated aortic root (H)     Foot fracture 2011    History of anesthesia complications     slow to wake up    PONV (postoperative nausea and vomiting)     Radial head fracture 2011    Seizures (H)     h/o seizure disorder in childhood, last seizure activity  \"4 yrs ago\"    Suspected sleep apnea     sleep study set up in Aug 2019       Past Surgical History:   Procedure Laterality Date    C/SECTION, LOW TRANSVERSE  07/10/1994    breech    HYSTERECTOMY, PAP NO LONGER INDICATED Bilateral     LAPAROSCOPIC HYSTERECTOMY TOTAL  2018    cervix benign    MAMMO STEREOTACTIC CORE BIOPSY RIGHT Right 2019    MAMMO STEREOTACTIC CORE BIOPSY RIGHT Right 2019    NM SENTINEL NODE INJECTION  2019    OTHER SURGICAL HISTORY      right arm surgery    LA MASTECTOMY, MODIFIED RADICAL Bilateral 2019    Procedure: BILATERAL MASTECTOMY;  Surgeon: Mckenzie Colby DO;  Location: [facility];  Service: General    LA REPLACEMENT TISSUE EXPANDER W/PERMANENT IMPLANT Bilateral 2019    Procedure: BILATERAL IMPLANT RECONSTRUCTION TO BREASTS;  Surgeon: Carlitos Crum MD;  Location: [facility];  Service: Plastics    RIGHT BREAST RECONSTRUCTION WITH LATISSIMUS DORSI FLAP    10/29/2021    [facility]  DELIVERY ONLY  1994         Physical Exam    BP (!) 120/90   Pulse 74   Temp 98.3  F (36.8  C)   Resp 16   Ht 1.772 m (5' 9.75\")   Wt 120.7 kg (266 lb)   LMP  (LMP Unknown)   SpO2 98%   BMI 38.44 kg/m      General: alert, awake, not in acute distress  HEENT: Head: Normal, " normocephalic, atraumatic.  Eye: Normal external eye, conjunctiva, lids cornea, DAYSI.  Lymphatics: No abnormally enlarged lymph nodes.  Chest: Normal chest wall and respirations. Clear to auscultation.  Breasts: She has tattoos on the reconstructed breasts  Extremities: normal strength, tone, and muscle mass  Skin: normal. no rash or abnormalities  CNS: non focal.    Lab Results    No results found for this or any previous visit (from the past 168 hour(s)).         Imaging    No results found.    The longitudinal plan of care for the diagnosis(es)/condition(s) as documented were addressed during this visit. Due to the added complexity in care, I will continue to support Sarah in the subsequent management and with ongoing continuity of care.     30 minutes spent by me on the date of the encounter doing chart review, history and exam, documentation and further activities as noted above.    Signed by: Ez Burroughs MD

## 2024-10-04 NOTE — PROGRESS NOTES
"Oncology Rooming Note    October 4, 2024 1:25 PM   Sarah Gilmore is a 54 year old female who presents for:    Chief Complaint   Patient presents with    Oncology Clinic Visit     Infiltrating ductal carcinoma of right breast     Initial Vitals: BP (!) 120/90   Pulse 74   Temp 98.3  F (36.8  C)   Resp 16   Ht 1.772 m (5' 9.75\")   Wt 120.7 kg (266 lb)   LMP  (LMP Unknown)   SpO2 98%   BMI 38.44 kg/m   Estimated body mass index is 38.44 kg/m  as calculated from the following:    Height as of this encounter: 1.772 m (5' 9.75\").    Weight as of this encounter: 120.7 kg (266 lb). Body surface area is 2.44 meters squared.  No Pain (0) Comment: Data Unavailable   No LMP recorded (lmp unknown). Patient has had a hysterectomy.  Allergies reviewed: Yes  Medications reviewed: Yes    Medications: Medication refills not needed today.  Pharmacy name entered into [facility]: [id number] [place] [phone] AT [facility] [address]    Frailty Screening:   Is the patient here for a new oncology consult visit in cancer care? 2. No      Clinical concerns:  6 month labs       Dagmar Suarez            "

## 2024-10-04 NOTE — PROGRESS NOTES
Infusion Nursing Note:  Sarah Gilmore presents today for yearly reclast infusion.    Patient seen by provider today: Yes: Dr. Burroughs   present during visit today: Not Applicable.    Note: Reclast given.      Intravenous Access:  Peripheral IV placed.    Treatment Conditions:  Results reviewed, labs MET treatment parameters, ok to proceed with treatment.      Post Infusion Assessment:  Patient tolerated infusion without incident.  Blood return noted pre and post infusion.  Site patent and intact, free from redness, edema or discomfort.  No evidence of extravasations.  Access discontinued per protocol.       Discharge Plan:   Discharge instructions reviewed with: Patient.  Patient and/or family verbalized understanding of discharge instructions and all questions answered.  Patient discharged in stable condition accompanied by: self.  Departure Mode: Ambulatory.      Мария Lopez RN

## 2024-10-04 NOTE — LETTER
"10/4/2024      Sarah Gilmore  [address]  [place] [address]      Dear Colleague,    Thank you for referring your patient, Sarah Gilmore, to the [facility]. Please see a copy of my visit note below.    Oncology Rooming Note    October 4, 2024 1:25 PM   Sarah Gilmore is a 54 year old female who presents for:    Chief Complaint   Patient presents with     Oncology Clinic Visit     Infiltrating ductal carcinoma of right breast     Initial Vitals: BP (!) 120/90   Pulse 74   Temp 98.3  F (36.8  C)   Resp 16   Ht 1.772 m (5' 9.75\")   Wt 120.7 kg (266 lb)   LMP  (LMP Unknown)   SpO2 98%   BMI 38.44 kg/m   Estimated body mass index is 38.44 kg/m  as calculated from the following:    Height as of this encounter: 1.772 m (5' 9.75\").    Weight as of this encounter: 120.7 kg (266 lb). Body surface area is 2.44 meters squared.  No Pain (0) Comment: Data Unavailable   No LMP recorded (lmp unknown). Patient has had a hysterectomy.  Allergies reviewed: Yes  Medications reviewed: Yes    Medications: Medication refills not needed today.  Pharmacy name entered into [employer]: [id number] - [place], [place] - [address] AT [facility] [address]    Frailty Screening:   Is the patient here for a new oncology consult visit in cancer care? 2. No      Clinical concerns:  6 month labs       Dagmar Suarez              [facility] Progress Note    Patient: Sarah Gilmore  MRN: [id number]  Date of Service: Oct 4, 2024         Reason for Visit    Chief Complaint   Patient presents with     Oncology Clinic Visit     Infiltrating ductal carcinoma of right breast       Assessment and Plan     Cancer Staging   Infiltrating ductal carcinoma of right breast (H)  Staging form: Breast, AJCC 8th Edition  - Pathologic stage from 9/4/2019: Stage Unknown (pTX, pN1a(sn), cM0, G1, ER+, MA+, HER2-) - Signed by Ez Burroughs MD on " 10/18/2021  - Clinical: No stage assigned - Unsigned      ECOG Performance    0 - Independent     Pain  Pain Score: No Pain (0)    #.  History of DCIS of the both breasts (upper outer quadrant, lower outer quadrant and central portion of the right breast, multifocal, grade 2; focus of DCIS approximately 2 mm grade 1 in the left breast)  #. pTx pN1a M0 invasive ductal carcinoma of the right breast, grade 1.  ER positive, NV positive, HER-2 negative.     She is clinically well without signs and symptoms suggestive for breast cancer recurrence. She tolerates current aromatase inhibitor therapy well without major intolerable side effects.  Plan is to complete 5 years of adjuvant endocrine therapy (till Dec 2024).  She does not have indication for extended endocrine therapy.   She does not have any indication for systemic imaging.  I discussed that we will obtain imaging with clinical signs and symptoms concerning for breast cancer recurrence, but not routinely.   Once she complete anastrozole in 12/2024, she we will stop it without taper.  At this point, she will be discharged from our clinic and I will be available as needed.  She will continue annual surveillance exam along with annual physical through her primary care provider.      #. Liver lesions/cysts  It was incidentally found MR angiogram of the chest during MRA of the chest.  Ultrasound of the liver confirmed tiny subcentimeter simple hepatic cysts and for 2024, therefore no follow-up is recommended.    #. Osteoporosis    I reviewed her labs.  She has normal renal function and normal calcium.    Started zoledronic acid on 10/4/2022.  She received second dose in 10/2023 and third dose today in 10/2024.   Continue calcium and vitamin D and encouraged weightbearing exercises.   Future bone density monitoring and management per her primary care provider.      Encounter Diagnoses:    Problem List Items Addressed This Visit          Oncology Diagnoses    DCIS  (ductal carcinoma in situ)    Relevant Medications    anastrozole (ARIMIDEX) 1 MG tablet    Infiltrating ductal carcinoma of right breast (H) - Primary       Other    Age-related osteoporosis without current pathological fracture    Long term current use of aromatase inhibitor         CC: EMMY Maxwell CNP   ______________________________________________________________________________  Diagnosis  July 2019-    DCIS of both breasts (upper outer quadrant, lower outer quadrant and central portion of the right breast, multifocal, grade 2; focus of DCIS approximately 2 mm grade 1 in the left breast)   pTx pN1a M0 invasive ductal carcinoma of the right breast, grade 1.  Multifocal DCIS.  6 right axillary sentinel lymph nodes were examined 1 lymph node was positive for macro metastases and one lymph node was positive for isolated tumor cells.  ER positive (90%), WI positive (80%), HER-2 negative.    LMP-at age 48 ( in 3/2018) after hysterectomy. Serology confirms menopause in 4/2020.    Treatment to date  7/30/2019- right breast mastectomy and right sentinel lymph node biopsy, left mastectomy.  10/9/2019-12/6/2019-radiation to the right breast and chest wall and regional lymph nodes area of 5040 cGy/28 fractions followed by additional 1000 cGy in 5 fractions to the primary tumor bed.  12/2019-initiated adjuvant tamoxifen, but stopped about 2 months after due to pain in both shins.  Restarted tamoxifen in Apirl 2020, but stopped again after a month and then switched to anastrozole in May 2020.     History of Present Illness    Oliver presented today for follow-up.  She does not have any concern today.  She takes anastrozole every day.    Review of systems  Apart from describing in HPI, the remainder of comprehensive ROS was negative.    Past History    Past Medical History:   Diagnosis Date     Dilated aortic root (H)      Foot fracture 06/03/2011     History of anesthesia complications     slow to wake up     PONV  "(postoperative nausea and vomiting)      Radial head fracture 2011     Seizures (H)     h/o seizure disorder in childhood, last seizure activity  \"4 yrs ago\"     Suspected sleep apnea     sleep study set up in Aug 2019       Past Surgical History:   Procedure Laterality Date     C/SECTION, LOW TRANSVERSE  07/10/1994    breech     HYSTERECTOMY, PAP NO LONGER INDICATED Bilateral      LAPAROSCOPIC HYSTERECTOMY TOTAL  2018    cervix benign     MAMMO STEREOTACTIC CORE BIOPSY RIGHT Right 2019     MAMMO STEREOTACTIC CORE BIOPSY RIGHT Right 2019     NM SENTINEL NODE INJECTION  2019     OTHER SURGICAL HISTORY      right arm surgery     OK MASTECTOMY, MODIFIED RADICAL Bilateral 2019    Procedure: BILATERAL MASTECTOMY;  Surgeon: Mckenzie Colby DO;  Location: [facility];  Service: General     OK REPLACEMENT TISSUE EXPANDER W/PERMANENT IMPLANT Bilateral 2019    Procedure: BILATERAL IMPLANT RECONSTRUCTION TO BREASTS;  Surgeon: Carlitos Crum MD;  Location: [facility];  Service: Plastics     RIGHT BREAST RECONSTRUCTION WITH LATISSIMUS DORSI FLAP    10/29/2021     [facility]  DELIVERY ONLY  1994         Physical Exam    BP (!) 120/90   Pulse 74   Temp 98.3  F (36.8  C)   Resp 16   Ht 1.772 m (5' 9.75\")   Wt 120.7 kg (266 lb)   LMP  (LMP Unknown)   SpO2 98%   BMI 38.44 kg/m      General: alert, awake, not in acute distress  HEENT: Head: Normal, normocephalic, atraumatic.  Eye: Normal external eye, conjunctiva, lids cornea, DAYSI.  Lymphatics: No abnormally enlarged lymph nodes.  Chest: Normal chest wall and respirations. Clear to auscultation.  Breasts: She has tattoos on the reconstructed breasts  Extremities: normal strength, tone, and muscle mass  Skin: normal. no rash or abnormalities  CNS: non focal.    Lab Results    No results found for this or any previous visit (from the past 168 hour(s)).         Imaging    No results found.    The longitudinal plan " of care for the diagnosis(es)/condition(s) as documented were addressed during this visit. Due to the added complexity in care, I will continue to support Sarah in the subsequent management and with ongoing continuity of care.     30 minutes spent by me on the date of the encounter doing chart review, history and exam, documentation and further activities as noted above.    Signed by: Ez Burroughs MD      Again, thank you for allowing me to participate in the care of your patient.        Sincerely,        Ez Burroughs MD

## 2024-10-09 ENCOUNTER — PATIENT OUTREACH (OUTPATIENT)
Dept: ONCOLOGY | Facility: HOSPITAL | Age: 54
End: 2024-10-09
Payer: COMMERCIAL

## 2024-10-09 NOTE — PROGRESS NOTES
[facility]: Cancer Care                                                                                            Situation: Patient chart reviewed by care coordinator.    Background: Patient has history of breast CA.    Assessment: Patient seen by Dr. Burroughs in follow-up on 10/4/24.  She will complete adjuvant endocrine therapy, anastrozole, Dec. 2024.    Plan/Recommendations: Patient to follow-up, as needed.    Status changed to graduated. Removing self as RNCC. Will follow patient in future, if needed.    Signature:  Alicia Maravilla RN

## 2024-11-07 ENCOUNTER — APPOINTMENT (OUTPATIENT)
Dept: RADIOLOGY | Facility: CLINIC | Age: 54
End: 2024-11-07
Attending: EMERGENCY MEDICINE

## 2024-11-07 ENCOUNTER — HOSPITAL ENCOUNTER (EMERGENCY)
Facility: CLINIC | Age: 54
Discharge: HOME | End: 2024-11-07
Attending: EMERGENCY MEDICINE | Admitting: EMERGENCY MEDICINE

## 2024-11-07 VITALS
DIASTOLIC BLOOD PRESSURE: 63 MMHG | RESPIRATION RATE: 18 BRPM | OXYGEN SATURATION: 98 % | HEIGHT: 70 IN | BODY MASS INDEX: 35.79 KG/M2 | WEIGHT: 250 LBS | TEMPERATURE: 97.9 F | SYSTOLIC BLOOD PRESSURE: 112 MMHG | HEART RATE: 62 BPM

## 2024-11-07 DIAGNOSIS — S43.402A SPRAIN OF LEFT SHOULDER, UNSPECIFIED SHOULDER SPRAIN TYPE, INITIAL ENCOUNTER: ICD-10-CM

## 2024-11-07 PROCEDURE — 73030 X-RAY EXAM OF SHOULDER: CPT | Mod: LT

## 2024-11-07 PROCEDURE — 250N000013 HC RX MED GY IP 250 OP 250 PS 637: Performed by: EMERGENCY MEDICINE

## 2024-11-07 PROCEDURE — 73110 X-RAY EXAM OF WRIST: CPT | Mod: LT

## 2024-11-07 PROCEDURE — 99283 EMERGENCY DEPT VISIT LOW MDM: CPT | Mod: 25 | Performed by: EMERGENCY MEDICINE

## 2024-11-07 PROCEDURE — 73080 X-RAY EXAM OF ELBOW: CPT | Mod: LT

## 2024-11-07 RX ORDER — IBUPROFEN 600 MG/1
600 TABLET, FILM COATED ORAL ONCE
Status: COMPLETED | OUTPATIENT
Start: 2024-11-07 | End: 2024-11-07

## 2024-11-07 RX ADMIN — IBUPROFEN 600 MG: 600 TABLET ORAL at 19:28

## 2024-11-07 ASSESSMENT — COLUMBIA-SUICIDE SEVERITY RATING SCALE - C-SSRS
1. IN THE PAST MONTH, HAVE YOU WISHED YOU WERE DEAD OR WISHED YOU COULD GO TO SLEEP AND NOT WAKE UP?: NO
2. HAVE YOU ACTUALLY HAD ANY THOUGHTS OF KILLING YOURSELF IN THE PAST MONTH?: NO
6. HAVE YOU EVER DONE ANYTHING, STARTED TO DO ANYTHING, OR PREPARED TO DO ANYTHING TO END YOUR LIFE?: NO

## 2024-11-07 ASSESSMENT — ACTIVITIES OF DAILY LIVING (ADL)
ADLS_ACUITY_SCORE: 0
ADLS_ACUITY_SCORE: 0

## 2024-11-07 NOTE — Clinical Note
Sarah Gilmore was seen and treated in our emergency department on 11/7/2024.  She may return to work on 11/11/2024.       If you have any questions or concerns, please don't hesitate to call.      Lex Miramontes MD

## 2024-11-08 NOTE — ED PROVIDER NOTES
EMERGENCY DEPARTMENT ENCOUNTER      NAME: Sarah Gilmore  AGE: 54 year old female  YOB: 1970  MRN: [id number]  EVALUATION DATE & TIME: 11/7/2024  6:56 PM    PCP: Niko Kennedy    ED PROVIDER: Lex Miramontes M.D.      Chief Complaint   Patient presents with    Fall    Arm Pain    Wrist Pain         FINAL IMPRESSION:  1. Sprain of left shoulder, unspecified shoulder sprain type, initial encounter          ED COURSE & MEDICAL DECISION MAKING:    Pertinent Labs & Imaging studies reviewed below.  All EKGs below represent my independent interpretation.   ED Course as of 11/07/24 2205   Thu Nov 07, 2024 1913 Patient is a 54-year-old woman who presents with pain in her left shoulder, elbow, wrist after a fall from a stool earlier today work.  She is guarding the left upper extremity, has some tenderness to the shoulder, otherwise no focal tenderness to the elbow or wrist.  I suspect a rotator cuff or muscular strain will plan to get x-rays to rule out fracture.  Ibuprofen, ice ordered.   1914 She also has some tenderness around the neck, but no midline pain.  No dangerous mechanism, and C-spine is clinically cleared.   X-ray of left shoulder, elbow, wrist were negative.  Patient placed in a sling for possible shoulder strain or rotator cuff injury.  She was given a work note for tomorrow, I recommended some orthopedic follow-up if symptoms not improving day by day with NSAIDs, rest.    Additional ED Course Timestamps:    7:06 PM I met with the patient, obtained history, performed an initial exam, and discussed options and plan for diagnostics and treatment here in the ED.      Medical Decision Making  Obtained supplemental history:Supplemental history obtained?: Family Member/Significant Other  Reviewed external records: External records reviewed?: Documented in chart  Care impacted by chronic illness:Hypertension  Care significantly affected by social determinants of health:N/A  Did you consider  "but not order tests?: Work up considered but not performed and documented in chart, if applicable  Did you interpret images independently?: Independent interpretation of ECG and images noted in documentation, when applicable.  Consultation discussion with other provider: Phone conversation with consultants will be documented in the ED Course  Discharge. No recommendations on prescription strength medication(s). N/A.    Not Applicable    At the conclusion of the encounter I discussed the results of all of the tests and the disposition. The questions were answered. The patient or family acknowledged understanding and was agreeable with the care plan.       MEDICATIONS GIVEN IN THE EMERGENCY:  Medications   ibuprofen (ADVIL/MOTRIN) tablet 600 mg (600 mg Oral $Given 11/7/24 1928)         NEW PRESCRIPTIONS STARTED AT TODAY'S ER VISIT  Discharge Medication List as of 11/7/2024  8:51 PM             =================================================================    HPI    Sarah Gilmore is a 54 year old female who presents to this ED for evaluation of shoulder, elbow, and wrist pain after fall.     Patient had a mechanical fall at work today on a stool, and states that she caught her fall with her left arm. Patient denies loss of consciousness, head injury, and is not on blood thinning medication. She states that she is experiencing numbness, tingling, and pain from her left finger tips to her left shoulder. She also states that she might've landed on something and hit her upper back, but she isn't sure. Her left thumb and pointer finger are more numb than her other fingers. She took Tylenol at 1 PM, and states it is wearing off now.     Patient denies any other complaints at this time.      VITALS:  /63   Pulse 62   Temp 97.9  F (36.6  C) (Oral)   Resp 18   Ht 1.778 m (5' 10\")   Wt 113.4 kg (250 lb)   LMP  (LMP Unknown)   SpO2 98%   BMI 35.87 kg/m      PHYSICAL EXAM    Constitutional: Comfortable " appearing.  HENT: No midline cervical spine tenderness. No midline pain with active flexion, extension, or lateral rotation bilaterally.  Eyes: Pupils mid-range, conjunctiva without injection, no discharge.   Respiratory: Clear to auscultation bilaterally, no respiratory distress, no wheezing, speaks full sentences easily. No cough.  Cardiovascular: Normal heart rate, regular rhythm, no murmurs.   Musculoskeletal: Tenderness to palpation the the lt shoulder, discomfot of left elbow and wrist, but no focal pain.   Skin: Warm, dry, no rash.  Neurologic: Alert & oriented x 3, cranial nerves grossly intact.  Psychiatric: Affect normal, cooperative.      I, Beau Anderson am serving as a scribe to document services personally performed by Dr. Lex Miramontes based on my observation and the provider's statements to me. I, Lex Miramontes MD attest that Beau Anderson is acting in a scribe capacity, has observed my performance of the services and has documented them in accordance with my direction.    Lex Miramontes M.D.  Emergency Medicine  [facility] EMERGENCY ROOM  [address] [id number] [phone]  Dept: [phone]       Lex Miramontes MD  11/07/24 3507

## 2024-11-08 NOTE — ED TRIAGE NOTES
Pt states she was at work sitting on a stool and stool went out from under her and pt fell off and landed on back and left side, fell on hard floor. Pt c/o pain to left hand up left arm into left shoulder and side of neck, pt states she had a wrist splint so she put it on to help with the pain.  Pt does  not think she hit her head. Denies blood thinners.

## 2024-11-08 NOTE — DISCHARGE INSTRUCTIONS
The pain you are experiencing has a large inflammatory component to it, so anti-inflammatories will be very helpful. Please take: 600 mg of ibuprofen AND 1000 mg of tylenol three times a day. It is ok to take these medications at the same time. After 2-3 days, please take the medications only as needed.

## 2024-11-27 ENCOUNTER — TRANSFERRED RECORDS (OUTPATIENT)
Dept: HEALTH INFORMATION MANAGEMENT | Facility: CLINIC | Age: 54
End: 2024-11-27
Payer: COMMERCIAL

## 2024-12-10 NOTE — TELEPHONE ENCOUNTER
Medication: Triamcinolone cream passed protocol.   Last office visit date: 8/9/24  Next appointment scheduled?: Yes   Number of refills given: 1 Tube      Who is calling:  Patient   Reason for Call:  Patient is calling in for follow up on referral for Pulmonology .  Patient states she is getting short of breath lately she need to see lung doctor as soon as possible .  Date of last appointment with primary care: 08/13/20  Okay to leave a detailed message: No

## 2024-12-31 ENCOUNTER — TELEPHONE (OUTPATIENT)
Dept: OTHER | Facility: CLINIC | Age: 54
End: 2024-12-31
Payer: COMMERCIAL

## 2024-12-31 DIAGNOSIS — I87.303 VENOUS HYPERTENSION OF LOWER EXTREMITY, BILATERAL: ICD-10-CM

## 2024-12-31 DIAGNOSIS — R60.9 LIPEDEMA: Primary | ICD-10-CM

## 2024-12-31 NOTE — TELEPHONE ENCOUNTER
[facility]    Who is the name of the provider?:  SARAH WINKLER   What is the location you see this provider at/preferred location?: [place]  Person calling / Facility: Sarah Gilmore  Phone number:  [phone] (home) [phone] (work)  Nurse call back needed:  Yes     Reason for call:  Patient calling to schedule a follow up with Dr Winkler. Patient is having some new mobility issues.     Pharmacy location[facility]  [id number] [address] [facility] [address]  Outside Imaging: n/a   Can we leave a detailed message on this number?  YES     12/31/2024, 3:29 PM

## 2025-01-02 NOTE — TELEPHONE ENCOUNTER
Progress Notes by Karl Jane MD at 03/28/18 11:57 AM     Author:  Karl Jane MD Service:  (none) Author Type:  Physician     Filed:  03/28/18 12:23 PM Encounter Date:  3/28/2018 Status:  Signed     :  Karl Jane MD (Physician)            Roomed by Ritu Waters CMA  3/28/2018  11:57 AM  Name and date of birth verified.   Last office visit 11/29/2017[TH1.1T] for Botox    Here for full skin exam today[TH1.1M]      SUBJECTIVE:  Current outpatient prescriptions       Medication  Sig Dispense Refill   • Multiple Vitamin (MULTIVITAMIN OR) Take  by mouth.     • levothyroxine 25 MCG tablet Take 1 Tab by mouth daily. 90 Tab 0   • albuterol (PROAIR HFA) 108 (90 BASE) MCG/ACT inhaler Inhale 2 Puffs by mouth every 4 (four) hours as needed for Wheezing. 1 Inhaler 5     Patient Active Problem List    Diagnosis    • Myopia   • Diffuse cystic mastopathy   • Other acne   • Prolonged depressive reaction   • Telangiectasia   • Lentigo   • Allergic rhinitis   • Regular astigmatism   • Presbyopia   • Anxiety   • History of acute bronchitis with bronchospasm     ROS:[TH1.1T]  --General: In good health, Thyroid issue - on tx  Allergies to:[TH1.1M]  Amoxicillin-pot clavulanate; Macrodantin; Sulfa antibiotics; and Trimethoprim    Problem #[TH1.1T] 1[TH1.1M]: LESION[TH1.1T]  New mole on left collar bone area for several months, gradual increase in size.[TH1.1M]   SUBJECTIVE: It has been present for[TH1.1T] several months[TH1.1M] and is[TH1.1T] gradually worsening[TH1.1M].  Previous treatments:[TH1.1T] none[TH1.1M].  Risk factors:[TH1.1T] significant time spent in sun and previous SK[TH1.1M].  Symptoms:[TH1.1T] increasing diameter, unsightliness[TH1.1M]   No pain, bleeding, itching.[FK1.1T]   ROS:[TH1.1T]  --General: In good health  --Heme: denies bleeding problems, anemia, blood clots, thrombophlebitis, pulmonary embolsim, stasis dermatitis, lupus erythematosis[FK1.1M]  OBJECTIVE:[TH1.1T] seborrheic keratosis flesh colored, evenly  Routing to scheduling to coordinate the following:    In-person follow up  Please schedule this at next available     Appt note: Follow up to 7/19/23    Maggy CUELLAR RN    [facility]  Office: [phone]  Fax: [phone]               pigmented, symmetric, sharply marginated[FK1.1T] 5 mm on[FK1.1M] left[FK1.1T] collar bone[FK1.1M]  inspection of conjunctiva and eyelids:normal  well developed, well nourished  alert and oriented x3   The skin of the head, neck, scalp,  chest, back, each arm, each leg, both hands, both feet was examined and found to be normal other than the noted abnormalities.[FK1.1T]     ASSESSMENT:[TH1.1T] sk[FK1.1M]--[TH1.1T] New Problem[FK1.1M]   PLAN:[TH1.1T] --[FK1.1M]  Sun protection discussed with patient[FK1.1T]  rtc if changing[FK1.1M]  Electronically Signed by:    Karl Jane MD , 3/28/2018[FK1.2T]         Revision History        User Key Date/Time User Provider Type Action    > FK1.2 03/28/18 12:23 PM Karl Jane MD Physician Sign     FK1.1 03/28/18 12:13 PM Karl Jane MD Physician      TH1.1 03/28/18 12:02 PM Cecilia Waters CMA Certified Medical Assistant Sign at close encounter    M - Manual, T - Template

## 2025-01-09 ENCOUNTER — TRANSFERRED RECORDS (OUTPATIENT)
Dept: HEALTH INFORMATION MANAGEMENT | Facility: CLINIC | Age: 55
End: 2025-01-09
Payer: COMMERCIAL

## 2025-01-14 ENCOUNTER — OFFICE VISIT (OUTPATIENT)
Dept: INTERNAL MEDICINE | Facility: CLINIC | Age: 55
End: 2025-01-14
Payer: COMMERCIAL

## 2025-01-14 ENCOUNTER — TRANSFERRED RECORDS (OUTPATIENT)
Dept: HEALTH INFORMATION MANAGEMENT | Facility: CLINIC | Age: 55
End: 2025-01-14

## 2025-01-14 VITALS
SYSTOLIC BLOOD PRESSURE: 122 MMHG | HEART RATE: 70 BPM | DIASTOLIC BLOOD PRESSURE: 66 MMHG | HEIGHT: 70 IN | OXYGEN SATURATION: 96 % | TEMPERATURE: 98.1 F | BODY MASS INDEX: 38.65 KG/M2 | RESPIRATION RATE: 18 BRPM | WEIGHT: 270 LBS

## 2025-01-14 DIAGNOSIS — E66.01 MORBID OBESITY (H): Primary | ICD-10-CM

## 2025-01-14 DIAGNOSIS — B35.1 ONYCHOMYCOSIS: ICD-10-CM

## 2025-01-14 DIAGNOSIS — R11.0 NAUSEA: ICD-10-CM

## 2025-01-14 DIAGNOSIS — C50.911 INFILTRATING DUCTAL CARCINOMA OF RIGHT BREAST (H): ICD-10-CM

## 2025-01-14 DIAGNOSIS — M54.50 LUMBAR BACK PAIN: ICD-10-CM

## 2025-01-14 LAB
ALBUMIN SERPL BCG-MCNC: 4.2 G/DL (ref 3.5–5.2)
ALP SERPL-CCNC: 61 U/L (ref 40–150)
ALT SERPL W P-5'-P-CCNC: 10 U/L (ref 0–50)
ANION GAP SERPL CALCULATED.3IONS-SCNC: 10 MMOL/L (ref 7–15)
AST SERPL W P-5'-P-CCNC: 15 U/L (ref 0–45)
BILIRUB SERPL-MCNC: 0.4 MG/DL
BUN SERPL-MCNC: 12.8 MG/DL (ref 6–20)
CALCIUM SERPL-MCNC: 9 MG/DL (ref 8.8–10.4)
CHLORIDE SERPL-SCNC: 108 MMOL/L (ref 98–107)
CREAT SERPL-MCNC: 0.88 MG/DL (ref 0.51–0.95)
EGFRCR SERPLBLD CKD-EPI 2021: 77 ML/MIN/1.73M2
GLUCOSE SERPL-MCNC: 87 MG/DL (ref 70–99)
HCO3 SERPL-SCNC: 22 MMOL/L (ref 22–29)
POTASSIUM SERPL-SCNC: 4 MMOL/L (ref 3.4–5.3)
PROT SERPL-MCNC: 7.2 G/DL (ref 6.4–8.3)
SODIUM SERPL-SCNC: 140 MMOL/L (ref 135–145)

## 2025-01-14 PROCEDURE — 80053 COMPREHEN METABOLIC PANEL: CPT | Performed by: NURSE PRACTITIONER

## 2025-01-14 PROCEDURE — 91320 SARSCV2 VAC 30MCG TRS-SUC IM: CPT | Performed by: NURSE PRACTITIONER

## 2025-01-14 PROCEDURE — 36415 COLL VENOUS BLD VENIPUNCTURE: CPT | Performed by: NURSE PRACTITIONER

## 2025-01-14 PROCEDURE — G2211 COMPLEX E/M VISIT ADD ON: HCPCS | Performed by: NURSE PRACTITIONER

## 2025-01-14 PROCEDURE — 99214 OFFICE O/P EST MOD 30 MIN: CPT | Performed by: NURSE PRACTITIONER

## 2025-01-14 PROCEDURE — 90480 ADMN SARSCOV2 VAC 1/ONLY CMP: CPT | Performed by: NURSE PRACTITIONER

## 2025-01-14 RX ORDER — TERBINAFINE HYDROCHLORIDE 250 MG/1
250 TABLET ORAL DAILY
Qty: 90 TABLET | Refills: 0 | Status: SHIPPED | OUTPATIENT
Start: 2025-01-14 | End: 2025-04-14

## 2025-01-14 RX ORDER — PROCHLORPERAZINE MALEATE 10 MG
10 TABLET ORAL EVERY 6 HOURS PRN
Qty: 30 TABLET | Refills: 0 | Status: SHIPPED | OUTPATIENT
Start: 2025-01-14

## 2025-01-14 ASSESSMENT — PATIENT HEALTH QUESTIONNAIRE - PHQ9
10. IF YOU CHECKED OFF ANY PROBLEMS, HOW DIFFICULT HAVE THESE PROBLEMS MADE IT FOR YOU TO DO YOUR WORK, TAKE CARE OF THINGS AT HOME, OR GET ALONG WITH OTHER PEOPLE: SOMEWHAT DIFFICULT
SUM OF ALL RESPONSES TO PHQ QUESTIONS 1-9: 6
SUM OF ALL RESPONSES TO PHQ QUESTIONS 1-9: 6

## 2025-01-14 ASSESSMENT — ENCOUNTER SYMPTOMS: BACK PAIN: 1

## 2025-01-14 NOTE — PATIENT INSTRUCTIONS
Refills of the Wegovy provided today.    Refills of the Compazine provided in case you experience nausea.    Terbinafine medication for nail fungus.  We need to get a baseline set of liver labs today, and recheck liver labs in 3 months.    Lab only appointment for 3-month check of liver labs.    COVID shot today.    MRI of the lumbar spine ordered for chronic low back pain.  You can call [phone] to schedule, if needed.    After completing the MRI, make sure that you follow-up with the spine clinic to review the results.  You can call [phone] to make an appointment.    I would go ahead and schedule the spine clinic consultation for a week after your MRI.    You will be due for an annual physical later this spring.

## 2025-01-14 NOTE — PROGRESS NOTES
Assessment & Plan     Morbid obesity (H)  She had been using the Wegovy with success.  However, there appears to be an issue with her prior authorizations as we titrated up on the medication.    She wishes to restart  - semaglutide-weight management (WEGOVY) 0.25 MG/0.5ML pen; Inject 0.5 mLs (0.25 mg) subcutaneously once a week.    Nausea  When starting the Wegovy she would have a bit of nausea that was successfully treated with the Compazine.  As we titrated up she did not need the Compazine as often  - prochlorperazine (COMPAZINE) 10 MG tablet; Take 1 tablet (10 mg) by mouth every 6 hours as needed for nausea or vomiting.    Lumbar back pain  She fell while at work about 3 months ago.  She fell off a stool backward and landed on her left shoulder but has had persistent low back pain since.  MRI for further characterization of follow-up with the spine clinic  - MR Lumbar Spine w/o Contrast; Future  - Spine  Referral; Future    Onychomycosis  Mostly involving toes but she does have some evidence of onychomycosis involving her fingers as well.    We had a conversation about the risks associated to oral antifungals.  She accepts these risks and wishes to proceed    - Comprehensive metabolic panel; Future  - terbinafine (LAMISIL) 250 MG tablet; Take 1 tablet (250 mg) by mouth daily.  - Comprehensive metabolic panel; Future    Infiltrating ductal carcinoma of right breast (H)  Oncology has signed off. She last saw Dr. Burroughs in October 2024 and was told that she could complete the anastrozole in December 2024.    Patient Instructions   Refills of the Wegovy provided today.    Refills of the Compazine provided in case you experience nausea.    Terbinafine medication for nail fungus.  We need to get a baseline set of liver labs today, and recheck liver labs in 3 months.    Lab only appointment for 3-month check of liver labs.    COVID shot today.    MRI of the lumbar spine ordered for chronic low back pain.  You  "can call [phone] to schedule, if needed.    After completing the MRI, make sure that you follow-up with the spine clinic to review the results.  You can call [phone] to make an appointment.    I would go ahead and schedule the spine clinic consultation for a week after your MRI.    You will be due for an annual physical later this spring.      The longitudinal plan of care for the diagnosis(es)/condition(s) as documented were addressed during this visit. Due to the added complexity in care, I will continue to support Sarah in the subsequent management and with ongoing continuity of care.            BMI  Estimated body mass index is 38.74 kg/m  as calculated from the following:    Height as of this encounter: 1.778 m (5' 10\").    Weight as of this encounter: 122.5 kg (270 lb).             Anastasiya Smith is a 54 year old, presenting for the following health issues:  Back Pain (Fell at work 2-3 months ago, had spinal injection at [facility] 2 weeks ago, helped somewhat ) and Nail Problem (Toenails and fingernails, brittle, break, toes are yellow and have ridges)      1/14/2025     7:56 AM   Additional Questions   Roomed by Luz Marina WOOD     Back Pain     History of Present Illness       Reason for visit:  Lower back pain worsening mobility issues    She eats 0-1 servings of fruits and vegetables daily.She consumes 2 sweetened beverage(s) daily.She exercises with enough effort to increase her heart rate 30 to 60 minutes per day.  She exercises with enough effort to increase her heart rate 4 days per week.   She is taking medications regularly.     Three months ago she was sitting on a stool when she fell off. She fell backwards. It happened fast, landed on left shoulder.     Continued working for a while. Went to [facility]. Has been doing PT. She has been doing spinal injections.     She has been having ongoing left shoulder and left thumb pain. Having some low back pain as well. Low back pain radiating down legs. No " "loss of bowel or bladder function. Sometimes some numbness and tingling in her groin at times.     She will sometimes have some pain in her stomach. She wonders if it is related to a hernia.     Back pain Rates a 3/10 at rest. Walking takes pain to 7/10.    Tylenol at home.                   Objective    /66 (BP Location: Left arm, Patient Position: Sitting, Cuff Size: Adult Regular)   Pulse 70   Temp 98.1  F (36.7  C)   Resp 18   Ht 1.778 m (5' 10\")   Wt 122.5 kg (270 lb)   LMP  (LMP Unknown)   SpO2 96%   BMI 38.74 kg/m    Body mass index is 38.74 kg/m .  Physical Exam   No pain with palpation over central spinal processes in the lumbar area.  Bilateral leg lift test unremarkable.  Patellar reflexes decreased bilaterally.                Signed Electronically by: Niko Kennedy CNP    "

## 2025-01-15 ENCOUNTER — PATIENT OUTREACH (OUTPATIENT)
Dept: CARE COORDINATION | Facility: CLINIC | Age: 55
End: 2025-01-15
Payer: COMMERCIAL

## 2025-01-15 DIAGNOSIS — E66.01 MORBID OBESITY (H): ICD-10-CM

## 2025-01-15 NOTE — TELEPHONE ENCOUNTER
Medication Question or Refill    Contacts       Contact Date/Time Type Contact Phone/Fax    01/15/2025 04:27 PM CST Phone (Incoming) Sarah Gilmore (Self) [phone] (M)            What medication are you calling about (include dose and sig)?: WEGOBY     Preferred Pharmacy:   [id number] [address] AT [address]  [address]  [facility] [address]  Phone: [phone] Fax: [phone]      Controlled Substance Agreement on file:   CSA -- Patient Level:    CSA: None found at the patient level.       Who prescribed the medication?:     Do you need a refill? Yes    When did you use the medication last? N/A RESTARTING MEDICATION    Patient offered an appointment? No    Do you have any questions or concerns?  Yes: Pharmacy is stating they do not have the prescription.      Could we send this information to you in [employer] or would you prefer to receive a phone call?:   No preference   Okay to leave a detailed message?: Yes at Cell number on file:    Telephone Information:   Mobile [phone]

## 2025-01-23 ENCOUNTER — HOSPITAL ENCOUNTER (OUTPATIENT)
Dept: MRI IMAGING | Facility: CLINIC | Age: 55
End: 2025-01-23
Attending: NURSE PRACTITIONER
Payer: COMMERCIAL

## 2025-01-23 DIAGNOSIS — M54.50 LUMBAR BACK PAIN: ICD-10-CM

## 2025-01-23 PROCEDURE — 72148 MRI LUMBAR SPINE W/O DYE: CPT

## 2025-02-06 ENCOUNTER — TRANSFERRED RECORDS (OUTPATIENT)
Dept: HEALTH INFORMATION MANAGEMENT | Facility: CLINIC | Age: 55
End: 2025-02-06
Payer: COMMERCIAL

## 2025-02-20 ENCOUNTER — TELEPHONE (OUTPATIENT)
Dept: ENDOCRINOLOGY | Facility: CLINIC | Age: 55
End: 2025-02-20
Payer: COMMERCIAL

## 2025-02-20 NOTE — TELEPHONE ENCOUNTER
Left Voicemail (1st Attempt) for the patient to call back and schedule the following:    Appointment type: return endocrine   Provider: [facility] or if pt does not want to go to [facility] can [facility]rid w any provider at the [facility] that sees osteoporosis   Return date: next avail   Specialty phone number: [phone]   Additional appointment(s) needed:   Additonal Notes: Devon SNOW x1   From refill team:  Overdue for follow up visit   LCV: 11/22/23   CALCIUM 600MG + D3 TABLETS 90 day mel refill with instructions that patient call clinic to schedule an appointment     Fatemeh Orosco on 2/20/2025 at 2:40 PM

## 2025-03-20 ENCOUNTER — TELEPHONE (OUTPATIENT)
Dept: INTERNAL MEDICINE | Facility: CLINIC | Age: 55
End: 2025-03-20
Payer: COMMERCIAL

## 2025-03-20 NOTE — TELEPHONE ENCOUNTER
Order/Referral Request    Who is requesting: Patient    Orders being requested: Auto immune    Reason service is needed/diagnosis:     When are orders needed by: when done    Has this been discussed with Provider: Yes    Does patient have a preference on a Group/Provider/Facility?     Does patient have an appointment scheduled?: No    Where to send orders: Place orders within Epic    Could we send this information to you in [email or web address] or would you prefer to receive a phone call?:   Patient would like to be contacted via [employer]      Patient also want's to no when her Wegovy will increase

## 2025-03-23 DIAGNOSIS — E66.01 MORBID OBESITY (H): ICD-10-CM

## 2025-03-24 RX ORDER — SEMAGLUTIDE 0.25 MG/.5ML
INJECTION, SOLUTION SUBCUTANEOUS
Qty: 2 ML | Refills: 1 | Status: SHIPPED | OUTPATIENT
Start: 2025-03-24

## 2025-04-07 ENCOUNTER — PATIENT OUTREACH (OUTPATIENT)
Dept: CARE COORDINATION | Facility: CLINIC | Age: 55
End: 2025-04-07
Payer: COMMERCIAL

## 2025-04-14 ENCOUNTER — LAB (OUTPATIENT)
Dept: LAB | Facility: CLINIC | Age: 55
End: 2025-04-14
Payer: COMMERCIAL

## 2025-04-14 ENCOUNTER — OFFICE VISIT (OUTPATIENT)
Dept: ENDOCRINOLOGY | Facility: CLINIC | Age: 55
End: 2025-04-14
Payer: COMMERCIAL

## 2025-04-14 VITALS
SYSTOLIC BLOOD PRESSURE: 132 MMHG | DIASTOLIC BLOOD PRESSURE: 86 MMHG | BODY MASS INDEX: 38.74 KG/M2 | HEART RATE: 69 BPM | HEIGHT: 70 IN | OXYGEN SATURATION: 94 %

## 2025-04-14 DIAGNOSIS — E03.8 SUBCLINICAL HYPOTHYROIDISM: ICD-10-CM

## 2025-04-14 DIAGNOSIS — M81.0 AGE-RELATED OSTEOPOROSIS WITHOUT CURRENT PATHOLOGICAL FRACTURE: Primary | ICD-10-CM

## 2025-04-14 DIAGNOSIS — B35.1 ONYCHOMYCOSIS: ICD-10-CM

## 2025-04-14 DIAGNOSIS — M81.0 AGE-RELATED OSTEOPOROSIS WITHOUT CURRENT PATHOLOGICAL FRACTURE: ICD-10-CM

## 2025-04-14 LAB
ALBUMIN SERPL BCG-MCNC: 4.2 G/DL (ref 3.5–5.2)
ALP SERPL-CCNC: 57 U/L (ref 40–150)
ALT SERPL W P-5'-P-CCNC: 10 U/L (ref 0–50)
ANION GAP SERPL CALCULATED.3IONS-SCNC: 11 MMOL/L (ref 7–15)
AST SERPL W P-5'-P-CCNC: 13 U/L (ref 0–45)
BILIRUB SERPL-MCNC: 0.4 MG/DL
BUN SERPL-MCNC: 10.2 MG/DL (ref 6–20)
CALCIUM SERPL-MCNC: 9.3 MG/DL (ref 8.8–10.4)
CHLORIDE SERPL-SCNC: 108 MMOL/L (ref 98–107)
CREAT SERPL-MCNC: 0.81 MG/DL (ref 0.51–0.95)
EGFRCR SERPLBLD CKD-EPI 2021: 85 ML/MIN/1.73M2
GLUCOSE SERPL-MCNC: 89 MG/DL (ref 70–99)
HCO3 SERPL-SCNC: 22 MMOL/L (ref 22–29)
POTASSIUM SERPL-SCNC: 4.2 MMOL/L (ref 3.4–5.3)
PROT SERPL-MCNC: 7.2 G/DL (ref 6.4–8.3)
PTH-INTACT SERPL-MCNC: 51 PG/ML (ref 15–65)
SODIUM SERPL-SCNC: 141 MMOL/L (ref 135–145)
T4 FREE SERPL-MCNC: 1.23 NG/DL (ref 0.9–1.7)
TSH SERPL DL<=0.005 MIU/L-ACNC: 4.34 UIU/ML (ref 0.3–4.2)
VIT D+METAB SERPL-MCNC: 25 NG/ML (ref 20–50)

## 2025-04-14 PROCEDURE — 84439 ASSAY OF FREE THYROXINE: CPT | Performed by: PATHOLOGY

## 2025-04-14 PROCEDURE — 36415 COLL VENOUS BLD VENIPUNCTURE: CPT | Performed by: PATHOLOGY

## 2025-04-14 PROCEDURE — 99000 SPECIMEN HANDLING OFFICE-LAB: CPT | Performed by: PATHOLOGY

## 2025-04-14 PROCEDURE — 82306 VITAMIN D 25 HYDROXY: CPT | Performed by: STUDENT IN AN ORGANIZED HEALTH CARE EDUCATION/TRAINING PROGRAM

## 2025-04-14 PROCEDURE — 86376 MICROSOMAL ANTIBODY EACH: CPT | Performed by: STUDENT IN AN ORGANIZED HEALTH CARE EDUCATION/TRAINING PROGRAM

## 2025-04-14 PROCEDURE — 84443 ASSAY THYROID STIM HORMONE: CPT | Performed by: PATHOLOGY

## 2025-04-14 PROCEDURE — 80053 COMPREHEN METABOLIC PANEL: CPT | Performed by: PATHOLOGY

## 2025-04-14 PROCEDURE — 83970 ASSAY OF PARATHORMONE: CPT | Performed by: PATHOLOGY

## 2025-04-14 ASSESSMENT — PAIN SCALES - GENERAL: PAINLEVEL_OUTOF10: NO PAIN (0)

## 2025-04-14 NOTE — NURSING NOTE
"Chief Complaint   Patient presents with    Osteoporosis     Vital signs:      BP: 132/86 Pulse: 69     SpO2: 94 %     Height: 177.8 cm (5' 10\")    Estimated body mass index is 38.74 kg/m  as calculated from the following:    Height as of this encounter: 1.778 m (5' 10\").    Weight as of 1/14/25: 122.5 kg (270 lb).        "

## 2025-04-14 NOTE — PATIENT INSTRUCTIONS
Labs today   Dexa bone scan before next visit please get it done in [facility]  I will see you back in October this year     Please call and schedule at one of these locations that provide the service you need.     DEXA, CT, MRI, US, XRAY    [facility]   ([facility]) [phone]   [facility] ([place], [place]) [phone]   [facility] ([facility]) [phone]   [facility] ([facility]) [phone]

## 2025-04-14 NOTE — LETTER
4/14/2025       RE: Sarah Gilmore  [address] [facility] [address]  [facility] [address]     Dear Colleague,    Thank you for referring your patient, Sarah Gilmore, to the [facility] [place] at [facility]. Please see a copy of my visit note below.    03/26/25 10:30 AM  PATIENT LAB/IMAGING STATUS : No pending lab orders      Endocrinology Clinic Visit 4/14/2025    NAME:  Sarah Gilmore  PCP:  Niko Kennedy  MRN:  [id number]  Reason for Consult: Osteoporosis  Requesting Provider:  Referred Self    Chief Complaint     Chief Complaint   Patient presents with     Osteoporosis       History of Present Illness     Sarah Gilmore is a 54 year old female who is seen in clinic for follow-up for osteoporosis management.  She established care with endocrinology back in May 2023.  Today is her first visit with me.    She has a PMHx of  history of aortic root/ascending aorta enlargement, LE edema, DCIS bilateral breasts, ER/NH positive with invasive ductal carcinoma of the right breast, status postmastectomy, EBRT right breast on aromatase inhibitor therapy for 5 years ended on December 2024.  She is postmenopausal at the age of 48, also had hysterectomy at that time.     She had a DEXA spine in December 2019 which showed the most negative T score of bilateral total hip at -1.5 and -1.4 at lumbar spine.  DEXA scan in April 2022 most negative T score of -2.6 at the lumbar spine L2-L4.  She was started on iv zoledronic acid in 10/4/2022 by her oncologist.    Bone specific medications received:  Reclast 10/4/2022, 10/2023, 10/2024.    - Hx of falls: She had a fall in 2011, she tripped and fell while putting on pants, had a intra-articular left radial head fracture, left ankle fracture.  She had a fall in June 2022 while cleaning, she tripped over a broom handle. She describes that as a bad fall, had facial pain and nasal pain CT facial  showed nondisplaced bony irregularity likely nondisplaced fracture  - FH of osteoporosis or hip fractures: Grandmother had osteoporosis and hip fractures  - Menopause: Had a hysterectomy at age of 48.    - Diet: Drinks milk once in a while with a meal, 8 Oz, infrequent use of yoghurt or cheese. Has orange juice couple of times a week, has leafy vegetables once in a while.   - Dental health:  last visit was in  , no current dental issues   - Thyroid disorder: none  - IBD/malabsorption/ RA: none  - Steroid exposure: none  - Smoking hx: none  - Alcohol hx: none  - Back pain or height loss: none        Interval hx:   Regarding anastrozole: stopped in 2024   Interval falls or fractures: no   Supplements: Ca carbonate-vitd 600-10 bid  She is following with oncology, last visit was on 10/4/2024.  Plan for discharge from the clinic following that 2024.    Interval imagin2025: MRI lumbar spine mild chronic superior endplate height loss of T12 and L1 vertebral bodies.  From reviewing old images available in the records no image included lateral view of T12/L1 except x-ray lumbar spine on 2014 which showed moderate compression in both vertebral bodies at that time. Sated she had work injury , 2024 she fell from stool backward as one one of the patient tackled her with needle and had back pain after the fall resolved with the PT sessions. She does not recall back pain or injuries prior to that.       Most recent DEXA bone scan on 2023:  -Lumbar Spine: L2-L4: BMD: 0.922 g/cm2. T-score: -2.3. Z-score: -2.7.  -RIGHT Hip Total: BMD: 0.800 g/cm2. T-score: -1.6. Z-score: -1.8.  -RIGHT Hip Femoral neck: BMD: 0.887 g/cm2. T-score: -1.1. Z-score: -0.8.  -LEFT Hip Total: BMD: 0.780 g/cm2. T-score: -1.8. Z-score: -2.0.  -LEFT Hip Femoral neck: BMD: 0.842 g/cm2. T-score: -1.4. Z-score: -1.1.    -Lumbar Spine L2-L4: TBS: 1.360. TBS T-score: -1.3.TBS Z-score: 0.1       INTERVAL CHANGE in comparison to  4/5/2022:  -There has been a 4.2% increase in lumbar spine BMD.  -There has been a 2.2% increase in bilateral hip BMD.    Subclinical hypothyroidism:  Intermittent since 5/2023   Most recent labs 5/9/2024 SH mildly elevated 4.485 , Free T4 1.2   No major tiredness in the vacation days but in the working days feels tired , takes iron supplements causing constipation , feeling always cold , has excessive skin dryness , no new hair loss        Problem List     Patient Active Problem List   Diagnosis     Iron deficiency anemia     Hymenoptera allergy     Vitamin D deficiency     Acquired lymphedema of leg     H/O breast reconstruction     Personal history of malignant neoplasm of breast     Swelling in right armpit     Post-mastectomy lymphedema syndrome     Venous hypertension of lower extremity, bilateral     Lipedema     DCIS (ductal carcinoma in situ)     Infiltrating ductal carcinoma of right breast (H)     History of hysterectomy     Age-related osteoporosis without current pathological fracture     Long term current use of aromatase inhibitor     Sleep apnea, unspecified type     Thoracic aortic ectasia     Ascending aorta dilatation        Medications     Current Outpatient Medications   Medication Sig Dispense Refill     Acetaminophen 325 MG CAPS Take 2 tablets by mouth       anastrozole (ARIMIDEX) 1 MG tablet Take 1 tablet (1 mg) by mouth daily. 30 tablet 0     calcium carbonate-vitamin D (CALCIUM 600+D3) 600-10 MG-MCG per tablet Take 2 tablets by mouth 2 times daily. 360 tablet 0     EPI E-Z PEN JOELLEN 1:1000  IJ 1 TIME ONLY  PRN bee sting 2 Device 20     FERROUS SULFATE  MG OR TBCR 1 TABLET BID  after food withm orange juice 100 3     fish oil-omega-3 fatty acids 1000 MG capsule        losartan (COZAAR) 25 MG tablet Take 1 tablet (25 mg) by mouth daily 90 tablet 3     MAGNESIUM PO Take 200 mg by mouth       Multiple Vitamin (MULTI-VITAMINS) TABS Take 1 tablet by mouth       prochlorperazine (COMPAZINE)  "10 MG tablet Take 1 tablet (10 mg) by mouth every 6 hours as needed for nausea or vomiting. 30 tablet 0     Specialty Vitamins Products (LYMPH PO) Take by mouth daily Lymphatic supplement       terbinafine (LAMISIL) 250 MG tablet Take 1 tablet (250 mg) by mouth daily. 90 tablet 0     Turmeric 400 MG CAPS        WEGOVY 0.25 MG/0.5ML pen ADMINISTER 0.25 MG UNDER THE SKIN 1 TIME A WEEK 2 mL 1     zoledronic acid (RECLAST) 5 MG/100ML SOLN infusion Inject 5 mg into the vein once       No current facility-administered medications for this visit.        Allergies     Allergies   Allergen Reactions     Bees Anaphylaxis     Noted in 1/17/09 ER  Trouble breathing, rash     Contrast Dye Anaphylaxis     airway problems     Seafood Anaphylaxis       Medical / Surgical History     Past Medical History:   Diagnosis Date     Dilated aortic root      Foot fracture 06/03/2011     History of anesthesia complications     slow to wake up     PONV (postoperative nausea and vomiting)      Radial head fracture 06/03/2011     Seizures (H)     h/o seizure disorder in childhood, last seizure activity  \"4 yrs ago\"     Suspected sleep apnea     sleep study set up in Aug 2019     Past Surgical History:   Procedure Laterality Date     C/SECTION, LOW TRANSVERSE  07/10/1994    breech     HYSTERECTOMY, PAP NO LONGER INDICATED Bilateral      LAPAROSCOPIC HYSTERECTOMY TOTAL  03/13/2018    cervix benign     MAMMO STEREOTACTIC CORE BIOPSY RIGHT Right 05/22/2019     MAMMO STEREOTACTIC CORE BIOPSY RIGHT Right 05/30/2019     NM SENTINEL NODE INJECTION  07/30/2019     OTHER SURGICAL HISTORY      right arm surgery     MS MASTECTOMY, MODIFIED RADICAL Bilateral 07/30/2019    Procedure: BILATERAL MASTECTOMY;  Surgeon: Mckenzie Colby DO;  Location: [facility] OR;  Service: General     MS REPLACEMENT TISSUE EXPANDER W/PERMANENT IMPLANT Bilateral 07/30/2019    Procedure: BILATERAL IMPLANT RECONSTRUCTION TO BREASTS;  Surgeon: Carlitos Crum MD;  Location: " [facility];  Service: Plastics     RIGHT BREAST RECONSTRUCTION WITH LATISSIMUS DORSI FLAP    10/29/2021     ZZC  DELIVERY ONLY  1994       Social History     Social History     Socioeconomic History     Marital status: Single     Spouse name: Not on file     Number of children: 3     Years of education: 12+     Highest education level: Not on file   Occupational History     Occupation: pt care tech, [employer]   Tobacco Use     Smoking status: Never     Passive exposure: Never     Smokeless tobacco: Never   Vaping Use     Vaping status: Never Used   Substance and Sexual Activity     Alcohol use: No     Drug use: No     Sexual activity: Yes     Partners: Male     Birth control/protection: Condom   Other Topics Concern      Service Not Asked     Blood Transfusions No     Caffeine Concern Yes     Occupational Exposure Not Asked     Hobby Hazards No     Sleep Concern No     Stress Concern No     Weight Concern No     Special Diet No     Back Care No     Exercise Yes     Bike Helmet Not Asked     Seat Belt Yes     Self-Exams Yes   Social History Narrative     Not on file     Social Drivers of Health     Financial Resource Strain: Low Risk  (2024)    Financial Resource Strain      Within the past 12 months, have you or your family members you live with been unable to get utilities (heat, electricity) when it was really needed?: No   Food Insecurity: Low Risk  (2024)    Food Insecurity      Within the past 12 months, did you worry that your food would run out before you got money to buy more?: No      Within the past 12 months, did the food you bought just not last and you didn t have money to get more?: No   Transportation Needs: Low Risk  (2024)    Transportation Needs      Within the past 12 months, has lack of transportation kept you from medical appointments, getting your medicines, non-medical meetings or appointments, work, or from getting things that you need?: No   Physical  Activity: Insufficiently Active (5/6/2024)    Exercise Vital Sign      Days of Exercise per Week: 4 days      Minutes of Exercise per Session: 30 min   Stress: Stress Concern Present (5/6/2024)    [employer] [place] [employer] - Occupational Stress Questionnaire      Feeling of Stress : To some extent   Social Connections: Unknown (5/6/2024)    Social Connection and Isolation Panel [NHANES]      Frequency of Communication with Friends and Family: Not on file      Frequency of Social Gatherings with Friends and Family: Once a week      Attends Yazidi Services: Not on file      Active Member of Clubs or Organizations: Not on file      Attends Club or Organization Meetings: Not on file      Marital Status: Not on file   Interpersonal Safety: Low Risk  (1/14/2025)    Interpersonal Safety      Do you feel physically and emotionally safe where you currently live?: Yes      Within the past 12 months, have you been hit, slapped, kicked or otherwise physically hurt by someone?: No      Within the past 12 months, have you been humiliated or emotionally abused in other ways by your partner or ex-partner?: No   Housing Stability: Low Risk  (5/6/2024)    Housing Stability      Do you have housing? : Yes      Are you worried about losing your housing?: No       Family History     Family History   Problem Relation Age of Onset     Psychotic Disorder Mother      Sleep Apnea Mother      Mental Illness Mother      C.A.D. Father      Diabetes Father      Sleep Apnea Father      No Known Problems Sister      No Known Problems Brother      Psychotic Disorder Maternal Grandfather         commit suicide     Suicidality Maternal Grandfather      Breast Cancer Paternal Grandmother 50     Prostate Cancer Paternal Grandfather      Testicular cancer Paternal Grandfather      Asperger's syndrome Son      Mental Illness Son      Leukemia Paternal Uncle        ROS     12 ROS completed, pertinent positive and negative in  "HPI    Physical Exam   /86   Pulse 69   Ht 1.778 m (5' 10\")   LMP  (LMP Unknown)   SpO2 94%   BMI 38.74 kg/m       General: Comfortable, no obvious distress, normal body habitus  HENT: Atraumatic,   CV: normal rate.   Resp:  good effort, no evidence of loud wheezing   Skin: No rashes, lesions, or subcutaneous nodules on exposed skin.   Psych: Alert and oriented x 3. Appropriate affect, good insight  Musculoskeletal: Appropriate muscle bulk   Neuro: Moves all four extremities. No focal deficits on limited exam.     Labs/Imaging     Pertinent Labs were reviewed and discussed briefly.  Radiology Results were  reviewed and discussed briefly.    Summary of recent findings:   No results found for: \"A1C\"    TSH   Date Value Ref Range Status   04/14/2025 4.34 (H) 0.30 - 4.20 uIU/mL Final   05/09/2024 4.45 (H) 0.30 - 4.20 uIU/mL Final   02/06/2024 4.06 0.30 - 4.20 uIU/mL Final   06/13/2023 6.07 (H) 0.30 - 4.20 uIU/mL Final   05/11/2023 5.82 (H) 0.30 - 4.20 uIU/mL Final   08/29/2020 3.88 0.30 - 5.00 uIU/mL Final   03/11/2008 2.25 0.4 - 5.0 mU/L Final   02/03/2004 1.94 0.34 - 4.82 IU/mL      T4 Free   Date Value Ref Range Status   03/11/2008 1.02 0.70 - 1.85 ng/dL Final     Free T4   Date Value Ref Range Status   05/09/2024 1.20 0.90 - 1.70 ng/dL Final   06/13/2023 1.16 0.90 - 1.70 ng/dL Final   05/11/2023 0.94 0.90 - 1.70 ng/dL Final       Creatinine   Date Value Ref Range Status   04/14/2025 0.81 0.51 - 0.95 mg/dL Final   06/07/2011 0.83 0.52 - 1.04 mg/dL Final      Latest Ref Rng 5/11/2023  12:08 PM 10/3/2023  10:08 AM 10/4/2024  12:46 PM 1/14/2025  8:50 AM 4/14/2025  9:41 AM   ENDO CALCIUM LABS-UMP         25 OH Vit D total 20 - 75 ug/L <38        25 OH Vit D2 ug/L <5        25 OH Vit D3 ug/L 33        Albumin 3.5 - 5.0 g/dL        Albumin 3.5 - 5.2 g/dL 4.4    4.2  4.2    Alkaline Phosphatase 40 - 150 U/L    61  57    Calcium 8.8 - 10.4 mg/dL 9.6  9.4  9.4  9.0  9.3    Urea Nitrogen 8 - 22 mg/dL        Urea " Nitrogen 6.0 - 20.0 mg/dL    12.8  10.2    Creatinine 0.51 - 0.95 mg/dL 0.80  0.76  0.89  0.88  0.81    Magnesium 1.6 - 2.3 mg/dL        Parathyroid Hormone Intact 15 - 65 pg/mL 42     51      EXAM: DX HIP/PELVIS/SPINE  LOCATION: [facility]  DATE: 9/29/2023     INDICATION: Age-related osteoporosis without current pathological fracture. Patient is taking Reclast and Vitamin D. History of breast cancer.     DEMOGRAPHICS: Age - 53 years. Gender - female. Menopausal status - postmenopausal.     COMPARISON: None.     TECHNIQUE: Dual-energy x-ray absorptiometry (DXA) performed with routine technique. Trabecular bone score (TBS) analysis performed.     FINDINGS:  DXA RESULTS  -Lumbar Spine: L2-L4: BMD: 0.922 g/cm2. T-score: -2.3. Z-score: -2.7.  -RIGHT Hip Total: BMD: 0.800 g/cm2. T-score: -1.6. Z-score: -1.8.  -RIGHT Hip Femoral neck: BMD: 0.887 g/cm2. T-score: -1.1. Z-score: -0.8.  -LEFT Hip Total: BMD: 0.780 g/cm2. T-score: -1.8. Z-score: -2.0.  -LEFT Hip Femoral neck: BMD: 0.842 g/cm2. T-score: -1.4. Z-score: -1.1.     WHO T-SCORE CRITERIA  -Normal: T-score at or above -1 SD  -Osteopenia: T-score between -1 and -2.5 SD  -Osteoporosis: T-score at or below -2.5 SD     The World Health Organization (WHO) criteria is applicable to perimenopausal females, postmenopausal females, and men aged 50 years or older.     TBS RESULTS  -Lumbar Spine L2-L4: TBS: 1.360. TBS T-score: -1.3.TBS Z-score: 0.1.     The TBS is a DXA derived measurement for fracture risk assessment, and reflects the structural condition of the bone microarchitecture. It can be used to adjust WHO Fracture Risk Assessment Tool (FRAX) probability of fracture in postmenopausal women and   older men. The calculated probabilities of fracture have been shown to be more accurate when computed with the TBS.     INTERVAL CHANGE  -There has been a 4.2% increase in lumbar spine BMD.  -There has been a 2.2% increase in bilateral hip BMD.      FRACTURE RISK  -FRAX Results: The 10 year probability of major osteoporotic fracture is 10.4%, and of hip fracture is 0.8%, based on left femoral neck BMD.  -TBS-adjusted FRAX Results: The 10 year probability of major osteoporotic fracture is 9.6%, and of hip fracture is 0.6%.     RECOMMENDATIONS: Consider treatment if major osteoporotic fracture score is greater than or equal to 20%, and if the hip fracture score is greater than or equal to 3%.                                                                      IMPRESSION: Low bone density (OSTEOPENIA). T-score meets the WHO criteria for low bone density (osteopenia) at one or more measured sites. The risk of osteoporotic fracture increases approximately twofold for each standard deviation decrease in T-score.    EXAM: MR LUMBAR SPINE W/O CONTRAST  LOCATION: [facility]  DATE: 1/23/2025     INDICATION: Low back pain; Low back pain, no complicating feature; Chronic LBP duration >= 3 months; Worsening or not improving; Follow up in the last 28-60 days for conservative therapy; No known automatically detected potential contraindications to   imaging.  COMPARISON: Plain film 1/13/2020.  TECHNIQUE: Routine Lumbar Spine MRI without IV contrast.     FINDINGS:   NOMENCLATURE: Five lumbar-type vertebral bodies.     ALIGNMENT: Minimal T12-L1 retrolisthesis.     BONES: Mild chronic superior endplate height loss at T12 and L1. The remaining imaged vertebral bodies are unremarkable in height. No findings to suggest a marrow-replacing process. No evidence for pathologic marrow edema.     DISCS: No large posterior disc abnormalities. Mild spondylosis described in further detail below.     SPINAL CORD: The conus terminates at L2-L3. No signal abnormalities of the imaged cord or cauda equina nerve roots are demonstrated.     SPINAL CANAL: No significant stenosis. Prominent sacral Tarlov cysts.     PARASPINAL SOFT TISSUES: Unremarkable.     ABDOMINAL  CAVITY: No acute abnormality demonstrated within technique limitations.     SIGNIFICANT FINDINGS BY LEVEL:     T12-L1: Mild intervertebral disc height loss. Disc desiccation. Shallow bulge. Right paracentral/lateral protrusion. No significant facet arthropathy. No significant spinal or neural foraminal stenosis.      L1-L2: Normal disc height and signal. No herniation. Normal facets. No spinal canal or neural foraminal stenosis.     L2-L3: Slight intervertebral disc height loss. Mild desiccation. Shallow bulge. Mild facet arthropathy. No significant stenosis of the spinal canal or neural foramina.      L3-L4: Mild intervertebral disc height loss. Disc desiccation. Shallow bulge. Mild interbody spurring. Mild facet arthropathy. No significant stenosis of the spinal canal or neural foramina.     L4-L5: Mild intervertebral disc height loss. Disc desiccation. No herniation. Mild right and mild-to-moderate left facet arthropathy. No significant stenosis of the spinal canal or neural foramina.     L5-S1: Mild intervertebral disc height loss. Disc desiccation. No herniation. Mild to moderate facet arthropathy. No spinal canal stenosis. No significant neural foraminal stenosis.                                                                      IMPRESSION:  1.  No high-grade stenosis of the spinal canal or neural foramina.  2.  Mild spondylosis described in further detail below.  3.  Mild chronic superior endplate height loss of the T12 and L1 vertebral bodies.        X-ray lumbar spine 1/14/2020:        I personally reviewed the patient's outside records from [facility], Care Everywhere, and faxed records. Summary of pertinent findings in HPI.    Impression / Plan     1.  History of osteoporosis:  History of breast cancer s/p EBRT plus aromatase inhibitor for 5 years discontinued on December 2024.  Was diagnosed with osteoporosis in December 2019 following having DEXA bone scan at that time with the lowest T-score of -2.6  at the lumbar spine.  Started on Reclast received 3 doses October 2022, October 2023, October 2024.  Reclast well-tolerated no side effects.  Most recent DEXA bone scan was in September 2023 lowest T-score was -2.3 at the lumbar spine with interval improvement in lumbar spine BMD by 4.2% and in bilateral hip BMD by 2.2%.    She had MRI lumbar spine January 2025 was found to have mild chronic loss of height of T12 and L1.  Previous images were reviewed similar loss of height was seen in the image of the lumbar spine in February 2014.    I discussed with her today about assessing the risk of fracture to decide if she needs further doses of Reclast or not.    Plan:  -To get DEXA bone scan in September 2025.  -Continue vitamin D/calcium supplements.  -Labs today.    2.  History of subclinical hypothyroidism:  History of intermittent mildly elevated TSH with normal free T4.  No previous TPO antibodies checked.  She has no other specific symptoms.  TSH today 4.34 (0.3-4.2).    Plan:  -Will follow free T4 results as well as TPO antibodies.  -If free T4 within normal range no need to start levothyroxine therapy if free T4 is low we will start her on levothyroxine small dose.        Test and/or medications prescribed today:  Orders Placed This Encounter   Procedures     DX Bone Density     Vitamin D Deficiency     Parathyroid Hormone Intact     Basic metabolic panel     TSH with free T4 reflex     Thyroid peroxidase antibody         Follow up: 6 months      DINO Gauthier  Endocrinology, Diabetes and Metabolism  [facility]    61 minutes spent on the date of the encounter doing chart review, history and exam, documentation and further activities per the note  The longitudinal plan of care for the diagnosis(es)/condition(s) as documented were addressed during this visit. Due to the added complexity in care, I will continue to support Sarah in the subsequent management and with ongoing continuity of  care.    Note: Chart documentation done in part with Dragon Voice Recognition software. Although reviewed after completion, some word and grammatical errors may remain.  Please consider this when interpreting information in this chart        Again, thank you for allowing me to participate in the care of your patient.      Sincerely,    DINO Gauthier

## 2025-04-14 NOTE — PROGRESS NOTES
Endocrinology Clinic Visit 4/14/2025    NAME:  Sarah Gilmore  PCP:  Niko Kennedy  MRN:  [id number]  Reason for Consult: Osteoporosis  Requesting Provider:  Referred Self    Chief Complaint     Chief Complaint   Patient presents with    Osteoporosis       History of Present Illness     Sarah Gilmore is a 54 year old female who is seen in clinic for follow-up for osteoporosis management.  She established care with endocrinology back in May 2023.  Today is her first visit with me.    She has a PMHx of  history of aortic root/ascending aorta enlargement, LE edema, DCIS bilateral breasts, ER/RI positive with invasive ductal carcinoma of the right breast, status postmastectomy, EBRT right breast on aromatase inhibitor therapy for 5 years ended on December 2024.  She is postmenopausal at the age of 48, also had hysterectomy at that time.     She had a DEXA spine in December 2019 which showed the most negative T score of bilateral total hip at -1.5 and -1.4 at lumbar spine.  DEXA scan in April 2022 most negative T score of -2.6 at the lumbar spine L2-L4.  She was started on iv zoledronic acid in 10/4/2022 by her oncologist.    Bone specific medications received:  Reclast 10/4/2022, 10/2023, 10/2024.    - Hx of falls: She had a fall in 2011, she tripped and fell while putting on pants, had a intra-articular left radial head fracture, left ankle fracture.  She had a fall in June 2022 while cleaning, she tripped over a broom handle. She describes that as a bad fall, had facial pain and nasal pain CT facial showed nondisplaced bony irregularity likely nondisplaced fracture  - FH of osteoporosis or hip fractures: Grandmother had osteoporosis and hip fractures  - Menopause: Had a hysterectomy at age of 48.    - Diet: Drinks milk once in a while with a meal, 8 Oz, infrequent use of yoghurt or cheese. Has orange juice couple of times a week, has leafy vegetables once in a while.   - Dental health:  last visit was in   , no current dental issues   - Thyroid disorder: none  - IBD/malabsorption/ RA: none  - Steroid exposure: none  - Smoking hx: none  - Alcohol hx: none  - Back pain or height loss: none        Interval hx:   Regarding anastrozole: stopped in 2024   Interval falls or fractures: no   Supplements: Ca carbonate-vitd 600-10 bid  She is following with oncology, last visit was on 10/4/2024.  Plan for discharge from the clinic following that 2024.    Interval imagin2025: MRI lumbar spine mild chronic superior endplate height loss of T12 and L1 vertebral bodies.  From reviewing old images available in the records no image included lateral view of T12/L1 except x-ray lumbar spine on 2014 which showed moderate compression in both vertebral bodies at that time. Sated she had work injury , 2024 she fell from stool backward as one one of the patient tackled her with needle and had back pain after the fall resolved with the PT sessions. She does not recall back pain or injuries prior to that.       Most recent DEXA bone scan on 2023:  -Lumbar Spine: L2-L4: BMD: 0.922 g/cm2. T-score: -2.3. Z-score: -2.7.  -RIGHT Hip Total: BMD: 0.800 g/cm2. T-score: -1.6. Z-score: -1.8.  -RIGHT Hip Femoral neck: BMD: 0.887 g/cm2. T-score: -1.1. Z-score: -0.8.  -LEFT Hip Total: BMD: 0.780 g/cm2. T-score: -1.8. Z-score: -2.0.  -LEFT Hip Femoral neck: BMD: 0.842 g/cm2. T-score: -1.4. Z-score: -1.1.    -Lumbar Spine L2-L4: TBS: 1.360. TBS T-score: -1.3.TBS Z-score: 0.1       INTERVAL CHANGE in comparison to 2022:  -There has been a 4.2% increase in lumbar spine BMD.  -There has been a 2.2% increase in bilateral hip BMD.    Subclinical hypothyroidism:  Intermittent since 2023   Most recent labs 2024 SH mildly elevated 4.485 , Free T4 1.2   No major tiredness in the vacation days but in the working days feels tired , takes iron supplements causing constipation , feeling always cold , has excessive skin  dryness , no new hair loss        Problem List     Patient Active Problem List   Diagnosis    Iron deficiency anemia    Hymenoptera allergy    Vitamin D deficiency    Acquired lymphedema of leg    H/O breast reconstruction    Personal history of malignant neoplasm of breast    Swelling in right armpit    Post-mastectomy lymphedema syndrome    Venous hypertension of lower extremity, bilateral    Lipedema    DCIS (ductal carcinoma in situ)    Infiltrating ductal carcinoma of right breast (H)    History of hysterectomy    Age-related osteoporosis without current pathological fracture    Long term current use of aromatase inhibitor    Sleep apnea, unspecified type    Thoracic aortic ectasia    Ascending aorta dilatation        Medications     Current Outpatient Medications   Medication Sig Dispense Refill    Acetaminophen 325 MG CAPS Take 2 tablets by mouth      anastrozole (ARIMIDEX) 1 MG tablet Take 1 tablet (1 mg) by mouth daily. 30 tablet 0    calcium carbonate-vitamin D (CALCIUM 600+D3) 600-10 MG-MCG per tablet Take 2 tablets by mouth 2 times daily. 360 tablet 0    EPI E-Z PEN JOELLEN 1:1000  IJ 1 TIME ONLY  PRN bee sting 2 Device 20    FERROUS SULFATE  MG OR TBCR 1 TABLET BID  after food withm orange juice 100 3    fish oil-omega-3 fatty acids 1000 MG capsule       losartan (COZAAR) 25 MG tablet Take 1 tablet (25 mg) by mouth daily 90 tablet 3    MAGNESIUM PO Take 200 mg by mouth      Multiple Vitamin (MULTI-VITAMINS) TABS Take 1 tablet by mouth      prochlorperazine (COMPAZINE) 10 MG tablet Take 1 tablet (10 mg) by mouth every 6 hours as needed for nausea or vomiting. 30 tablet 0    Specialty Vitamins Products (LYMPH PO) Take by mouth daily Lymphatic supplement      terbinafine (LAMISIL) 250 MG tablet Take 1 tablet (250 mg) by mouth daily. 90 tablet 0    Turmeric 400 MG CAPS       WEGOVY 0.25 MG/0.5ML pen ADMINISTER 0.25 MG UNDER THE SKIN 1 TIME A WEEK 2 mL 1    zoledronic acid (RECLAST) 5 MG/100ML SOLN  "infusion Inject 5 mg into the vein once       No current facility-administered medications for this visit.        Allergies     Allergies   Allergen Reactions    Bees Anaphylaxis     Noted in 09 ER  Trouble breathing, rash    Contrast Dye Anaphylaxis     airway problems    Seafood Anaphylaxis       Medical / Surgical History     Past Medical History:   Diagnosis Date    Dilated aortic root     Foot fracture 2011    History of anesthesia complications     slow to wake up    PONV (postoperative nausea and vomiting)     Radial head fracture 2011    Seizures (H)     h/o seizure disorder in childhood, last seizure activity  \"4 yrs ago\"    Suspected sleep apnea     sleep study set up in Aug 2019     Past Surgical History:   Procedure Laterality Date    C/SECTION, LOW TRANSVERSE  07/10/1994    breech    HYSTERECTOMY, PAP NO LONGER INDICATED Bilateral     LAPAROSCOPIC HYSTERECTOMY TOTAL  2018    cervix benign    MAMMO STEREOTACTIC CORE BIOPSY RIGHT Right 2019    MAMMO STEREOTACTIC CORE BIOPSY RIGHT Right 2019    NM SENTINEL NODE INJECTION  2019    OTHER SURGICAL HISTORY      right arm surgery    LA MASTECTOMY, MODIFIED RADICAL Bilateral 2019    Procedure: BILATERAL MASTECTOMY;  Surgeon: Mckenzie Colby DO;  Location: [facility];  Service: General    LA REPLACEMENT TISSUE EXPANDER W/PERMANENT IMPLANT Bilateral 2019    Procedure: BILATERAL IMPLANT RECONSTRUCTION TO BREASTS;  Surgeon: Carlitos Crum MD;  Location: [facility] OR;  Service: Plastics    RIGHT BREAST RECONSTRUCTION WITH LATISSIMUS DORSI FLAP    10/29/2021    [facility]  DELIVERY ONLY  1994       Social History     Social History     Socioeconomic History    Marital status: Single     Spouse name: Not on file    Number of children: 3    Years of education: 12+    Highest education level: Not on file   Occupational History    Occupation: pt care tech, [employer]   Tobacco Use    Smoking status: " Never     Passive exposure: Never    Smokeless tobacco: Never   Vaping Use    Vaping status: Never Used   Substance and Sexual Activity    Alcohol use: No    Drug use: No    Sexual activity: Yes     Partners: Male     Birth control/protection: Condom   Other Topics Concern     Service Not Asked    Blood Transfusions No    Caffeine Concern Yes    Occupational Exposure Not Asked    Hobby Hazards No    Sleep Concern No    Stress Concern No    Weight Concern No    Special Diet No    Back Care No    Exercise Yes    Bike Helmet Not Asked    Seat Belt Yes    Self-Exams Yes   Social History Narrative    Not on file     Social Drivers of Health     Financial Resource Strain: Low Risk  (5/6/2024)    Financial Resource Strain     Within the past 12 months, have you or your family members you live with been unable to get utilities (heat, electricity) when it was really needed?: No   Food Insecurity: Low Risk  (5/6/2024)    Food Insecurity     Within the past 12 months, did you worry that your food would run out before you got money to buy more?: No     Within the past 12 months, did the food you bought just not last and you didn t have money to get more?: No   Transportation Needs: Low Risk  (5/6/2024)    Transportation Needs     Within the past 12 months, has lack of transportation kept you from medical appointments, getting your medicines, non-medical meetings or appointments, work, or from getting things that you need?: No   Physical Activity: Insufficiently Active (5/6/2024)    Exercise Vital Sign     Days of Exercise per Week: 4 days     Minutes of Exercise per Session: 30 min   Stress: Stress Concern Present (5/6/2024)    [employer] [place] [employer] - Occupational Stress Questionnaire     Feeling of Stress : To some extent   Social Connections: Unknown (5/6/2024)    Social Connection and Isolation Panel [NHANES]     Frequency of Communication with Friends and Family: Not on file     Frequency of  "Social Gatherings with Friends and Family: Once a week     Attends Anabaptist Services: Not on file     Active Member of Clubs or Organizations: Not on file     Attends Club or Organization Meetings: Not on file     Marital Status: Not on file   Interpersonal Safety: Low Risk  (1/14/2025)    Interpersonal Safety     Do you feel physically and emotionally safe where you currently live?: Yes     Within the past 12 months, have you been hit, slapped, kicked or otherwise physically hurt by someone?: No     Within the past 12 months, have you been humiliated or emotionally abused in other ways by your partner or ex-partner?: No   Housing Stability: Low Risk  (5/6/2024)    Housing Stability     Do you have housing? : Yes     Are you worried about losing your housing?: No       Family History     Family History   Problem Relation Age of Onset    Psychotic Disorder Mother     Sleep Apnea Mother     Mental Illness Mother     C.A.D. Father     Diabetes Father     Sleep Apnea Father     No Known Problems Sister     No Known Problems Brother     Psychotic Disorder Maternal Grandfather         commit suicide    Suicidality Maternal Grandfather     Breast Cancer Paternal Grandmother 50    Prostate Cancer Paternal Grandfather     Testicular cancer Paternal Grandfather     Asperger's syndrome Son     Mental Illness Son     Leukemia Paternal Uncle        ROS     12 ROS completed, pertinent positive and negative in HPI    Physical Exam   /86   Pulse 69   Ht 1.778 m (5' 10\")   LMP  (LMP Unknown)   SpO2 94%   BMI 38.74 kg/m       General: Comfortable, no obvious distress, normal body habitus  HENT: Atraumatic,   CV: normal rate.   Resp:  good effort, no evidence of loud wheezing   Skin: No rashes, lesions, or subcutaneous nodules on exposed skin.   Psych: Alert and oriented x 3. Appropriate affect, good insight  Musculoskeletal: Appropriate muscle bulk   Neuro: Moves all four extremities. No focal deficits on limited exam. " "    Labs/Imaging     Pertinent Labs were reviewed and discussed briefly.  Radiology Results were  reviewed and discussed briefly.    Summary of recent findings:   No results found for: \"A1C\"    TSH   Date Value Ref Range Status   04/14/2025 4.34 (H) 0.30 - 4.20 uIU/mL Final   05/09/2024 4.45 (H) 0.30 - 4.20 uIU/mL Final   02/06/2024 4.06 0.30 - 4.20 uIU/mL Final   06/13/2023 6.07 (H) 0.30 - 4.20 uIU/mL Final   05/11/2023 5.82 (H) 0.30 - 4.20 uIU/mL Final   08/29/2020 3.88 0.30 - 5.00 uIU/mL Final   03/11/2008 2.25 0.4 - 5.0 mU/L Final   02/03/2004 1.94 0.34 - 4.82 IU/mL      T4 Free   Date Value Ref Range Status   03/11/2008 1.02 0.70 - 1.85 ng/dL Final     Free T4   Date Value Ref Range Status   05/09/2024 1.20 0.90 - 1.70 ng/dL Final   06/13/2023 1.16 0.90 - 1.70 ng/dL Final   05/11/2023 0.94 0.90 - 1.70 ng/dL Final       Creatinine   Date Value Ref Range Status   04/14/2025 0.81 0.51 - 0.95 mg/dL Final   06/07/2011 0.83 0.52 - 1.04 mg/dL Final      Latest Ref Rng 5/11/2023  12:08 PM 10/3/2023  10:08 AM 10/4/2024  12:46 PM 1/14/2025  8:50 AM 4/14/2025  9:41 AM   ENDO CALCIUM LABS-UMP         25 OH Vit D total 20 - 75 ug/L <38        25 OH Vit D2 ug/L <5        25 OH Vit D3 ug/L 33        Albumin 3.5 - 5.0 g/dL        Albumin 3.5 - 5.2 g/dL 4.4    4.2  4.2    Alkaline Phosphatase 40 - 150 U/L    61  57    Calcium 8.8 - 10.4 mg/dL 9.6  9.4  9.4  9.0  9.3    Urea Nitrogen 8 - 22 mg/dL        Urea Nitrogen 6.0 - 20.0 mg/dL    12.8  10.2    Creatinine 0.51 - 0.95 mg/dL 0.80  0.76  0.89  0.88  0.81    Magnesium 1.6 - 2.3 mg/dL        Parathyroid Hormone Intact 15 - 65 pg/mL 42     51      EXAM: DX HIP/PELVIS/SPINE  LOCATION: [facility]  DATE: 9/29/2023     INDICATION: Age-related osteoporosis without current pathological fracture. Patient is taking Reclast and Vitamin D. History of breast cancer.     DEMOGRAPHICS: Age - 53 years. Gender - female. Menopausal status - postmenopausal.   "   COMPARISON: None.     TECHNIQUE: Dual-energy x-ray absorptiometry (DXA) performed with routine technique. Trabecular bone score (TBS) analysis performed.     FINDINGS:  DXA RESULTS  -Lumbar Spine: L2-L4: BMD: 0.922 g/cm2. T-score: -2.3. Z-score: -2.7.  -RIGHT Hip Total: BMD: 0.800 g/cm2. T-score: -1.6. Z-score: -1.8.  -RIGHT Hip Femoral neck: BMD: 0.887 g/cm2. T-score: -1.1. Z-score: -0.8.  -LEFT Hip Total: BMD: 0.780 g/cm2. T-score: -1.8. Z-score: -2.0.  -LEFT Hip Femoral neck: BMD: 0.842 g/cm2. T-score: -1.4. Z-score: -1.1.     WHO T-SCORE CRITERIA  -Normal: T-score at or above -1 SD  -Osteopenia: T-score between -1 and -2.5 SD  -Osteoporosis: T-score at or below -2.5 SD     The World Health Organization (WHO) criteria is applicable to perimenopausal females, postmenopausal females, and men aged 50 years or older.     TBS RESULTS  -Lumbar Spine L2-L4: TBS: 1.360. TBS T-score: -1.3.TBS Z-score: 0.1.     The TBS is a DXA derived measurement for fracture risk assessment, and reflects the structural condition of the bone microarchitecture. It can be used to adjust WHO Fracture Risk Assessment Tool (FRAX) probability of fracture in postmenopausal women and   older men. The calculated probabilities of fracture have been shown to be more accurate when computed with the TBS.     INTERVAL CHANGE  -There has been a 4.2% increase in lumbar spine BMD.  -There has been a 2.2% increase in bilateral hip BMD.     FRACTURE RISK  -FRAX Results: The 10 year probability of major osteoporotic fracture is 10.4%, and of hip fracture is 0.8%, based on left femoral neck BMD.  -TBS-adjusted FRAX Results: The 10 year probability of major osteoporotic fracture is 9.6%, and of hip fracture is 0.6%.     RECOMMENDATIONS: Consider treatment if major osteoporotic fracture score is greater than or equal to 20%, and if the hip fracture score is greater than or equal to 3%.                                                                       IMPRESSION: Low bone density (OSTEOPENIA). T-score meets the WHO criteria for low bone density (osteopenia) at one or more measured sites. The risk of osteoporotic fracture increases approximately twofold for each standard deviation decrease in T-score.    EXAM: MR LUMBAR SPINE W/O CONTRAST  LOCATION: [facility]  DATE: 1/23/2025     INDICATION: Low back pain; Low back pain, no complicating feature; Chronic LBP duration >= 3 months; Worsening or not improving; Follow up in the last 28-60 days for conservative therapy; No known automatically detected potential contraindications to   imaging.  COMPARISON: Plain film 1/13/2020.  TECHNIQUE: Routine Lumbar Spine MRI without IV contrast.     FINDINGS:   NOMENCLATURE: Five lumbar-type vertebral bodies.     ALIGNMENT: Minimal T12-L1 retrolisthesis.     BONES: Mild chronic superior endplate height loss at T12 and L1. The remaining imaged vertebral bodies are unremarkable in height. No findings to suggest a marrow-replacing process. No evidence for pathologic marrow edema.     DISCS: No large posterior disc abnormalities. Mild spondylosis described in further detail below.     SPINAL CORD: The conus terminates at L2-L3. No signal abnormalities of the imaged cord or cauda equina nerve roots are demonstrated.     SPINAL CANAL: No significant stenosis. Prominent sacral Tarlov cysts.     PARASPINAL SOFT TISSUES: Unremarkable.     ABDOMINAL CAVITY: No acute abnormality demonstrated within technique limitations.     SIGNIFICANT FINDINGS BY LEVEL:     T12-L1: Mild intervertebral disc height loss. Disc desiccation. Shallow bulge. Right paracentral/lateral protrusion. No significant facet arthropathy. No significant spinal or neural foraminal stenosis.      L1-L2: Normal disc height and signal. No herniation. Normal facets. No spinal canal or neural foraminal stenosis.     L2-L3: Slight intervertebral disc height loss. Mild desiccation. Shallow bulge. Mild  facet arthropathy. No significant stenosis of the spinal canal or neural foramina.      L3-L4: Mild intervertebral disc height loss. Disc desiccation. Shallow bulge. Mild interbody spurring. Mild facet arthropathy. No significant stenosis of the spinal canal or neural foramina.     L4-L5: Mild intervertebral disc height loss. Disc desiccation. No herniation. Mild right and mild-to-moderate left facet arthropathy. No significant stenosis of the spinal canal or neural foramina.     L5-S1: Mild intervertebral disc height loss. Disc desiccation. No herniation. Mild to moderate facet arthropathy. No spinal canal stenosis. No significant neural foraminal stenosis.                                                                      IMPRESSION:  1.  No high-grade stenosis of the spinal canal or neural foramina.  2.  Mild spondylosis described in further detail below.  3.  Mild chronic superior endplate height loss of the T12 and L1 vertebral bodies.        X-ray lumbar spine 1/14/2020:        I personally reviewed the patient's outside records from [facility] EMR, Care Everywhere, and faxed records. Summary of pertinent findings in HPI.    Impression / Plan     1.  History of osteoporosis:  History of breast cancer s/p EBRT plus aromatase inhibitor for 5 years discontinued on December 2024.  Was diagnosed with osteoporosis in December 2019 following having DEXA bone scan at that time with the lowest T-score of -2.6 at the lumbar spine.  Started on Reclast received 3 doses October 2022, October 2023, October 2024.  Reclast well-tolerated no side effects.  Most recent DEXA bone scan was in September 2023 lowest T-score was -2.3 at the lumbar spine with interval improvement in lumbar spine BMD by 4.2% and in bilateral hip BMD by 2.2%.    She had MRI lumbar spine January 2025 was found to have mild chronic loss of height of T12 and L1.  Previous images were reviewed similar loss of height was seen in the image of the lumbar spine in  February 2014.    I discussed with her today about assessing the risk of fracture to decide if she needs further doses of Reclast or not.    Plan:  -To get DEXA bone scan in September 2025.  -Continue vitamin D/calcium supplements.  -Labs today.    2.  History of subclinical hypothyroidism:  History of intermittent mildly elevated TSH with normal free T4.  No previous TPO antibodies checked.  She has no other specific symptoms.  TSH today 4.34 (0.3-4.2).    Plan:  -Will follow free T4 results as well as TPO antibodies.  -If free T4 within normal range no need to start levothyroxine therapy if free T4 is low we will start her on levothyroxine small dose.        Test and/or medications prescribed today:  Orders Placed This Encounter   Procedures    DX Bone Density    Vitamin D Deficiency    Parathyroid Hormone Intact    Basic metabolic panel    TSH with free T4 reflex    Thyroid peroxidase antibody         Follow up: 6 months      DINO Gauthier  Endocrinology, Diabetes and Metabolism  [facility]    61 minutes spent on the date of the encounter doing chart review, history and exam, documentation and further activities per the note  The longitudinal plan of care for the diagnosis(es)/condition(s) as documented were addressed during this visit. Due to the added complexity in care, I will continue to support Sarah in the subsequent management and with ongoing continuity of care.    Note: Chart documentation done in part with Dragon Voice Recognition software. Although reviewed after completion, some word and grammatical errors may remain.  Please consider this when interpreting information in this chart

## 2025-04-15 LAB — THYROPEROXIDASE AB SERPL-ACNC: <10 IU/ML

## 2025-04-21 ENCOUNTER — PATIENT OUTREACH (OUTPATIENT)
Dept: CARE COORDINATION | Facility: CLINIC | Age: 55
End: 2025-04-21
Payer: COMMERCIAL

## 2025-04-22 ENCOUNTER — MYC MEDICAL ADVICE (OUTPATIENT)
Dept: INTERNAL MEDICINE | Facility: CLINIC | Age: 55
End: 2025-04-22

## 2025-04-22 DIAGNOSIS — E66.01 MORBID OBESITY (H): Primary | ICD-10-CM

## 2025-04-22 DIAGNOSIS — B35.1 ONYCHOMYCOSIS: ICD-10-CM

## 2025-04-22 NOTE — TELEPHONE ENCOUNTER
"LOV 1/14/25:   \"Onychomycosis  Mostly involving toes but she does have some evidence of onychomycosis involving her fingers as well.     We had a conversation about the risks associated to oral antifungals.  She accepts these risks and wishes to proceed     - Comprehensive metabolic panel; Future  - terbinafine (LAMISIL) 250 MG tablet; Take 1 tablet (250 mg) by mouth daily.  - Comprehensive metabolic panel; Future    Terbinafine medication for nail fungus.  We need to get a baseline set of liver labs today, and recheck liver labs in 3 months.     Lab only appointment for 3-month check of liver labs.    You will be due for an annual physical later this spring.\"               "

## 2025-04-22 NOTE — TELEPHONE ENCOUNTER
Incoming fax from pharmacy - Refill request on TERBINAFINE 250MG TABLETS - TAKE 1 TABLET BY MOUTH DAILY - I do not see on med list.

## 2025-04-23 RX ORDER — TERBINAFINE HYDROCHLORIDE 250 MG/1
250 TABLET ORAL DAILY
Qty: 90 TABLET | Refills: 0 | Status: SHIPPED | OUTPATIENT
Start: 2025-04-23

## 2025-04-23 RX ORDER — SEMAGLUTIDE 0.5 MG/.5ML
0.5 INJECTION, SOLUTION SUBCUTANEOUS WEEKLY
Qty: 2 ML | Refills: 0 | Status: SHIPPED | OUTPATIENT
Start: 2025-04-23

## 2025-05-15 ENCOUNTER — LAB (OUTPATIENT)
Dept: LAB | Facility: CLINIC | Age: 55
End: 2025-05-15
Payer: COMMERCIAL

## 2025-05-15 ENCOUNTER — OFFICE VISIT (OUTPATIENT)
Dept: RHEUMATOLOGY | Facility: CLINIC | Age: 55
End: 2025-05-15
Payer: COMMERCIAL

## 2025-05-15 VITALS
BODY MASS INDEX: 37.71 KG/M2 | WEIGHT: 263.4 LBS | OXYGEN SATURATION: 96 % | HEIGHT: 70 IN | DIASTOLIC BLOOD PRESSURE: 63 MMHG | HEART RATE: 85 BPM | SYSTOLIC BLOOD PRESSURE: 95 MMHG

## 2025-05-15 DIAGNOSIS — E03.9 HYPOTHYROIDISM, UNSPECIFIED TYPE: ICD-10-CM

## 2025-05-15 DIAGNOSIS — M25.572 CHRONIC PAIN OF BOTH ANKLES: Primary | ICD-10-CM

## 2025-05-15 DIAGNOSIS — M25.572 CHRONIC PAIN OF BOTH ANKLES: ICD-10-CM

## 2025-05-15 DIAGNOSIS — M25.571 CHRONIC PAIN OF BOTH ANKLES: ICD-10-CM

## 2025-05-15 DIAGNOSIS — M79.10 MYALGIA: ICD-10-CM

## 2025-05-15 DIAGNOSIS — M25.571 CHRONIC PAIN OF BOTH ANKLES: Primary | ICD-10-CM

## 2025-05-15 DIAGNOSIS — R53.82 CHRONIC FATIGUE: ICD-10-CM

## 2025-05-15 DIAGNOSIS — G89.29 CHRONIC PAIN OF BOTH ANKLES: Primary | ICD-10-CM

## 2025-05-15 DIAGNOSIS — G89.29 CHRONIC PAIN OF BOTH ANKLES: ICD-10-CM

## 2025-05-15 LAB
RHEUMATOID FACT SERPL-ACNC: <10 IU/ML
TSH SERPL DL<=0.005 MIU/L-ACNC: 3.37 UIU/ML (ref 0.3–4.2)

## 2025-05-15 NOTE — PROGRESS NOTES
"  This document was created using a software with less than 100% fidelity, at times resulting in unintended, even erroneous syntax and grammar.  The reader is advised to keep this under consideration while reviewing, interpreting this note.      Rheumatology Consult Note      Sarah Gilmore     YOB: 1970 Age: 54 year old    Date of visit: 5/15/25    PCP: Niko Kennedy    Chief Complaint   Fatigue, joint and muscle aches and pains          Assessment and Plan   1.  We went over the differences between autoimmune causes of joint pain (rheumatoid arthritis, lupus, psoriatic etc.) contrasting them with nonautoimmune causes i.e. wear-and-tear, overuse, posture, muscular etc.  She does not have any synovitis or rashes to suggest an autoimmune process but we will check some additional blood test including RF, CCP, YVONNE.  Will x-ray her ankles and hands.  If the serologies are normal then autoimmune causes can be ruled out.  Managing mechanical joint problems are best addressed by orthopedics, sports medicine and our beyond the scope of of rheumatology practice.    2.  Fatigue.  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  These are certainly very long hours and can account for most of her fatigue.  She will discuss with her PCP about getting a medical letter to reduce her work hours (which she would like to do.).  I have ordered TSH to screen for subclinical hypothyroidism which can cause fatigue.    Follow-up as needed    CC PCP          ROGERIO     Sarah is a very nice 54-year-old lady, her main complaint for the past few years have been fatigue and joint and muscle aches and pains.  Her pain is generalized, tends to vary from day to day, generally exacerbated by prolonged standing, work (she works 10-hour shifts at the dialysis unit).  There is \"some pain somewhere most of the time \".  She does not have any swelling or redness on her joint exam (see exam).  Her main complaint is a lot of " fatigue.  She works long hours at the dialysis unit waking up at 4 AM, works 5 AM-5 PM 4 days a week.  She has been working at the dialysis unit for 20 years.  In the past she did cut down her work hours after her PCP gave her medical note but subsequently went back to 12-hour shifts.  Family history is negative for lupus, psoriasis, her grandmother may have had rheumatoid arthritis but she is not sure.  Reports swelling around the ankles with prolonged standing.  She does not have synovitis or tenderness of the ankle joints (exam).  Her joint pain can affect her ankles, knees, wrists, fingers intermittently.    She had 15 breast surgeries after she was diagnosed with breast cancer and reconstruction surgeries (multiple).  She also follows with vascular for an ascending aortic root dilatation (4.7 cm last visit)    Social history.  She is a non-smoker, has been working at the dialysis unit for 20 years.               Active Problem List     Patient Active Problem List   Diagnosis    Iron deficiency anemia    Hymenoptera allergy    Vitamin D deficiency    Acquired lymphedema of leg    H/O breast reconstruction    Personal history of malignant neoplasm of breast    Swelling in right armpit    Post-mastectomy lymphedema syndrome    Venous hypertension of lower extremity, bilateral    Lipedema    DCIS (ductal carcinoma in situ)    Infiltrating ductal carcinoma of right breast (H)    History of hysterectomy    Age-related osteoporosis without current pathological fracture    Long term current use of aromatase inhibitor    Sleep apnea, unspecified type    Thoracic aortic ectasia    Ascending aorta dilatation     Past Medical History     Past Medical History:   Diagnosis Date    Dilated aortic root     Foot fracture 06/03/2011    History of anesthesia complications     slow to wake up    PONV (postoperative nausea and vomiting)     Radial head fracture 06/03/2011    Seizures (H)     h/o seizure disorder in childhood, last  "seizure activity  \"4 yrs ago\"    Suspected sleep apnea     sleep study set up in Aug 2019     Past Surgical History     Past Surgical History:   Procedure Laterality Date    C/SECTION, LOW TRANSVERSE  07/10/1994    breech    HYSTERECTOMY, PAP NO LONGER INDICATED Bilateral     LAPAROSCOPIC HYSTERECTOMY TOTAL  2018    cervix benign    MAMMO STEREOTACTIC CORE BIOPSY RIGHT Right 2019    MAMMO STEREOTACTIC CORE BIOPSY RIGHT Right 2019    NM SENTINEL NODE INJECTION  2019    OTHER SURGICAL HISTORY      right arm surgery    MO MASTECTOMY, MODIFIED RADICAL Bilateral 2019    Procedure: BILATERAL MASTECTOMY;  Surgeon: Mckenzie Colby DO;  [facility];  Service: General    MO REPLACEMENT TISSUE EXPANDER W/PERMANENT IMPLANT Bilateral 2019    Procedure: BILATERAL IMPLANT RECONSTRUCTION TO BREASTS;  Surgeon: Carlitos Crum MD;  Location: [facility];  Service: Plastics    RIGHT BREAST RECONSTRUCTION WITH LATISSIMUS DORSI FLAP    10/29/2021    Z  DELIVERY ONLY  1994     Allergy     Allergies   Allergen Reactions    Bees Anaphylaxis     Noted in 09 ER  Trouble breathing, rash    Contrast Dye Anaphylaxis     airway problems    Seafood Anaphylaxis     Current Medication List     Current Outpatient Medications   Medication Sig Dispense Refill    Acetaminophen 325 MG CAPS Take 2 tablets by mouth      anastrozole (ARIMIDEX) 1 MG tablet Take 1 tablet (1 mg) by mouth daily. 30 tablet 0    calcium carbonate-vitamin D (CALCIUM 600+D3) 600-10 MG-MCG per tablet Take 2 tablets by mouth 2 times daily. 360 tablet 0    EPI E-Z PEN JOELLEN 1:1000  IJ 1 TIME ONLY  PRN bee sting 2 Device 20    FERROUS SULFATE  MG OR TBCR 1 TABLET BID  after food withm orange juice 100 3    fish oil-omega-3 fatty acids 1000 MG capsule       losartan (COZAAR) 25 MG tablet Take 1 tablet (25 mg) by mouth daily 90 tablet 3    MAGNESIUM PO Take 200 mg by mouth      Multiple Vitamin " "(MULTI-VITAMINS) TABS Take 1 tablet by mouth      prochlorperazine (COMPAZINE) 10 MG tablet Take 1 tablet (10 mg) by mouth every 6 hours as needed for nausea or vomiting. 30 tablet 0    Semaglutide-Weight Management (WEGOVY) 0.5 MG/0.5ML pen Inject 0.5 mg subcutaneously once a week. 2 mL 0    Specialty Vitamins Products (LYMPH PO) Take by mouth daily Lymphatic supplement      terbinafine (LAMISIL) 250 MG tablet Take 1 tablet (250 mg) by mouth daily. 90 tablet 0    Turmeric 400 MG CAPS       WEGOVY 0.25 MG/0.5ML pen ADMINISTER 0.25 MG UNDER THE SKIN 1 TIME A WEEK 2 mL 1    zoledronic acid (RECLAST) 5 MG/100ML SOLN infusion Inject 5 mg into the vein once       No current facility-administered medications for this visit.       No current facility-administered medications for this visit.        Family History     Family History   Problem Relation Age of Onset    Psychotic Disorder Mother     Sleep Apnea Mother     Mental Illness Mother     C.A.D. Father     Diabetes Father     Sleep Apnea Father     No Known Problems Sister     No Known Problems Brother     Psychotic Disorder Maternal Grandfather         commit suicide    Suicidality Maternal Grandfather     Breast Cancer Paternal Grandmother 50    Prostate Cancer Paternal Grandfather     Testicular cancer Paternal Grandfather     Asperger's syndrome Son     Mental Illness Son     Leukemia Paternal Uncle          Physical Exam     COMPREHENSIVE EXAMINATION:  Vitals:    05/15/25 1009   BP: 95/63   BP Location: Left arm   Patient Position: Sitting   Cuff Size: Adult Large   Pulse: 85   SpO2: 96%   Weight: 119.5 kg (263 lb 6.4 oz)   Height: 1.778 m (5' 10\")     Patient is alert and oriented x 3.    Head/neck: No butterfly rash, no jaundice, no conjunctival pallor, no ocular redness, no facial asymmetry, no enlargement of the major salivary glands, no oral ulcers    Lungs: clear to auscultation, no crackles or wheezing    Heart sounds :regular    Abdomen: Soft, " "nontender    Musculoskeletal:    Right hand: No synovitis or tenderness of 1-5 MCP joints, no synovitis ,+ very mild tenderness of 1-5 PIP joints.  No swan-neck or boutonniere deformities    Left hand: No synovitis or tenderness of 1-5 MCP joints, no synovitis or tenderness of 1-5 PIP joints, no swan-neck or boutonniere deformities    Right wrist: no synovitis,no tenderness    Left wrist: No tenderness, no synovitis    Elbows: Full range of motion, no swelling or synovitis    Right shoulder: Normal abduction, internal and external rotation    Left shoulder: Normal abduction, internal and external rotation    Cervical spine: Normal flexion, lateral rotation bilaterally full    Spine: No alignment abnormalities noted    Right hip:Full  Flexion internal and external rotation    Left hip: Full flexion, internal and external rotation    Right knee: no effusion, no redness, full ROM    Left knee: No effusion, no redness, full ROM    Rt ankle: No swelling, redness or tenderness    Left ankle: No swelling, redness or tenderness    Right foot: No swelling, redness or tenderness of the toes/MTPs    Left foot: No swelling, redness or tenderness of the toes/MTP joints    No trigger points of fibromyalgia elicited          Labs / Imaging (select studies)     No results found for: \"PPDINDURATIO\", \"PPDREDNESS\", \"TBGOLT\", \"RHF\", \"CCPABG\", \"URIC\", \"PG57530\", \"[id number]\", \"ANTIDNA\", \"ANTIDNAINT\", \"CARIA\", \"[id number]\", \"FR009721\", \"ENASM\", \"ENASCL\", \"JO1\", \"ENASSA\", \"ENASSB\", \"CENTA\", \"S7UDJWR\", \"W8YBIGI\", \"OK9930\"   Hemoglobin   Date Value Ref Range Status   07/26/2022 11.7 11.7 - 15.7 g/dL Final   02/28/2022 11.9 11.7 - 15.7 g/dL Final   10/14/2021 12.3 11.7 - 15.7 g/dL Final   06/07/2011 10.2 (L) 11.7 - 15.7 g/dL Final   04/07/2009 11.1 (L) 11.7 - 15.7 g/dL Final   03/11/2008 10.8 (L) 11.7 - 15.7 g/dL Final     Urea Nitrogen   Date Value Ref Range Status   04/14/2025 10.2 6.0 - 20.0 mg/dL Final   01/14/2025 12.8 6.0 - 20.0 mg/dL Final "   02/28/2022 13 8 - 22 mg/dL Final   10/14/2021 7 (L) 8 - 22 mg/dL Final   06/01/2021 10 8 - 22 mg/dL Final   06/07/2011 12 5 - 24 mg/dL Final   04/07/2009 10 5 - 24 mg/dL Final   02/23/2004 9 5 - 24 mg/dL Final     Sed Rate   Date Value Ref Range Status   04/07/2009 26 (H) 0 - 20 mm/h Final     AST   Date Value Ref Range Status   04/14/2025 13 0 - 45 U/L Final   01/14/2025 15 0 - 45 U/L Final   07/18/2019 12 0 - 40 U/L Final   06/07/2011 16 0 - 45 U/L Final   04/07/2009 20 0 - 45 U/L Final   02/07/2004 12 0 - 37 U/L      Albumin   Date Value Ref Range Status   04/14/2025 4.2 3.5 - 5.2 g/dL Final   01/14/2025 4.2 3.5 - 5.2 g/dL Final   05/11/2023 4.4 3.5 - 5.2 g/dL Final   07/18/2019 4.0 3.5 - 5.0 g/dL Final   06/07/2011 3.4 (L) 3.9 - 5.1 g/dL Final   04/07/2009 4.0 3.9 - 5.1 g/dL Final   02/07/2004 3.9 3.2 - 4.5 g/dL      Alkaline Phosphatase   Date Value Ref Range Status   04/14/2025 57 40 - 150 U/L Final   01/14/2025 61 40 - 150 U/L Final   07/18/2019 66 45 - 120 U/L Final   06/07/2011 54 40 - 150 U/L Final   04/07/2009 65 40 - 150 U/L Final   02/07/2004 69 50 - 136 (Adult) u/L      ALT   Date Value Ref Range Status   04/14/2025 10 0 - 50 U/L Final   01/14/2025 10 0 - 50 U/L Final   07/18/2019 12 0 - 45 U/L Final   06/07/2011 20 0 - 50 U/L Final   04/07/2009 14 0 - 50 U/L Final   02/07/2004 27 0 - 65 U/L           Immunization History     Immunization History   Administered Date(s) Administered    COVID-19 12+ (Pfizer) 01/14/2025    COVID-19 Monovalent 18+ (Moderna) 01/25/2021, 03/23/2021    Influenza Vaccine >6 months,quad, PF 10/01/2016, 10/01/2017    Pneumococcal 20 valent Conjugate (Prevnar 20) 05/06/2024    TDAP (Adacel,Boostrix) 06/01/2016, 06/23/2016    TDAP Vaccine (Adacel) 03/11/2008

## 2025-05-17 LAB
CCP AB SER IA-ACNC: 1.5 U/ML
ENA SM IGG SER IA-ACNC: <0.7 U/ML
ENA SM IGG SER IA-ACNC: NEGATIVE
ENA SS-A AB SER IA-ACNC: <0.5 U/ML
ENA SS-A AB SER IA-ACNC: NEGATIVE
ENA SS-B IGG SER IA-ACNC: <0.6 U/ML
ENA SS-B IGG SER IA-ACNC: NEGATIVE
U1 SNRNP IGG SER IA-ACNC: 1.5 U/ML
U1 SNRNP IGG SER IA-ACNC: NEGATIVE

## 2025-06-09 DIAGNOSIS — E66.01 MORBID OBESITY (H): ICD-10-CM

## 2025-06-09 RX ORDER — SEMAGLUTIDE 0.25 MG/.5ML
0.25 INJECTION, SOLUTION SUBCUTANEOUS
Qty: 2 ML | Refills: 1 | Status: SHIPPED | OUTPATIENT
Start: 2025-06-09

## 2025-06-21 ENCOUNTER — HEALTH MAINTENANCE LETTER (OUTPATIENT)
Age: 55
End: 2025-06-21

## 2025-08-01 ENCOUNTER — TRANSFERRED RECORDS (OUTPATIENT)
Dept: HEALTH INFORMATION MANAGEMENT | Facility: CLINIC | Age: 55
End: 2025-08-01
Payer: COMMERCIAL

## 2025-08-07 ENCOUNTER — APPOINTMENT (OUTPATIENT)
Dept: URBAN - METROPOLITAN AREA CLINIC 260 | Age: 55
Setting detail: DERMATOLOGY
End: 2025-08-07

## 2025-08-07 VITALS — WEIGHT: 260 LBS | HEIGHT: 70 IN

## 2025-08-07 DIAGNOSIS — D18.0 HEMANGIOMA: ICD-10-CM

## 2025-08-07 DIAGNOSIS — D17 BENIGN LIPOMATOUS NEOPLASM: ICD-10-CM

## 2025-08-07 DIAGNOSIS — D49.2 NEOPLASM OF UNSPECIFIED BEHAVIOR OF BONE, SOFT TISSUE, AND SKIN: ICD-10-CM

## 2025-08-07 DIAGNOSIS — L81.4 OTHER MELANIN HYPERPIGMENTATION: ICD-10-CM

## 2025-08-07 DIAGNOSIS — L82.1 OTHER SEBORRHEIC KERATOSIS: ICD-10-CM

## 2025-08-07 DIAGNOSIS — Z71.89 OTHER SPECIFIED COUNSELING: ICD-10-CM

## 2025-08-07 DIAGNOSIS — D22 MELANOCYTIC NEVI: ICD-10-CM

## 2025-08-07 PROBLEM — D17.24 BENIGN LIPOMATOUS NEOPLASM OF SKIN AND SUBCUTANEOUS TISSUE OF LEFT LEG: Status: ACTIVE | Noted: 2025-08-07

## 2025-08-07 PROBLEM — D18.01 HEMANGIOMA OF SKIN AND SUBCUTANEOUS TISSUE: Status: ACTIVE | Noted: 2025-08-07

## 2025-08-07 PROBLEM — D22.5 MELANOCYTIC NEVI OF TRUNK: Status: ACTIVE | Noted: 2025-08-07

## 2025-08-07 PROCEDURE — OTHER PHOTO-DOCUMENTATION: OTHER

## 2025-08-07 PROCEDURE — 11103 TANGNTL BX SKIN EA SEP/ADDL: CPT

## 2025-08-07 PROCEDURE — OTHER COUNSELING: OTHER

## 2025-08-07 PROCEDURE — OTHER BIOPSY BY SHAVE METHOD: OTHER

## 2025-08-07 PROCEDURE — OTHER MIPS QUALITY: OTHER

## 2025-08-07 PROCEDURE — 99203 OFFICE O/P NEW LOW 30 MIN: CPT | Mod: 25

## 2025-08-07 PROCEDURE — OTHER ADDITIONAL NOTES: OTHER

## 2025-08-07 PROCEDURE — 11102 TANGNTL BX SKIN SINGLE LES: CPT

## 2025-08-07 ASSESSMENT — LOCATION ZONE DERM
LOCATION ZONE: TRUNK
LOCATION ZONE: LEG

## 2025-08-07 ASSESSMENT — LOCATION SIMPLE DESCRIPTION DERM
LOCATION SIMPLE: LOWER BACK
LOCATION SIMPLE: LEFT POSTERIOR THIGH
LOCATION SIMPLE: UPPER BACK
LOCATION SIMPLE: RIGHT THIGH

## 2025-08-07 ASSESSMENT — LOCATION DETAILED DESCRIPTION DERM
LOCATION DETAILED: RIGHT POSTERIOR LATERAL DISTAL THIGH
LOCATION DETAILED: INFERIOR THORACIC SPINE
LOCATION DETAILED: RIGHT ANTERIOR LATERAL PROXIMAL THIGH
LOCATION DETAILED: LEFT DISTAL POSTERIOR THIGH
LOCATION DETAILED: SUPERIOR LUMBAR SPINE

## 2025-08-08 ENCOUNTER — TELEPHONE (OUTPATIENT)
Dept: INTERNAL MEDICINE | Facility: CLINIC | Age: 55
End: 2025-08-08
Payer: COMMERCIAL

## 2025-08-08 DIAGNOSIS — D17.30 LIPOMA OF SKIN AND SUBCUTANEOUS TISSUE: Primary | ICD-10-CM

## 2025-08-20 DIAGNOSIS — B35.1 ONYCHOMYCOSIS: ICD-10-CM

## 2025-08-21 RX ORDER — TERBINAFINE HYDROCHLORIDE 250 MG/1
250 TABLET ORAL DAILY
Qty: 90 TABLET | Refills: 0 | Status: SHIPPED | OUTPATIENT
Start: 2025-08-21

## 2025-08-26 ENCOUNTER — OFFICE VISIT (OUTPATIENT)
Dept: SURGERY | Facility: CLINIC | Age: 55
End: 2025-08-26
Attending: NURSE PRACTITIONER
Payer: COMMERCIAL

## 2025-08-26 VITALS — BODY MASS INDEX: 37.31 KG/M2 | WEIGHT: 260 LBS

## 2025-08-26 DIAGNOSIS — R60.0 LIPEDEMA OF LOWER EXTREMITY: Primary | ICD-10-CM

## 2025-08-26 DIAGNOSIS — D17.30 LIPOMA OF SKIN AND SUBCUTANEOUS TISSUE: ICD-10-CM

## 2025-08-28 ENCOUNTER — PATIENT OUTREACH (OUTPATIENT)
Dept: CARE COORDINATION | Facility: CLINIC | Age: 55
End: 2025-08-28
Payer: COMMERCIAL

## 2025-09-01 ENCOUNTER — PATIENT OUTREACH (OUTPATIENT)
Dept: CARE COORDINATION | Facility: CLINIC | Age: 55
End: 2025-09-01
Payer: COMMERCIAL

## 2025-09-02 ENCOUNTER — TELEPHONE (OUTPATIENT)
Dept: INTERNAL MEDICINE | Facility: CLINIC | Age: 55
End: 2025-09-02
Payer: COMMERCIAL

## 2025-09-02 DIAGNOSIS — Z98.890 H/O BREAST RECONSTRUCTION: ICD-10-CM

## 2025-09-02 DIAGNOSIS — I89.0 ACQUIRED LYMPHEDEMA OF LEG: ICD-10-CM

## 2025-09-02 DIAGNOSIS — Z85.3 PERSONAL HISTORY OF MALIGNANT NEOPLASM OF BREAST: ICD-10-CM

## 2025-09-02 DIAGNOSIS — I97.2 POST-MASTECTOMY LYMPHEDEMA SYNDROME: Primary | ICD-10-CM

## 2025-09-04 ENCOUNTER — PATIENT OUTREACH (OUTPATIENT)
Dept: CARE COORDINATION | Facility: CLINIC | Age: 55
End: 2025-09-04